# Patient Record
Sex: FEMALE | Race: WHITE | NOT HISPANIC OR LATINO | Employment: OTHER | URBAN - METROPOLITAN AREA
[De-identification: names, ages, dates, MRNs, and addresses within clinical notes are randomized per-mention and may not be internally consistent; named-entity substitution may affect disease eponyms.]

---

## 2017-01-05 ENCOUNTER — ALLSCRIPTS OFFICE VISIT (OUTPATIENT)
Dept: OTHER | Facility: OTHER | Age: 82
End: 2017-01-05

## 2017-01-05 DIAGNOSIS — M54.6 PAIN IN THORACIC SPINE: ICD-10-CM

## 2017-01-23 ENCOUNTER — APPOINTMENT (OUTPATIENT)
Dept: PHYSICAL THERAPY | Facility: CLINIC | Age: 82
End: 2017-01-23
Payer: COMMERCIAL

## 2017-01-23 ENCOUNTER — GENERIC CONVERSION - ENCOUNTER (OUTPATIENT)
Dept: OTHER | Facility: OTHER | Age: 82
End: 2017-01-23

## 2017-01-23 PROCEDURE — G0283 ELEC STIM OTHER THAN WOUND: HCPCS

## 2017-01-23 PROCEDURE — 97162 PT EVAL MOD COMPLEX 30 MIN: CPT

## 2017-01-23 PROCEDURE — 97014 ELECTRIC STIMULATION THERAPY: CPT

## 2017-01-26 ENCOUNTER — APPOINTMENT (OUTPATIENT)
Dept: PHYSICAL THERAPY | Facility: CLINIC | Age: 82
End: 2017-01-26
Payer: COMMERCIAL

## 2017-02-03 ENCOUNTER — ALLSCRIPTS OFFICE VISIT (OUTPATIENT)
Dept: OTHER | Facility: OTHER | Age: 82
End: 2017-02-03

## 2017-02-16 ENCOUNTER — HOSPITAL ENCOUNTER (OUTPATIENT)
Facility: HOSPITAL | Age: 82
Setting detail: OBSERVATION
Discharge: HOME | End: 2017-02-17
Attending: EMERGENCY MEDICINE | Admitting: INTERNAL MEDICINE
Payer: COMMERCIAL

## 2017-02-16 ENCOUNTER — APPOINTMENT (EMERGENCY)
Dept: RADIOLOGY | Facility: HOSPITAL | Age: 82
End: 2017-02-16
Payer: COMMERCIAL

## 2017-02-16 DIAGNOSIS — R07.9 CHEST PAIN IN ADULT: Primary | ICD-10-CM

## 2017-02-16 DIAGNOSIS — R07.89 ATYPICAL CHEST PAIN: ICD-10-CM

## 2017-02-16 LAB
ALBUMIN SERPL BCP-MCNC: 3.7 G/DL (ref 3.5–5)
ALP SERPL-CCNC: 93 U/L (ref 46–116)
ALT SERPL W P-5'-P-CCNC: 28 U/L (ref 12–78)
ANION GAP SERPL CALCULATED.3IONS-SCNC: 7 MMOL/L (ref 4–13)
AST SERPL W P-5'-P-CCNC: 26 U/L (ref 5–45)
BASOPHILS # BLD AUTO: 0.03 THOUSANDS/ΜL (ref 0–0.1)
BASOPHILS NFR BLD AUTO: 0 % (ref 0–1)
BILIRUB SERPL-MCNC: 0.5 MG/DL (ref 0.2–1)
BUN SERPL-MCNC: 14 MG/DL (ref 5–25)
CALCIUM SERPL-MCNC: 9.6 MG/DL (ref 8.3–10.1)
CHLORIDE SERPL-SCNC: 101 MMOL/L (ref 100–108)
CO2 SERPL-SCNC: 32 MMOL/L (ref 21–32)
CREAT SERPL-MCNC: 0.99 MG/DL (ref 0.6–1.3)
DEPRECATED D DIMER PPP: 613 NG/ML (FEU) (ref 0–424)
EOSINOPHIL # BLD AUTO: 0.11 THOUSAND/ΜL (ref 0–0.61)
EOSINOPHIL NFR BLD AUTO: 2 % (ref 0–6)
ERYTHROCYTE [DISTWIDTH] IN BLOOD BY AUTOMATED COUNT: 13.5 % (ref 11.6–15.1)
GFR SERPL CREATININE-BSD FRML MDRD: 53.3 ML/MIN/1.73SQ M
GLUCOSE SERPL-MCNC: 102 MG/DL (ref 65–140)
HCT VFR BLD AUTO: 40.8 % (ref 34.8–46.1)
HGB BLD-MCNC: 13.9 G/DL (ref 11.5–15.4)
LIPASE SERPL-CCNC: 101 U/L (ref 73–393)
LYMPHOCYTES # BLD AUTO: 2.68 THOUSANDS/ΜL (ref 0.6–4.47)
LYMPHOCYTES NFR BLD AUTO: 38 % (ref 14–44)
MAGNESIUM SERPL-MCNC: 2 MG/DL (ref 1.6–2.6)
MCH RBC QN AUTO: 33.4 PG (ref 26.8–34.3)
MCHC RBC AUTO-ENTMCNC: 34.1 G/DL (ref 31.4–37.4)
MCV RBC AUTO: 98 FL (ref 82–98)
MONOCYTES # BLD AUTO: 0.52 THOUSAND/ΜL (ref 0.17–1.22)
MONOCYTES NFR BLD AUTO: 7 % (ref 4–12)
NEUTROPHILS # BLD AUTO: 3.78 THOUSANDS/ΜL (ref 1.85–7.62)
NEUTS SEG NFR BLD AUTO: 53 % (ref 43–75)
PLATELET # BLD AUTO: 250 THOUSANDS/UL (ref 149–390)
PMV BLD AUTO: 10.8 FL (ref 8.9–12.7)
POTASSIUM SERPL-SCNC: 3.5 MMOL/L (ref 3.5–5.3)
PROT SERPL-MCNC: 7.6 G/DL (ref 6.4–8.2)
RBC # BLD AUTO: 4.16 MILLION/UL (ref 3.81–5.12)
SODIUM SERPL-SCNC: 140 MMOL/L (ref 136–145)
TROPONIN I SERPL-MCNC: <0.02 NG/ML
TROPONIN I SERPL-MCNC: <0.02 NG/ML
WBC # BLD AUTO: 7.12 THOUSAND/UL (ref 4.31–10.16)

## 2017-02-16 PROCEDURE — A9270 NON-COVERED ITEM OR SERVICE: HCPCS | Performed by: EMERGENCY MEDICINE

## 2017-02-16 PROCEDURE — 80053 COMPREHEN METABOLIC PANEL: CPT | Performed by: EMERGENCY MEDICINE

## 2017-02-16 PROCEDURE — 84484 ASSAY OF TROPONIN QUANT: CPT | Performed by: EMERGENCY MEDICINE

## 2017-02-16 PROCEDURE — 72070 X-RAY EXAM THORAC SPINE 2VWS: CPT

## 2017-02-16 PROCEDURE — 83690 ASSAY OF LIPASE: CPT | Performed by: EMERGENCY MEDICINE

## 2017-02-16 PROCEDURE — 93005 ELECTROCARDIOGRAM TRACING: CPT | Performed by: EMERGENCY MEDICINE

## 2017-02-16 PROCEDURE — 71020 HB CHEST X-RAY 2VW FRONTAL&LATL: CPT

## 2017-02-16 PROCEDURE — 85379 FIBRIN DEGRADATION QUANT: CPT | Performed by: EMERGENCY MEDICINE

## 2017-02-16 PROCEDURE — 96374 THER/PROPH/DIAG INJ IV PUSH: CPT

## 2017-02-16 PROCEDURE — 85025 COMPLETE CBC W/AUTO DIFF WBC: CPT | Performed by: EMERGENCY MEDICINE

## 2017-02-16 PROCEDURE — 83735 ASSAY OF MAGNESIUM: CPT | Performed by: EMERGENCY MEDICINE

## 2017-02-16 PROCEDURE — 36415 COLL VENOUS BLD VENIPUNCTURE: CPT | Performed by: EMERGENCY MEDICINE

## 2017-02-16 RX ORDER — ASPIRIN 81 MG/1
324 TABLET, CHEWABLE ORAL ONCE
Status: COMPLETED | OUTPATIENT
Start: 2017-02-16 | End: 2017-02-16

## 2017-02-16 RX ADMIN — NITROGLYCERIN 1 INCH: 20 OINTMENT TOPICAL at 21:48

## 2017-02-16 RX ADMIN — ASPIRIN 81 MG 324 MG: 81 TABLET ORAL at 21:47

## 2017-02-16 RX ADMIN — LIDOCAINE HYDROCHLORIDE 15 ML: 20 SOLUTION ORAL; TOPICAL at 19:40

## 2017-02-16 RX ADMIN — FAMOTIDINE 20 MG: 10 INJECTION, SOLUTION INTRAVENOUS at 19:41

## 2017-02-17 ENCOUNTER — APPOINTMENT (OUTPATIENT)
Dept: NON INVASIVE DIAGNOSTICS | Facility: HOSPITAL | Age: 82
End: 2017-02-17
Payer: COMMERCIAL

## 2017-02-17 VITALS
DIASTOLIC BLOOD PRESSURE: 63 MMHG | RESPIRATION RATE: 19 BRPM | HEART RATE: 96 BPM | OXYGEN SATURATION: 96 % | HEIGHT: 60 IN | SYSTOLIC BLOOD PRESSURE: 123 MMHG | TEMPERATURE: 99.5 F | WEIGHT: 143 LBS | BODY MASS INDEX: 28.07 KG/M2

## 2017-02-17 PROBLEM — K21.9 GERD (GASTROESOPHAGEAL REFLUX DISEASE): Chronic | Status: ACTIVE | Noted: 2017-02-17

## 2017-02-17 PROBLEM — R07.89 ATYPICAL CHEST PAIN: Status: ACTIVE | Noted: 2017-02-17

## 2017-02-17 PROBLEM — F41.9 ANXIETY: Chronic | Status: ACTIVE | Noted: 2017-02-17

## 2017-02-17 LAB
ANION GAP SERPL CALCULATED.3IONS-SCNC: 7 MMOL/L (ref 4–13)
ATRIAL RATE: 70 BPM
BUN SERPL-MCNC: 12 MG/DL (ref 5–25)
CALCIUM SERPL-MCNC: 9 MG/DL (ref 8.3–10.1)
CHLORIDE SERPL-SCNC: 104 MMOL/L (ref 100–108)
CO2 SERPL-SCNC: 30 MMOL/L (ref 21–32)
CREAT SERPL-MCNC: 0.84 MG/DL (ref 0.6–1.3)
ERYTHROCYTE [DISTWIDTH] IN BLOOD BY AUTOMATED COUNT: 13.4 % (ref 11.6–15.1)
GFR SERPL CREATININE-BSD FRML MDRD: >60 ML/MIN/1.73SQ M
GLUCOSE SERPL-MCNC: 104 MG/DL (ref 65–140)
HCT VFR BLD AUTO: 37.7 % (ref 34.8–46.1)
HGB BLD-MCNC: 12.4 G/DL (ref 11.5–15.4)
MCH RBC QN AUTO: 32.8 PG (ref 26.8–34.3)
MCHC RBC AUTO-ENTMCNC: 32.9 G/DL (ref 31.4–37.4)
MCV RBC AUTO: 100 FL (ref 82–98)
P AXIS: 37 DEGREES
PLATELET # BLD AUTO: 221 THOUSANDS/UL (ref 149–390)
PMV BLD AUTO: 10.2 FL (ref 8.9–12.7)
POTASSIUM SERPL-SCNC: 4 MMOL/L (ref 3.5–5.3)
PR INTERVAL: 170 MS
QRS AXIS: -35 DEGREES
QRSD INTERVAL: 90 MS
QT INTERVAL: 364 MS
QTC INTERVAL: 393 MS
RBC # BLD AUTO: 3.78 MILLION/UL (ref 3.81–5.12)
SODIUM SERPL-SCNC: 141 MMOL/L (ref 136–145)
T WAVE AXIS: 22 DEGREES
TROPONIN I SERPL-MCNC: <0.02 NG/ML
VENTRICULAR RATE: 70 BPM
WBC # BLD AUTO: 8.17 THOUSAND/UL (ref 4.31–10.16)

## 2017-02-17 PROCEDURE — 93306 TTE W/DOPPLER COMPLETE: CPT

## 2017-02-17 PROCEDURE — 80048 BASIC METABOLIC PNL TOTAL CA: CPT | Performed by: PHYSICIAN ASSISTANT

## 2017-02-17 PROCEDURE — 85027 COMPLETE CBC AUTOMATED: CPT | Performed by: PHYSICIAN ASSISTANT

## 2017-02-17 PROCEDURE — 99285 EMERGENCY DEPT VISIT HI MDM: CPT

## 2017-02-17 PROCEDURE — 84484 ASSAY OF TROPONIN QUANT: CPT | Performed by: PHYSICIAN ASSISTANT

## 2017-02-17 PROCEDURE — 36415 COLL VENOUS BLD VENIPUNCTURE: CPT | Performed by: PHYSICIAN ASSISTANT

## 2017-02-17 RX ORDER — PANTOPRAZOLE SODIUM 40 MG/1
40 TABLET, DELAYED RELEASE ORAL DAILY
Status: DISCONTINUED | OUTPATIENT
Start: 2017-02-17 | End: 2017-02-17 | Stop reason: HOSPADM

## 2017-02-17 RX ORDER — ACETAMINOPHEN 325 MG/1
650 TABLET ORAL EVERY 6 HOURS PRN
Status: DISCONTINUED | OUTPATIENT
Start: 2017-02-17 | End: 2017-02-17 | Stop reason: HOSPADM

## 2017-02-17 RX ORDER — PANTOPRAZOLE SODIUM 40 MG/1
40 TABLET, DELAYED RELEASE ORAL 2 TIMES DAILY
Qty: 60 TABLET | Refills: 0 | Status: SHIPPED | OUTPATIENT
Start: 2017-02-17 | End: 2017-02-18

## 2017-02-17 RX ORDER — MELATONIN
1000 DAILY
Status: DISCONTINUED | OUTPATIENT
Start: 2017-02-17 | End: 2017-02-17 | Stop reason: HOSPADM

## 2017-02-17 RX ORDER — ALPRAZOLAM 0.25 MG/1
0.25 TABLET ORAL 3 TIMES DAILY PRN
Status: DISCONTINUED | OUTPATIENT
Start: 2017-02-17 | End: 2017-02-17 | Stop reason: HOSPADM

## 2017-02-17 RX ORDER — ASPIRIN 81 MG/1
81 TABLET, CHEWABLE ORAL DAILY
Status: DISCONTINUED | OUTPATIENT
Start: 2017-02-17 | End: 2017-02-17 | Stop reason: HOSPADM

## 2017-02-18 ENCOUNTER — HOSPITAL ENCOUNTER (EMERGENCY)
Facility: HOSPITAL | Age: 82
Discharge: HOME | End: 2017-02-18
Attending: EMERGENCY MEDICINE | Admitting: EMERGENCY MEDICINE
Payer: COMMERCIAL

## 2017-02-18 VITALS
RESPIRATION RATE: 20 BRPM | SYSTOLIC BLOOD PRESSURE: 137 MMHG | BODY MASS INDEX: 28.54 KG/M2 | TEMPERATURE: 97.6 F | HEIGHT: 60 IN | OXYGEN SATURATION: 97 % | HEART RATE: 90 BPM | WEIGHT: 145.4 LBS | DIASTOLIC BLOOD PRESSURE: 70 MMHG

## 2017-02-18 DIAGNOSIS — R22.0 LIP SWELLING: ICD-10-CM

## 2017-02-18 DIAGNOSIS — L50.9 URTICARIA: Primary | ICD-10-CM

## 2017-02-18 PROCEDURE — 96372 THER/PROPH/DIAG INJ SC/IM: CPT

## 2017-02-18 PROCEDURE — 99283 EMERGENCY DEPT VISIT LOW MDM: CPT

## 2017-02-18 RX ORDER — DIPHENHYDRAMINE HCL 25 MG
25 TABLET ORAL ONCE
Status: COMPLETED | OUTPATIENT
Start: 2017-02-18 | End: 2017-02-18

## 2017-02-18 RX ADMIN — DIPHENHYDRAMINE HCL 25 MG: 25 TABLET ORAL at 15:27

## 2017-02-18 RX ADMIN — DEXAMETHASONE SODIUM PHOSPHATE 4.5 MG: 10 INJECTION INTRAMUSCULAR; INTRAVENOUS at 15:28

## 2017-02-23 ENCOUNTER — GENERIC CONVERSION - ENCOUNTER (OUTPATIENT)
Dept: OTHER | Facility: OTHER | Age: 82
End: 2017-02-23

## 2017-03-02 ENCOUNTER — ALLSCRIPTS OFFICE VISIT (OUTPATIENT)
Dept: OTHER | Facility: OTHER | Age: 82
End: 2017-03-02

## 2017-03-08 ENCOUNTER — LAB CONVERSION - ENCOUNTER (OUTPATIENT)
Dept: OTHER | Facility: OTHER | Age: 82
End: 2017-03-08

## 2017-03-08 ENCOUNTER — TRANSCRIBE ORDERS (OUTPATIENT)
Dept: LAB | Facility: MEDICAL CENTER | Age: 82
End: 2017-03-08

## 2017-03-08 ENCOUNTER — APPOINTMENT (OUTPATIENT)
Dept: LAB | Facility: MEDICAL CENTER | Age: 82
End: 2017-03-08
Payer: COMMERCIAL

## 2017-03-08 DIAGNOSIS — L50.9 URTICARIA, UNSPECIFIED: Primary | ICD-10-CM

## 2017-03-08 DIAGNOSIS — L50.9 URTICARIA, UNSPECIFIED: ICD-10-CM

## 2017-03-08 LAB
BACTERIA UR QL AUTO: ABNORMAL /HPF
BASOPHILS # BLD AUTO: 0.01 THOUSANDS/ΜL (ref 0–0.1)
BASOPHILS NFR BLD AUTO: 0 % (ref 0–1)
BILIRUB UR QL STRIP: NEGATIVE
CLARITY UR: CLEAR
COLOR UR: YELLOW
EOSINOPHIL # BLD AUTO: 0.07 THOUSAND/ΜL (ref 0–0.61)
EOSINOPHIL NFR BLD AUTO: 1 % (ref 0–6)
ERYTHROCYTE [DISTWIDTH] IN BLOOD BY AUTOMATED COUNT: 13.8 % (ref 11.6–15.1)
ERYTHROCYTE [SEDIMENTATION RATE] IN BLOOD: 33 MM/HOUR (ref 0–20)
GLUCOSE UR STRIP-MCNC: NEGATIVE MG/DL
HCT VFR BLD AUTO: 39.1 % (ref 34.8–46.1)
HGB BLD-MCNC: 12.7 G/DL (ref 11.5–15.4)
HGB UR QL STRIP.AUTO: ABNORMAL
HYALINE CASTS #/AREA URNS LPF: ABNORMAL /LPF
KETONES UR STRIP-MCNC: NEGATIVE MG/DL
LEUKOCYTE ESTERASE UR QL STRIP: NEGATIVE
LYMPHOCYTES # BLD AUTO: 2.43 THOUSANDS/ΜL (ref 0.6–4.47)
LYMPHOCYTES NFR BLD AUTO: 27 % (ref 14–44)
MCH RBC QN AUTO: 32.4 PG (ref 26.8–34.3)
MCHC RBC AUTO-ENTMCNC: 32.5 G/DL (ref 31.4–37.4)
MCV RBC AUTO: 100 FL (ref 82–98)
MONOCYTES # BLD AUTO: 0.53 THOUSAND/ΜL (ref 0.17–1.22)
MONOCYTES NFR BLD AUTO: 6 % (ref 4–12)
NEUTROPHILS # BLD AUTO: 5.94 THOUSANDS/ΜL (ref 1.85–7.62)
NEUTS SEG NFR BLD AUTO: 66 % (ref 43–75)
NITRITE UR QL STRIP: NEGATIVE
NON-SQ EPI CELLS URNS QL MICRO: ABNORMAL /HPF
NRBC BLD AUTO-RTO: 0 /100 WBCS
PH UR STRIP.AUTO: 6.5 [PH] (ref 4.5–8)
PLATELET # BLD AUTO: 287 THOUSANDS/UL (ref 149–390)
PMV BLD AUTO: 11.3 FL (ref 8.9–12.7)
PROT UR STRIP-MCNC: NEGATIVE MG/DL
RBC # BLD AUTO: 3.92 MILLION/UL (ref 3.81–5.12)
RBC #/AREA URNS AUTO: ABNORMAL /HPF
SP GR UR STRIP.AUTO: 1.01 (ref 1–1.03)
UROBILINOGEN UR QL STRIP.AUTO: 0.2 E.U./DL
WBC # BLD AUTO: 9 THOUSAND/UL (ref 4.31–10.16)
WBC #/AREA URNS AUTO: ABNORMAL /HPF

## 2017-03-08 PROCEDURE — 86592 SYPHILIS TEST NON-TREP QUAL: CPT

## 2017-03-08 PROCEDURE — 85652 RBC SED RATE AUTOMATED: CPT

## 2017-03-08 PROCEDURE — 86038 ANTINUCLEAR ANTIBODIES: CPT

## 2017-03-08 PROCEDURE — 81001 URINALYSIS AUTO W/SCOPE: CPT

## 2017-03-08 PROCEDURE — 85025 COMPLETE CBC W/AUTO DIFF WBC: CPT

## 2017-03-08 PROCEDURE — 36415 COLL VENOUS BLD VENIPUNCTURE: CPT

## 2017-03-09 LAB — RPR SER QL: NORMAL

## 2017-03-10 LAB — RYE IGE QN: NEGATIVE

## 2017-03-22 ENCOUNTER — GENERIC CONVERSION - ENCOUNTER (OUTPATIENT)
Dept: OTHER | Facility: OTHER | Age: 82
End: 2017-03-22

## 2017-03-23 ENCOUNTER — GENERIC CONVERSION - ENCOUNTER (OUTPATIENT)
Dept: OTHER | Facility: OTHER | Age: 82
End: 2017-03-23

## 2017-03-27 ENCOUNTER — GENERIC CONVERSION - ENCOUNTER (OUTPATIENT)
Dept: OTHER | Facility: OTHER | Age: 82
End: 2017-03-27

## 2017-04-05 ENCOUNTER — GENERIC CONVERSION - ENCOUNTER (OUTPATIENT)
Dept: OTHER | Facility: OTHER | Age: 82
End: 2017-04-05

## 2017-04-10 ENCOUNTER — GENERIC CONVERSION - ENCOUNTER (OUTPATIENT)
Dept: OTHER | Facility: OTHER | Age: 82
End: 2017-04-10

## 2017-05-03 ENCOUNTER — GENERIC CONVERSION - ENCOUNTER (OUTPATIENT)
Dept: OTHER | Facility: OTHER | Age: 82
End: 2017-05-03

## 2017-05-10 ENCOUNTER — GENERIC CONVERSION - ENCOUNTER (OUTPATIENT)
Dept: OTHER | Facility: OTHER | Age: 82
End: 2017-05-10

## 2017-06-07 ENCOUNTER — GENERIC CONVERSION - ENCOUNTER (OUTPATIENT)
Dept: OTHER | Facility: OTHER | Age: 82
End: 2017-06-07

## 2017-07-12 ENCOUNTER — GENERIC CONVERSION - ENCOUNTER (OUTPATIENT)
Dept: OTHER | Facility: OTHER | Age: 82
End: 2017-07-12

## 2017-07-25 ENCOUNTER — GENERIC CONVERSION - ENCOUNTER (OUTPATIENT)
Dept: OTHER | Facility: OTHER | Age: 82
End: 2017-07-25

## 2017-07-26 ENCOUNTER — GENERIC CONVERSION - ENCOUNTER (OUTPATIENT)
Dept: OTHER | Facility: OTHER | Age: 82
End: 2017-07-26

## 2017-08-01 ENCOUNTER — TRANSCRIBE ORDERS (OUTPATIENT)
Dept: LAB | Facility: MEDICAL CENTER | Age: 82
End: 2017-08-01

## 2017-08-01 ENCOUNTER — APPOINTMENT (OUTPATIENT)
Dept: LAB | Facility: MEDICAL CENTER | Age: 82
End: 2017-08-01
Payer: COMMERCIAL

## 2017-08-01 DIAGNOSIS — L50.1 IDIOPATHIC URTICARIA: Primary | ICD-10-CM

## 2017-08-01 LAB
ALBUMIN SERPL BCP-MCNC: 3.6 G/DL (ref 3.5–5)
ALP SERPL-CCNC: 86 U/L (ref 46–116)
ALT SERPL W P-5'-P-CCNC: 16 U/L (ref 12–78)
ANION GAP SERPL CALCULATED.3IONS-SCNC: 7 MMOL/L (ref 4–13)
AST SERPL W P-5'-P-CCNC: 17 U/L (ref 5–45)
BASOPHILS # BLD AUTO: 0.03 THOUSANDS/ΜL (ref 0–0.1)
BASOPHILS NFR BLD AUTO: 0 % (ref 0–1)
BILIRUB SERPL-MCNC: 0.6 MG/DL (ref 0.2–1)
BUN SERPL-MCNC: 13 MG/DL (ref 5–25)
CALCIUM SERPL-MCNC: 9.5 MG/DL (ref 8.3–10.1)
CHLORIDE SERPL-SCNC: 105 MMOL/L (ref 100–108)
CO2 SERPL-SCNC: 26 MMOL/L (ref 21–32)
CREAT SERPL-MCNC: 0.78 MG/DL (ref 0.6–1.3)
EOSINOPHIL # BLD AUTO: 0.06 THOUSAND/ΜL (ref 0–0.61)
EOSINOPHIL NFR BLD AUTO: 1 % (ref 0–6)
ERYTHROCYTE [DISTWIDTH] IN BLOOD BY AUTOMATED COUNT: 14.5 % (ref 11.6–15.1)
GFR SERPL CREATININE-BSD FRML MDRD: 69 ML/MIN/1.73SQ M
GLUCOSE SERPL-MCNC: 109 MG/DL (ref 65–140)
HCT VFR BLD AUTO: 39.5 % (ref 34.8–46.1)
HGB BLD-MCNC: 13 G/DL (ref 11.5–15.4)
LYMPHOCYTES # BLD AUTO: 2.12 THOUSANDS/ΜL (ref 0.6–4.47)
LYMPHOCYTES NFR BLD AUTO: 30 % (ref 14–44)
MCH RBC QN AUTO: 32.3 PG (ref 26.8–34.3)
MCHC RBC AUTO-ENTMCNC: 32.9 G/DL (ref 31.4–37.4)
MCV RBC AUTO: 98 FL (ref 82–98)
MONOCYTES # BLD AUTO: 0.7 THOUSAND/ΜL (ref 0.17–1.22)
MONOCYTES NFR BLD AUTO: 10 % (ref 4–12)
NEUTROPHILS # BLD AUTO: 4.04 THOUSANDS/ΜL (ref 1.85–7.62)
NEUTS SEG NFR BLD AUTO: 59 % (ref 43–75)
NRBC BLD AUTO-RTO: 0 /100 WBCS
PLATELET # BLD AUTO: 231 THOUSANDS/UL (ref 149–390)
PMV BLD AUTO: 11.9 FL (ref 8.9–12.7)
POTASSIUM SERPL-SCNC: 3.8 MMOL/L (ref 3.5–5.3)
PROT SERPL-MCNC: 7.2 G/DL (ref 6.4–8.2)
RBC # BLD AUTO: 4.02 MILLION/UL (ref 3.81–5.12)
SODIUM SERPL-SCNC: 138 MMOL/L (ref 136–145)
T4 SERPL-MCNC: 7.5 UG/DL (ref 4.7–13.3)
TSH SERPL DL<=0.05 MIU/L-ACNC: 1.52 UIU/ML (ref 0.36–3.74)
WBC # BLD AUTO: 6.97 THOUSAND/UL (ref 4.31–10.16)

## 2017-08-01 PROCEDURE — 84436 ASSAY OF TOTAL THYROXINE: CPT

## 2017-08-01 PROCEDURE — 36415 COLL VENOUS BLD VENIPUNCTURE: CPT

## 2017-08-01 PROCEDURE — 85025 COMPLETE CBC W/AUTO DIFF WBC: CPT

## 2017-08-01 PROCEDURE — 84443 ASSAY THYROID STIM HORMONE: CPT

## 2017-08-01 PROCEDURE — 86618 LYME DISEASE ANTIBODY: CPT

## 2017-08-01 PROCEDURE — 80053 COMPREHEN METABOLIC PANEL: CPT

## 2017-08-01 PROCEDURE — 86677 HELICOBACTER PYLORI ANTIBODY: CPT

## 2017-08-02 LAB
B BURGDOR IGG SER IA-ACNC: 0.13
B BURGDOR IGM SER IA-ACNC: 0.23
H PYLORI IGG SER IA-ACNC: 1.1 U/ML (ref 0–0.8)

## 2017-08-10 ENCOUNTER — GENERIC CONVERSION - ENCOUNTER (OUTPATIENT)
Dept: OTHER | Facility: OTHER | Age: 82
End: 2017-08-10

## 2017-08-15 ENCOUNTER — ALLSCRIPTS OFFICE VISIT (OUTPATIENT)
Dept: OTHER | Facility: OTHER | Age: 82
End: 2017-08-15

## 2017-09-06 ENCOUNTER — GENERIC CONVERSION - ENCOUNTER (OUTPATIENT)
Dept: OTHER | Facility: OTHER | Age: 82
End: 2017-09-06

## 2017-09-08 ENCOUNTER — APPOINTMENT (OUTPATIENT)
Dept: LAB | Facility: MEDICAL CENTER | Age: 82
End: 2017-09-08
Payer: COMMERCIAL

## 2017-09-08 ENCOUNTER — TRANSCRIBE ORDERS (OUTPATIENT)
Dept: LAB | Facility: MEDICAL CENTER | Age: 82
End: 2017-09-08

## 2017-09-08 DIAGNOSIS — R10.816 EPIGASTRIC ABDOMINAL TENDERNESS, REBOUND TENDERNESS PRESENCE NOT SPECIFIED: Primary | ICD-10-CM

## 2017-09-08 DIAGNOSIS — R76.8 POSITIVE HELICOBACTER PYLORI SEROLOGY: ICD-10-CM

## 2017-09-08 DIAGNOSIS — R10.816 EPIGASTRIC ABDOMINAL TENDERNESS, REBOUND TENDERNESS PRESENCE NOT SPECIFIED: ICD-10-CM

## 2017-09-08 DIAGNOSIS — E73.9 LACTOSE INTOLERANCE: ICD-10-CM

## 2017-09-08 DIAGNOSIS — K21.9 GASTRO-ESOPHAGEAL REFLUX DISEASE WITHOUT ESOPHAGITIS: ICD-10-CM

## 2017-09-08 PROCEDURE — 87338 HPYLORI STOOL AG IA: CPT

## 2017-09-09 LAB — H PYLORI AG STL QL IA: NEGATIVE

## 2017-09-13 ENCOUNTER — GENERIC CONVERSION - ENCOUNTER (OUTPATIENT)
Dept: OTHER | Facility: OTHER | Age: 82
End: 2017-09-13

## 2017-10-27 ENCOUNTER — ALLSCRIPTS OFFICE VISIT (OUTPATIENT)
Dept: OTHER | Facility: OTHER | Age: 82
End: 2017-10-27

## 2017-10-28 NOTE — PROGRESS NOTES
Assessment  1  No advance directives (V49 89) (Z78 9)   2  Former smoker (V15 82) (Q36 233)   3  Preoperative clearance (V72 84) (Z01 818)   4  Macular cyst, hole, or pseudohole, left eye (362 54) (H35 342)   5  Benign essential hypertension (401 1) (I10)   6  Irritable bowel syndrome (564 1) (K58 9)    Plan  Benign essential hypertension    · Losartan Potassium 25 MG Oral Tablet; TAKE 1 TABLET DAILY AS DIRECTED  Need for influenza vaccination    · Stop: Fluzone High-Dose 0 5 ML Intramuscular Suspension Prefilled  Syringe  Preoperative clearance    · EKG/ECG- POC; Status:Complete - Retrospective By Protocol Authorization;   Done:  [id number] 11:20AM    Discussion/Summary  Surgical Clearance: She is at a LOW risk from a cardiovascular standpoint at this time without any additional cardiac testing  Reevaluation needed, if she should present with symptoms prior to surgery/procedure  History of Present Illness  Pre-Op Visit (Brief): The patient is being seen for a preoperative visit  The procedure is a(n) vitreotomy left eye scheduled for November 2, 2017 with [facility]  The indication for surgery is  vitreous with the retinal buccal left eye  Surgical Risk Assessment:   Prior Anesthesia: She had prior anesthesia,-- no prior adverse reaction to edidural anesthesia,-- no prior adverse reaction to spinal anesthesia-- and-- no prior adverse reaction to general anesthesia  Pertinent Past Medical History: no pertinent past medical history  Exercise Capacity: able to walk four blocks without symptoms-- and-- able to walk two flights of stairs without symptoms  Lifestyle Factors: denies alcohol use, denies tobacco use and denies illegal drug use  Symptoms: no symptoms  STOP questionnaire score is 1  Other QUIQUE risk factors include age over 48, but-- normal BMI,-- female gender-- and-- normal neck circumference  Predicted risk of QUIQUE: None  Pertinent Family History: no pertinent family history     Living Situation: home is secure and supportive and no post-op concerns with her living situation  HPI: Patient presents for medical clearance for a left atriotomy      Review of Systems    Constitutional: No fever, no chills, feels well, no tiredness, no recent weight gain or weight loss  Eyes: as noted in HPI    ENT: no complaints of earache, no loss of hearing, no nose bleeds, no nasal discharge, no sore throat, no hoarseness  Cardiovascular: No complaints of slow heart rate, no fast heart rate, no chest pain, no palpitations, no leg claudication, no lower extremity edema  Respiratory: No complaints of shortness of breath, no wheezing, no cough, no SOB on exertion, no orthopnea, no PND  Gastrointestinal: No complaints of abdominal pain, no constipation, no nausea or vomiting, no diarrhea, no bloody stools  Genitourinary: No complaints of dysuria, no incontinence, no pelvic pain, no dysmenorrhea, no vaginal discharge or bleeding  Musculoskeletal: No complaints of arthralgias, no myalgias, no joint swelling or stiffness, no limb pain or swelling  Integumentary: No complaints of skin rash or lesions, no itching, no skin wounds, no breast pain or lump  Neurological: No complaints of headache, no confusion, no convulsions, no numbness, no dizziness or fainting, no tingling, no limb weakness, no difficulty walking  Psychiatric: Not suicidal, no sleep disturbance, no anxiety or depression, no change in personality, no emotional problems  Endocrine: No complaints of proptosis, no hot flashes, no muscle weakness, no deepening of the voice, no feelings of weakness  Hematologic/Lymphatic: No complaints of swollen glands, no swollen glands in the neck, does not bleed easily, does not bruise easily  ROS reviewed  Active Problems  1  Advanced directives, counseling/discussion (Q57 49) (Z71 89)   2  Allergic contact dermatitis due to cosmetics (482 81) (L23 2)   3  Anxiety (300 00) (F41 9)   4   Arthritis (716 90) (M19 90)   5  Carpal tunnel syndrome, unspecified laterality (354 0) (G56 00)   6  Chronic bilateral thoracic back pain (724 1,338 29) (M54 6,G89 29)   7  Chronic idiopathic urticaria (708 1) (L50 1)   8  Chronic urticaria (708 8) (L50 8)   9  Diverticulosis (562 10) (K57 90)   10  Encounter for screening mammogram for breast cancer (V76 12) (Z12 31)   11  Encounter for special screening examination for nervous system disorder (V80 09)    (Z13 858)   12  Esophageal reflux (530 81) (K21 9)   13  Exercise counseling (V65 41) (Z71 82)   14  Exertional dyspnea (786 09) (R06 09)   15  Heart palpitations (785 1) (R00 2)   16  History of food allergy (V15 05) (Z91 018)   17  Irritable bowel syndrome (564 1) (K58 9)   18  Lactose intolerance (271 3) (E73 9)   19  Need for influenza vaccination (V04 81) (Z23)   20  Osteoporosis (733 00) (M81 0)   21  Preoperative clearance (V72 84) (Z01 818)   22  Screening for neurological condition (V80 09) (Z13 89)   23  Venous insufficiency (459 81) (I87 2)    Past Medical History   · History of Abdominal bloating (787 3) (R14 0)   · History of Abdominal pain (789 00) (R10 9)   · History of Abdominal pain of multiple sites (789 09) (R10 9)   · History of Acute recurrent maxillary sinusitis (461 0) (J01 01)   · History of Cellulitis (682 9) (L03 90)   · History of Colon cancer screening (V76 51) (Z12 11)   · History of Dermatitis (692 9) (L30 9)   · History of Screening for depression (V79 0) (Z13 89)   · History of Screening for genitourinary condition (V81 6) (Z13 89)   · History of Screening for glaucoma (V80 1) (Z13 5)   · History of Significant closed head trauma within past 3 months (V15 52) (E45 806)   · History of Special screening for other neurological conditions (V80 09) (Z13 89)    The active problems and past medical history were reviewed and updated today        Surgical History   · History of Cholecystectomy   · History of Hemorrhoidectomy   · History of Hysterectomy   · History of Tonsillectomy With Adenoidectomy    The surgical history was reviewed and updated today  Family History  Mother    · Family history of colon cancer (V16 0) (Z80 0)  Maternal Uncle    · Family history of Gastric Cancer (151 9)    The family history was reviewed and updated today  Social History   · Former smoker (D39 65) (W02 156)   · Never Drank Alcohol   · No advance directives (V49 89) (Z78 9)   · No living will  The social history was reviewed and updated today  The social history was reviewed and is unchanged  Current Meds   1  ALPRAZolam 0 25 MG Oral Tablet Disintegrating; One tablet at 9 AM, 2 PM and 8 PM;   Therapy: 78FKR3316 to (Last Rx:43Kmk3321) Ordered   2  Citracal Maximum TABS; 1 TABLET Bid Recorded   3  Claritin 10 MG Oral Capsule; Therapy: (Recorded:06Vvm3979) to Recorded   4  [employer] Vitamin E 400 UNIT Oral Capsule; 1 PO QD Recorded   5  Multivitamins Oral Capsule; TAKE 1 CAPSULE Daily Recorded   6  Tylenol Extra Strength 500 MG Oral Tablet; takes one daily Recorded   7  Vitamin D3 1000 UNIT Oral Capsule; 1 PO BID Recorded   8  Zantac 150 MG CAPS; TAKE 1 CAPSULE TWICE DAILY; Therapy: (NPVIQBVO:58FIE0491) to Recorded   9  ZyrTEC Allergy 10 MG Oral Capsule; Therapy: (Recorded:33Geb2852) to Recorded    The medication list was reviewed and updated today  Allergies  1  Cantil TABS   2  Escitalopram Oxalate TABS   3  Pantoprazole Sodium TBEC    Vitals   Recorded: 58YEC0469 11:02AM   Temperature 97 3 F   Heart Rate 86   Systolic 857   Diastolic 92   Height 5 ft    Weight 147 lb 3 2 oz   BMI Calculated 28 75   BSA Calculated 1 64   O2 Saturation 97     Physical Exam    Constitutional   General appearance: No acute distress, well appearing and well nourished  Eyes   Conjunctiva and lids: No swelling, erythema or discharge  Pupils and irises: Equal, round and reactive to light      Ears, Nose, Mouth, and Throat   External inspection of ears and nose: Normal     Otoscopic examination: Tympanic membranes translucent with normal light reflex  Canals patent without erythema  Oropharynx: Normal with no erythema, edema, exudate or lesions  Pulmonary   Respiratory effort: No increased work of breathing or signs of respiratory distress  Auscultation of lungs: Clear to auscultation  Cardiovascular   Palpation of heart: Normal PMI, no thrills  Auscultation of heart: Normal rate and rhythm, normal S1 and S2, without murmurs  Examination of extremities for edema and/or varicosities: Normal     Abdomen   Abdomen: Non-tender, no masses  Liver and spleen: No hepatomegaly or splenomegaly  Lymphatic   Palpation of lymph nodes in neck: No lymphadenopathy  Musculoskeletal   Gait and station: Normal     Digits and nails: Normal without clubbing or cyanosis  Inspection/palpation of joints, bones, and muscles: Normal     Skin   Skin and subcutaneous tissue: Normal without rashes or lesions  Neurologic   Cranial nerves: Cranial nerves 2-12 intact  Reflexes: 2+ and symmetric  Sensation: No sensory loss  Psychiatric   Orientation to person, place, and time: Normal     Mood and affect: Normal        Results/Data  EKG/ECG- POC 27Oct2017 11:20AM Ty Yo     Test Name Result Flag Reference   EKG/ECG      See Tracing for results       End of Encounter Meds  1  ALPRAZolam 0 25 MG Oral Tablet Disintegrating; One tablet at 9 AM, 2 PM and 8 PM;   Therapy: 87ULC3519 to (Last Rx:14Rku5220) Ordered  2  Losartan Potassium 25 MG Oral Tablet; TAKE 1 TABLET DAILY AS DIRECTED; Therapy: 35NAR2161 to (Last Rx:27Oct2017) Ordered  3  Citracal Maximum TABS; 1 TABLET Bid Recorded   4  Claritin 10 MG Oral Capsule; Therapy: (Recorded:84Rpa8267) to Recorded   5  [employer] Vitamin E 400 UNIT Oral Capsule; 1 PO QD Recorded   6  Multivitamins Oral Capsule; TAKE 1 CAPSULE Daily Recorded   7  Tylenol Extra Strength 500 MG Oral Tablet; takes one daily Recorded   8   Vitamin D3 1000 UNIT Oral Capsule; 1 PO BID Recorded   9  Zantac 150 MG CAPS (RaNITidine HCl); TAKE 1 CAPSULE TWICE DAILY; Therapy: (XQQONZUT:97FZG4959) to Recorded   10  ZyrTEC Allergy 10 MG Oral Capsule;     Therapy: (Recorded:69Tvc6572) to Recorded    Future Appointments    Date/Time Provider Specialty Site   11/15/2017 02:00 PM Ty Ram DO Family Medicine [address] [place] [address]   Electronically signed by : Bhargav Ortez DO; Oct 27 2017 11:54AM EST                       (Author)

## 2017-10-30 ENCOUNTER — GENERIC CONVERSION - ENCOUNTER (OUTPATIENT)
Dept: OTHER | Facility: OTHER | Age: 82
End: 2017-10-30

## 2017-12-06 ENCOUNTER — GENERIC CONVERSION - ENCOUNTER (OUTPATIENT)
Dept: FAMILY MEDICINE CLINIC | Facility: CLINIC | Age: 82
End: 2017-12-06

## 2017-12-13 ENCOUNTER — GENERIC CONVERSION - ENCOUNTER (OUTPATIENT)
Dept: FAMILY MEDICINE CLINIC | Facility: CLINIC | Age: 82
End: 2017-12-13

## 2018-01-10 NOTE — RESULT NOTES
Verified Results  * XR CHEST PA & LATERAL [id number] 08:50AM Casimiro Landa Order Number: [id number]     Test Name Result Flag Reference   XR CHEST PA & LATERAL (Report)     CHEST     INDICATION: Palpitations, chest pain, anxiety   COMPARISON: 7/2/2013 chest x-ray     VIEWS: Frontal and lateral projections; 2 images     FINDINGS:     The lungs are clear  No pleural effusions  The cardiomediastinal silhouette is unremarkable     S shaped scoliosis     Findings are stable       IMPRESSION:     No acute cardiopulmonary disease, stable        Workstation performed: [id number]     Signed by:   Elza Ferreira MD   3/11/16

## 2018-01-11 NOTE — RESULT NOTES
Verified Results  (1) T3 TOTAL [id number] 09:02AM Valentino Frannie    Order Number: [id number]     Order Number: [id number]     Test Name Result Flag Reference   T3 1 0 ng/mL  0 6-1 8     (1) T4, FREE [id number] 09:02AM [email or web address] Order Number: [id number]     Test Name Result Flag Reference   T4,FREE 0 99 ng/dL  0 76-1 46     (1) TSH 49QRM6559 09:02AM [email or web address] Order Number: [id number]    Patients undergoing fluorescein dye angiography may retain small amounts of fluorescein in the body for 48-72 hours post procedure  Samples containing fluorescein can produce falsely depressed TSH values  If the patient had this procedure,a specimen should be resubmitted post fluorescein clearance  The recommended reference ranges for TSH during pregnancy are as follows:  First trimester 0 1 to 2 5 uIU/mL  Second trimester  0 2 to 3 0 uIU/mL  Third trimester 0 3 to 3 0 uIU/m     Test Name Result Flag Reference   TSH 1 672 uIU/mL  0 358-3 740     (1) CBC/PLT/DIFF [id number] 09:02AM Valentino Frannie    Order Number: [id number]     Order Number: [id number]     Test Name Result Flag Reference   WBC COUNT 4 83 Thousand/uL  4 31-10 16   RBC COUNT 4 46 Million/uL  3 81-5 12   HEMOGLOBIN 14 4 g/dL  11 5-15 4   HEMATOCRIT 44 0 %  34 8-46  1   MCV 99 fL H 82-98   MCH 32 3 pg  26 8-34 3   MCHC 32 7 g/dL  31 4-37 4   RDW 14 0 %  11 6-15 1   MPV 11 4 fL  8 9-12 7   PLATELET COUNT 028 Thousands/uL  149-390   NEUTROPHILS RELATIVE PERCENT 55 %  43-75   LYMPHOCYTES RELATIVE PERCENT 35 %  14-44   MONOCYTES RELATIVE PERCENT 8 %  4-12   EOSINOPHILS RELATIVE PERCENT 1 %  0-6   BASOPHILS RELATIVE PERCENT 1 %  0-1   NEUTROPHILS ABSOLUTE COUNT 2 68 Thousands/µL  1 85-7 62   LYMPHOCYTES ABSOLUTE COUNT 1 70 Thousands/µL  0 60-4 47   MONOCYTES ABSOLUTE COUNT 0 37 Thousand/µL  0 17-1 22   EOSINOPHILS ABSOLUTE COUNT 0 04 Thousand/µL  0 00-0 61   BASOPHILS ABSOLUTE COUNT 0 04 Thousands/µL  0 00-0 10     (1) COMPREHENSIVE METABOLIC PANEL [id number] 09:02AM Lamonte Prince   TW Order Number: [id number]      National Kidney Disease Education Program recommendations are as follows:  GFR calculation is accurate only with a steady state creatinine  Chronic Kidney disease less than 60 ml/min/1 73 sq  meters  Kidney failure less than 15 ml/min/1 73 sq  meters  Test Name Result Flag Reference   GLUCOSE,RANDM 93 mg/dL     If the patient is fasting, the ADA then defines impaired fasting glucose as > 100 mg/dL and diabetes as > or equal to 123 mg/dL     SODIUM 144 mmol/L  136-145   POTASSIUM 4 2 mmol/L  3 5-5 3   CHLORIDE 106 mmol/L  100-108   CARBON DIOXIDE 27 mmol/L  21-32   ANION GAP (CALC) 11 mmol/L  4-13   BLOOD UREA NITROGEN 15 mg/dL  5-25   CREATININE 0 97 mg/dL  0 60-1 30   Standardized to IDMS reference method   CALCIUM 9 6 mg/dL  8 3-10 1   BILI, TOTAL 0 69 mg/dL  0 20-1 00   ALK PHOSPHATAS 80 U/L     ALT (SGPT) 27 U/L  12-78   AST(SGOT) 28 U/L  5-45   ALBUMIN 4 1 g/dL  3 5-5 0   TOTAL PROTEIN 7 3 g/dL  6 4-8 2   eGFR Non-African American 54 7 ml/min/1 73sq m       (1) VITAMIN D 25-HYDROXY [id number] 09:02AM Lamonte Chew   TW Order Number: [id number]    TW Order Number: [id number]     Test Name Result Flag Reference   VIT D 25-HYDROX 50 3 ng/mL  30 0-100 0

## 2018-01-12 VITALS — SYSTOLIC BLOOD PRESSURE: 138 MMHG | DIASTOLIC BLOOD PRESSURE: 74 MMHG

## 2018-01-12 NOTE — RESULT NOTES
Verified Results  (1) CBC/PLT/DIFF 21Oct2016 08:31AM [employer]    Order Number: [id number]     Test Name Result Flag Reference   WBC COUNT 5 29 Thousand/uL  4 31-10 16   RBC COUNT 4 45 Million/uL  3 81-5 12   HEMOGLOBIN 14 7 g/dL  11 5-15 4   HEMATOCRIT 43 5 %  34 8-46  1   MCV 98 fL  82-98   MCH 33 0 pg  26 8-34 3   MCHC 33 8 g/dL  31 4-37 4   RDW 14 1 %  11 6-15 1   MPV 12 1 fL  8 9-12 7   PLATELET COUNT 773 Thousands/uL  149-390   nRBC AUTOMATED 0 /100 WBCs     NEUTROPHILS RELATIVE PERCENT 48 %  43-75   LYMPHOCYTES RELATIVE PERCENT 42 %  14-44   MONOCYTES RELATIVE PERCENT 8 %  4-12   EOSINOPHILS RELATIVE PERCENT 1 %  0-6   BASOPHILS RELATIVE PERCENT 1 %  0-1   NEUTROPHILS ABSOLUTE COUNT 2 54 Thousands/?L  1 85-7 62   LYMPHOCYTES ABSOLUTE COUNT 2 24 Thousands/?L  0 60-4 47   MONOCYTES ABSOLUTE COUNT 0 42 Thousand/?L  0 17-1 22   EOSINOPHILS ABSOLUTE COUNT 0 05 Thousand/?L  0 00-0 61   BASOPHILS ABSOLUTE COUNT 0 03 Thousands/?L  0 00-0 10   - Patient Instructions: This bloodwork is non-fasting  Please drink two glasses of water morning of bloodwork  - Patient Instructions: This bloodwork is non-fasting  Please drink two glasses of water morning of bloodwork  (1) COMPREHENSIVE METABOLIC PANEL [id number] 06:95SI [employer]    Order Number: [id number]     Test Name Result Flag Reference   GLUCOSE,RANDM 102 mg/dL     If the patient is fasting, the ADA then defines impaired fasting glucose as > 100 mg/dL and diabetes as > or equal to 123 mg/dL     SODIUM 140 mmol/L  136-145   POTASSIUM 3 7 mmol/L  3 5-5 3   CHLORIDE 107 mmol/L  100-108   CARBON DIOXIDE 25 mmol/L  21-32   ANION GAP (CALC) 8 mmol/L  4-13   BLOOD UREA NITROGEN 11 mg/dL  5-25   CREATININE 0 96 mg/dL  0 60-1 30   Standardized to IDMS reference method   CALCIUM 9 5 mg/dL  8 3-10 1   BILI, TOTAL 0 88 mg/dL  0 20-1 00   ALK PHOSPHATAS 71 U/L     ALT (SGPT) 24 U/L  12-78   AST(SGOT) 15 U/L  5-45   ALBUMIN 3 9 g/dL 3 5-5 0   TOTAL PROTEIN 7 6 g/dL  6 4-8 2   eGFR Non-African American 55 2 ml/min/1 73sq m     - Patient Instructions: This bloodwork is non-fasting  Please drink two glasses of water morning of bloodwork  National Kidney Disease Education Program recommendations are as follows:  GFR calculation is accurate only with a steady state creatinine  Chronic Kidney disease less than 60 ml/min/1 73 sq  meters  Kidney failure less than 15 ml/min/1 73 sq  meters  (1) TSH [id number] 08:31AM Ty Yo    Order Number: [id number]     Test Name Result Flag Reference   TSH 1 620 uIU/mL  0 358-3 740   - Patient Instructions: This bloodwork is non-fasting  Please drink two glasses of water morning of bloodwork  - Patient Instructions: This bloodwork is non-fasting  Please drink two glasses of water morning of bloodwork  Patients undergoing fluorescein dye angiography may retain small amounts of fluorescein in the body for 48-72 hours post procedure  Samples containing fluorescein can produce falsely depressed TSH values  If the patient had this procedure,a specimen should be resubmitted post fluorescein clearance            The recommended reference ranges for TSH during pregnancy are as follows:  First trimester 0 1 to 2 5 uIU/mL  Second trimester  0 2 to 3 0 uIU/mL  Third trimester 0 3 to 3 0 uIU/m

## 2018-01-13 VITALS
DIASTOLIC BLOOD PRESSURE: 84 MMHG | BODY MASS INDEX: 28.27 KG/M2 | HEIGHT: 60 IN | WEIGHT: 144 LBS | SYSTOLIC BLOOD PRESSURE: 144 MMHG

## 2018-01-13 VITALS
WEIGHT: 147.2 LBS | HEART RATE: 86 BPM | DIASTOLIC BLOOD PRESSURE: 92 MMHG | TEMPERATURE: 97.3 F | SYSTOLIC BLOOD PRESSURE: 158 MMHG | BODY MASS INDEX: 28.9 KG/M2 | HEIGHT: 60 IN | OXYGEN SATURATION: 97 %

## 2018-01-13 VITALS — DIASTOLIC BLOOD PRESSURE: 68 MMHG | SYSTOLIC BLOOD PRESSURE: 130 MMHG

## 2018-01-14 VITALS
BODY MASS INDEX: 28.57 KG/M2 | WEIGHT: 145.5 LBS | SYSTOLIC BLOOD PRESSURE: 138 MMHG | HEIGHT: 60 IN | DIASTOLIC BLOOD PRESSURE: 68 MMHG

## 2018-01-14 NOTE — CONSULTS
History of Present Illness  Pre-Op Visit (Brief): The patient is being seen for a preoperative visit  The procedure is a(n) vitreotomy left eye scheduled for November 2, 2017 with [facility]  The indication for surgery is  vitreous with the retinal buccal left eye  Surgical Risk Assessment:   Prior Anesthesia: She had prior anesthesia, no prior adverse reaction to edidural anesthesia, no prior adverse reaction to spinal anesthesia and no prior adverse reaction to general anesthesia  Pertinent Past Medical History: no pertinent past medical history  Exercise Capacity: able to walk four blocks without symptoms and able to walk two flights of stairs without symptoms  Lifestyle Factors: denies alcohol use, denies tobacco use and denies illegal drug use  Symptoms: no symptoms  STOP questionnaire score is 1  Other QUIQUE risk factors include age over 48, but normal BMI, female gender and normal neck circumference  Predicted risk of QUIQUE: None  Pertinent Family History: no pertinent family history  Living Situation: home is secure and supportive and no post-op concerns with her living situation  HPI: Patient presents for medical clearance for a left atriotomy      Review of Systems    Constitutional: No fever, no chills, feels well, no tiredness, no recent weight gain or weight loss  Eyes: as noted in HPI    ENT: no complaints of earache, no loss of hearing, no nose bleeds, no nasal discharge, no sore throat, no hoarseness  Cardiovascular: No complaints of slow heart rate, no fast heart rate, no chest pain, no palpitations, no leg claudication, no lower extremity edema  Respiratory: No complaints of shortness of breath, no wheezing, no cough, no SOB on exertion, no orthopnea, no PND  Gastrointestinal: No complaints of abdominal pain, no constipation, no nausea or vomiting, no diarrhea, no bloody stools     Genitourinary: No complaints of dysuria, no incontinence, no pelvic pain, no dysmenorrhea, no vaginal discharge or bleeding  Musculoskeletal: No complaints of arthralgias, no myalgias, no joint swelling or stiffness, no limb pain or swelling  Integumentary: No complaints of skin rash or lesions, no itching, no skin wounds, no breast pain or lump  Neurological: No complaints of headache, no confusion, no convulsions, no numbness, no dizziness or fainting, no tingling, no limb weakness, no difficulty walking  Psychiatric: Not suicidal, no sleep disturbance, no anxiety or depression, no change in personality, no emotional problems  Endocrine: No complaints of proptosis, no hot flashes, no muscle weakness, no deepening of the voice, no feelings of weakness  Hematologic/Lymphatic: No complaints of swollen glands, no swollen glands in the neck, does not bleed easily, does not bruise easily  ROS reviewed  Active Problems    1  Advanced directives, counseling/discussion (V65 49) (Z71 89)   2  Allergic contact dermatitis due to cosmetics (692 81) (L23 2)   3  Anxiety (300 00) (F41 9)   4  Arthritis (716 90) (M19 90)   5  Carpal tunnel syndrome, unspecified laterality (354 0) (G56 00)   6  Chronic bilateral thoracic back pain (724 1,338 29) (M54 6,G89 29)   7  Chronic idiopathic urticaria (708 1) (L50 1)   8  Chronic urticaria (708 8) (L50 8)   9  Diverticulosis (562 10) (K57 90)   10  Encounter for screening mammogram for breast cancer (V76 12) (Z12 31)   11  Encounter for special screening examination for nervous system disorder (V80 09)    (Z13 858)   12  Esophageal reflux (530 81) (K21 9)   13  Exercise counseling (V65 41) (Z71 82)   14  Exertional dyspnea (786 09) (R06 09)   15  Heart palpitations (785 1) (R00 2)   16  History of food allergy (V15 05) (Z91 018)   17  Irritable bowel syndrome (564 1) (K58 9)   18  Lactose intolerance (271 3) (E73 9)   19  Need for influenza vaccination (V04 81) (Z23)   20  Osteoporosis (733 00) (M81 0)   21  Preoperative clearance (V72 84) (Z01 818)   22  Screening for neurological condition (V80 09) (Z13 89)   23  Venous insufficiency (459 81) (I87 2)    Past Medical History    · History of Abdominal bloating (787 3) (R14 0)   · History of Abdominal pain (789 00) (R10 9)   · History of Abdominal pain of multiple sites (789 09) (R10 9)   · History of Acute recurrent maxillary sinusitis (461 0) (J01 01)   · History of Cellulitis (682 9) (L03 90)   · History of Colon cancer screening (V76 51) (Z12 11)   · History of Dermatitis (692 9) (L30 9)   · History of Screening for depression (V79 0) (Z13 89)   · History of Screening for genitourinary condition (V81 6) (Z13 89)   · History of Screening for glaucoma (V80 1) (Z13 5)   · History of Significant closed head trauma within past 3 months (V15 52) (S37 716)   · History of Special screening for other neurological conditions (V80 09) (Z13 89)    The active problems and past medical history were reviewed and updated today  Surgical History    · History of Cholecystectomy   · History of Hemorrhoidectomy   · History of Hysterectomy   · History of Tonsillectomy With Adenoidectomy    The surgical history was reviewed and updated today  Family History    · Family history of colon cancer (V16 0) (Z80 0)    · Family history of Gastric Cancer (151 9)    The family history was reviewed and updated today  Social History    · Former smoker (G95 22) (J06 437)   · Never Drank Alcohol   · No advance directives (V49 89) (Z78 9)   · No living will  The social history was reviewed and updated today  The social history was reviewed and is unchanged  Current Meds   1  ALPRAZolam 0 25 MG Oral Tablet Disintegrating; One tablet at 9 AM, 2 PM and 8 PM;   Therapy: [id number] to (Last Rx:[id number]) Ordered   2  Citracal Maximum TABS; 1 TABLET Bid Recorded   3  Claritin 10 MG Oral Capsule; Therapy: (Recorded:[id number]) to Recorded   4  [employer] Vitamin E 400 UNIT Oral Capsule; 1 PO QD Recorded   5   Multivitamins Oral Capsule; TAKE 1 CAPSULE Daily Recorded   6  Tylenol Extra Strength 500 MG Oral Tablet; takes one daily Recorded   7  Vitamin D3 1000 UNIT Oral Capsule; 1 PO BID Recorded   8  Zantac 150 MG CAPS; TAKE 1 CAPSULE TWICE DAILY; Therapy: (RGZA:[id number]) to Recorded   9  ZyrTEC Allergy 10 MG Oral Capsule; Therapy: (Recorded:[id number]) to Recorded    The medication list was reviewed and updated today  Allergies    1  Cantil TABS   2  Escitalopram Oxalate TABS   3  Pantoprazole Sodium TBEC    Vitals  Signs    Temperature: 97 3 F  Heart Rate: 86  Systolic: 733  Diastolic: 92  Height: 5 ft   Weight: 147 lb 3 2 oz  BMI Calculated: 28 75  BSA Calculated: 1 64  O2 Saturation: 97    Physical Exam    Constitutional   General appearance: No acute distress, well appearing and well nourished  Eyes   Conjunctiva and lids: No swelling, erythema or discharge  Pupils and irises: Equal, round and reactive to light  Ears, Nose, Mouth, and Throat   External inspection of ears and nose: Normal     Otoscopic examination: Tympanic membranes translucent with normal light reflex  Canals patent without erythema  Oropharynx: Normal with no erythema, edema, exudate or lesions  Pulmonary   Respiratory effort: No increased work of breathing or signs of respiratory distress  Auscultation of lungs: Clear to auscultation  Cardiovascular   Palpation of heart: Normal PMI, no thrills  Auscultation of heart: Normal rate and rhythm, normal S1 and S2, without murmurs  Examination of extremities for edema and/or varicosities: Normal     Abdomen   Abdomen: Non-tender, no masses  Liver and spleen: No hepatomegaly or splenomegaly  Lymphatic   Palpation of lymph nodes in neck: No lymphadenopathy  Musculoskeletal   Gait and station: Normal     Digits and nails: Normal without clubbing or cyanosis  Inspection/palpation of joints, bones, and muscles: Normal     Skin   Skin and subcutaneous tissue: Normal without rashes or lesions  Neurologic   Cranial nerves: Cranial nerves 2-12 intact  Reflexes: 2+ and symmetric  Sensation: No sensory loss  Psychiatric   Orientation to person, place, and time: Normal     Mood and affect: Normal        Results/Data  EKG/ECG- POC 27Oct2017 11:20AM Servando Hooper     Test Name Result Flag Reference   EKG/ECG      See Tracing for results       Assessment    1  No advance directives (V49 89) (Z78 9)   2  Former smoker (V15 82) (V87 190)   3  Preoperative clearance (V72 84) (Z01 818)   4  Macular cyst, hole, or pseudohole, left eye (362 54) (H35 342)   5  Benign essential hypertension (401 1) (I10)   6  Irritable bowel syndrome (564 1) (K58 9)    Plan  Benign essential hypertension    · Losartan Potassium 25 MG Oral Tablet; TAKE 1 TABLET DAILY AS DIRECTED  Need for influenza vaccination    · Stop: Fluzone High-Dose 0 5 ML Intramuscular Suspension Prefilled  Syringe  Preoperative clearance    · EKG/ECG- POC; Status:Complete - Retrospective By Protocol Authorization;   Done:  [id number] 11:20AM    Discussion/Summary  Surgical Clearance: She is at a LOW risk from a cardiovascular standpoint at this time without any additional cardiac testing  Reevaluation needed, if she should present with symptoms prior to surgery/procedure  End of Encounter Meds    1  ALPRAZolam 0 25 MG Oral Tablet Disintegrating; One tablet at 9 AM, 2 PM and 8 PM;   Therapy: [id number] to (Last Rx:[id number]) Ordered    2  Losartan Potassium 25 MG Oral Tablet; TAKE 1 TABLET DAILY AS DIRECTED; Therapy: [id number] to (Last Rx:27Oct2017) Ordered    3  Citracal Maximum TABS; 1 TABLET Bid Recorded   4  Claritin 10 MG Oral Capsule; Therapy: (Recorded:[id number]) to Recorded   5  [employer] Vitamin E 400 UNIT Oral Capsule; 1 PO QD Recorded   6  Multivitamins Oral Capsule; TAKE 1 CAPSULE Daily Recorded   7  Tylenol Extra Strength 500 MG Oral Tablet; takes one daily Recorded   8  Vitamin D3 1000 UNIT Oral Capsule; 1 PO BID Recorded   9   Zantac 150 MG CAPS (RaNITidine HCl); TAKE 1 CAPSULE TWICE DAILY; Therapy: (LATQYMEV:[id number]) to Recorded   10  ZyrTEC Allergy 10 MG Oral Capsule;     Therapy: (Recorded:[id number]) to Recorded    Signatures   Electronically signed by : Inocencio Coelho DO; Oct 27 2017 11:54AM EST                       (Author)

## 2018-01-15 VITALS
DIASTOLIC BLOOD PRESSURE: 72 MMHG | HEIGHT: 60 IN | WEIGHT: 143 LBS | BODY MASS INDEX: 28.07 KG/M2 | TEMPERATURE: 97.3 F | SYSTOLIC BLOOD PRESSURE: 132 MMHG

## 2018-02-16 ENCOUNTER — OFFICE VISIT (OUTPATIENT)
Dept: FAMILY MEDICINE CLINIC | Facility: CLINIC | Age: 83
End: 2018-02-16
Payer: COMMERCIAL

## 2018-02-16 VITALS
BODY MASS INDEX: 29.45 KG/M2 | HEIGHT: 60 IN | DIASTOLIC BLOOD PRESSURE: 82 MMHG | SYSTOLIC BLOOD PRESSURE: 160 MMHG | WEIGHT: 150 LBS

## 2018-02-16 DIAGNOSIS — L50.1 CHRONIC IDIOPATHIC URTICARIA: ICD-10-CM

## 2018-02-16 DIAGNOSIS — F41.1 GENERALIZED ANXIETY DISORDER: Primary | ICD-10-CM

## 2018-02-16 DIAGNOSIS — I10 ESSENTIAL HYPERTENSION: ICD-10-CM

## 2018-02-16 PROBLEM — L50.8 CHRONIC URTICARIA: Status: ACTIVE | Noted: 2017-01-05

## 2018-02-16 PROBLEM — H35.342 MACULAR CYST, HOLE, OR PSEUDOHOLE, LEFT EYE: Status: ACTIVE | Noted: 2017-10-27

## 2018-02-16 PROCEDURE — 1101F PT FALLS ASSESS-DOCD LE1/YR: CPT | Performed by: FAMILY MEDICINE

## 2018-02-16 PROCEDURE — 99213 OFFICE O/P EST LOW 20 MIN: CPT | Performed by: FAMILY MEDICINE

## 2018-02-16 PROCEDURE — 3725F SCREEN DEPRESSION PERFORMED: CPT | Performed by: FAMILY MEDICINE

## 2018-02-16 RX ORDER — CETIRIZINE HYDROCHLORIDE 10 MG/1
10 TABLET ORAL DAILY
COMMUNITY
End: 2020-08-28 | Stop reason: ALTCHOICE

## 2018-02-16 RX ORDER — ALPRAZOLAM 0.25 MG/1
0.25 TABLET ORAL 3 TIMES DAILY PRN
Qty: 90 TABLET | Refills: 0 | Status: SHIPPED | OUTPATIENT
Start: 2018-02-16 | End: 2018-03-14 | Stop reason: SDUPTHER

## 2018-02-16 RX ORDER — RANITIDINE 150 MG/1
150 CAPSULE ORAL 2 TIMES DAILY
Qty: 60 CAPSULE | Refills: 3 | Status: SHIPPED | OUTPATIENT
Start: 2018-02-16 | End: 2018-03-14

## 2018-02-16 RX ORDER — LOSARTAN POTASSIUM 25 MG/1
1 TABLET ORAL DAILY
COMMUNITY
Start: 2017-10-27 | End: 2018-02-16

## 2018-02-16 RX ORDER — LOSARTAN POTASSIUM 50 MG/1
50 TABLET ORAL DAILY
Qty: 30 TABLET | Refills: 3 | Status: SHIPPED | OUTPATIENT
Start: 2018-02-16 | End: 2018-03-14

## 2018-02-16 RX ORDER — RANITIDINE 150 MG/1
1 CAPSULE ORAL 2 TIMES DAILY
COMMUNITY
End: 2018-02-16 | Stop reason: SDUPTHER

## 2018-02-16 NOTE — PROGRESS NOTES
Assessment/Plan:    No problem-specific Assessment & Plan notes found for this encounter  Diagnoses and all orders for this visit:    Essential hypertension    Other orders  -     ranitidine (ZANTAC) 150 MG capsule; Take 1 capsule by mouth 2 (two) times a day  -     losartan (COZAAR) 25 mg tablet; Take 1 tablet by mouth daily  -     cetirizine (ZyrTEC) 10 mg tablet; Take 10 mg by mouth daily          Subjective:      Patient ID: Vaishnavi Nobles is a 80 y o  female  Patient here for follow-up visit regarding her blood pressure which still is not meeting guidelines she takes a low dose of Cozaar 25 mg once a day        The following portions of the patient's history were reviewed and updated as appropriate:   She  has a past medical history of Diverticulosis; GERD (gastroesophageal reflux disease); Hiatal hernia; Irritable bowel disease; Lactose intolerance; and Osteoporosis  She  does not have any pertinent problems on file  She  has a past surgical history that includes Carpal tunnel release; Hysterectomy; Tonsillectomy; Cholecystectomy; Ear tube removal; and Skin cancer excision  Her family history is not on file  She  reports that she has quit smoking  She has never used smokeless tobacco  She reports that she does not drink alcohol or use drugs  Current Outpatient Prescriptions   Medication Sig Dispense Refill    ALPRAZolam (XANAX) 0 25 mg tablet Take 0 25 mg by mouth 3 (three) times a day as needed for anxiety   Calcium Citrate (CITRACAL PO) Take 1 tablet by mouth daily   cetirizine (ZyrTEC) 10 mg tablet Take 10 mg by mouth daily      Cholecalciferol (VITAMIN D PO) Take 1,000 Units by mouth 2 (two) times a day   losartan (COZAAR) 25 mg tablet Take 1 tablet by mouth daily      Multiple Vitamin (MULTIVITAMIN) capsule Take 1 capsule by mouth daily   ranitidine (ZANTAC) 150 MG capsule Take 1 capsule by mouth 2 (two) times a day      VITAMIN E PO Take 1 tablet by mouth daily  No current facility-administered medications for this visit  Current Outpatient Prescriptions on File Prior to Visit   Medication Sig    ALPRAZolam (XANAX) 0 25 mg tablet Take 0 25 mg by mouth 3 (three) times a day as needed for anxiety   Calcium Citrate (CITRACAL PO) Take 1 tablet by mouth daily   Cholecalciferol (VITAMIN D PO) Take 1,000 Units by mouth 2 (two) times a day   Multiple Vitamin (MULTIVITAMIN) capsule Take 1 capsule by mouth daily   VITAMIN E PO Take 1 tablet by mouth daily  No current facility-administered medications on file prior to visit  She is allergic to escitalopram; mepenzolate; maalox anti-gas [simethicone]; other; protonix [pantoprazole]; and reglan [metoclopramide]       Review of Systems   Constitutional: Negative for activity change, appetite change, diaphoresis, fatigue and fever  HENT: Negative  Eyes: Negative  Respiratory: Negative for apnea, cough, chest tightness, shortness of breath and wheezing  Cardiovascular: Negative for chest pain, palpitations and leg swelling  Gastrointestinal: Negative for abdominal distention, abdominal pain, anal bleeding, constipation, diarrhea, nausea and vomiting  Endocrine: Negative for cold intolerance, heat intolerance, polydipsia, polyphagia and polyuria  Genitourinary: Negative for difficulty urinating, dysuria, flank pain, hematuria and urgency  Musculoskeletal: Negative for arthralgias, back pain, gait problem, joint swelling and myalgias  Skin: Negative for color change, rash and wound  Chronic urticaria currently well controlled with Zantac   Allergic/Immunologic: Negative for environmental allergies, food allergies and immunocompromised state  Neurological: Negative for dizziness, seizures, syncope, speech difficulty, numbness and headaches  Hematological: Negative for adenopathy  Does not bruise/bleed easily     Psychiatric/Behavioral: Negative for agitation, behavioral problems, hallucinations, sleep disturbance and suicidal ideas  The patient is nervous/anxious  Objective:      /82 (BP Location: Right arm, Patient Position: Sitting, Cuff Size: Standard)   Ht 5' (1 524 m)   Wt 68 kg (150 lb)   BMI 29 29 kg/m²          Physical Exam   Constitutional: She is oriented to person, place, and time  She appears well-developed and well-nourished  No distress  HENT:   Head: Normocephalic  Right Ear: External ear normal    Left Ear: External ear normal    Nose: Nose normal    Mouth/Throat: Oropharynx is clear and moist    Eyes: Conjunctivae and EOM are normal  Pupils are equal, round, and reactive to light  Right eye exhibits no discharge  Left eye exhibits no discharge  No scleral icterus  Neck: Normal range of motion  No tracheal deviation present  No thyromegaly present  Cardiovascular: Normal rate, regular rhythm and normal heart sounds  Exam reveals no gallop and no friction rub  No murmur heard  Pulmonary/Chest: Effort normal and breath sounds normal  No respiratory distress  She has no wheezes  Abdominal: Soft  Bowel sounds are normal  She exhibits no mass  There is no tenderness  There is no guarding  Musculoskeletal: She exhibits no edema or deformity  Lymphadenopathy:     She has no cervical adenopathy  Neurological: She is alert and oriented to person, place, and time  No cranial nerve deficit  Skin: Skin is warm and dry  No rash noted  She is not diaphoretic  No erythema  Psychiatric: She has a normal mood and affect   Thought content normal

## 2018-03-05 ENCOUNTER — TELEPHONE (OUTPATIENT)
Dept: FAMILY MEDICINE CLINIC | Facility: CLINIC | Age: 83
End: 2018-03-05

## 2018-03-05 NOTE — TELEPHONE ENCOUNTER
She has been on the cozaar 50 mg for 9 days and has headache, and dizziness, she went back to the 25 mg, is that ok?

## 2018-03-05 NOTE — TELEPHONE ENCOUNTER
Doesn't know any Bp, daughter nurse said to do this until she could talk to you today   Please advise

## 2018-03-05 NOTE — TELEPHONE ENCOUNTER
Called Pt with 's message to take a full tablet - She was taking this Rx x 9day & when taking it she got a bad headache & very dizzy  Now while taking 1/2 tab she no longer has the headache      She said she does not feel comfortable taking a full tablet & would like to continue on the 1/2 tab, recheck B/P daily, then call back with results

## 2018-03-05 NOTE — TELEPHONE ENCOUNTER
Pt called back stating that B/P today 151/77 taken @ home this morning - Cazaar 50mg 1/2 tablet - Do you want to make any changes?

## 2018-03-05 NOTE — TELEPHONE ENCOUNTER
I do not know if that is okay blood pressure readings would be nice to have because she has either headache and dizzy from high blood pressure or could be low blood pressure no way of knowing without checking pressure

## 2018-03-08 ENCOUNTER — TELEPHONE (OUTPATIENT)
Dept: FAMILY MEDICINE CLINIC | Facility: CLINIC | Age: 83
End: 2018-03-08

## 2018-03-14 DIAGNOSIS — K21.9 GASTROESOPHAGEAL REFLUX DISEASE WITHOUT ESOPHAGITIS: ICD-10-CM

## 2018-03-14 DIAGNOSIS — I10 ESSENTIAL HYPERTENSION: Primary | ICD-10-CM

## 2018-03-14 DIAGNOSIS — F41.1 GENERALIZED ANXIETY DISORDER: ICD-10-CM

## 2018-03-14 RX ORDER — LOSARTAN POTASSIUM 25 MG/1
25 TABLET ORAL DAILY
Qty: 30 TABLET | Refills: 5 | Status: SHIPPED | OUTPATIENT
Start: 2018-03-14 | End: 2018-09-05 | Stop reason: SDUPTHER

## 2018-03-14 RX ORDER — ALPRAZOLAM 0.25 MG/1
0.25 TABLET ORAL 3 TIMES DAILY PRN
Qty: 90 TABLET | Refills: 0 | OUTPATIENT
Start: 2018-03-14 | End: 2018-04-20 | Stop reason: SDUPTHER

## 2018-03-14 RX ORDER — RANITIDINE 150 MG/1
150 TABLET ORAL 2 TIMES DAILY
Qty: 60 TABLET | Refills: 5 | Status: SHIPPED | OUTPATIENT
Start: 2018-03-14 | End: 2018-09-05 | Stop reason: SDUPTHER

## 2018-04-20 DIAGNOSIS — F41.1 GENERALIZED ANXIETY DISORDER: ICD-10-CM

## 2018-04-20 RX ORDER — ALPRAZOLAM 0.25 MG/1
0.25 TABLET ORAL 3 TIMES DAILY PRN
Qty: 90 TABLET | Refills: 0 | Status: SHIPPED | OUTPATIENT
Start: 2018-04-20 | End: 2018-05-24 | Stop reason: ALTCHOICE

## 2018-05-24 ENCOUNTER — OFFICE VISIT (OUTPATIENT)
Dept: FAMILY MEDICINE CLINIC | Facility: CLINIC | Age: 83
End: 2018-05-24
Payer: COMMERCIAL

## 2018-05-24 VITALS
WEIGHT: 149.8 LBS | BODY MASS INDEX: 30.2 KG/M2 | HEIGHT: 59 IN | DIASTOLIC BLOOD PRESSURE: 82 MMHG | SYSTOLIC BLOOD PRESSURE: 148 MMHG

## 2018-05-24 DIAGNOSIS — I10 BENIGN ESSENTIAL HYPERTENSION: Primary | ICD-10-CM

## 2018-05-24 DIAGNOSIS — F41.1 GENERALIZED ANXIETY DISORDER: ICD-10-CM

## 2018-05-24 DIAGNOSIS — M17.0 PRIMARY OSTEOARTHRITIS OF BOTH KNEES: ICD-10-CM

## 2018-05-24 PROCEDURE — 99213 OFFICE O/P EST LOW 20 MIN: CPT | Performed by: FAMILY MEDICINE

## 2018-05-24 RX ORDER — ALPRAZOLAM 0.25 MG/1
0.25 TABLET ORAL 2 TIMES DAILY PRN
Qty: 60 TABLET | Refills: 0 | Status: SHIPPED | OUTPATIENT
Start: 2018-05-24 | End: 2018-06-25 | Stop reason: SDUPTHER

## 2018-05-24 NOTE — PROGRESS NOTES
Assessment/Plan:    Benign essential hypertension   Continue current dose of antihypertensives       Diagnoses and all orders for this visit:    Benign essential hypertension    Primary osteoarthritis of both knees          Subjective:      Patient ID: Hanny William is a 80 y o  female  Patient here for follow-up visit regarding blood pressure blood pressure is mildly elevated in blood in office but much better than it was at the previous visit and at home her blood pressure is less when she is not excited about being in a doctor's office a 2nd issue she complains about today is osteoarthritis of both knees stiffness and pain with walking        The following portions of the patient's history were reviewed and updated as appropriate:   She  has a past medical history of Diverticulosis; GERD (gastroesophageal reflux disease); Hiatal hernia; Irritable bowel disease; Lactose intolerance; and Osteoporosis  She   Patient Active Problem List    Diagnosis Date Noted    Benign essential hypertension 10/27/2017    Macular cyst, hole, or pseudohole, left eye 10/27/2017    Atypical chest pain 02/17/2017    GERD (gastroesophageal reflux disease) 02/17/2017    Anxiety 02/17/2017    Chronic urticaria 01/05/2017     She  has a past surgical history that includes Carpal tunnel release; Hysterectomy; Tonsillectomy; Cholecystectomy; Ear tube removal; and Skin cancer excision  Her family history is not on file  She  reports that she has quit smoking  She has never used smokeless tobacco  She reports that she does not drink alcohol or use drugs  Current Outpatient Prescriptions   Medication Sig Dispense Refill    ALPRAZolam (XANAX) 0 25 mg tablet Take 1 tablet (0 25 mg total) by mouth 3 (three) times a day as needed for anxiety for up to 30 days (Patient taking differently: Take 0 25 mg by mouth 2 (two) times a day as needed for anxiety  ) 90 tablet 0    Calcium Citrate (CITRACAL PO) Take 1 tablet by mouth daily        cetirizine (ZyrTEC) 10 mg tablet Take 10 mg by mouth daily      Cholecalciferol (VITAMIN D PO) Take 1,000 Units by mouth 2 (two) times a day   losartan (COZAAR) 25 mg tablet Take 1 tablet (25 mg total) by mouth daily 30 tablet 5    Multiple Vitamin (MULTIVITAMIN) capsule Take 1 capsule by mouth daily   ranitidine (ZANTAC) 150 mg tablet Take 1 tablet (150 mg total) by mouth 2 (two) times a day 60 tablet 5    VITAMIN E PO Take 1 tablet by mouth daily  No current facility-administered medications for this visit  Current Outpatient Prescriptions on File Prior to Visit   Medication Sig    ALPRAZolam (XANAX) 0 25 mg tablet Take 1 tablet (0 25 mg total) by mouth 3 (three) times a day as needed for anxiety for up to 30 days (Patient taking differently: Take 0 25 mg by mouth 2 (two) times a day as needed for anxiety  )    Calcium Citrate (CITRACAL PO) Take 1 tablet by mouth daily   cetirizine (ZyrTEC) 10 mg tablet Take 10 mg by mouth daily    Cholecalciferol (VITAMIN D PO) Take 1,000 Units by mouth 2 (two) times a day   losartan (COZAAR) 25 mg tablet Take 1 tablet (25 mg total) by mouth daily    Multiple Vitamin (MULTIVITAMIN) capsule Take 1 capsule by mouth daily   ranitidine (ZANTAC) 150 mg tablet Take 1 tablet (150 mg total) by mouth 2 (two) times a day    VITAMIN E PO Take 1 tablet by mouth daily  No current facility-administered medications on file prior to visit  She is allergic to escitalopram; mepenzolate; maalox anti-gas [simethicone]; other; protonix [pantoprazole]; and reglan [metoclopramide]       Review of Systems   Constitutional: Negative for activity change, appetite change, diaphoresis, fatigue and fever  HENT: Negative  Eyes: Negative  Respiratory: Negative for apnea, cough, chest tightness, shortness of breath and wheezing  Cardiovascular: Negative for chest pain, palpitations and leg swelling     Gastrointestinal: Negative for abdominal distention, abdominal pain, anal bleeding, constipation, diarrhea, nausea and vomiting  Endocrine: Negative for cold intolerance, heat intolerance, polydipsia, polyphagia and polyuria  Genitourinary: Negative for difficulty urinating, dysuria, flank pain, hematuria and urgency  Musculoskeletal: Positive for arthralgias, gait problem and joint swelling  Negative for back pain and myalgias  Skin: Negative for color change, rash and wound  Allergic/Immunologic: Negative for environmental allergies, food allergies and immunocompromised state  Neurological: Negative for dizziness, seizures, syncope, speech difficulty, numbness and headaches  Hematological: Negative for adenopathy  Does not bruise/bleed easily  Psychiatric/Behavioral: Negative for agitation, behavioral problems, hallucinations, sleep disturbance and suicidal ideas  Objective:      /82 (BP Location: Left arm, Patient Position: Sitting, Cuff Size: Standard)   Ht 4' 11" (1 499 m)   Wt 67 9 kg (149 lb 12 8 oz)   BMI 30 26 kg/m²          Physical Exam   Constitutional: She is oriented to person, place, and time  She appears well-developed and well-nourished  No distress  HENT:   Head: Normocephalic  Right Ear: External ear normal    Left Ear: External ear normal    Nose: Nose normal    Mouth/Throat: Oropharynx is clear and moist    Eyes: Conjunctivae and EOM are normal  Pupils are equal, round, and reactive to light  Right eye exhibits no discharge  Left eye exhibits no discharge  No scleral icterus  Neck: Normal range of motion  No tracheal deviation present  No thyromegaly present  Cardiovascular: Normal rate, regular rhythm and normal heart sounds  Exam reveals no gallop and no friction rub  No murmur heard  Pulmonary/Chest: Effort normal and breath sounds normal  No respiratory distress  She has no wheezes  Abdominal: Soft  Bowel sounds are normal  She exhibits no mass  There is no tenderness  There is no guarding  Musculoskeletal: She exhibits tenderness and deformity  She exhibits no edema  Arthritic morphology of both knees with crepitance and pain with movement   Lymphadenopathy:     She has no cervical adenopathy  Neurological: She is alert and oriented to person, place, and time  No cranial nerve deficit  Skin: Skin is warm and dry  No rash noted  She is not diaphoretic  No erythema  Psychiatric: She has a normal mood and affect   Thought content normal

## 2018-06-25 DIAGNOSIS — F41.1 GENERALIZED ANXIETY DISORDER: ICD-10-CM

## 2018-06-25 RX ORDER — ALPRAZOLAM 0.25 MG/1
0.25 TABLET ORAL 2 TIMES DAILY PRN
Qty: 60 TABLET | Refills: 0 | Status: SHIPPED | OUTPATIENT
Start: 2018-06-25 | End: 2018-07-25 | Stop reason: SDUPTHER

## 2018-07-25 DIAGNOSIS — F41.1 GENERALIZED ANXIETY DISORDER: ICD-10-CM

## 2018-07-25 RX ORDER — ALPRAZOLAM 0.25 MG/1
0.25 TABLET ORAL 2 TIMES DAILY PRN
Qty: 60 TABLET | Refills: 0 | Status: SHIPPED | OUTPATIENT
Start: 2018-07-25 | End: 2018-09-12 | Stop reason: SDUPTHER

## 2018-09-05 DIAGNOSIS — K21.9 GASTROESOPHAGEAL REFLUX DISEASE WITHOUT ESOPHAGITIS: ICD-10-CM

## 2018-09-05 DIAGNOSIS — I10 ESSENTIAL HYPERTENSION: ICD-10-CM

## 2018-09-05 RX ORDER — RANITIDINE 150 MG/1
150 TABLET ORAL 2 TIMES DAILY
Qty: 60 TABLET | Refills: 2 | Status: SHIPPED | OUTPATIENT
Start: 2018-09-05 | End: 2018-09-12 | Stop reason: SDUPTHER

## 2018-09-05 RX ORDER — LOSARTAN POTASSIUM 25 MG/1
25 TABLET ORAL DAILY
Qty: 30 TABLET | Refills: 5 | Status: SHIPPED | OUTPATIENT
Start: 2018-09-05 | End: 2019-03-06 | Stop reason: SDUPTHER

## 2018-09-12 ENCOUNTER — OFFICE VISIT (OUTPATIENT)
Dept: FAMILY MEDICINE CLINIC | Facility: CLINIC | Age: 83
End: 2018-09-12
Payer: COMMERCIAL

## 2018-09-12 VITALS
DIASTOLIC BLOOD PRESSURE: 82 MMHG | WEIGHT: 149.8 LBS | HEIGHT: 60 IN | SYSTOLIC BLOOD PRESSURE: 146 MMHG | BODY MASS INDEX: 29.41 KG/M2

## 2018-09-12 DIAGNOSIS — I10 BENIGN ESSENTIAL HYPERTENSION: Primary | ICD-10-CM

## 2018-09-12 DIAGNOSIS — F41.1 GENERALIZED ANXIETY DISORDER: ICD-10-CM

## 2018-09-12 DIAGNOSIS — K21.9 GASTROESOPHAGEAL REFLUX DISEASE WITHOUT ESOPHAGITIS: ICD-10-CM

## 2018-09-12 DIAGNOSIS — F41.9 ANXIETY: Chronic | ICD-10-CM

## 2018-09-12 PROCEDURE — 99213 OFFICE O/P EST LOW 20 MIN: CPT | Performed by: FAMILY MEDICINE

## 2018-09-12 RX ORDER — RANITIDINE 150 MG/1
150 TABLET ORAL 2 TIMES DAILY
Qty: 60 TABLET | Refills: 0 | Status: SHIPPED | OUTPATIENT
Start: 2018-09-12 | End: 2018-11-06 | Stop reason: SDUPTHER

## 2018-09-12 RX ORDER — ALPRAZOLAM 0.25 MG/1
0.25 TABLET ORAL 2 TIMES DAILY PRN
Qty: 60 TABLET | Refills: 0 | Status: SHIPPED | OUTPATIENT
Start: 2018-09-12 | End: 2018-10-23 | Stop reason: SDUPTHER

## 2018-09-12 NOTE — PROGRESS NOTES
Assessment/Plan: patient will continue her current dose of alprazolam she is not able to discontinue this patient's occurred will continue to be treated with ranitidine    No problem-specific Assessment & Plan notes found for this encounter  There are no diagnoses linked to this encounter  Subjective:      Patient ID: Melanie Bhatt is a 80 y o  female  follw up visit for  Anxiety and  gerd          The following portions of the patient's history were reviewed and updated as appropriate:   She  has a past medical history of Diverticulosis; GERD (gastroesophageal reflux disease); Hiatal hernia; Irritable bowel disease; Lactose intolerance; and Osteoporosis  She   Patient Active Problem List    Diagnosis Date Noted    Benign essential hypertension 10/27/2017    Macular cyst, hole, or pseudohole, left eye 10/27/2017    Atypical chest pain 02/17/2017    GERD (gastroesophageal reflux disease) 02/17/2017    Anxiety 02/17/2017    Chronic urticaria 01/05/2017     She  has a past surgical history that includes Carpal tunnel release; Hysterectomy; Tonsillectomy; Cholecystectomy; Ear tube removal; Skin cancer excision; and Hemorroidectomy  Her family history includes Colon cancer in her mother; Stomach cancer in her father  She  reports that she has quit smoking  She has never used smokeless tobacco  She reports that she does not drink alcohol or use drugs  Current Outpatient Prescriptions   Medication Sig Dispense Refill    ALPRAZolam (XANAX) 0 25 mg tablet Take 1 tablet (0 25 mg total) by mouth 2 (two) times a day as needed for anxiety 60 tablet 0    Calcium Citrate (CITRACAL PO) Take 1 tablet by mouth daily   cetirizine (ZyrTEC) 10 mg tablet Take 10 mg by mouth daily      Cholecalciferol (VITAMIN D PO) Take 1,000 Units by mouth 2 (two) times a day        losartan (COZAAR) 25 mg tablet Take 1 tablet (25 mg total) by mouth daily 30 tablet 5    Multiple Vitamin (MULTIVITAMIN) capsule Take 1 capsule by mouth daily   ranitidine (ZANTAC) 150 mg tablet Take 1 tablet (150 mg total) by mouth 2 (two) times a day 60 tablet 2    VITAMIN E PO Take 1 tablet by mouth daily  No current facility-administered medications for this visit  Current Outpatient Prescriptions on File Prior to Visit   Medication Sig    ALPRAZolam (XANAX) 0 25 mg tablet Take 1 tablet (0 25 mg total) by mouth 2 (two) times a day as needed for anxiety    Calcium Citrate (CITRACAL PO) Take 1 tablet by mouth daily   cetirizine (ZyrTEC) 10 mg tablet Take 10 mg by mouth daily    Cholecalciferol (VITAMIN D PO) Take 1,000 Units by mouth 2 (two) times a day   losartan (COZAAR) 25 mg tablet Take 1 tablet (25 mg total) by mouth daily    Multiple Vitamin (MULTIVITAMIN) capsule Take 1 capsule by mouth daily   ranitidine (ZANTAC) 150 mg tablet Take 1 tablet (150 mg total) by mouth 2 (two) times a day    VITAMIN E PO Take 1 tablet by mouth daily  No current facility-administered medications on file prior to visit  She is allergic to escitalopram; mepenzolate; maalox anti-gas [simethicone]; other; protonix [pantoprazole]; and reglan [metoclopramide]       Review of Systems   Constitutional: Negative for activity change, appetite change, diaphoresis, fatigue and fever  HENT: Negative  Eyes: Negative  Respiratory: Negative for apnea, cough, chest tightness, shortness of breath and wheezing  Cardiovascular: Negative for chest pain, palpitations and leg swelling  Gastrointestinal: Negative for abdominal distention, abdominal pain, anal bleeding, constipation, diarrhea, nausea and vomiting  Endocrine: Negative for cold intolerance, heat intolerance, polydipsia, polyphagia and polyuria  Genitourinary: Negative for difficulty urinating, dysuria, flank pain, hematuria and urgency  Musculoskeletal: Negative for arthralgias, back pain, gait problem, joint swelling and myalgias     Skin: Negative for color change, rash and wound  Allergic/Immunologic: Negative for environmental allergies, food allergies and immunocompromised state  Neurological: Negative for dizziness, seizures, syncope, speech difficulty, numbness and headaches  Hematological: Negative for adenopathy  Does not bruise/bleed easily  Psychiatric/Behavioral: Negative for agitation, behavioral problems, hallucinations, sleep disturbance and suicidal ideas  Objective:      /82 (BP Location: Left arm, Patient Position: Sitting, Cuff Size: Standard)   Ht 4' 11 5" (1 511 m)   Wt 67 9 kg (149 lb 12 8 oz)   BMI 29 75 kg/m²          Physical Exam   Constitutional: She is oriented to person, place, and time  She appears well-developed and well-nourished  No distress  HENT:   Head: Normocephalic  Right Ear: External ear normal    Left Ear: External ear normal    Nose: Nose normal    Mouth/Throat: Oropharynx is clear and moist    Eyes: Conjunctivae and EOM are normal  Pupils are equal, round, and reactive to light  Right eye exhibits no discharge  Left eye exhibits no discharge  No scleral icterus  Neck: Normal range of motion  No tracheal deviation present  No thyromegaly present  Cardiovascular: Normal rate, regular rhythm and normal heart sounds  Exam reveals no gallop and no friction rub  No murmur heard  Pulmonary/Chest: Effort normal and breath sounds normal  No respiratory distress  She has no wheezes  Abdominal: Soft  Bowel sounds are normal  She exhibits no mass  There is no tenderness  There is no guarding  Musculoskeletal: She exhibits no edema or deformity  Lymphadenopathy:     She has no cervical adenopathy  Neurological: She is alert and oriented to person, place, and time  No cranial nerve deficit  Skin: Skin is warm and dry  No rash noted  She is not diaphoretic  No erythema  Psychiatric: She has a normal mood and affect   Thought content normal

## 2018-10-23 DIAGNOSIS — F41.1 GENERALIZED ANXIETY DISORDER: ICD-10-CM

## 2018-10-23 RX ORDER — ALPRAZOLAM 0.25 MG/1
0.25 TABLET ORAL 2 TIMES DAILY PRN
Qty: 60 TABLET | Refills: 0 | Status: SHIPPED | OUTPATIENT
Start: 2018-10-23 | End: 2018-11-19 | Stop reason: SDUPTHER

## 2018-11-06 DIAGNOSIS — K21.9 GASTROESOPHAGEAL REFLUX DISEASE WITHOUT ESOPHAGITIS: ICD-10-CM

## 2018-11-06 RX ORDER — RANITIDINE 150 MG/1
TABLET ORAL
Qty: 60 TABLET | Refills: 1 | Status: SHIPPED | OUTPATIENT
Start: 2018-11-06 | End: 2019-02-04 | Stop reason: SDUPTHER

## 2018-11-19 DIAGNOSIS — F41.1 GENERALIZED ANXIETY DISORDER: ICD-10-CM

## 2018-11-19 RX ORDER — ALPRAZOLAM 0.25 MG/1
0.25 TABLET ORAL 2 TIMES DAILY PRN
Qty: 60 TABLET | Refills: 0 | Status: SHIPPED | OUTPATIENT
Start: 2018-11-19 | End: 2018-12-28 | Stop reason: SDUPTHER

## 2018-11-23 ENCOUNTER — OFFICE VISIT (OUTPATIENT)
Dept: FAMILY MEDICINE CLINIC | Facility: CLINIC | Age: 83
End: 2018-11-23
Payer: COMMERCIAL

## 2018-11-23 VITALS
HEIGHT: 60 IN | WEIGHT: 149 LBS | SYSTOLIC BLOOD PRESSURE: 132 MMHG | TEMPERATURE: 97.6 F | DIASTOLIC BLOOD PRESSURE: 74 MMHG | BODY MASS INDEX: 29.25 KG/M2

## 2018-11-23 DIAGNOSIS — J01.01 ACUTE RECURRENT MAXILLARY SINUSITIS: Primary | ICD-10-CM

## 2018-11-23 PROCEDURE — 99213 OFFICE O/P EST LOW 20 MIN: CPT | Performed by: FAMILY MEDICINE

## 2018-11-23 PROCEDURE — 1036F TOBACCO NON-USER: CPT | Performed by: FAMILY MEDICINE

## 2018-11-23 PROCEDURE — 1160F RVW MEDS BY RX/DR IN RCRD: CPT | Performed by: FAMILY MEDICINE

## 2018-11-23 RX ORDER — CEFUROXIME AXETIL 500 MG/1
500 TABLET ORAL EVERY 12 HOURS SCHEDULED
Qty: 14 TABLET | Refills: 0 | Status: SHIPPED | OUTPATIENT
Start: 2018-11-23 | End: 2018-11-30

## 2018-11-23 NOTE — PROGRESS NOTES
Assessment/Plan:  Patient will be treated with Ceftin 500 mg b i d  For 7 days    No problem-specific Assessment & Plan notes found for this encounter  Diagnoses and all orders for this visit:    Acute recurrent maxillary sinusitis  -     cefuroxime (CEFTIN) 500 mg tablet; Take 1 tablet (500 mg total) by mouth every 12 (twelve) hours for 7 days          Subjective:      Patient ID: Nevaeh Jo is a 80 y o  female  Patient is here with acute maxillary sinusitis and lower respiratory tract infection patient has cough again congestion as is expectorating purulent mucus she does from time to time feel chilled not febrile no signs of sepsis        The following portions of the patient's history were reviewed and updated as appropriate:   She  has a past medical history of Diverticulosis; GERD (gastroesophageal reflux disease); Hiatal hernia; Irritable bowel disease; Lactose intolerance; and Osteoporosis  She   Patient Active Problem List    Diagnosis Date Noted    Benign essential hypertension 10/27/2017    Macular cyst, hole, or pseudohole, left eye 10/27/2017    Atypical chest pain 02/17/2017    GERD (gastroesophageal reflux disease) 02/17/2017    Anxiety 02/17/2017    Chronic urticaria 01/05/2017     She  has a past surgical history that includes Carpal tunnel release; Hysterectomy; Tonsillectomy; Cholecystectomy; Ear tube removal; Skin cancer excision; and Hemorroidectomy  Her family history includes Colon cancer in her mother; Stomach cancer in her father  She  reports that she has quit smoking  She has never used smokeless tobacco  She reports that she does not drink alcohol or use drugs  Current Outpatient Prescriptions   Medication Sig Dispense Refill    ALPRAZolam (XANAX) 0 25 mg tablet Take 1 tablet (0 25 mg total) by mouth 2 (two) times a day as needed for anxiety 60 tablet 0    Calcium Citrate (CITRACAL PO) Take 1 tablet by mouth daily        cetirizine (ZyrTEC) 10 mg tablet Take 10 mg by mouth daily      Cholecalciferol (VITAMIN D PO) Take 1,000 Units by mouth 2 (two) times a day   losartan (COZAAR) 25 mg tablet Take 1 tablet (25 mg total) by mouth daily 30 tablet 5    Multiple Vitamin (MULTIVITAMIN) capsule Take 1 capsule by mouth daily   ranitidine (ZANTAC) 150 mg tablet take 1 tablet by mouth twice a day 60 tablet 1    VITAMIN E PO Take 1 tablet by mouth daily   cefuroxime (CEFTIN) 500 mg tablet Take 1 tablet (500 mg total) by mouth every 12 (twelve) hours for 7 days 14 tablet 0     No current facility-administered medications for this visit  Current Outpatient Prescriptions on File Prior to Visit   Medication Sig    ALPRAZolam (XANAX) 0 25 mg tablet Take 1 tablet (0 25 mg total) by mouth 2 (two) times a day as needed for anxiety    Calcium Citrate (CITRACAL PO) Take 1 tablet by mouth daily   cetirizine (ZyrTEC) 10 mg tablet Take 10 mg by mouth daily    Cholecalciferol (VITAMIN D PO) Take 1,000 Units by mouth 2 (two) times a day   losartan (COZAAR) 25 mg tablet Take 1 tablet (25 mg total) by mouth daily    Multiple Vitamin (MULTIVITAMIN) capsule Take 1 capsule by mouth daily   ranitidine (ZANTAC) 150 mg tablet take 1 tablet by mouth twice a day    VITAMIN E PO Take 1 tablet by mouth daily  No current facility-administered medications on file prior to visit  She is allergic to escitalopram; mepenzolate; maalox anti-gas [simethicone]; other; protonix [pantoprazole]; and reglan [metoclopramide]       Review of Systems   Constitutional: Negative for activity change, appetite change, diaphoresis, fatigue and fever  HENT: Positive for sinus pressure and sore throat  Eyes: Negative  Respiratory: Positive for cough  Negative for apnea, chest tightness, shortness of breath and wheezing  Cardiovascular: Negative for chest pain, palpitations and leg swelling     Gastrointestinal: Negative for abdominal distention, abdominal pain, anal bleeding, constipation, diarrhea, nausea and vomiting  Endocrine: Negative for cold intolerance, heat intolerance, polydipsia, polyphagia and polyuria  Genitourinary: Negative for difficulty urinating, dysuria, flank pain, hematuria and urgency  Musculoskeletal: Negative for arthralgias, back pain, gait problem, joint swelling and myalgias  Skin: Negative for color change, rash and wound  Allergic/Immunologic: Negative for environmental allergies, food allergies and immunocompromised state  Neurological: Negative for dizziness, seizures, syncope, speech difficulty, numbness and headaches  Hematological: Negative for adenopathy  Does not bruise/bleed easily  Psychiatric/Behavioral: Negative for agitation, behavioral problems, hallucinations, sleep disturbance and suicidal ideas  Objective:      /74 (BP Location: Left arm, Patient Position: Sitting, Cuff Size: Standard)   Temp 97 6 °F (36 4 °C) (Tympanic)   Ht 4' 11 5" (1 511 m)   Wt 67 6 kg (149 lb)   BMI 29 59 kg/m²          Physical Exam   Constitutional: She is oriented to person, place, and time  She appears well-developed and well-nourished  No distress  HENT:   Head: Normocephalic  Right Ear: External ear normal    Left Ear: External ear normal    Mouth/Throat: Oropharyngeal exudate present  Sinuses are hyperemic posterior pharynx is call will stone with posterior nasal drip   Eyes: Pupils are equal, round, and reactive to light  Conjunctivae and EOM are normal  Right eye exhibits no discharge  Left eye exhibits no discharge  No scleral icterus  Neck: Normal range of motion  No tracheal deviation present  No thyromegaly present  Cardiovascular: Normal rate, regular rhythm and normal heart sounds  Exam reveals no gallop and no friction rub  No murmur heard  Pulmonary/Chest: Effort normal  No respiratory distress  She has no wheezes  Rhonchi in both lung fields   Abdominal: Soft   Bowel sounds are normal  She exhibits no mass  There is no tenderness  There is no guarding  Musculoskeletal: She exhibits no edema or deformity  Lymphadenopathy:     She has no cervical adenopathy  Neurological: She is alert and oriented to person, place, and time  No cranial nerve deficit  Skin: Skin is warm and dry  No rash noted  She is not diaphoretic  No erythema  Psychiatric: She has a normal mood and affect   Thought content normal

## 2018-12-28 DIAGNOSIS — F41.1 GENERALIZED ANXIETY DISORDER: ICD-10-CM

## 2018-12-28 RX ORDER — ALPRAZOLAM 0.25 MG/1
0.25 TABLET ORAL 2 TIMES DAILY PRN
Qty: 60 TABLET | Refills: 0 | Status: SHIPPED | OUTPATIENT
Start: 2018-12-28 | End: 2019-02-04 | Stop reason: SDUPTHER

## 2019-02-04 DIAGNOSIS — K21.9 GASTROESOPHAGEAL REFLUX DISEASE WITHOUT ESOPHAGITIS: ICD-10-CM

## 2019-02-04 DIAGNOSIS — F41.1 GENERALIZED ANXIETY DISORDER: ICD-10-CM

## 2019-02-04 RX ORDER — ALPRAZOLAM 0.25 MG/1
0.25 TABLET ORAL 2 TIMES DAILY PRN
Qty: 60 TABLET | Refills: 0 | Status: SHIPPED | OUTPATIENT
Start: 2019-02-04 | End: 2019-03-06 | Stop reason: SDUPTHER

## 2019-02-04 RX ORDER — RANITIDINE 150 MG/1
150 TABLET ORAL 2 TIMES DAILY
Qty: 60 TABLET | Refills: 5 | Status: SHIPPED | OUTPATIENT
Start: 2019-02-04 | End: 2019-10-02 | Stop reason: SDUPTHER

## 2019-03-06 ENCOUNTER — OFFICE VISIT (OUTPATIENT)
Dept: FAMILY MEDICINE CLINIC | Facility: CLINIC | Age: 84
End: 2019-03-06
Payer: COMMERCIAL

## 2019-03-06 VITALS
HEIGHT: 60 IN | BODY MASS INDEX: 29.06 KG/M2 | WEIGHT: 148 LBS | DIASTOLIC BLOOD PRESSURE: 74 MMHG | SYSTOLIC BLOOD PRESSURE: 138 MMHG

## 2019-03-06 DIAGNOSIS — Z00.00 MEDICARE ANNUAL WELLNESS VISIT, SUBSEQUENT: Primary | ICD-10-CM

## 2019-03-06 DIAGNOSIS — I10 ESSENTIAL HYPERTENSION: ICD-10-CM

## 2019-03-06 DIAGNOSIS — F41.1 GENERALIZED ANXIETY DISORDER: ICD-10-CM

## 2019-03-06 PROCEDURE — G0439 PPPS, SUBSEQ VISIT: HCPCS | Performed by: FAMILY MEDICINE

## 2019-03-06 PROCEDURE — 1170F FXNL STATUS ASSESSED: CPT | Performed by: FAMILY MEDICINE

## 2019-03-06 PROCEDURE — 1160F RVW MEDS BY RX/DR IN RCRD: CPT | Performed by: FAMILY MEDICINE

## 2019-03-06 PROCEDURE — 1125F AMNT PAIN NOTED PAIN PRSNT: CPT | Performed by: FAMILY MEDICINE

## 2019-03-06 PROCEDURE — 1036F TOBACCO NON-USER: CPT | Performed by: FAMILY MEDICINE

## 2019-03-06 RX ORDER — ALPRAZOLAM 0.25 MG/1
0.25 TABLET ORAL 2 TIMES DAILY PRN
Qty: 60 TABLET | Refills: 0 | Status: SHIPPED | OUTPATIENT
Start: 2019-03-06 | End: 2019-04-05 | Stop reason: SDUPTHER

## 2019-03-06 RX ORDER — LOSARTAN POTASSIUM 25 MG/1
25 TABLET ORAL DAILY
Qty: 30 TABLET | Refills: 5 | Status: SHIPPED | OUTPATIENT
Start: 2019-03-06 | End: 2019-05-15 | Stop reason: SDUPTHER

## 2019-03-06 NOTE — PROGRESS NOTES
Assessment and Plan:  Problem List Items Addressed This Visit     None        Health Maintenance Due   Topic Date Due    Medicare Annual Wellness Visit (AWV)  06/30/1931    SLP PLAN OF CARE  06/30/1931    BMI: Followup Plan  06/30/1949    Pneumococcal PPSV23/PCV13 65+ Years / Low and Medium Risk (2 of 2 - PCV13) 10/22/2008    INFLUENZA VACCINE  07/01/2018    Fall Risk  02/16/2019    Depression Screening PHQ  02/16/2019    Urinary Incontinence Screening  02/16/2019         HPI:  Patient Active Problem List   Diagnosis    Atypical chest pain    GERD (gastroesophageal reflux disease)    Anxiety    Benign essential hypertension    Chronic urticaria    Macular cyst, hole, or pseudohole, left eye     Past Medical History:   Diagnosis Date    Diverticulosis     GERD (gastroesophageal reflux disease)     Hiatal hernia     Irritable bowel disease     Lactose intolerance     Osteoporosis      Past Surgical History:   Procedure Laterality Date    CARPAL TUNNEL RELEASE      CHOLECYSTECTOMY      EAR TUBE REMOVAL      HEMORROIDECTOMY      HYSTERECTOMY      SKIN CANCER EXCISION      TONSILLECTOMY       Family History   Problem Relation Age of Onset    Colon cancer Mother     Stomach cancer Father      Social History     Tobacco Use   Smoking Status Former Smoker   Smokeless Tobacco Never Used     Social History     Substance and Sexual Activity   Alcohol Use No      Social History     Substance and Sexual Activity   Drug Use No         Current Outpatient Medications   Medication Sig Dispense Refill    ALPRAZolam (XANAX) 0 25 mg tablet Take 1 tablet (0 25 mg total) by mouth 2 (two) times a day as needed for anxiety 60 tablet 0    Calcium Citrate (CITRACAL PO) Take 1 tablet by mouth daily   cetirizine (ZyrTEC) 10 mg tablet Take 10 mg by mouth daily      Cholecalciferol (VITAMIN D PO) Take 1,000 Units by mouth 2 (two) times a day        losartan (COZAAR) 25 mg tablet Take 1 tablet (25 mg total) by mouth daily 30 tablet 5    Multiple Vitamin (MULTIVITAMIN) capsule Take 1 capsule by mouth daily   ranitidine (ZANTAC) 150 mg tablet Take 1 tablet (150 mg total) by mouth 2 (two) times a day 60 tablet 5    VITAMIN E PO Take 1 tablet by mouth daily  No current facility-administered medications for this visit  Allergies   Allergen Reactions    Escitalopram Hives    Mepenzolate      Other reaction(s): Urinary Retention    Maalox Anti-Gas [Simethicone] Hives    Other      Seasonal     Protonix [Pantoprazole] Hives    Reglan [Metoclopramide]      Immunization History   Administered Date(s) Administered    Influenza Split High Dose Preservative Free IM 06/30/1931       Patient Care Team:  Monica Hodges DO as PCP - General  Rocael Winter MD    Medicare Screening Tests and Risk Assessments: Beatrice Vang is here for her Subsequent Wellness visit  Health Risk Assessment:  Patient rates overall health as good  Patient feels that their physical health rating is Same  Eyesight was rated as Slightly worse  Hearing was rated as Same  Patient feels that their emotional and mental health rating is Same  Pain experienced by patient in the last 7 days has been None  Patient's pain rating has been 5/10  Patient states that she has experienced no weight loss or gain in last 6 months  Emotional/Mental Health:  Patient has been feeling nervous/anxious  PHQ-9 Depression Screening:    Frequency of the following problems over the past two weeks:      1  Little interest or pleasure in doing things: 0 - not at all      2  Feeling down, depressed, or hopeless: 0 - not at all  PHQ-2 Score: 0          Broken Bones/Falls: Fall Risk Assessment:    In the past year, patient has experienced: No history of falling in past year  Chronic conditions that may contribute to falls visual disturbance  Bladder/Bowel:  Patient has leaked urine accidently in the last six months    Patient reports no loss of bowel control  Immunizations:  Patient has not had a flu vaccination within the last year  Patient has not received a pneumonia shot  Patient has not received a shingles shot  Patient has not received tetanus/diphtheria shot  Home Safety:  Patient does not have trouble with stairs inside or outside of their home  Patient currently reports that there are no safety hazards present in home, working smoke alarms, working carbon monoxide detectors  Preventative Screenings:   No breast cancer screening performed, no colon cancer screen completed, cholesterol screen completed, glaucoma eye exam completed,     Nutrition:  Current diet: Regular and Limited junk food with servings of the following:    Medications:  Patient is currently taking over-the-counter supplements  List of OTC medications includes: vitamins  Patient is able to manage medications  Lifestyle Choices:  Patient reports no tobacco use  Patient has smoked or used tobacco in the past   Patient has stopped her tobacco use  Tobacco use quit date: 1989  Patient reports no alcohol use  Patient drives a vehicle  Patient wears seat belt  Current level of exercise of physical activity described by patient as: limited due to knee pain, does housework  Activities of Daily Living:  Can get out of bed by his or her self, able to dress self, able to make own meals, able to do own shopping, able to bathe self, can do own laundry/housekeeping, can manage own money, pay bills and track expenses    Previous Hospitalizations:  No hospitalization or ED visit in past 12 months        Advanced Directives:  Patient has decided on a power of   Patient has spoken to designated power of   Patient has completed advanced directive          Preventative Screening/Counseling:      Cardiovascular:      General: Screening Not Indicated          Diabetes:      General: Risks and Benefits Discussed and Screening Current Colorectal Cancer:      General: Screening Not Indicated          Breast Cancer:      General: Screening Not Indicated          Cervical Cancer:      General: Screening Not Indicated          Osteoporosis:      General: Screening Not Indicated          AAA:      General: Screening Not Indicated          Glaucoma:      General: Risks and Benefits Discussed and Screening Current          HIV:      General: Screening Not Indicated          Hepatitis C:      General: Screening Not Indicated        Advanced Directives:   Patient has living will for healthcare, has durable POA for healthcare, patient has an advanced directive  Information on ACP and/or AD not provided  No 5 wishes given  End of life assessment reviewed with patient  Provider agrees with end of life decisions   Immunizations:      Influenza: Risks & Benefits Discussed and Influenza UTD This Year      Pneumococcal: Risks & Benefits Discussed and Vaccine Status Unknown      Shingrix: Risks & Benefits Discussed and Patient Declines      Hepatitis B (Low risk patients): Prevention Counseling and Series Not Indicated      Zostavax: Risks & Benefits Discussed and Patient Declines            No exam data present    Physical Exam:  Review of Systems   Constitutional: Negative for activity change, appetite change, diaphoresis, fatigue and fever  HENT: Positive for postnasal drip and sinus pressure  Eyes: Positive for visual disturbance  Respiratory: Negative for apnea, cough, chest tightness, shortness of breath and wheezing  Cardiovascular: Negative for chest pain, palpitations and leg swelling  Gastrointestinal: Negative for abdominal distention, abdominal pain, anal bleeding, bowel incontinence, constipation, diarrhea, nausea and vomiting  Endocrine: Negative for cold intolerance, heat intolerance, polydipsia, polyphagia and polyuria  Genitourinary: Positive for urgency  Negative for difficulty urinating, dysuria, flank pain and hematuria  Stess incontinence   Musculoskeletal: Negative for arthralgias, back pain, gait problem, joint swelling and myalgias  Skin: Negative for color change, rash and wound  Allergic/Immunologic: Negative for environmental allergies, food allergies and immunocompromised state  Neurological: Negative for dizziness, seizures, syncope, speech difficulty, numbness and headaches  Hematological: Negative for adenopathy  Does not bruise/bleed easily  Psychiatric/Behavioral: Negative for agitation, behavioral problems, hallucinations, sleep disturbance and suicidal ideas  The patient is nervous/anxious  Vitals:    03/06/19 1223   BP: 138/74   BP Location: Left arm   Patient Position: Sitting   Cuff Size: Standard   Weight: 67 1 kg (148 lb)   Height: 4' 11 5" (1 511 m)   Body mass index is 29 39 kg/m²  Physical Exam   Constitutional: She is oriented to person, place, and time  She appears well-developed and well-nourished  HENT:   Head: Normocephalic and atraumatic  Right Ear: External ear normal    Left Ear: External ear normal    Nose: Nose normal    Mouth/Throat: Oropharynx is clear and moist    Eyes: Pupils are equal, round, and reactive to light  Conjunctivae and EOM are normal    Neck: Normal range of motion  Neck supple  Cardiovascular: Normal rate, regular rhythm, normal heart sounds and intact distal pulses  Exam reveals no friction rub  No murmur heard  Pulmonary/Chest: Effort normal and breath sounds normal  No respiratory distress  She has no wheezes  She has no rales  She exhibits no tenderness  Abdominal: Soft  Bowel sounds are normal    Musculoskeletal: Normal range of motion  Neurological: She is alert and oriented to person, place, and time  Skin: Skin is warm and dry  Capillary refill takes 2 to 3 seconds  Psychiatric: She has a normal mood and affect   Her behavior is normal  Judgment and thought content normal

## 2019-03-29 ENCOUNTER — OFFICE VISIT (OUTPATIENT)
Dept: FAMILY MEDICINE CLINIC | Facility: CLINIC | Age: 84
End: 2019-03-29
Payer: COMMERCIAL

## 2019-03-29 VITALS
BODY MASS INDEX: 28.66 KG/M2 | WEIGHT: 146 LBS | DIASTOLIC BLOOD PRESSURE: 82 MMHG | SYSTOLIC BLOOD PRESSURE: 144 MMHG | HEIGHT: 60 IN | TEMPERATURE: 97.8 F

## 2019-03-29 DIAGNOSIS — J01.01 ACUTE RECURRENT MAXILLARY SINUSITIS: Primary | ICD-10-CM

## 2019-03-29 PROCEDURE — 1036F TOBACCO NON-USER: CPT | Performed by: FAMILY MEDICINE

## 2019-03-29 PROCEDURE — 1160F RVW MEDS BY RX/DR IN RCRD: CPT | Performed by: FAMILY MEDICINE

## 2019-03-29 PROCEDURE — 99213 OFFICE O/P EST LOW 20 MIN: CPT | Performed by: FAMILY MEDICINE

## 2019-03-29 RX ORDER — CEPHALEXIN 500 MG/1
CAPSULE ORAL
Qty: 30 CAPSULE | Refills: 0 | Status: SHIPPED | OUTPATIENT
Start: 2019-03-29 | End: 2019-04-08

## 2019-03-29 NOTE — PROGRESS NOTES
Assessment/Plan:    No problem-specific Assessment & Plan notes found for this encounter  Diagnoses and all orders for this visit:    Acute recurrent maxillary sinusitis          Subjective:      Patient ID: Nusrat Menendez is a 80 y o  female  Comes of sinusitis with pressure in the sinuses sore throat postnasal drip a feeling of a head swelling and a clearing of the throat not currently taking any antibiotic therapy      The following portions of the patient's history were reviewed and updated as appropriate:   She  has a past medical history of Diverticulosis, GERD (gastroesophageal reflux disease), Hiatal hernia, Irritable bowel disease, Lactose intolerance, and Osteoporosis  She   Patient Active Problem List    Diagnosis Date Noted    Benign essential hypertension 10/27/2017    Macular cyst, hole, or pseudohole, left eye 10/27/2017    Atypical chest pain 02/17/2017    GERD (gastroesophageal reflux disease) 02/17/2017    Anxiety 02/17/2017    Chronic urticaria 01/05/2017     She  has a past surgical history that includes Carpal tunnel release; Hysterectomy; Tonsillectomy; Cholecystectomy; Ear tube removal; Skin cancer excision; and Hemorroidectomy  Her family history includes Colon cancer in her mother; Stomach cancer in her father  She  reports that she quit smoking about 30 years ago  She has never used smokeless tobacco  She reports that she does not drink alcohol or use drugs  Current Outpatient Medications   Medication Sig Dispense Refill    ALPRAZolam (XANAX) 0 25 mg tablet Take 1 tablet (0 25 mg total) by mouth 2 (two) times a day as needed for anxiety 60 tablet 0    Calcium Citrate (CITRACAL PO) Take 1 tablet by mouth daily   cetirizine (ZyrTEC) 10 mg tablet Take 10 mg by mouth daily      Cholecalciferol (VITAMIN D PO) Take 1,000 Units by mouth 2 (two) times a day        losartan (COZAAR) 25 mg tablet Take 1 tablet (25 mg total) by mouth daily 30 tablet 5    Multiple Vitamin (MULTIVITAMIN) capsule Take 1 capsule by mouth daily   ranitidine (ZANTAC) 150 mg tablet Take 1 tablet (150 mg total) by mouth 2 (two) times a day 60 tablet 5    VITAMIN E PO Take 1 tablet by mouth daily  No current facility-administered medications for this visit  Current Outpatient Medications on File Prior to Visit   Medication Sig    ALPRAZolam (XANAX) 0 25 mg tablet Take 1 tablet (0 25 mg total) by mouth 2 (two) times a day as needed for anxiety    Calcium Citrate (CITRACAL PO) Take 1 tablet by mouth daily   cetirizine (ZyrTEC) 10 mg tablet Take 10 mg by mouth daily    Cholecalciferol (VITAMIN D PO) Take 1,000 Units by mouth 2 (two) times a day   losartan (COZAAR) 25 mg tablet Take 1 tablet (25 mg total) by mouth daily    Multiple Vitamin (MULTIVITAMIN) capsule Take 1 capsule by mouth daily   ranitidine (ZANTAC) 150 mg tablet Take 1 tablet (150 mg total) by mouth 2 (two) times a day    VITAMIN E PO Take 1 tablet by mouth daily  No current facility-administered medications on file prior to visit  She is allergic to escitalopram; mepenzolate; maalox anti-gas [simethicone]; other; protonix [pantoprazole]; and reglan [metoclopramide]       Review of Systems   Constitutional: Negative for activity change, appetite change, diaphoresis, fatigue and fever  HENT: Positive for sinus pressure, sinus pain and sore throat  Eyes: Negative  Respiratory: Negative for apnea, cough, chest tightness, shortness of breath and wheezing  Cardiovascular: Negative for chest pain, palpitations and leg swelling  Gastrointestinal: Negative for abdominal distention, abdominal pain, anal bleeding, constipation, diarrhea, nausea and vomiting  Endocrine: Negative for cold intolerance, heat intolerance, polydipsia, polyphagia and polyuria  Genitourinary: Negative for difficulty urinating, dysuria, flank pain, hematuria and urgency     Musculoskeletal: Negative for arthralgias, back pain, gait problem, joint swelling and myalgias  Skin: Negative for color change, rash and wound  Allergic/Immunologic: Negative for environmental allergies, food allergies and immunocompromised state  Neurological: Negative for dizziness, seizures, syncope, speech difficulty, numbness and headaches  Hematological: Negative for adenopathy  Does not bruise/bleed easily  Psychiatric/Behavioral: Negative for agitation, behavioral problems, hallucinations, sleep disturbance and suicidal ideas  Objective:      /82 (BP Location: Left arm, Patient Position: Sitting, Cuff Size: Standard)   Temp 97 8 °F (36 6 °C) (Tympanic) Comment (Src): Left Ear  Ht 4' 11 5" (1 511 m)   Wt 66 2 kg (146 lb)   BMI 28 99 kg/m²          Physical Exam   Constitutional: She is oriented to person, place, and time  She appears well-developed and well-nourished  No distress  HENT:   Head: Normocephalic  Right Ear: External ear normal    Left Ear: External ear normal    Mouth/Throat: Oropharyngeal exudate present  Red and irritated nasal mucosa   Eyes: Pupils are equal, round, and reactive to light  Conjunctivae and EOM are normal  Right eye exhibits no discharge  Left eye exhibits no discharge  No scleral icterus  Neck: Normal range of motion  No tracheal deviation present  No thyromegaly present  Cardiovascular: Normal rate, regular rhythm and normal heart sounds  Exam reveals no gallop and no friction rub  No murmur heard  Pulmonary/Chest: Effort normal and breath sounds normal  No respiratory distress  She has no wheezes  Abdominal: Soft  Bowel sounds are normal  She exhibits no mass  There is no tenderness  There is no guarding  Musculoskeletal: She exhibits no edema or deformity  Lymphadenopathy:     She has no cervical adenopathy  Neurological: She is alert and oriented to person, place, and time  No cranial nerve deficit  Skin: Skin is warm and dry  No rash noted  She is not diaphoretic   No erythema  Psychiatric: She has a normal mood and affect   Thought content normal

## 2019-04-04 ENCOUNTER — TELEPHONE (OUTPATIENT)
Dept: FAMILY MEDICINE CLINIC | Facility: CLINIC | Age: 84
End: 2019-04-04

## 2019-04-05 DIAGNOSIS — F41.1 GENERALIZED ANXIETY DISORDER: ICD-10-CM

## 2019-04-08 RX ORDER — ALPRAZOLAM 0.25 MG/1
TABLET ORAL
Qty: 60 TABLET | Refills: 0 | Status: SHIPPED | OUTPATIENT
Start: 2019-04-08 | End: 2019-05-07 | Stop reason: SDUPTHER

## 2019-05-07 DIAGNOSIS — F41.1 GENERALIZED ANXIETY DISORDER: ICD-10-CM

## 2019-05-07 RX ORDER — ALPRAZOLAM 0.25 MG/1
0.25 TABLET ORAL 2 TIMES DAILY PRN
Qty: 60 TABLET | Refills: 0 | Status: SHIPPED | OUTPATIENT
Start: 2019-05-07 | End: 2019-06-10 | Stop reason: SDUPTHER

## 2019-05-15 DIAGNOSIS — I10 ESSENTIAL HYPERTENSION: ICD-10-CM

## 2019-05-15 RX ORDER — LOSARTAN POTASSIUM 25 MG/1
25 TABLET ORAL DAILY
Qty: 90 TABLET | Refills: 1 | Status: SHIPPED | OUTPATIENT
Start: 2019-05-15 | End: 2019-10-02 | Stop reason: SDUPTHER

## 2019-06-10 DIAGNOSIS — F41.1 GENERALIZED ANXIETY DISORDER: ICD-10-CM

## 2019-06-10 RX ORDER — ALPRAZOLAM 0.25 MG/1
0.25 TABLET ORAL 2 TIMES DAILY PRN
Qty: 60 TABLET | Refills: 0 | Status: SHIPPED | OUTPATIENT
Start: 2019-06-10 | End: 2019-07-10 | Stop reason: SDUPTHER

## 2019-07-10 DIAGNOSIS — F41.1 GENERALIZED ANXIETY DISORDER: ICD-10-CM

## 2019-07-10 RX ORDER — ALPRAZOLAM 0.25 MG/1
0.25 TABLET ORAL 2 TIMES DAILY PRN
Qty: 60 TABLET | Refills: 0 | Status: SHIPPED | OUTPATIENT
Start: 2019-07-10 | End: 2019-08-14 | Stop reason: SDUPTHER

## 2019-08-12 DIAGNOSIS — F41.1 GENERALIZED ANXIETY DISORDER: ICD-10-CM

## 2019-08-13 RX ORDER — ALPRAZOLAM 0.25 MG/1
0.25 TABLET ORAL 2 TIMES DAILY PRN
Qty: 60 TABLET | Refills: 0 | OUTPATIENT
Start: 2019-08-13

## 2019-08-14 ENCOUNTER — OFFICE VISIT (OUTPATIENT)
Dept: FAMILY MEDICINE CLINIC | Facility: CLINIC | Age: 84
End: 2019-08-14
Payer: COMMERCIAL

## 2019-08-14 VITALS
HEIGHT: 60 IN | SYSTOLIC BLOOD PRESSURE: 140 MMHG | BODY MASS INDEX: 29.25 KG/M2 | DIASTOLIC BLOOD PRESSURE: 80 MMHG | WEIGHT: 149 LBS

## 2019-08-14 DIAGNOSIS — F41.1 GENERALIZED ANXIETY DISORDER: ICD-10-CM

## 2019-08-14 DIAGNOSIS — I10 BENIGN ESSENTIAL HYPERTENSION: Primary | ICD-10-CM

## 2019-08-14 DIAGNOSIS — K21.9 GASTROESOPHAGEAL REFLUX DISEASE WITHOUT ESOPHAGITIS: Chronic | ICD-10-CM

## 2019-08-14 DIAGNOSIS — F41.9 ANXIETY: Chronic | ICD-10-CM

## 2019-08-14 PROCEDURE — 1036F TOBACCO NON-USER: CPT | Performed by: FAMILY MEDICINE

## 2019-08-14 PROCEDURE — 1160F RVW MEDS BY RX/DR IN RCRD: CPT | Performed by: FAMILY MEDICINE

## 2019-08-14 PROCEDURE — 99213 OFFICE O/P EST LOW 20 MIN: CPT | Performed by: FAMILY MEDICINE

## 2019-08-14 RX ORDER — ALPRAZOLAM 0.25 MG/1
0.25 TABLET ORAL 2 TIMES DAILY PRN
Qty: 60 TABLET | Refills: 0 | Status: SHIPPED | OUTPATIENT
Start: 2019-08-14 | End: 2019-09-17 | Stop reason: SDUPTHER

## 2019-08-14 NOTE — PROGRESS NOTES
Assessment/Plan:    Problem List Items Addressed This Visit        Digestive    GERD (gastroesophageal reflux disease) (Chronic)       Cardiovascular and Mediastinum    Benign essential hypertension - Primary       Other    Anxiety (Chronic)           Diagnoses and all orders for this visit:    Benign essential hypertension    Gastroesophageal reflux disease without esophagitis    Anxiety        No problem-specific Assessment & Plan notes found for this encounter  Subjective:      Patient ID: Silverio Solomon is a 80 y o  female  Patient is here today for a follow-up of visit she has not recently had lipid profile or a chemistry panel she is scheduled for pre-admission testing as part of her surgical workup for her up on bladder resuspension on but I am going to give her slips for what we need and she can just do it whenever she gets her pre-admission testing      The following portions of the patient's history were reviewed and updated as appropriate:   She has a past medical history of Diverticulosis, GERD (gastroesophageal reflux disease), Hiatal hernia, Irritable bowel disease, Lactose intolerance, and Osteoporosis  ,  does not have any pertinent problems on file  ,   has a past surgical history that includes Carpal tunnel release; Hysterectomy; Tonsillectomy; Cholecystectomy; Ear tube removal; Skin cancer excision; and Hemorroidectomy  ,  family history includes Colon cancer in her mother; Stomach cancer in her father  ,   reports that she quit smoking about 30 years ago  She has never used smokeless tobacco  She reports that she does not drink alcohol or use drugs  ,  is allergic to escitalopram; mepenzolate; maalox anti-gas [simethicone]; other; protonix [pantoprazole]; and reglan [metoclopramide]     Current Outpatient Medications   Medication Sig Dispense Refill    ALPRAZolam (XANAX) 0 25 mg tablet Take 1 tablet (0 25 mg total) by mouth 2 (two) times a day as needed for anxiety 60 tablet 0    Calcium Citrate (CITRACAL PO) Take 1 tablet by mouth daily   cetirizine (ZyrTEC) 10 mg tablet Take 10 mg by mouth daily      Cholecalciferol (VITAMIN D PO) Take 1,000 Units by mouth 2 (two) times a day   losartan (COZAAR) 25 mg tablet Take 1 tablet (25 mg total) by mouth daily 90 tablet 1    Multiple Vitamin (MULTIVITAMIN) capsule Take 1 capsule by mouth daily   ranitidine (ZANTAC) 150 mg tablet Take 1 tablet (150 mg total) by mouth 2 (two) times a day 60 tablet 5    VITAMIN E PO Take 1 tablet by mouth daily  No current facility-administered medications for this visit  Review of Systems   Constitutional: Negative for activity change, appetite change, diaphoresis, fatigue and fever  HENT: Negative  Eyes: Negative  Respiratory: Negative for apnea, cough, chest tightness, shortness of breath and wheezing  Cardiovascular: Negative for chest pain, palpitations and leg swelling  Gastrointestinal: Negative for abdominal distention, abdominal pain, anal bleeding, constipation, diarrhea, nausea and vomiting  Endocrine: Negative for cold intolerance, heat intolerance, polydipsia, polyphagia and polyuria  Genitourinary: Positive for enuresis and frequency  Negative for difficulty urinating, dysuria, flank pain, hematuria and urgency  Musculoskeletal: Negative for arthralgias, back pain, gait problem, joint swelling and myalgias  Skin: Negative for color change, rash and wound  Allergic/Immunologic: Negative for environmental allergies, food allergies and immunocompromised state  Neurological: Negative for dizziness, seizures, syncope, speech difficulty, numbness and headaches  Hematological: Negative for adenopathy  Does not bruise/bleed easily  Psychiatric/Behavioral: Negative for agitation, behavioral problems, hallucinations, sleep disturbance and suicidal ideas  The patient is nervous/anxious            Objective:  Vitals:    08/14/19 1201   BP: 140/80   BP Location: Left arm   Patient Position: Sitting   Cuff Size: Standard   Weight: 67 6 kg (149 lb)   Height: 4' 11 5" (1 511 m)     Body mass index is 29 59 kg/m²  Physical Exam   Constitutional: She is oriented to person, place, and time  She appears well-developed and well-nourished  No distress  HENT:   Head: Normocephalic  Right Ear: External ear normal    Left Ear: External ear normal    Nose: Nose normal    Mouth/Throat: Oropharynx is clear and moist    Eyes: Pupils are equal, round, and reactive to light  Conjunctivae and EOM are normal  Right eye exhibits no discharge  Left eye exhibits no discharge  No scleral icterus  Neck: Normal range of motion  No tracheal deviation present  No thyromegaly present  Cardiovascular: Normal rate, regular rhythm and normal heart sounds  Exam reveals no gallop and no friction rub  No murmur heard  Pulmonary/Chest: Effort normal and breath sounds normal  No respiratory distress  She has no wheezes  Abdominal: Soft  Bowel sounds are normal  She exhibits no mass  There is no tenderness  There is no guarding  Musculoskeletal: She exhibits no edema or deformity  Lymphadenopathy:     She has no cervical adenopathy  Neurological: She is alert and oriented to person, place, and time  No cranial nerve deficit  Skin: Skin is warm and dry  No rash noted  She is not diaphoretic  No erythema  Psychiatric: She has a normal mood and affect   Thought content normal

## 2019-08-20 ENCOUNTER — LAB (OUTPATIENT)
Dept: LAB | Facility: MEDICAL CENTER | Age: 84
End: 2019-08-20
Payer: COMMERCIAL

## 2019-08-20 DIAGNOSIS — F41.9 ANXIETY: Chronic | ICD-10-CM

## 2019-08-20 LAB
ALBUMIN SERPL BCP-MCNC: 4.2 G/DL (ref 3.5–5)
ALP SERPL-CCNC: 93 U/L (ref 46–116)
ALT SERPL W P-5'-P-CCNC: 20 U/L (ref 12–78)
ANION GAP SERPL CALCULATED.3IONS-SCNC: 10 MMOL/L (ref 4–13)
AST SERPL W P-5'-P-CCNC: 17 U/L (ref 5–45)
BILIRUB SERPL-MCNC: 0.88 MG/DL (ref 0.2–1)
BUN SERPL-MCNC: 12 MG/DL (ref 5–25)
CALCIUM SERPL-MCNC: 9.4 MG/DL (ref 8.3–10.1)
CHLORIDE SERPL-SCNC: 108 MMOL/L (ref 100–108)
CHOLEST SERPL-MCNC: 175 MG/DL (ref 50–200)
CO2 SERPL-SCNC: 26 MMOL/L (ref 21–32)
CREAT SERPL-MCNC: 0.77 MG/DL (ref 0.6–1.3)
GFR SERPL CREATININE-BSD FRML MDRD: 69 ML/MIN/1.73SQ M
GLUCOSE P FAST SERPL-MCNC: 100 MG/DL (ref 65–99)
HDLC SERPL-MCNC: 39 MG/DL (ref 40–60)
LDLC SERPL CALC-MCNC: 99 MG/DL (ref 0–100)
NONHDLC SERPL-MCNC: 136 MG/DL
POTASSIUM SERPL-SCNC: 4 MMOL/L (ref 3.5–5.3)
PROT SERPL-MCNC: 7.4 G/DL (ref 6.4–8.2)
SODIUM SERPL-SCNC: 144 MMOL/L (ref 136–145)
TRIGL SERPL-MCNC: 187 MG/DL
TSH SERPL DL<=0.05 MIU/L-ACNC: 1.99 UIU/ML (ref 0.36–3.74)

## 2019-08-20 PROCEDURE — 80053 COMPREHEN METABOLIC PANEL: CPT | Performed by: FAMILY MEDICINE

## 2019-08-20 PROCEDURE — 80061 LIPID PANEL: CPT | Performed by: FAMILY MEDICINE

## 2019-08-20 PROCEDURE — 84443 ASSAY THYROID STIM HORMONE: CPT

## 2019-08-20 PROCEDURE — 36415 COLL VENOUS BLD VENIPUNCTURE: CPT | Performed by: FAMILY MEDICINE

## 2019-09-03 ENCOUNTER — CONSULT (OUTPATIENT)
Dept: FAMILY MEDICINE CLINIC | Facility: CLINIC | Age: 84
End: 2019-09-03
Payer: COMMERCIAL

## 2019-09-03 VITALS
HEIGHT: 60 IN | SYSTOLIC BLOOD PRESSURE: 128 MMHG | DIASTOLIC BLOOD PRESSURE: 80 MMHG | WEIGHT: 148 LBS | TEMPERATURE: 97.8 F | BODY MASS INDEX: 29.06 KG/M2

## 2019-09-03 DIAGNOSIS — N81.6 RECTOCELE: ICD-10-CM

## 2019-09-03 DIAGNOSIS — I10 BENIGN ESSENTIAL HYPERTENSION: ICD-10-CM

## 2019-09-03 DIAGNOSIS — Z01.818 PREOPERATIVE CLEARANCE: Primary | ICD-10-CM

## 2019-09-03 DIAGNOSIS — N81.10 BLADDER PROLAPSE, FEMALE, ACQUIRED: ICD-10-CM

## 2019-09-03 PROCEDURE — 99214 OFFICE O/P EST MOD 30 MIN: CPT | Performed by: FAMILY MEDICINE

## 2019-09-03 NOTE — PROGRESS NOTES
Subjective: Kingsley Daniel is a 80 y o  female who presents to the office today for a preoperative consultation at the request of surgeon Serenity Mcbride MD who plans on performing anterior colporrhaphy, rectocele repair, sling and cysto on September 13  This consultation is requested for the specific conditions prompting preoperative evaluation (i e  because of potential affect on operative risk):  Essential hypertension  Planned anesthesia: general  The patient has the following known anesthesia issues:  no prior problems with endotracheal intubation or anesthesia  Patients bleeding risk: no recent abnormal bleeding  The following portions of the patient's history were reviewed and updated as appropriate:   She  has a past medical history of Diverticulosis, GERD (gastroesophageal reflux disease), Hiatal hernia, Irritable bowel disease, Lactose intolerance, and Osteoporosis  She   Patient Active Problem List    Diagnosis Date Noted    Benign essential hypertension 10/27/2017    Macular cyst, hole, or pseudohole, left eye 10/27/2017    Atypical chest pain 02/17/2017    GERD (gastroesophageal reflux disease) 02/17/2017    Anxiety 02/17/2017    Chronic urticaria 01/05/2017     She  has a past surgical history that includes Carpal tunnel release; Hysterectomy; Tonsillectomy; Cholecystectomy; Ear tube removal; Skin cancer excision; and Hemorroidectomy  Her family history includes Colon cancer in her mother; Stomach cancer in her father  She  reports that she quit smoking about 30 years ago  She has never used smokeless tobacco  She reports that she does not drink alcohol or use drugs  Current Outpatient Medications   Medication Sig Dispense Refill    Calcium Citrate (CITRACAL PO) Take 1 tablet by mouth daily   cetirizine (ZyrTEC) 10 mg tablet Take 10 mg by mouth daily      Cholecalciferol (VITAMIN D PO) Take 1,000 Units by mouth 2 (two) times a day        losartan (COZAAR) 25 mg tablet Take 1 tablet (25 mg total) by mouth daily 90 tablet 1    Multiple Vitamin (MULTIVITAMIN) capsule Take 1 capsule by mouth daily   ranitidine (ZANTAC) 150 mg tablet Take 1 tablet (150 mg total) by mouth 2 (two) times a day 60 tablet 5    VITAMIN E PO Take 1 tablet by mouth daily   ALPRAZolam (XANAX) 0 25 mg tablet Take 1 tablet (0 25 mg total) by mouth 2 (two) times a day as needed for anxiety 60 tablet 0     No current facility-administered medications for this visit  Current Outpatient Medications on File Prior to Visit   Medication Sig    Calcium Citrate (CITRACAL PO) Take 1 tablet by mouth daily   cetirizine (ZyrTEC) 10 mg tablet Take 10 mg by mouth daily    Cholecalciferol (VITAMIN D PO) Take 1,000 Units by mouth 2 (two) times a day   losartan (COZAAR) 25 mg tablet Take 1 tablet (25 mg total) by mouth daily    Multiple Vitamin (MULTIVITAMIN) capsule Take 1 capsule by mouth daily   ranitidine (ZANTAC) 150 mg tablet Take 1 tablet (150 mg total) by mouth 2 (two) times a day    VITAMIN E PO Take 1 tablet by mouth daily   ALPRAZolam (XANAX) 0 25 mg tablet Take 1 tablet (0 25 mg total) by mouth 2 (two) times a day as needed for anxiety     No current facility-administered medications on file prior to visit  She is allergic to escitalopram; mepenzolate; maalox anti-gas [simethicone]; other; protonix [pantoprazole]; and reglan [metoclopramide]     Past Medical History:   Diagnosis Date    Diverticulosis     GERD (gastroesophageal reflux disease)     Hiatal hernia     Irritable bowel disease     Lactose intolerance     Osteoporosis        Past Surgical History:   Procedure Laterality Date    CARPAL TUNNEL RELEASE      CHOLECYSTECTOMY      EAR TUBE REMOVAL      HEMORROIDECTOMY      HYSTERECTOMY      SKIN CANCER EXCISION      TONSILLECTOMY         Review of Systems  A comprehensive review of systems was negative except for: Genitourinary: positive for Rectocele and also a bladder prolapse     Objective:  Physical Exam    Cardiographics  ECG: normal sinus rhythm, no blocks or conduction defects, no ischemic changes  Echocardiogram: normal and reviewed by myself    Imaging  Chest x-ray:  no recent chest x-ray     Lab Review   Lab on 08/20/2019   Component Date Value    TSH 3RD GENERATON 08/20/2019 1 990    Office Visit on 08/14/2019   Component Date Value    Sodium 08/20/2019 144     Potassium 08/20/2019 4 0     Chloride 08/20/2019 108     CO2 08/20/2019 26     ANION GAP 08/20/2019 10     BUN 08/20/2019 12     Creatinine 08/20/2019 0 77     Glucose, Fasting 08/20/2019 100*    Calcium 08/20/2019 9 4     AST 08/20/2019 17     ALT 08/20/2019 20     Alkaline Phosphatase 08/20/2019 93     Total Protein 08/20/2019 7 4     Albumin 08/20/2019 4 2     Total Bilirubin 08/20/2019 0 88     eGFR 08/20/2019 69     Cholesterol 08/20/2019 175     Triglycerides 08/20/2019 187*    HDL, Direct 08/20/2019 39*    LDL Calculated 08/20/2019 99     Non-HDL-Chol (CHOL-HDL) 08/20/2019 136         Assessment:     80 y o  female with planned surgery as above  Known risk factors for perioperative complications: None    Difficulty with intubation is not anticipated  Cardiac Risk Estimation:  Minimal    Current medications which may produce withdrawal symptoms if withheld perioperatively:  None      Plan:  Patient is CLEARED for surgery without any additional cardiac testing  1  Preoperative workup as follows none  2  Change in medication regimen before surgery: none, continue medication regimen including morning of surgery, with sip of water  3  Prophylaxis for cardiac events with perioperative beta-blockers: not indicated  4  Invasive hemodynamic monitoring perioperatively: not indicated    5  Deep vein thrombosis prophylaxis postoperatively:regimen to be chosen by surgical team   6  Other measures: Not applicable

## 2019-09-16 DIAGNOSIS — F41.1 GENERALIZED ANXIETY DISORDER: ICD-10-CM

## 2019-09-16 RX ORDER — ALPRAZOLAM 0.25 MG/1
0.25 TABLET ORAL 2 TIMES DAILY PRN
Qty: 60 TABLET | Refills: 0 | OUTPATIENT
Start: 2019-09-16

## 2019-09-17 DIAGNOSIS — F41.1 GENERALIZED ANXIETY DISORDER: ICD-10-CM

## 2019-09-17 RX ORDER — ALPRAZOLAM 0.25 MG/1
0.25 TABLET ORAL 2 TIMES DAILY PRN
Qty: 60 TABLET | Refills: 0 | Status: SHIPPED | OUTPATIENT
Start: 2019-09-17 | End: 2019-10-17 | Stop reason: SDUPTHER

## 2019-10-02 DIAGNOSIS — I10 ESSENTIAL HYPERTENSION: ICD-10-CM

## 2019-10-02 DIAGNOSIS — K21.9 GASTROESOPHAGEAL REFLUX DISEASE WITHOUT ESOPHAGITIS: ICD-10-CM

## 2019-10-02 RX ORDER — RANITIDINE 150 MG/1
150 TABLET ORAL 2 TIMES DAILY
Qty: 60 TABLET | Refills: 5 | Status: SHIPPED | OUTPATIENT
Start: 2019-10-02 | End: 2019-11-19 | Stop reason: ALTCHOICE

## 2019-10-02 RX ORDER — LOSARTAN POTASSIUM 25 MG/1
25 TABLET ORAL DAILY
Qty: 90 TABLET | Refills: 1 | Status: SHIPPED | OUTPATIENT
Start: 2019-10-02 | End: 2020-03-11 | Stop reason: SDUPTHER

## 2019-10-17 DIAGNOSIS — F41.1 GENERALIZED ANXIETY DISORDER: ICD-10-CM

## 2019-10-17 RX ORDER — ALPRAZOLAM 0.25 MG/1
0.25 TABLET ORAL 2 TIMES DAILY PRN
Qty: 60 TABLET | Refills: 0 | Status: SHIPPED | OUTPATIENT
Start: 2019-10-17 | End: 2019-11-19 | Stop reason: SDUPTHER

## 2019-11-19 ENCOUNTER — OFFICE VISIT (OUTPATIENT)
Dept: FAMILY MEDICINE CLINIC | Facility: CLINIC | Age: 84
End: 2019-11-19
Payer: COMMERCIAL

## 2019-11-19 VITALS
SYSTOLIC BLOOD PRESSURE: 148 MMHG | HEIGHT: 60 IN | BODY MASS INDEX: 29.45 KG/M2 | WEIGHT: 150 LBS | DIASTOLIC BLOOD PRESSURE: 80 MMHG

## 2019-11-19 DIAGNOSIS — I10 BENIGN ESSENTIAL HYPERTENSION: Primary | ICD-10-CM

## 2019-11-19 DIAGNOSIS — K21.9 GASTROESOPHAGEAL REFLUX DISEASE WITHOUT ESOPHAGITIS: Chronic | ICD-10-CM

## 2019-11-19 DIAGNOSIS — F41.1 GENERALIZED ANXIETY DISORDER: ICD-10-CM

## 2019-11-19 PROCEDURE — 1036F TOBACCO NON-USER: CPT | Performed by: FAMILY MEDICINE

## 2019-11-19 PROCEDURE — 1160F RVW MEDS BY RX/DR IN RCRD: CPT | Performed by: FAMILY MEDICINE

## 2019-11-19 PROCEDURE — 99213 OFFICE O/P EST LOW 20 MIN: CPT | Performed by: FAMILY MEDICINE

## 2019-11-19 RX ORDER — IBUPROFEN 600 MG/1
TABLET ORAL
COMMUNITY
Start: 2019-09-13 | End: 2019-11-19 | Stop reason: ALTCHOICE

## 2019-11-19 RX ORDER — ALPRAZOLAM 0.25 MG/1
0.25 TABLET ORAL 2 TIMES DAILY PRN
Qty: 60 TABLET | Refills: 0 | Status: SHIPPED | OUTPATIENT
Start: 2019-11-19 | End: 2019-12-19 | Stop reason: SDUPTHER

## 2019-11-19 RX ORDER — FAMOTIDINE 20 MG/1
20 TABLET, FILM COATED ORAL 2 TIMES DAILY
Qty: 60 TABLET | Refills: 5 | Status: SHIPPED | OUTPATIENT
Start: 2019-11-19 | End: 2020-03-11 | Stop reason: ALTCHOICE

## 2019-11-19 NOTE — PROGRESS NOTES
BMI Counseling: Body mass index is 29 79 kg/m²  The BMI is above normal  Nutrition recommendations include decreasing portion sizes, encouraging healthy choices of fruits and vegetables, decreasing fast food intake, moderation in carbohydrate intake and increasing intake of lean protein  Exercise recommendations include exercising 3-5 times per week  No pharmacotherapy was ordered  Patient referred to PCP due to patient being overweight  Assessment/Plan:  Patient will continue her current medications    Problem List Items Addressed This Visit        Digestive    GERD (gastroesophageal reflux disease) (Chronic)       Cardiovascular and Mediastinum    Benign essential hypertension - Primary     Continue current meds                Diagnoses and all orders for this visit:    Benign essential hypertension    Gastroesophageal reflux disease without esophagitis    Other orders  -     Discontinue: ibuprofen (MOTRIN) 600 mg tablet        Benign essential hypertension  Continue current meds        Subjective:      Patient ID: Alee Felix is a 80 y o  female  Mrs Burnett here for a follow-up of visit she had good results from her pelvic reconstructive surgery blood pressure is borderline today but she always get always gets very anxious in the office and when checked at home her blood pressures are much better she would like to continue taking the Pepcid 20 mg b i d  We took her off of her proton pump inhibitors because of her osteoporosis      The following portions of the patient's history were reviewed and updated as appropriate:   She has a past medical history of Diverticulosis, GERD (gastroesophageal reflux disease), Hiatal hernia, Irritable bowel disease, Lactose intolerance, and Osteoporosis  ,  does not have any pertinent problems on file  ,   has a past surgical history that includes Carpal tunnel release; Hysterectomy; Tonsillectomy; Cholecystectomy; Ear tube removal; Skin cancer excision;  Hemorroidectomy; and Vagina reconstruction surgery (09/13/2019)  ,  family history includes Colon cancer in her mother; Stomach cancer in her father  ,   reports that she quit smoking about 30 years ago  She has never used smokeless tobacco  She reports that she does not drink alcohol or use drugs  ,  is allergic to escitalopram; mepenzolate; maalox anti-gas [simethicone]; other; protonix [pantoprazole]; and reglan [metoclopramide]     Current Outpatient Medications   Medication Sig Dispense Refill    ALPRAZolam (XANAX) 0 25 mg tablet Take 1 tablet (0 25 mg total) by mouth 2 (two) times a day as needed for anxiety 60 tablet 0    Calcium Citrate (CITRACAL PO) Take 1 tablet by mouth daily   cetirizine (ZyrTEC) 10 mg tablet Take 10 mg by mouth daily      Cholecalciferol (VITAMIN D PO) Take 1,000 Units by mouth 2 (two) times a day   losartan (COZAAR) 25 mg tablet Take 1 tablet (25 mg total) by mouth daily 90 tablet 1    Multiple Vitamin (MULTIVITAMIN) capsule Take 1 capsule by mouth daily   VITAMIN E PO Take 1 tablet by mouth daily  No current facility-administered medications for this visit  Review of Systems   Constitutional: Negative for activity change, appetite change, diaphoresis, fatigue and fever  HENT: Negative  Eyes: Negative  Respiratory: Negative for apnea, cough, chest tightness, shortness of breath and wheezing  Cardiovascular: Negative for chest pain, palpitations and leg swelling  Gastrointestinal: Negative for abdominal distention, abdominal pain, anal bleeding, constipation, diarrhea, nausea and vomiting  Endocrine: Negative for cold intolerance, heat intolerance, polydipsia, polyphagia and polyuria  Genitourinary: Negative for difficulty urinating, dysuria, flank pain, hematuria and urgency  Musculoskeletal: Negative for arthralgias, back pain, gait problem, joint swelling and myalgias  Skin: Negative for color change, rash and wound     Allergic/Immunologic: Negative for environmental allergies, food allergies and immunocompromised state  Neurological: Negative for dizziness, seizures, syncope, speech difficulty, numbness and headaches  Hematological: Negative for adenopathy  Does not bruise/bleed easily  Psychiatric/Behavioral: Negative for agitation, behavioral problems, hallucinations, sleep disturbance and suicidal ideas  Objective:  Vitals:    11/19/19 1202   BP: 148/80   BP Location: Left arm   Patient Position: Sitting   Cuff Size: Standard   Weight: 68 kg (150 lb)   Height: 4' 11 5" (1 511 m)     Body mass index is 29 79 kg/m²  Physical Exam   Constitutional: She is oriented to person, place, and time  She appears well-developed and well-nourished  No distress  HENT:   Head: Normocephalic  Right Ear: External ear normal    Left Ear: External ear normal    Nose: Nose normal    Mouth/Throat: Oropharynx is clear and moist    Eyes: Pupils are equal, round, and reactive to light  Conjunctivae and EOM are normal  Right eye exhibits no discharge  Left eye exhibits no discharge  No scleral icterus  Neck: Normal range of motion  No tracheal deviation present  No thyromegaly present  Cardiovascular: Normal rate, regular rhythm and normal heart sounds  Exam reveals no gallop and no friction rub  No murmur heard  Pulmonary/Chest: Effort normal and breath sounds normal  No respiratory distress  She has no wheezes  Abdominal: Soft  Bowel sounds are normal  She exhibits no mass  There is no tenderness  There is no guarding  Musculoskeletal: She exhibits no edema or deformity  Lymphadenopathy:     She has no cervical adenopathy  Neurological: She is alert and oriented to person, place, and time  No cranial nerve deficit  Skin: Skin is warm and dry  No rash noted  She is not diaphoretic  No erythema  Psychiatric: She has a normal mood and affect   Thought content normal

## 2019-12-19 DIAGNOSIS — F41.1 GENERALIZED ANXIETY DISORDER: ICD-10-CM

## 2019-12-19 RX ORDER — ALPRAZOLAM 0.25 MG/1
0.25 TABLET ORAL 2 TIMES DAILY PRN
Qty: 60 TABLET | Refills: 0 | Status: SHIPPED | OUTPATIENT
Start: 2019-12-19 | End: 2020-01-20 | Stop reason: SDUPTHER

## 2020-01-20 DIAGNOSIS — F41.1 GENERALIZED ANXIETY DISORDER: ICD-10-CM

## 2020-01-21 RX ORDER — ALPRAZOLAM 0.25 MG/1
0.25 TABLET ORAL 2 TIMES DAILY PRN
Qty: 60 TABLET | Refills: 0 | Status: ON HOLD | OUTPATIENT
Start: 2020-01-21 | End: 2020-02-05 | Stop reason: SDUPTHER

## 2020-01-31 ENCOUNTER — APPOINTMENT (EMERGENCY)
Dept: CT IMAGING | Facility: HOSPITAL | Age: 85
End: 2020-01-31
Payer: COMMERCIAL

## 2020-01-31 ENCOUNTER — HOSPITAL ENCOUNTER (EMERGENCY)
Facility: HOSPITAL | Age: 85
End: 2020-01-31
Attending: FAMILY MEDICINE | Admitting: FAMILY MEDICINE
Payer: COMMERCIAL

## 2020-01-31 ENCOUNTER — HOSPITAL ENCOUNTER (INPATIENT)
Facility: HOSPITAL | Age: 85
LOS: 5 days | Discharge: NURSING, REHAB OR LONG-TERM CARE | DRG: 481 | End: 2020-02-05
Attending: SURGERY | Admitting: SURGERY
Payer: COMMERCIAL

## 2020-01-31 ENCOUNTER — APPOINTMENT (INPATIENT)
Dept: RADIOLOGY | Facility: HOSPITAL | Age: 85
DRG: 481 | End: 2020-01-31
Payer: COMMERCIAL

## 2020-01-31 ENCOUNTER — APPOINTMENT (EMERGENCY)
Dept: RADIOLOGY | Facility: HOSPITAL | Age: 85
End: 2020-01-31
Payer: COMMERCIAL

## 2020-01-31 VITALS
RESPIRATION RATE: 18 BRPM | WEIGHT: 173.28 LBS | SYSTOLIC BLOOD PRESSURE: 142 MMHG | OXYGEN SATURATION: 98 % | TEMPERATURE: 97.2 F | DIASTOLIC BLOOD PRESSURE: 62 MMHG | HEART RATE: 106 BPM | BODY MASS INDEX: 34.41 KG/M2

## 2020-01-31 DIAGNOSIS — S72.142A CLOSED DISPLACED INTERTROCHANTERIC FRACTURE OF LEFT FEMUR, INITIAL ENCOUNTER (HCC): Primary | ICD-10-CM

## 2020-01-31 DIAGNOSIS — F41.1 GENERALIZED ANXIETY DISORDER: ICD-10-CM
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
## 2020-01-31 DIAGNOSIS — S52.502A CLOSED FRACTURE OF LEFT DISTAL RADIUS: ICD-10-CM

## 2020-01-31 DIAGNOSIS — S72.142A: Primary | ICD-10-CM

## 2020-01-31 DIAGNOSIS — S52.502A NONDISPLACED FRACTURE OF DISTAL END OF LEFT RADIUS: ICD-10-CM

## 2020-01-31 LAB
ABO GROUP BLD: NORMAL
ALBUMIN SERPL BCP-MCNC: 3.7 G/DL (ref 3.5–5)
ALP SERPL-CCNC: 95 U/L (ref 46–116)
ALT SERPL W P-5'-P-CCNC: 23 U/L (ref 12–78)
ANION GAP SERPL CALCULATED.3IONS-SCNC: 10 MMOL/L (ref 4–13)
APTT PPP: 28 SECONDS (ref 23–37)
AST SERPL W P-5'-P-CCNC: 19 U/L (ref 5–45)
BASOPHILS # BLD AUTO: 0.08 THOUSANDS/ΜL (ref 0–0.1)
BASOPHILS NFR BLD AUTO: 1 % (ref 0–1)
BILIRUB SERPL-MCNC: 0.7 MG/DL (ref 0.2–1)
BLD GP AB SCN SERPL QL: NEGATIVE
BUN SERPL-MCNC: 16 MG/DL (ref 5–25)
CALCIUM SERPL-MCNC: 9.7 MG/DL (ref 8.3–10.1)
CHLORIDE SERPL-SCNC: 100 MMOL/L (ref 100–108)
CK SERPL-CCNC: 39 U/L (ref 26–192)
CO2 SERPL-SCNC: 27 MMOL/L (ref 21–32)
CREAT SERPL-MCNC: 1.02 MG/DL (ref 0.6–1.3)
EOSINOPHIL # BLD AUTO: 0.06 THOUSAND/ΜL (ref 0–0.61)
EOSINOPHIL NFR BLD AUTO: 1 % (ref 0–6)
ERYTHROCYTE [DISTWIDTH] IN BLOOD BY AUTOMATED COUNT: 13.1 % (ref 11.6–15.1)
GFR SERPL CREATININE-BSD FRML MDRD: 49 ML/MIN/1.73SQ M
GLUCOSE SERPL-MCNC: 133 MG/DL (ref 65–140)
HCT VFR BLD AUTO: 40.6 % (ref 34.8–46.1)
HGB BLD-MCNC: 13.3 G/DL (ref 11.5–15.4)
IMM GRANULOCYTES # BLD AUTO: 0.06 THOUSAND/UL (ref 0–0.2)
IMM GRANULOCYTES NFR BLD AUTO: 1 % (ref 0–2)
INR PPP: 1.01 (ref 0.84–1.19)
LYMPHOCYTES # BLD AUTO: 3.4 THOUSANDS/ΜL (ref 0.6–4.47)
LYMPHOCYTES NFR BLD AUTO: 38 % (ref 14–44)
MCH RBC QN AUTO: 33.7 PG (ref 26.8–34.3)
MCHC RBC AUTO-ENTMCNC: 32.8 G/DL (ref 31.4–37.4)
MCV RBC AUTO: 103 FL (ref 82–98)
MONOCYTES # BLD AUTO: 0.96 THOUSAND/ΜL (ref 0.17–1.22)
MONOCYTES NFR BLD AUTO: 11 % (ref 4–12)
NEUTROPHILS # BLD AUTO: 4.29 THOUSANDS/ΜL (ref 1.85–7.62)
NEUTS SEG NFR BLD AUTO: 48 % (ref 43–75)
NRBC BLD AUTO-RTO: 0 /100 WBCS
PLATELET # BLD AUTO: 296 THOUSANDS/UL (ref 149–390)
PMV BLD AUTO: 10.6 FL (ref 8.9–12.7)
POTASSIUM SERPL-SCNC: 3.4 MMOL/L (ref 3.5–5.3)
PROT SERPL-MCNC: 7.4 G/DL (ref 6.4–8.2)
PROTHROMBIN TIME: 13.3 SECONDS (ref 11.6–14.5)
RBC # BLD AUTO: 3.95 MILLION/UL (ref 3.81–5.12)
RH BLD: POSITIVE
SODIUM SERPL-SCNC: 137 MMOL/L (ref 136–145)
SPECIMEN EXPIRATION DATE: NORMAL
TROPONIN I SERPL-MCNC: <0.02 NG/ML
WBC # BLD AUTO: 8.85 THOUSAND/UL (ref 4.31–10.16)

## 2020-01-31 PROCEDURE — 93005 ELECTROCARDIOGRAM TRACING: CPT

## 2020-01-31 PROCEDURE — 29125 APPL SHORT ARM SPLINT STATIC: CPT | Performed by: PHYSICIAN ASSISTANT

## 2020-01-31 PROCEDURE — 96376 TX/PRO/DX INJ SAME DRUG ADON: CPT

## 2020-01-31 PROCEDURE — 96374 THER/PROPH/DIAG INJ IV PUSH: CPT

## 2020-01-31 PROCEDURE — 84484 ASSAY OF TROPONIN QUANT: CPT | Performed by: PHYSICIAN ASSISTANT

## 2020-01-31 PROCEDURE — 82550 ASSAY OF CK (CPK): CPT | Performed by: PHYSICIAN ASSISTANT

## 2020-01-31 PROCEDURE — 73552 X-RAY EXAM OF FEMUR 2/>: CPT

## 2020-01-31 PROCEDURE — 99285 EMERGENCY DEPT VISIT HI MDM: CPT | Performed by: PHYSICIAN ASSISTANT

## 2020-01-31 PROCEDURE — 85730 THROMBOPLASTIN TIME PARTIAL: CPT | Performed by: PHYSICIAN ASSISTANT

## 2020-01-31 PROCEDURE — 80053 COMPREHEN METABOLIC PANEL: CPT | Performed by: PHYSICIAN ASSISTANT

## 2020-01-31 PROCEDURE — 72125 CT NECK SPINE W/O DYE: CPT

## 2020-01-31 PROCEDURE — 85610 PROTHROMBIN TIME: CPT | Performed by: PHYSICIAN ASSISTANT

## 2020-01-31 PROCEDURE — 85025 COMPLETE CBC W/AUTO DIFF WBC: CPT | Performed by: PHYSICIAN ASSISTANT

## 2020-01-31 PROCEDURE — 71260 CT THORAX DX C+: CPT

## 2020-01-31 PROCEDURE — 99222 1ST HOSP IP/OBS MODERATE 55: CPT | Performed by: SURGERY

## 2020-01-31 PROCEDURE — 74177 CT ABD & PELVIS W/CONTRAST: CPT

## 2020-01-31 PROCEDURE — 86850 RBC ANTIBODY SCREEN: CPT | Performed by: EMERGENCY MEDICINE

## 2020-01-31 PROCEDURE — 36415 COLL VENOUS BLD VENIPUNCTURE: CPT | Performed by: PHYSICIAN ASSISTANT

## 2020-01-31 PROCEDURE — 86901 BLOOD TYPING SEROLOGIC RH(D): CPT | Performed by: EMERGENCY MEDICINE

## 2020-01-31 PROCEDURE — 70450 CT HEAD/BRAIN W/O DYE: CPT

## 2020-01-31 PROCEDURE — 99285 EMERGENCY DEPT VISIT HI MDM: CPT

## 2020-01-31 PROCEDURE — 86900 BLOOD TYPING SEROLOGIC ABO: CPT | Performed by: EMERGENCY MEDICINE

## 2020-01-31 PROCEDURE — 73110 X-RAY EXAM OF WRIST: CPT

## 2020-01-31 PROCEDURE — 73502 X-RAY EXAM HIP UNI 2-3 VIEWS: CPT

## 2020-01-31 RX ORDER — LORAZEPAM 0.5 MG/1
0.5 TABLET ORAL ONCE
Status: COMPLETED | OUTPATIENT
Start: 2020-01-31 | End: 2020-01-31

## 2020-01-31 RX ORDER — HYDROMORPHONE HCL/PF 1 MG/ML
0.2 SYRINGE (ML) INJECTION
Status: DISCONTINUED | OUTPATIENT
Start: 2020-01-31 | End: 2020-02-05 | Stop reason: HOSPADM

## 2020-01-31 RX ORDER — OXYCODONE HYDROCHLORIDE 5 MG/1
2.5 TABLET ORAL EVERY 4 HOURS PRN
Status: DISCONTINUED | OUTPATIENT
Start: 2020-01-31 | End: 2020-02-05 | Stop reason: HOSPADM

## 2020-01-31 RX ORDER — LORAZEPAM 2 MG/ML
1 INJECTION INTRAMUSCULAR ONCE
Status: COMPLETED | OUTPATIENT
Start: 2020-01-31 | End: 2020-01-31

## 2020-01-31 RX ORDER — SODIUM CHLORIDE 9 MG/ML
75 INJECTION, SOLUTION INTRAVENOUS CONTINUOUS
Status: DISCONTINUED | OUTPATIENT
Start: 2020-02-01 | End: 2020-02-01

## 2020-01-31 RX ORDER — FENTANYL CITRATE 50 UG/ML
1 INJECTION, SOLUTION INTRAMUSCULAR; INTRAVENOUS ONCE
Status: COMPLETED | OUTPATIENT
Start: 2020-01-31 | End: 2020-01-31

## 2020-01-31 RX ORDER — FENTANYL CITRATE 50 UG/ML
25 INJECTION, SOLUTION INTRAMUSCULAR; INTRAVENOUS ONCE
Status: COMPLETED | OUTPATIENT
Start: 2020-01-31 | End: 2020-01-31

## 2020-01-31 RX ORDER — OXYCODONE HYDROCHLORIDE 5 MG/1
5 TABLET ORAL EVERY 4 HOURS PRN
Status: DISCONTINUED | OUTPATIENT
Start: 2020-01-31 | End: 2020-02-05 | Stop reason: HOSPADM

## 2020-01-31 RX ORDER — ALPRAZOLAM 0.25 MG/1
0.25 TABLET ORAL 2 TIMES DAILY PRN
Status: DISCONTINUED | OUTPATIENT
Start: 2020-01-31 | End: 2020-02-05 | Stop reason: HOSPADM

## 2020-01-31 RX ORDER — ONDANSETRON 2 MG/ML
INJECTION INTRAMUSCULAR; INTRAVENOUS
Status: DISPENSED
Start: 2020-01-31 | End: 2020-02-01

## 2020-01-31 RX ORDER — FAMOTIDINE 20 MG/1
20 TABLET, FILM COATED ORAL DAILY
Status: DISCONTINUED | OUTPATIENT
Start: 2020-02-01 | End: 2020-02-01

## 2020-01-31 RX ORDER — ACETAMINOPHEN 325 MG/1
650 TABLET ORAL EVERY 6 HOURS SCHEDULED
Status: DISCONTINUED | OUTPATIENT
Start: 2020-01-31 | End: 2020-02-03

## 2020-01-31 RX ADMIN — OXYCODONE HYDROCHLORIDE 5 MG: 5 TABLET ORAL at 20:32

## 2020-01-31 RX ADMIN — LORAZEPAM 0.5 MG: 0.5 TABLET ORAL at 14:42

## 2020-01-31 RX ADMIN — FENTANYL CITRATE 25 MCG: 50 INJECTION, SOLUTION INTRAMUSCULAR; INTRAVENOUS at 16:43

## 2020-01-31 RX ADMIN — ACETAMINOPHEN 650 MG: 325 TABLET ORAL at 19:41

## 2020-01-31 RX ADMIN — FENTANYL CITRATE 25 MCG: 50 INJECTION, SOLUTION INTRAMUSCULAR; INTRAVENOUS at 13:46

## 2020-01-31 RX ADMIN — FENTANYL CITRATE 25 MCG: 50 INJECTION, SOLUTION INTRAMUSCULAR; INTRAVENOUS at 14:42

## 2020-01-31 RX ADMIN — IOHEXOL 100 ML: 350 INJECTION, SOLUTION INTRAVENOUS at 14:07

## 2020-01-31 NOTE — EMTALA/ACUTE CARE TRANSFER
[facility] EMERGENCY DEPARTMENT  [address] [place] [id number]  Dept: [phone]      EMTALA TRANSFER CONSENT    NAME Darleen GALINDO 1931                              MRN [id number]    I have been informed of my rights regarding examination, treatment, and transfer   by Dr Dhara Dia MD    Benefits: Specialized equipment and/or services available at the receiving facility (Include comment)________________________(trauma/orthopedic surgery)    Risks: Potential for delay in receiving treatment, Potential deterioration of medical condition, Loss of IV, Increased discomfort during transfer, Possible worsening of condition or death during transfer      Consent for Transfer:  I acknowledge that my medical condition has been evaluated and explained to me by the emergency department physician or other qualified medical person and/or my attending physician, who has recommended that I be transferred to the service of  Accepting Physician: Dr Aye Francisco at [address] Name, [address] : [facility]  The above potential benefits of such transfer, the potential risks associated with such transfer, and the probable risks of not being transferred have been explained to me, and I fully understand them  The doctor has explained that, in my case, the benefits of transfer outweigh the risks  I agree to be transferred  I authorize the performance of emergency medical procedures and treatments upon me in both transit and upon arrival at the receiving facility  Additionally, I authorize the release of any and all medical records to the receiving facility and request they be transported with me, if possible  I understand that the safest mode of transportation during a medical emergency is an ambulance and that the Hospital advocates the use of this mode of transport   Risks of traveling to the receiving facility by car, including absence of medical control, life sustaining equipment, such as oxygen, and medical personnel has been explained to me and I fully understand them  (JACOB CORRECT BOX BELOW)  [  ]  I consent to the stated transfer and to be transported by ambulance/helicopter  [  ]  I consent to the stated transfer, but refuse transportation by ambulance and accept full responsibility for my transportation by car  I understand the risks of non-ambulance transfers and I exonerate the Hospital and its staff from any deterioration in my condition that results from this refusal     X___________________________________________    DATE  20  TIME________  Signature of patient or legally responsible individual signing on patient behalf           RELATIONSHIP TO PATIENT_________________________          Provider Certification    NAME Chelsy Thurston                                        [facility] 1931                              MRN [id number]    A medical screening exam was performed on the above named patient  Based on the examination:    Condition Necessitating Transfer The primary encounter diagnosis was Closed displaced intertrochanteric fracture of left femur, initial encounter ([facility] [address] )  A diagnosis of Fall, initial encounter was also pertinent to this visit      Patient Condition: The patient has been stabilized such that within reasonable medical probability, no material deterioration of the patient condition or the condition of the unborn child(rogerio) is likely to result from the transfer    Reason for Transfer: Level of Care needed not available at this facility, Patient/Family request    Transfer Requirements: [facility]   · Space available and qualified personnel available for treatment as acknowledged by PACS  · Agreed to accept transfer and to provide appropriate medical treatment as acknowledged by       Dr Lesia Schneider  · Appropriate medical records of the examination and treatment of the patient are provided at the time of transfer   STAFF INITIAL WHEN COMPLETED _______  · Transfer will be performed by qualified personnel from    and appropriate transfer equipment as required, including the use of necessary and appropriate life support measures  Provider Certification: I have examined the patient and explained the following risks and benefits of being transferred/refusing transfer to the patient/family:  General risk, such as traffic hazards, adverse weather conditions, rough terrain or turbulence, possible failure of equipment (including vehicle or aircraft), or consequences of actions of persons outside the control of the transport personnel, Unanticipated needs of medical equipment and personnel during transport, Risk of worsening condition, The possibility of a transport vehicle being unavailable      Based on these reasonable risks and benefits to the patient and/or the unborn child(rogerio), and based upon the information available at the time of the patients examination, I certify that the medical benefits reasonably to be expected from the provision of appropriate medical treatments at another medical facility outweigh the increasing risks, if any, to the individuals medical condition, and in the case of labor to the unborn child, from effecting the transfer      X____________________________________________ DATE 01/31/20        TIME_______      ORIGINAL - SEND TO MEDICAL RECORDS   COPY - SEND WITH PATIENT DURING TRANSFER

## 2020-01-31 NOTE — H&P
H&P Exam - Trauma   Chepe Holcomb 80 y o  female MRN: [id number]  Unit/Bed#: [facility] 608-01 Encounter: [id number]          Assessment/Plan   Trauma Alert: Other Transfer from [address] [place] [address] [place]: Ambulance  Trauma Team: Attending Nano Boykin and Residents Quynh Simpson  Consultants: Orthopaedics: Resident Edie  Time Called 6:40pm, Returned call: Yes right away    Trauma Active Problems:   Fall  Left intertrochanteric fracture  Left distal radial fracture    Trauma Plan:   Admit to trauma  CTs/xrays reviewed  Ortho consult-NWB LUE and LLE, OR tomorrow   NPO at midnight   Neurovascular checks  Analgesia  PT/OT  Case mgmt     Chief Complaint: "My hip is sore "     History of Present Illness   HPI:  Chepe Holcomb is a 80 y o  female who presents as a transfer from [facility] after a fall at home  Patient says she was going down her stairs when she slipped on the last step and landed on her left hip  Patient denies hitting her head or LOC  Patient says she was unable to stand back up or move her leg due to left hip pain  At [facility], patient found to have a left radial fracture which was splinted and a left intertrochanteric fracture  Patient complains of left hip pain at this time  Denies AC/AP  Patient does not have other complaints at this time  Mechanism:Fall    Review of Systems   Constitutional: Negative for chills, diaphoresis, fatigue and fever  HENT: Negative for congestion, rhinorrhea and sore throat  Eyes: Negative for photophobia and visual disturbance  Respiratory: Negative for cough, chest tightness and shortness of breath  Cardiovascular: Negative for chest pain and palpitations  Gastrointestinal: Negative for abdominal pain, blood in stool, constipation, diarrhea, nausea and vomiting  Genitourinary: Negative for dysuria, frequency and hematuria  Musculoskeletal: Positive for arthralgias and gait problem  Negative for myalgias, neck pain and neck stiffness  Skin: Negative for pallor and rash  Neurological: Negative for weakness, light-headedness, numbness and headaches  Hematological: Negative for adenopathy  Does not bruise/bleed easily  All other systems reviewed and are negative  12-point, complete review of systems was reviewed and negative except as stated above         Historical Information        Past Medical History:   Diagnosis Date    Diverticulosis     GERD (gastroesophageal reflux disease)     Hiatal hernia     Irritable bowel disease     Lactose intolerance     Osteoporosis      Past Surgical History:   Procedure Laterality Date    CARPAL TUNNEL RELEASE      CHOLECYSTECTOMY      EAR TUBE REMOVAL      HEMORROIDECTOMY      HYSTERECTOMY      PERINEAL  PELVIC RECONSTRUCTION SURGERY  2019    Dr Lucy Esteban       Social History   Social History     Substance and Sexual Activity   Alcohol Use Never    Frequency: Never     Social History     Substance and Sexual Activity   Drug Use No     Social History     Tobacco Use   Smoking Status Former Smoker    Last attempt to quit: Florene Odor Years since quittin 1   Smokeless Tobacco Never Used     Immunization History   Administered Date(s) Administered    Influenza Split High Dose Preservative Free IM 1931    Pneumococcal Polysaccharide PPV23 10/22/2007     Last Tetanus: Unknown     Meds/Allergies   all current active meds have been reviewed    Allergies   Allergen Reactions    Escitalopram Hives    Mepenzolate      Other reaction(s): Urinary Retention    Maalox Anti-Gas [Simethicone] Hives    Other      Seasonal     Protonix [Pantoprazole] Hives    Reglan [Metoclopramide]          PHYSICAL EXAM    Objective   Vitals:   First set: Temperature: 97 8 °F (36 6 °C) (20)  Pulse: 99 (20)  Respirations: 16 (20)  Blood Pressure: 136/62 (20)    Primary Survey:   (A) Airway: Intact  (B) Breathing: CTAB  (C) Circulation: Pulses: normal, carotid  2/4, pedal  2/4, radial  2/4 and femoral  2/4  (D) Disabliity:  GCS Total:  15  (E) Expose:  Completed    Secondary Survey: (Click on Physical Exam tab above)  Physical Exam   Constitutional: She is oriented to person, place, and time  No distress  Alert and oriented, no acute distress   HENT:   No obvious signs of trauma    Eyes: Pupils are equal, round, and reactive to light  EOM are normal    Neck: Normal range of motion  Neck supple  No Cspine TTP   Cardiovascular: Normal rate, regular rhythm, normal heart sounds and intact distal pulses  Pulmonary/Chest: Effort normal and breath sounds normal  No respiratory distress  She exhibits no tenderness  Abdominal: Soft  Bowel sounds are normal  She exhibits no distension  There is no tenderness  Musculoskeletal: She exhibits tenderness and deformity  LUE: splinted, soft compartments, cap refill <2, able to range fingers, sensation intact  LLE: shorted and externally rotated, soft compartments throughout leg, TTP at proximal femur, sensation intact, able to range toes, DP/PT 2+   Neurological: She is alert and oriented to person, place, and time  No facial asymmetry noted, CN 2-12 intact, full ROM of upper and lower extremities, muscle strength 5/5 throughout, sensation intact throughout   Skin: Skin is warm and dry  Capillary refill takes less than 2 seconds  No rash noted  She is not diaphoretic  No erythema  No pallor  Psychiatric: She has a normal mood and affect  Her behavior is normal  Judgment and thought content normal    Nursing note and vitals reviewed  Invasive Devices     Peripheral Intravenous Line            Peripheral IV 01/31/20 Right Antecubital less than 1 day          Drain            Urethral Catheter Latex 16 Fr  less than 1 day                Lab Results:   Results: I have personally reviewed pertinent reports      Lab Results   Component Value Date    SODIUM 137 01/31/2020    K 3 4 (L) 01/31/2020     01/31/2020    CO2 27 01/31/2020    BUN 16 01/31/2020    CREATININE 1 02 01/31/2020    CALCIUM 9 7 01/31/2020    AST 19 01/31/2020    ALT 23 01/31/2020    ALKPHOS 95 01/31/2020    EGFR 49 01/31/2020     Lab Results   Component Value Date    WBC 8 72 02/01/2020    HGB 10 9 (L) 02/01/2020    HCT 33 6 (L) 02/01/2020     (H) 02/01/2020     02/01/2020    MCH 33 3 02/01/2020    MCHC 32 4 02/01/2020    RDW 13 2 02/01/2020    MPV 10 5 02/01/2020    NRBC 0 02/01/2020     Lab Results   Component Value Date    TROPONINI <0 02 01/31/2020     Imaging/EKG Studies: Results: I have personally reviewed pertinent reports  1/31 CTH/Cspine: Negative  1/31 CT CAP: Comminuted left intertrochanteric hip fracture  Otherwise no traumatic injury to the chest, abdomen, or pelvis  1/31 Left hip XR: Acute left femoral intertrochanteric fracture with mild varus angulation  1/31 Left wrist XR: Nondisplaced fracture of the distal radius        Code Status: Level 1 - Full Code  Advance Directive and Living Will:      Power of :    POLST:

## 2020-01-31 NOTE — ED PROVIDER NOTES
H&P Exam - Trauma   Alexx Hitchcock 80 y o  female MRN: [id number]  Unit/Bed#: TR15/TR15 Encounter: [id number]    Assessment/Plan   Trauma Alert: Trauma Acuity: Trauma Evaluation  Model of Arrival:   via   BLS  Trauma Team: Current Providers  Attending Provider: Baljit Amaya MD  Physician Assistant: Paulett Duane, PA-C  Registered Nurse: Fiona Manzanares RN  Registered Nurse: Janis Seals RN  ED Technician: Kevon Loera  Consultants: Orthopaedics: Dr Anil Marte  Returned call: Yes    Trauma Active Problems: 1  Fall down single step, left hip pain    Trauma Plan: 1  Check labs, imaging  Dispo pending findings  Chief Complaint:   Chief Complaint   Patient presents with    Fall     Patient fell down one step, c/o left hip pain, no LOC, no thinners       History of Present Illness   HPI:  Alexx Hitchcock is a 80 y o  female who presents with left hip pain following a fall down a single step today at home  Mechanism:        tripped and fell on last step     80year old female presents via EMS for evaluation after a fall  Pt notes she was walking down her steps to go outside  She tripped and fell down the last step and landed on her left hip  She was unable to get back up after the fall  She normally ambulates without assistance  C/o left hip pain and left wrist pain since injury  Pt has been unable to ambulate since injury  Denies any symptoms prior to the fall and states she tripped secondary to her "bad knees"  She has been feeling well and in her usual state of health  Denies hitting head  No LOC reported  Denies aspirin or blood thinners  Denies neck or back pain  She states, "I feel shakey and need something for my anxiety"  She does admit taking her home xanax earlier today  EMS reports she was laying outside for approximately 30-40 minutes until EMS arrived          History provided by:  Patient and EMS personnel   used: No    Fall   Mechanism of injury: fall    Incident location:  Home  Time since incident:  1 hour  Arrived directly from scene: yes    Fall:     Fall occurred:  Down stairs    Impact surface:  [place]    Point of impact:  Buttocks (left hip/left side)  Suspicion of alcohol use: no    Suspicion of drug use: no    Prior to arrival data:     Patient ambulatory at scene: no      Blood loss:  None    Responsiveness at scene:  Alert    Orientation at scene:  Person, place, situation and time    Loss of consciousness: no      Airway interventions:  None    Breathing interventions:  None    IV access status:  Established    Fluids administered:  None    Medications administered:  None    Immobilization:  C-collar  Associated symptoms: no abdominal pain, no back pain, no chest pain, no difficulty breathing, no headaches, no loss of consciousness, no nausea, no neck pain, no seizures and no vomiting    Risk factors: no anticoagulation therapy      Review of Systems   Constitutional: Positive for chills  Negative for fatigue and fever  HENT: Negative  Negative for congestion, rhinorrhea and sore throat  Eyes: Negative  Negative for visual disturbance  Respiratory: Negative  Negative for cough, shortness of breath and wheezing  Cardiovascular: Negative  Negative for chest pain, palpitations and leg swelling  Gastrointestinal: Negative  Negative for abdominal pain, constipation, diarrhea, nausea and vomiting  Genitourinary: Negative  Negative for dysuria, flank pain, frequency and hematuria  Musculoskeletal: Positive for arthralgias and gait problem  Negative for back pain, myalgias and neck pain  Skin: Negative  Negative for rash  Neurological: Negative for dizziness, seizures, loss of consciousness, syncope, light-headedness and headaches  Psychiatric/Behavioral: Negative for confusion  The patient is nervous/anxious  All other systems reviewed and are negative        Historical Information     Immunizations:   Immunization History   Administered Date(s) Administered    Influenza Split High Dose Preservative Free IM 1931    Pneumococcal Polysaccharide PPV23 10/22/2007       Past Medical History:   Diagnosis Date    Diverticulosis     GERD (gastroesophageal reflux disease)     Hiatal hernia     Irritable bowel disease     Lactose intolerance     Osteoporosis        Family History   Problem Relation Age of Onset    Colon cancer Mother     Stomach cancer Father      Past Surgical History:   Procedure Laterality Date    CARPAL TUNNEL RELEASE      CHOLECYSTECTOMY      EAR TUBE REMOVAL      HEMORROIDECTOMY      HYSTERECTOMY      PERINEAL  PELVIC RECONSTRUCTION SURGERY  2019    Dr Asaf White - [facility]    SKIN CANCER EXCISION      TONSILLECTOMY         Social History     Socioeconomic History    Marital status:       Spouse name: None    Number of children: None    Years of education: None    Highest education level: None   Occupational History    None   Social Needs    Financial resource strain: None    Food insecurity:     Worry: None     Inability: None    Transportation needs:     Medical: None     Non-medical: None   Tobacco Use    Smoking status: Former Smoker     Last attempt to quit:      Years since quittin     Smokeless tobacco: Never Used   Substance and Sexual Activity    Alcohol use: No    Drug use: No    Sexual activity: None   Lifestyle    Physical activity:     Days per week: None     Minutes per session: None    Stress: None   Relationships    Social connections:     Talks on phone: None     Gets together: None     Attends Druze service: None     Active member of club or organization: None     Attends meetings of clubs or organizations: None     Relationship status: None    Intimate partner violence:     Fear of current or ex partner: None     Emotionally abused: None     Physically abused: None     Forced sexual activity: None   Other Topics Concern    None   Social History Narrative No advanced directives        Family History: non-contributory    Meds/Allergies   Prior to Admission Medications   Prescriptions Last Dose Informant Patient Reported? Taking? ALPRAZolam (XANAX) 0 25 mg tablet   No No   Sig: Take 1 tablet (0 25 mg total) by mouth 2 (two) times a day as needed for anxiety   Calcium Citrate (CITRACAL PO)   Yes No   Sig: Take 1 tablet by mouth daily  Cholecalciferol (VITAMIN D PO)   Yes No   Sig: Take 1,000 Units by mouth 2 (two) times a day  Multiple Vitamin (MULTIVITAMIN) capsule   Yes No   Sig: Take 1 capsule by mouth daily  VITAMIN E PO   Yes No   Sig: Take 1 tablet by mouth daily  cetirizine (ZyrTEC) 10 mg tablet  Self Yes No   Sig: Take 10 mg by mouth daily   famotidine (PEPCID) 20 mg tablet   No No   Sig: Take 1 tablet (20 mg total) by mouth 2 (two) times a day   losartan (COZAAR) 25 mg tablet   No No   Sig: Take 1 tablet (25 mg total) by mouth daily      Facility-Administered Medications: None       Allergies   Allergen Reactions    Escitalopram Hives    Mepenzolate      Other reaction(s): Urinary Retention    Maalox Anti-Gas [Simethicone] Hives    Other      Seasonal     Protonix [Pantoprazole] Hives    Reglan [Metoclopramide]        PHYSICAL EXAM  Objective   Vitals:   First set: Temperature: (!) 97 2 °F (36 2 °C) (01/31/20 1330)  Pulse: 96 (01/31/20 1330)  Respirations: 20 (01/31/20 1330)  Blood Pressure: 130/81 (01/31/20 1330)  SpO2: 99 % (01/31/20 1330)    Primary Survey:   (A) Airway: airway patent; pt phonating normally  C spine immobilized  (B) Breathing: lung sounds present bilaterally  (C) Circulation: Pulses:   normal, carotid  2/4, pedal  2/4, radial  2/4 and femoral  2/4; there is no visible external hemorrhage    (D) Disabliity:  GCS Total:  15, Eye Opening:   Spontaneous = 4, Motor Response: Obeys commands = 6 and Verbal Response:  Oriented = 5  (E) Expose:  Completed    Secondary Survey: (Click on Physical Exam tab above)    Physical Exam Constitutional: She is oriented to person, place, and time  She appears well-developed and well-nourished  Non-toxic appearance  She appears distressed  Cervical collar in place  Elderly female   HENT:   Head: Normocephalic and atraumatic  Head is without raccoon's eyes, without Esteban's sign, without abrasion and without contusion  Right Ear: Hearing, tympanic membrane, external ear and ear canal normal  No hemotympanum  Left Ear: Hearing, tympanic membrane, external ear and ear canal normal  No hemotympanum  Nose: Nose normal    Mouth/Throat: Uvula is midline, oropharynx is clear and moist and mucous membranes are normal  No oropharyngeal exudate  Eyes: Pupils are equal, round, and reactive to light  Conjunctivae, EOM and lids are normal  No scleral icterus  Neck: Trachea normal and phonation normal  Neck supple  No spinous process tenderness present  No tracheal deviation present  Cardiovascular: Regular rhythm, normal heart sounds, intact distal pulses and normal pulses  Tachycardia present  No murmur heard  Pulmonary/Chest: Effort normal and breath sounds normal  No tachypnea  No respiratory distress  She has no wheezes  She has no rhonchi  She has no rales  Abdominal: Soft  Bowel sounds are normal  She exhibits no distension  There is no tenderness  There is no rigidity, no rebound, no guarding and no CVA tenderness  Musculoskeletal: She exhibits no edema  Left elbow: Normal  She exhibits normal range of motion  No tenderness found  Left wrist: She exhibits tenderness  She exhibits normal range of motion, no swelling, no deformity and no laceration  Left hip: She exhibits decreased range of motion, bony tenderness and deformity  She exhibits no swelling and no laceration  Cervical back: Normal  She exhibits normal range of motion, no bony tenderness and no pain  Thoracic back: Normal  She exhibits normal range of motion, no bony tenderness and no pain  Lumbar back: Normal  She exhibits normal range of motion, no bony tenderness and no pain  Left hand: Normal  She exhibits normal range of motion, no tenderness, normal capillary refill, no deformity, no laceration and no swelling  Normal sensation noted  Normal strength noted  + shortening and external rotation of LLE   Neurological: She is alert and oriented to person, place, and time  She has normal strength  No cranial nerve deficit or sensory deficit  She exhibits normal muscle tone  GCS eye subscore is 4  GCS verbal subscore is 5  GCS motor subscore is 6  Gait not assessed   Skin: Skin is warm and dry  Capillary refill takes less than 2 seconds  No abrasion, no bruising, no ecchymosis, no laceration and no rash noted  No cyanosis  Psychiatric: Her speech is normal and behavior is normal  Her mood appears anxious  Nursing note and vitals reviewed        Invasive Devices     Peripheral Intravenous Line            Peripheral IV 01/31/20 Left Antecubital less than 1 day          Drain            Urethral Catheter Latex 16 Fr  less than 1 day                Lab Results:   Results Reviewed     Procedure Component Value Units Date/Time    Troponin I [[id number]]  (Normal) Collected:  01/31/20 1342    Lab Status:  Final result Specimen:  Blood from Arm, Left Updated:  01/31/20 1407     Troponin I <0 02 ng/mL     Comprehensive metabolic panel [[id number]]  (Abnormal) Collected:  01/31/20 1342    Lab Status:  Final result Specimen:  Blood from Arm, Left Updated:  01/31/20 1404     Sodium 137 mmol/L      Potassium 3 4 mmol/L      Chloride 100 mmol/L      CO2 27 mmol/L      ANION GAP 10 mmol/L      BUN 16 mg/dL      Creatinine 1 02 mg/dL      Glucose 133 mg/dL      Calcium 9 7 mg/dL      AST 19 U/L      ALT 23 U/L      Alkaline Phosphatase 95 U/L      Total Protein 7 4 g/dL      Albumin 3 7 g/dL      Total Bilirubin 0 70 mg/dL      eGFR 49 ml/min/1 73sq m     Narrative:       National Kidney Disease Foundation guidelines for Chronic Kidney Disease (CKD):     Stage 1 with normal or high GFR (GFR > 90 mL/min/1 73 square meters)    Stage 2 Mild CKD (GFR = 60-89 mL/min/1 73 square meters)    Stage 3A Moderate CKD (GFR = 45-59 mL/min/1 73 square meters)    Stage 3B Moderate CKD (GFR = 30-44 mL/min/1 73 square meters)    Stage 4 Severe CKD (GFR = 15-29 mL/min/1 73 square meters)    Stage 5 End Stage CKD (GFR <15 mL/min/1 73 square meters)  Note: GFR calculation is accurate only with a steady state creatinine    CK Total with Reflex CKMB [[id number]]  (Normal) Collected:  01/31/20 1342    Lab Status:  Final result Specimen:  Blood from Arm, Left Updated:  01/31/20 1403     Total CK 39 U/L     Protime-INR [[id number]]  (Normal) Collected:  01/31/20 1342    Lab Status:  Final result Specimen:  Blood from Arm, Left Updated:  01/31/20 1358     Protime 13 3 seconds      INR 1 01    APTT [[id number]]  (Normal) Collected:  01/31/20 1342    Lab Status:  Final result Specimen:  Blood from Arm, Left Updated:  01/31/20 1358     PTT 28 seconds     CBC and differential [[id number]]  (Abnormal) Collected:  01/31/20 1342    Lab Status:  Final result Specimen:  Blood from Arm, Left Updated:  01/31/20 1349     WBC 8 85 Thousand/uL      RBC 3 95 Million/uL      Hemoglobin 13 3 g/dL      Hematocrit 40 6 %       fL      MCH 33 7 pg      MCHC 32 8 g/dL      RDW 13 1 %      MPV 10 6 fL      Platelets 968 Thousands/uL      nRBC 0 /100 WBCs      Neutrophils Relative 48 %      Immat GRANS % 1 %      Lymphocytes Relative 38 %      Monocytes Relative 11 %      Eosinophils Relative 1 %      Basophils Relative 1 %      Neutrophils Absolute 4 29 Thousands/µL      Immature Grans Absolute 0 06 Thousand/uL      Lymphocytes Absolute 3 40 Thousands/µL      Monocytes Absolute 0 96 Thousand/µL      Eosinophils Absolute 0 06 Thousand/µL      Basophils Absolute 0 08 Thousands/µL                  Imaging Studies:   Direct to CT:  Yes  XR wrist 3+ views LEFT   Final Result by Elida Dickens MD (01/31 1642)      Nondisplaced fracture of the distal radius  The study was marked in [facility] for immediate notification  Workstation performed: [id number]         XR hip/pelv 2-3 vws left if performed   ED Interpretation by nAjali Meza PA-C (01/31 1415)   Abnormal   Hip fracture      Final Result by Gita Hooper MD (01/31 1626)      Acute left femoral intertrochanteric fracture with mild varus angulation  Workstation performed: [id number]         TRAUMA - CT head wo contrast   Final Result by Katherine Naylor MD (01/31 1422)      No acute intracranial abnormality  Workstation performed: [id number]         TRAUMA - CT spine cervical wo contrast   Final Result by Katherine Naylor MD (01/31 1430)      No cervical spine fracture or traumatic malalignment  Workstation performed: [id number]         TRAUMA - CT chest abdomen pelvis w contrast   Final Result by Katherine Naylor MD (01/31 1452)      Degraded by respiratory motion  1   Comminuted left intertrochanteric hip fracture  2   Otherwise no traumatic injury to the chest, abdomen, or pelvis  The study was marked in [facility] for immediate notification        Workstation performed: [id number]             Other Studies: xray pelvis with left hip, xray left wrist    Code Status: Prior  Advance Directive and Living Will:      Power of :    POLST:      Procedures  ECG 12 Lead Documentation Only  Date/Time: 1/31/2020 1:51 PM  Performed by: Anjali Meza PA-C  Authorized by: Anjali Meza PA-C     Indications / Diagnosis:  Trauma, HTN  ECG reviewed by me, the ED Provider: yes    Patient location:  ED  Previous ECG:     Previous ECG:  Unavailable  Interpretation:     Interpretation: abnormal    Rate:     ECG rate:  94    ECG rate assessment: normal    Rhythm:     Rhythm: sinus rhythm    Ectopy:     Ectopy: PVCs    QRS:     QRS axis:  Left QRS intervals:  Normal  Conduction:     Conduction: normal    ST segments:     ST segments:  Normal  T waves:     T waves: non-specific    Comments:      , , QT/QTc 374/467; no acute ischemic changes  Splint application  Date/Time: 1/31/2020 4:50 PM  Performed by: Nathaniel Edwards PA-C  Authorized by: Nathaniel Edwards PA-C     Patient location:  ED  Performing Provider:  PA  Other Assisting Provider: Yes (comment) (ER tech)    Consent:     Consent obtained:  Verbal    Consent given by:  Patient    Risks discussed:  Discoloration, numbness, pain and swelling    Alternatives discussed:  Referral  [place] protocol:     Procedure explained and questions answered to patient or proxy's satisfaction: yes      Radiology Images displayed and confirmed  If images not available, report reviewed: yes      Site/side marked: yes      Patient identity confirmed:  Verbally with patient and arm band  Indication:     Indications: fracture    Pre-procedure details:     Sensation:  Normal    Skin color:  Pink  Procedure details:     Laterality:  Left    Location:  Wrist    Wrist:  L wrist    Strapping: no      Splint type:  Short arm splint, static (forearm to hand)    Supplies:  Elastic bandage, cotton padding and Ortho-Glass  Post-procedure details:     Pain:  Improved    Sensation:  Normal    Neurovascular Exam: skin pink, capillary refill <2 sec, normal pulses and skin intact, warm, and dry      Patient tolerance of procedure:   Tolerated well, no immediate complications             ED Course  ED Course as of Jan 31 1705 Fri Jan 31, 2020   1349 WBC: 8 85   1349 Hemoglobin: 13 3   1349 Platelet Count: 475   1401 INR: 1 01   1401 Protime: 13 3   1401 PTT: 28   1404 Glucose, Random: 133   1404 Creatinine: 1 02   1404 BUN: 16   1404 Sodium: 137   1404 Potassium(!): 3 4   1404 Chloride: 100   1404 CO2: 27   1404 Anion Gap: 10   1404 Calcium: 9 7   1404 AST: 19   1404 ALT: 23   1404 Alkaline Phosphatase: 95 1404 Total Protein: 7 4   1404 Albumin: 3 7   1404 TOTAL BILIRUBIN: 0 70   1404 eGFR: 49   1404 Total CK: 39   1408 Troponin I: <0 02   1414 Independently viewed and interpreted by me - intertrochanteric fracture left hip; pending official read  XR hip/pelv 2-3 vws left if performed   1416 Tiger Text sent to on call ortho Dr Sindy Saucedo  0 Pt and daughter Lu Parks updated on xray results and hip fracture  Other studies still pending  Pt's daughter would prefer transfer to [place] as she feels they have "better resources"  However, willing to discuss further once other studies result  1426 IMPRESSION:     No acute intracranial abnormality  TRAUMA - CT head wo contrast   1427 Per Dr Sindy Saucedo, if work up negative for other traumatic injuries, can admit here for surgery tomorrow and NPO after midnight  1432 IMPRESSION:     No cervical spine fracture or traumatic malalignment  TRAUMA - CT spine cervical wo contrast   1435 Per Dr Sindy Saucedo, she spoke to OR, will not have staff to recover pt tomorrow so won't be able to do surgery here tomorrow  1513 IMPRESSION:     Degraded by respiratory motion      1  Comminuted left intertrochanteric hip fracture      2   Otherwise no traumatic injury to the chest, abdomen, or pelvis      The study was marked in [facility] for immediate notification       TRAUMA - CT chest abdomen pelvis w contrast   1520 Pt updated regarding results  1525 Per nursing supervisor, no staff here to recover pt  Will require transfer for surgery  [id number] PACS request for transfer placed  Ulices Rivas 103 to Dr Whitney Maldonado, trauma, accepts for transfer to ED to be evaluated  26 Pt and daughter updated on transfer plan  No further questions at this time  Pending EMS   SIRISHA paperwork completed and placed on the chart        1648 XR wrist 3+ views LEFT   1649 IMPRESSION:     Nondisplaced fracture of the distal radius      The study was marked in EPIC for immediate notification  1653 Pt updated on left wrist results  Short arm splint applied  Pt neurovascularly intact post splint application  EMS at bedside to pick pt up for transport  Pt and daughter in agreeance with transfer/admission plan and had no further questions  Pt left in stable condition  MDM  Number of Diagnoses or Management Options  Closed displaced intertrochanteric fracture of left femur, initial encounter [facility]): new and requires workup  Fall, initial encounter: new and requires workup  Nondisplaced fracture of distal end of left radius: new and requires workup     Amount and/or Complexity of Data Reviewed  Clinical lab tests: reviewed and ordered  Tests in the radiology section of CPT®: reviewed and ordered  Decide to obtain previous medical records or to obtain history from someone other than the patient: yes  Obtain history from someone other than the patient: yes  Review and summarize past medical records: yes  Discuss the patient with other providers: yes (Attending, orthopedics, trauma)  Independent visualization of images, tracings, or specimens: yes    Risk of Complications, Morbidity, and/or Mortality  Presenting problems: high  Diagnostic procedures: high  Management options: high    Patient Progress  Patient progress: stable        Disposition  [facility] Transfer: No  Final diagnoses:   Closed displaced intertrochanteric fracture of left femur, initial encounter ([facility] )   Fall, initial encounter   Nondisplaced fracture of distal end of left radius     Time reflects when diagnosis was documented in both MDM as applicable and the Disposition within this note     Time User Action Codes Description Comment    1/31/2020  3:35 PM Shara Morocho [S72 142A] Closed displaced intertrochanteric fracture of left femur, initial encounter ([facility] )     1/31/2020  3:35 PM Shara Morocho [W19  UGQC] Fall, initial encounter     1/31/2020  4:49 PM Shara Le Nondisplaced fracture of distal end of left radius       ED Disposition     ED Disposition Condition Date/Time Comment    Transfer to Another Facility-In Network  Fri Jan 31, 2020  3:34 PM Malik Garcia should be transferred out to [facility]          MD Documentation      Most Recent Value   Patient Condition  The patient has been stabilized such that within reasonable medical probability, no material deterioration of the patient condition or the condition of the unborn child(rogerio) is likely to result from the transfer   Reason for Transfer  Level of Care needed not available at this facility, Patient/Family request   Benefits of Transfer  Specialized equipment and/or services available at the receiving facility (Include comment)________________________ [trauma/orthopedic surgery]   Risks of Transfer  Potential for delay in receiving treatment, Potential deterioration of medical condition, Loss of IV, Increased discomfort during transfer, Possible worsening of condition or death during transfer   Accepting Physician  Dr Gurdeep Lancaster Name, [address]    (Name & Tel number)  PACS   Sending MD Dr Jerry Gamez   Provider Certification  General risk, such as traffic hazards, adverse weather conditions, rough terrain or turbulence, possible failure of equipment (including vehicle or aircraft), or consequences of actions of persons outside the control of the transport personnel, Unanticipated needs of medical equipment and personnel during transport, Risk of worsening condition, The possibility of a transport vehicle being unavailable      RN Documentation      Most Recent Value   Accepting Facility Name, [id number] [facility]  ER    (Name & Tel number)  PACS   Report Given to  [place]   Medications Reviewed with Next Provider of Service  Yes   Transport Mode  Ambulance   Level of Care  Advanced life support Copies of Medical Records Sent  History and Physical, Orders, Transfer form, Progress note, Nursing note, Radiology, EKG, Labs, Med Rec form   Transfer Date  01/31/20   Transfer Time  1656      Follow-up Information    None       Discharge Medication List as of 1/31/2020  4:56 PM      CONTINUE these medications which have NOT CHANGED    Details   ALPRAZolam (XANAX) 0 25 mg tablet Take 1 tablet (0 25 mg total) by mouth 2 (two) times a day as needed for anxiety, Starting Tue 1/21/2020, Normal      Calcium Citrate (CITRACAL PO) Take 1 tablet by mouth daily  , Until Discontinued, Historical Med      cetirizine (ZyrTEC) 10 mg tablet Take 10 mg by mouth daily, Historical Med      Cholecalciferol (VITAMIN D PO) Take 1,000 Units by mouth 2 (two) times a day , Until Discontinued, Historical Med      famotidine (PEPCID) 20 mg tablet Take 1 tablet (20 mg total) by mouth 2 (two) times a day, Starting Tue 11/19/2019, Until Thu 12/19/2019, Normal      losartan (COZAAR) 25 mg tablet Take 1 tablet (25 mg total) by mouth daily, Starting Wed 10/2/2019, Normal      Multiple Vitamin (MULTIVITAMIN) capsule Take 1 capsule by mouth daily  , Until Discontinued, Historical Med      VITAMIN E PO Take 1 tablet by mouth daily  , Until Discontinued, Historical Med           No discharge procedures on file        ED Provider  Electronically Signed by         Dominique Covington PA-C  01/31/20 5481

## 2020-02-01 ENCOUNTER — APPOINTMENT (INPATIENT)
Dept: RADIOLOGY | Facility: HOSPITAL | Age: 85
DRG: 481 | End: 2020-02-01
Payer: COMMERCIAL

## 2020-02-01 ENCOUNTER — ANESTHESIA EVENT (INPATIENT)
Dept: PERIOP | Facility: HOSPITAL | Age: 85
DRG: 481 | End: 2020-02-01
Payer: COMMERCIAL

## 2020-02-01 ENCOUNTER — ANESTHESIA (INPATIENT)
Dept: PERIOP | Facility: HOSPITAL | Age: 85
DRG: 481 | End: 2020-02-01
Payer: COMMERCIAL
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
## 2020-02-01 LAB
ANION GAP SERPL CALCULATED.3IONS-SCNC: 4 MMOL/L (ref 4–13)
ATRIAL RATE: 91 BPM
BASOPHILS # BLD AUTO: 0.04 THOUSANDS/ΜL (ref 0–0.1)
BASOPHILS NFR BLD AUTO: 1 % (ref 0–1)
BUN SERPL-MCNC: 12 MG/DL (ref 5–25)
CALCIUM SERPL-MCNC: 8.4 MG/DL (ref 8.3–10.1)
CHLORIDE SERPL-SCNC: 110 MMOL/L (ref 100–108)
CO2 SERPL-SCNC: 25 MMOL/L (ref 21–32)
CREAT SERPL-MCNC: 0.72 MG/DL (ref 0.6–1.3)
EOSINOPHIL # BLD AUTO: 0.02 THOUSAND/ΜL (ref 0–0.61)
EOSINOPHIL NFR BLD AUTO: 0 % (ref 0–6)
ERYTHROCYTE [DISTWIDTH] IN BLOOD BY AUTOMATED COUNT: 13.2 % (ref 11.6–15.1)
GFR SERPL CREATININE-BSD FRML MDRD: 75 ML/MIN/1.73SQ M
GLUCOSE SERPL-MCNC: 140 MG/DL (ref 65–140)
HCT VFR BLD AUTO: 33.6 % (ref 34.8–46.1)
HGB BLD-MCNC: 10.9 G/DL (ref 11.5–15.4)
IMM GRANULOCYTES # BLD AUTO: 0.04 THOUSAND/UL (ref 0–0.2)
IMM GRANULOCYTES NFR BLD AUTO: 1 % (ref 0–2)
LYMPHOCYTES # BLD AUTO: 1.46 THOUSANDS/ΜL (ref 0.6–4.47)
LYMPHOCYTES NFR BLD AUTO: 17 % (ref 14–44)
MCH RBC QN AUTO: 33.3 PG (ref 26.8–34.3)
MCHC RBC AUTO-ENTMCNC: 32.4 G/DL (ref 31.4–37.4)
MCV RBC AUTO: 103 FL (ref 82–98)
MONOCYTES # BLD AUTO: 0.85 THOUSAND/ΜL (ref 0.17–1.22)
MONOCYTES NFR BLD AUTO: 10 % (ref 4–12)
NEUTROPHILS # BLD AUTO: 6.31 THOUSANDS/ΜL (ref 1.85–7.62)
NEUTS SEG NFR BLD AUTO: 71 % (ref 43–75)
NRBC BLD AUTO-RTO: 0 /100 WBCS
P AXIS: 59 DEGREES
PLATELET # BLD AUTO: 202 THOUSANDS/UL (ref 149–390)
PMV BLD AUTO: 10.5 FL (ref 8.9–12.7)
POTASSIUM SERPL-SCNC: 3.7 MMOL/L (ref 3.5–5.3)
PR INTERVAL: 162 MS
QRS AXIS: -29 DEGREES
QRSD INTERVAL: 86 MS
QT INTERVAL: 338 MS
QTC INTERVAL: 415 MS
RBC # BLD AUTO: 3.27 MILLION/UL (ref 3.81–5.12)
SODIUM SERPL-SCNC: 139 MMOL/L (ref 136–145)
T WAVE AXIS: 47 DEGREES
VENTRICULAR RATE: 91 BPM
WBC # BLD AUTO: 8.72 THOUSAND/UL (ref 4.31–10.16)

## 2020-02-01 PROCEDURE — 80048 BASIC METABOLIC PNL TOTAL CA: CPT | Performed by: ORTHOPAEDIC SURGERY

## 2020-02-01 PROCEDURE — 73502 X-RAY EXAM HIP UNI 2-3 VIEWS: CPT | Performed by: ORTHOPAEDIC SURGERY

## 2020-02-01 PROCEDURE — 2W3DX2Z IMMOBILIZATION OF LEFT LOWER ARM USING CAST: ICD-10-PCS | Performed by: ORTHOPAEDIC SURGERY

## 2020-02-01 PROCEDURE — C1713 ANCHOR/SCREW BN/BN,TIS/BN: HCPCS | Performed by: ORTHOPAEDIC SURGERY

## 2020-02-01 PROCEDURE — 93010 ELECTROCARDIOGRAM REPORT: CPT | Performed by: INTERNAL MEDICINE

## 2020-02-01 PROCEDURE — 0QS736Z REPOSITION LEFT UPPER FEMUR WITH INTRAMEDULLARY INTERNAL FIXATION DEVICE, PERCUTANEOUS APPROACH: ICD-10-PCS | Performed by: ORTHOPAEDIC SURGERY

## 2020-02-01 PROCEDURE — 73502 X-RAY EXAM HIP UNI 2-3 VIEWS: CPT

## 2020-02-01 PROCEDURE — NC001 PR NO CHARGE: Performed by: ORTHOPAEDIC SURGERY

## 2020-02-01 PROCEDURE — 27245 TREAT THIGH FRACTURE: CPT | Performed by: ORTHOPAEDIC SURGERY

## 2020-02-01 PROCEDURE — 25600 CLTX DST RDL FX/EPHYS SEP WO: CPT | Performed by: ORTHOPAEDIC SURGERY

## 2020-02-01 PROCEDURE — 99223 1ST HOSP IP/OBS HIGH 75: CPT | Performed by: ORTHOPAEDIC SURGERY

## 2020-02-01 PROCEDURE — 99232 SBSQ HOSP IP/OBS MODERATE 35: CPT | Performed by: PHYSICIAN ASSISTANT

## 2020-02-01 PROCEDURE — C1769 GUIDE WIRE: HCPCS | Performed by: ORTHOPAEDIC SURGERY

## 2020-02-01 PROCEDURE — 85025 COMPLETE CBC W/AUTO DIFF WBC: CPT | Performed by: ORTHOPAEDIC SURGERY

## 2020-02-01 DEVICE — 11.0MM TI HELICAL BLADE 90MM-STERILE: Type: IMPLANTABLE DEVICE | Site: LEG | Status: FUNCTIONAL

## 2020-02-01 DEVICE — 10MM/130 DEG TI CANN TROCH FIXATION NAIL 170MM-STERILE: Type: IMPLANTABLE DEVICE | Site: LEG | Status: FUNCTIONAL

## 2020-02-01 DEVICE — 5.0MM TI LOCKING SCREW W/T25 STARDRIVE 34MM F/IM NAIL-STER: Type: IMPLANTABLE DEVICE | Site: LEG | Status: FUNCTIONAL

## 2020-02-01 RX ORDER — HYDROMORPHONE HCL/PF 1 MG/ML
0.5 SYRINGE (ML) INJECTION
Status: DISCONTINUED | OUTPATIENT
Start: 2020-02-01 | End: 2020-02-01 | Stop reason: HOSPADM

## 2020-02-01 RX ORDER — HYDROMORPHONE HCL/PF 1 MG/ML
0.2 SYRINGE (ML) INJECTION
Status: DISCONTINUED | OUTPATIENT
Start: 2020-02-01 | End: 2020-02-01 | Stop reason: HOSPADM

## 2020-02-01 RX ORDER — DIPHENHYDRAMINE HYDROCHLORIDE 50 MG/ML
12.5 INJECTION INTRAMUSCULAR; INTRAVENOUS ONCE AS NEEDED
Status: DISCONTINUED | OUTPATIENT
Start: 2020-02-01 | End: 2020-02-01 | Stop reason: HOSPADM

## 2020-02-01 RX ORDER — MAGNESIUM HYDROXIDE 1200 MG/15ML
LIQUID ORAL AS NEEDED
Status: DISCONTINUED | OUTPATIENT
Start: 2020-02-01 | End: 2020-02-01 | Stop reason: HOSPADM

## 2020-02-01 RX ORDER — METOCLOPRAMIDE HYDROCHLORIDE 5 MG/ML
10 INJECTION INTRAMUSCULAR; INTRAVENOUS ONCE AS NEEDED
Status: DISCONTINUED | OUTPATIENT
Start: 2020-02-01 | End: 2020-02-01 | Stop reason: HOSPADM

## 2020-02-01 RX ORDER — PROPOFOL 10 MG/ML
INJECTION, EMULSION INTRAVENOUS AS NEEDED
Status: DISCONTINUED | OUTPATIENT
Start: 2020-02-01 | End: 2020-02-01 | Stop reason: SURG

## 2020-02-01 RX ORDER — EPHEDRINE SULFATE 50 MG/ML
INJECTION INTRAVENOUS AS NEEDED
Status: DISCONTINUED | OUTPATIENT
Start: 2020-02-01 | End: 2020-02-01 | Stop reason: SURG

## 2020-02-01 RX ORDER — SODIUM CHLORIDE 9 MG/ML
INJECTION, SOLUTION INTRAVENOUS CONTINUOUS PRN
Status: DISCONTINUED | OUTPATIENT
Start: 2020-02-01 | End: 2020-02-01 | Stop reason: SURG

## 2020-02-01 RX ORDER — BUPIVACAINE HYDROCHLORIDE 7.5 MG/ML
INJECTION, SOLUTION INTRASPINAL AS NEEDED
Status: DISCONTINUED | OUTPATIENT
Start: 2020-02-01 | End: 2020-02-01 | Stop reason: SURG

## 2020-02-01 RX ORDER — CHLORHEXIDINE GLUCONATE 0.12 MG/ML
15 RINSE ORAL ONCE
Status: COMPLETED | OUTPATIENT
Start: 2020-02-01 | End: 2020-02-01

## 2020-02-01 RX ORDER — ONDANSETRON 2 MG/ML
4 INJECTION INTRAMUSCULAR; INTRAVENOUS ONCE AS NEEDED
Status: DISCONTINUED | OUTPATIENT
Start: 2020-02-01 | End: 2020-02-01 | Stop reason: HOSPADM

## 2020-02-01 RX ORDER — LIDOCAINE HYDROCHLORIDE 10 MG/ML
0.5 INJECTION, SOLUTION EPIDURAL; INFILTRATION; INTRACAUDAL; PERINEURAL ONCE AS NEEDED
Status: CANCELLED | OUTPATIENT
Start: 2020-02-01

## 2020-02-01 RX ORDER — FAMOTIDINE 20 MG/1
20 TABLET, FILM COATED ORAL 2 TIMES DAILY
Status: DISCONTINUED | OUTPATIENT
Start: 2020-02-01 | End: 2020-02-05 | Stop reason: HOSPADM

## 2020-02-01 RX ORDER — FENTANYL CITRATE/PF 50 MCG/ML
50 SYRINGE (ML) INJECTION
Status: DISCONTINUED | OUTPATIENT
Start: 2020-02-01 | End: 2020-02-01 | Stop reason: HOSPADM

## 2020-02-01 RX ORDER — FENTANYL CITRATE 50 UG/ML
INJECTION, SOLUTION INTRAMUSCULAR; INTRAVENOUS AS NEEDED
Status: DISCONTINUED | OUTPATIENT
Start: 2020-02-01 | End: 2020-02-01 | Stop reason: SURG

## 2020-02-01 RX ORDER — ONDANSETRON 2 MG/ML
INJECTION INTRAMUSCULAR; INTRAVENOUS AS NEEDED
Status: DISCONTINUED | OUTPATIENT
Start: 2020-02-01 | End: 2020-02-01 | Stop reason: SURG

## 2020-02-01 RX ORDER — PROPOFOL 10 MG/ML
INJECTION, EMULSION INTRAVENOUS CONTINUOUS PRN
Status: DISCONTINUED | OUTPATIENT
Start: 2020-02-01 | End: 2020-02-01 | Stop reason: SURG

## 2020-02-01 RX ADMIN — FENTANYL CITRATE 50 MCG: 50 INJECTION, SOLUTION INTRAMUSCULAR; INTRAVENOUS at 10:17

## 2020-02-01 RX ADMIN — EPHEDRINE SULFATE 10 MG: 50 INJECTION, SOLUTION INTRAVENOUS at 09:41

## 2020-02-01 RX ADMIN — PROPOFOL 20 MG: 10 INJECTION, EMULSION INTRAVENOUS at 08:14

## 2020-02-01 RX ADMIN — FENTANYL CITRATE 50 MCG: 50 INJECTION, SOLUTION INTRAMUSCULAR; INTRAVENOUS at 10:33

## 2020-02-01 RX ADMIN — ONDANSETRON 4 MG: 2 INJECTION INTRAMUSCULAR; INTRAVENOUS at 09:08

## 2020-02-01 RX ADMIN — PHENYLEPHRINE HYDROCHLORIDE 200 MCG: 10 INJECTION INTRAVENOUS at 08:32

## 2020-02-01 RX ADMIN — SODIUM CHLORIDE 75 ML/HR: 0.9 INJECTION, SOLUTION INTRAVENOUS at 00:43

## 2020-02-01 RX ADMIN — OXYCODONE HYDROCHLORIDE 5 MG: 5 TABLET ORAL at 00:43

## 2020-02-01 RX ADMIN — ACETAMINOPHEN 650 MG: 325 TABLET ORAL at 17:35

## 2020-02-01 RX ADMIN — BUPIVACAINE HYDROCHLORIDE IN DEXTROSE 1.4 ML: 7.5 INJECTION, SOLUTION SUBARACHNOID at 08:18

## 2020-02-01 RX ADMIN — PHENYLEPHRINE HYDROCHLORIDE 20 MCG/MIN: 10 INJECTION INTRAVENOUS at 08:40

## 2020-02-01 RX ADMIN — CEFAZOLIN SODIUM 2000 MG: 10 INJECTION, POWDER, FOR SOLUTION INTRAVENOUS at 17:34

## 2020-02-01 RX ADMIN — PHENYLEPHRINE HYDROCHLORIDE 200 MCG: 10 INJECTION INTRAVENOUS at 08:28

## 2020-02-01 RX ADMIN — FENTANYL CITRATE 25 MCG: 50 INJECTION, SOLUTION INTRAMUSCULAR; INTRAVENOUS at 08:29

## 2020-02-01 RX ADMIN — PROPOFOL 30 MCG/KG/MIN: 10 INJECTION, EMULSION INTRAVENOUS at 08:25

## 2020-02-01 RX ADMIN — CHLORHEXIDINE GLUCONATE 0.12% ORAL RINSE 15 ML: 1.2 LIQUID ORAL at 07:47

## 2020-02-01 RX ADMIN — Medication 2000 MG: at 08:12

## 2020-02-01 RX ADMIN — PHENYLEPHRINE HYDROCHLORIDE 200 MCG: 10 INJECTION INTRAVENOUS at 08:24

## 2020-02-01 RX ADMIN — PHENYLEPHRINE HYDROCHLORIDE 200 MCG: 10 INJECTION INTRAVENOUS at 08:20

## 2020-02-01 RX ADMIN — FENTANYL CITRATE 25 MCG: 50 INJECTION, SOLUTION INTRAMUSCULAR; INTRAVENOUS at 07:59

## 2020-02-01 RX ADMIN — FAMOTIDINE 20 MG: 20 TABLET ORAL at 12:44

## 2020-02-01 RX ADMIN — EPHEDRINE SULFATE 5 MG: 50 INJECTION, SOLUTION INTRAVENOUS at 08:55

## 2020-02-01 RX ADMIN — FENTANYL CITRATE 25 MCG: 50 INJECTION, SOLUTION INTRAMUSCULAR; INTRAVENOUS at 08:05

## 2020-02-01 RX ADMIN — EPHEDRINE SULFATE 10 MG: 50 INJECTION, SOLUTION INTRAVENOUS at 09:02

## 2020-02-01 RX ADMIN — ACETAMINOPHEN 650 MG: 325 TABLET ORAL at 00:43

## 2020-02-01 RX ADMIN — FAMOTIDINE 20 MG: 20 TABLET ORAL at 17:35

## 2020-02-01 RX ADMIN — PROPOFOL 30 MG: 10 INJECTION, EMULSION INTRAVENOUS at 08:06

## 2020-02-01 RX ADMIN — FENTANYL CITRATE 25 MCG: 50 INJECTION, SOLUTION INTRAMUSCULAR; INTRAVENOUS at 08:21

## 2020-02-01 RX ADMIN — PHENYLEPHRINE HYDROCHLORIDE 200 MCG: 10 INJECTION INTRAVENOUS at 08:53

## 2020-02-01 RX ADMIN — SODIUM CHLORIDE: 0.9 INJECTION, SOLUTION INTRAVENOUS at 08:25

## 2020-02-01 RX ADMIN — EPHEDRINE SULFATE 10 MG: 50 INJECTION, SOLUTION INTRAVENOUS at 09:27

## 2020-02-01 RX ADMIN — ACETAMINOPHEN 650 MG: 325 TABLET ORAL at 12:44

## 2020-02-01 NOTE — PLAN OF CARE
Problem: Potential for Falls  Goal: Patient will remain free of falls  Description  INTERVENTIONS:  - Assess patient frequently for physical needs  -  Identify cognitive and physical deficits and behaviors that affect risk of falls    -  [place] fall precautions as indicated by assessment   - Educate patient/family on patient safety including physical limitations  - Instruct patient to call for assistance with activity based on assessment  - Modify environment to reduce risk of injury  - Consider OT/PT consult to assist with strengthening/mobility  Outcome: Progressing     Problem: Prexisting or High Potential for Compromised Skin Integrity  Goal: Skin integrity is maintained or improved  Description  INTERVENTIONS:  - Identify patients at risk for skin breakdown  - Assess and monitor skin integrity  - Assess and monitor nutrition and hydration status  - Monitor labs   - Assess for incontinence   - Turn and reposition patient  - Assist with mobility/ambulation  - Relieve pressure over bony prominences  - Avoid friction and shearing  - Provide appropriate hygiene as needed including keeping skin clean and dry  - Evaluate need for skin moisturizer/barrier cream  - Collaborate with interdisciplinary team   - Patient/family teaching  - Consider wound care consult   Outcome: Progressing

## 2020-02-01 NOTE — PROGRESS NOTES
Subjective: No acute events overnight  No acute distress  Resting comfortably in bed    Objective:  A 10 point ROS was performed; negative except as noted above  Lab Results   Component Value Date/Time    WBC 8 72 02/01/2020 04:53 AM    WBC 4 79 03/11/2015 08:58 AM    HGB 10 9 (L) 02/01/2020 04:53 AM    HGB 14 5 03/11/2015 08:58 AM       Vitals:    02/01/20 0022   BP: 133/68   Pulse: 86   Resp: 16   Temp: 98 °F (36 7 °C)   SpO2: 96%     Left lower extremity  Thigh soft and compressible  TTP over hip  Motor and sensory exam is grossly intact distally  Toes WWP    Left upper extremity  Splint CDI  Motor and sensory exam grossly intact to median, radial, and ulnar nerve distributions distally  Fingers WWP    Assessment: 80 y o  female with left distal radius and intertrochanteric fracture    Plan:  NWB LUE and LLE  To OR for L TFN and possible closed reduction and casting of left upper extremity  Pain control  DVT ppx:  On hold for OR  PT/OT  Will continue to assess for acute blood loss anemia  Dispo: Ortho will follow

## 2020-02-01 NOTE — H&P
[address] [facility] 80 y o  female MRN: [id number]  Unit/Bed#: ED 3      Chief Complaint:   Left wrist and hip pain    HPI:   80 y o  RHD female who is a community ambulator s/p fall complains of left wrist and hip pain  She does have a history of left wrist fracture in the 1980s but no history of injury to the left hip  She notes being at the bottom of the steps when her from risk grading her losing her balance, falling feeling immediate pain  No numbness or tingling noted  Review Of Systems:   · Skin: Normal  · Neuro: See HPI  · Musculoskeletal: See HPI  · 14 point review of systems negative except as stated above     Past Medical History:   Past Medical History:   Diagnosis Date    Diverticulosis     GERD (gastroesophageal reflux disease)     Hiatal hernia     Irritable bowel disease     Lactose intolerance     Osteoporosis        Past Surgical History:   Past Surgical History:   Procedure Laterality Date    CARPAL TUNNEL RELEASE      CHOLECYSTECTOMY      EAR TUBE REMOVAL      HEMORROIDECTOMY      HYSTERECTOMY      PERINEAL  PELVIC RECONSTRUCTION SURGERY  2019    Dr Ho Barrett - [facility]    SKIN CANCER EXCISION      TONSILLECTOMY         Family History:  Family history reviewed and non-contributory  Family History   Problem Relation Age of Onset    Colon cancer Mother     Stomach cancer Father        Social History:  Social History     Socioeconomic History    Marital status:       Spouse name: Not on file    Number of children: Not on file    Years of education: Not on file    Highest education level: Not on file   Occupational History    Not on file   Social Needs    Financial resource strain: Not on file    Food insecurity:     Worry: Not on file     Inability: Not on file    Transportation needs:     Medical: Not on file     Non-medical: Not on file   Tobacco Use    Smoking status: Former Smoker     Last attempt to quit:      Years since quittin 1    Smokeless tobacco: Never Used   Substance and Sexual Activity    Alcohol use: No    Drug use: No    Sexual activity: Not on file   Lifestyle    Physical activity:     Days per week: Not on file     Minutes per session: Not on file    Stress: Not on file   Relationships    Social connections:     Talks on phone: Not on file     Gets together: Not on file     Attends Mu-ism service: Not on file     Active member of club or organization: Not on file     Attends meetings of clubs or organizations: Not on file     Relationship status: Not on file    Intimate partner violence:     Fear of current or ex partner: Not on file     Emotionally abused: Not on file     Physically abused: Not on file     Forced sexual activity: Not on file   Other Topics Concern    Not on file   Social History Narrative    No advanced directives        Allergies:    Allergies   Allergen Reactions    Escitalopram Hives    Mepenzolate      Other reaction(s): Urinary Retention    Maalox Anti-Gas [Simethicone] Hives    Other      Seasonal     Protonix [Pantoprazole] Hives    Reglan [Metoclopramide]            Labs:  0   Lab Value Date/Time    HCT 40 6 01/31/2020 1342    HCT 39 5 08/01/2017 1343    HCT 39 1 03/08/2017 1451    HCT 44 2 03/11/2015 0858    HGB 13 3 01/31/2020 1342    HGB 13 0 08/01/2017 1343    HGB 12 7 03/08/2017 1451    HGB 14 5 03/11/2015 0858    INR 1 01 01/31/2020 1342    WBC 8 85 01/31/2020 1342    WBC 6 97 08/01/2017 1343    WBC 9 00 03/08/2017 1451    WBC 4 79 03/11/2015 0858    ESR 33 (H) 03/08/2017 1451       Meds:    Current Facility-Administered Medications:     acetaminophen (TYLENOL) tablet 650 mg, 650 mg, Oral, Q6H EILEEN, Gregory Doll, DO    HYDROmorphone (DILAUDID) injection 0 2 mg, 0 2 mg, Intravenous, Q3H PRN, Gregory Doll, DO    oxyCODONE (ROXICODONE) IR tablet 2 5 mg, 2 5 mg, Oral, Q4H PRN, Gregory Doll, DO    oxyCODONE (ROXICODONE) IR tablet 5 mg, 5 mg, Oral, Q4H PRN, Gregory Doll, DO    Current Outpatient Medications:     ALPRAZolam (XANAX) 0 25 mg tablet, Take 1 tablet (0 25 mg total) by mouth 2 (two) times a day as needed for anxiety, Disp: 60 tablet, Rfl: 0    Calcium Citrate (CITRACAL PO), Take 1 tablet by mouth daily  , Disp: , Rfl:     cetirizine (ZyrTEC) 10 mg tablet, Take 10 mg by mouth daily, Disp: , Rfl:     Cholecalciferol (VITAMIN D PO), Take 1,000 Units by mouth 2 (two) times a day , Disp: , Rfl:     famotidine (PEPCID) 20 mg tablet, Take 1 tablet (20 mg total) by mouth 2 (two) times a day, Disp: 60 tablet, Rfl: 5    losartan (COZAAR) 25 mg tablet, Take 1 tablet (25 mg total) by mouth daily, Disp: 90 tablet, Rfl: 1    Multiple Vitamin (MULTIVITAMIN) capsule, Take 1 capsule by mouth daily  , Disp: , Rfl:     VITAMIN E PO, Take 1 tablet by mouth daily  , Disp: , Rfl:     Blood Culture:   No results found for: BLOODCX    Wound Culture:   No results found for: WOUNDCULT    Ins and Outs:  No intake/output data recorded  Physical Exam:   /70   Pulse 99   Temp 97 8 °F (36 6 °C) (Oral)   Resp 20   SpO2 96%   Gen: Alert and oriented to person, place, time  HEENT: EOMI, eyes clear, moist mucus membranes, hearing intact  Respiratory: Bilateral chest rise  No audible wheezing found  Cardiovascular: Regular Rate and Rhythm  Abdomen: soft nontender/nondistended  Musculoskeletal: left lower extremity  · Skin intact, limb shortened and externally rotated  · Tender to palpation over hip  · Pain with internal/external rotation of the hip  · Sensation intact L3-S1  · Intact ankle dorsi/plantar flexion, EHL/FHL  · 2+ DP pulse    Musculoskeletal: left upper extremity  · Skin intact, tender to palpation wrist  · Motor and sensory exams grossly intact median, radial, and ulnar nerve distributions distally  · Fingers warm and well perfused, 2+ radial pulse    Radiology:   I personally reviewed the films    X-rays left wrist shows distal radius fracture  X-rays left hip show IT fracture    Assessment:  80 y  o female status post fall with leftIntertrochanteric femur fracture and distal radius fracture    Plan:   · Non weight bearing LLE and LUE  · Analgesics for pain  · NPO at midnight  · Angel Catheter insertion  · Medicine consult for all medical management and preoperative risk stratification  · To OR for IM nail femur fracture of Intertrochanteric femur fracture  · There is no height or weight on file to calculate BMI  moderately obese  Recommend behavior modifications    · Dispo: Ortho will follow    Liza Yoder MD

## 2020-02-01 NOTE — OCCUPATIONAL THERAPY NOTE
OCCUPATIONAL THERAPY CANCEL NOTE:    ORDERS RECEIVED  CHART REVIEW COMPLETED  PT IN OR FOR IMN  WILL FOLLOW POST-OP      Brendon Carranza, MOT, OTR/L

## 2020-02-01 NOTE — OP NOTE
OPERATIVE REPORT  PATIENT NAME: Malik Garcia    :  1931  MRN: [id number]  Pt Location:  OR ROOM 09    SURGERY DATE: 2020     SURGEON: Cristina Chang MD     RESIDENT ASSITANT: Gena Bright MD PGY-3 Assisted in the procedure  Manuel Resendiz MD, was present for the entire procedure and was scrubbed for and performed all the key and essential components of the procedure  PREOPERATIVE DIAGNOSIS:  Left Distal RadiusFracture    POSTOPERATIVE DIAGNOSIS: Same    PROCEDURES: Short Arm Cast Application Left Distal Radius Fracture    ANESTHESIA STAFF: Andi SMITH     COMPLICATIONS: None    FINDINGS: Stable Reduction    SPECIMEN(S): None    ESTIMATED BLOOD LOSS: Minimal    INDICATIONS FOR PROCEDURE:  The patient is a 80 y o  female presenting with pain and lack of function secondary to a fracture of the left distal radius  This was amenable to nonoperative care and following her intertrochanteric hip fracture fixation for her comfort while she was still under sedation for her anesthesia she consented to having a short-arm cast placed  OPERATIVE TECHNIQUE:  The left wrist was identified and after time-out for safety a stocking that and cast padding was placed around the forearm and distal radius and a fiberglass short-arm cast was then placed in appropriate anatomic position of the wrist taking care to avoid any irritation of skin or soft tissue  After the cast was placed the patient was transported to the recovery room in good condition  Fluoroscopy was not utilized for this portion of the procedure as the fracture was nondisplaced        SIGNATURE: Karol Gavin MD  DATE: 2020  TIME: 9:42 AM

## 2020-02-01 NOTE — ANESTHESIA PROCEDURE NOTES
Spinal Block    Patient location during procedure: OR  Reason for block: primary anesthetic  Staffing  Anesthesiologist: Lavern Celestin MD  Resident/CRNA: Nunu Beltran CRNA  Performed: anesthesiologist   Preanesthetic Checklist  Completed: patient identified, site marked, surgical consent, pre-op evaluation, timeout performed, IV checked, risks and benefits discussed and monitors and equipment checked  Spinal Block  Patient position: right lateral decubitus  Prep: ChloraPrep and site prepped and draped  Patient monitoring: cardiac monitor, frequent blood pressure checks, heart rate and continuous pulse ox  Approach: midline  Location: L3-4  Injection technique: single-shot  Needle  Needle type: pencil-tip   Needle gauge: 25 G  Needle length: 10 cm  Assessment  Sensory level: T4  Injection Assessment:  negative aspiration for heme, no paresthesia on injection and positive aspiration for clear CSF    Post-procedure:  adhesive bandage applied, pressure dressing applied, secured with tape, site cleaned and sterile dressing applied

## 2020-02-01 NOTE — ANESTHESIA POSTPROCEDURE EVALUATION
Post-Op Assessment Note    CV Status:  Stable  Pain Score: 6    Pain management: adequate     Mental Status:  Alert and awake   Hydration Status:  Euvolemic and stable   PONV Controlled:  None   Airway Patency:  Patent   Post Op Vitals Reviewed: Yes      Staff: CRNA           /55 (02/01/20 1009)    Temp 98 °F (36 7 °C) (02/01/20 1009)    Pulse 96 (02/01/20 1009)   Resp 19 (02/01/20 1009)    SpO2 93 % (02/01/20 1009)

## 2020-02-01 NOTE — PHYSICAL THERAPY NOTE
Physical Therapy Cancellation Note    Pt orders received  Chart reviewed  Will hold PT due to pending OR for IM nail femur   Will continue to follow  Mounika Combs, Pt, DPT

## 2020-02-01 NOTE — DISCHARGE INSTRUCTIONS
Discharge Instructions - Eran Alonso 80 y o  female MRN: [id number]  Unit/Bed#: PACU 13    Weight Bearing Status:                                           Weight bearing as tolerated left lower extremity  Non weight bearing left upper extremity in short arm cast    DVT prophylaxis:  Complete course of Lovenox as directed    Pain:  Continue analgesics as directed    Dressing Instructions:   Keep dressing clean, dry and intact until follow up appointment  Do not shower until follow up    PT/OT:  Continue PT/OT on outpatient basis as directed    Appt Instructions: If you do not have your appointment, please call the clinic at [phone] to f/u with Dr Rhett Dukes in 2 weeks  Otherwise followup as scheduled     Contact the office sooner if you experience any increased numbness/tingling in the extremities        Miscellaneous:  None

## 2020-02-01 NOTE — OP NOTE
OPERATIVE REPORT  PATIENT NAME: Assunta Sandifer    :  1931  MRN: [id number]  Pt Location:  OR ROOM 09    SURGERY DATE: 2020     SURGEON: Estefani Esquivel MD     ASSISTANT: Mary Hamilton PA-C     NOTE: Mary Hamilton PA-C was present throughout the entire procedure and performed essential assistance with patient prepping, draping, positioning, fracture reduction, implant insertion, wound closure, sterile dressing application, all under my direct supervision  RESIDENT ASSITANT: Morales Saxena MD PGY-3 Assisted in the procedure  Jesus Barker MD, was present for the entire procedure and directly supervised/performed all the key and essential components of the procedure  PREOPERATIVE DIAGNOSIS:  Left Intertrochanteric Proximal Femur Fracture    POSTOPERATIVE DIAGNOSIS: Same    PROCEDURES: Cephalomedullary Nail Left Proximal Femur for Intertrochanteric Femur Fracture    ANESTHESIA STAFF: Luis Cazares MD     ANESTHESIA TYPE: Spinal with Sedation    COMPLICATIONS: None    FINDINGS: Stable Reduction    SPECIMEN(S):None    ESTIMATED BLOOD LOSS: Minimal    INDICATION:  Briefly, the patient is a 80 y o   female who presented to the ED with a left intertrochanteric proximal femur fracture  The patient was admitted for medical optimization and consent was obtained to perform cephalo-medullary fixation of the fracture  A thorough discussion of the risks and benefits of procedure as well as alternatives the procedure was performed prior to obtaining consent  PROCEDURE AND TECHNIQUE:  The day of surgery I identified the patient's left proximal femur and marked it with my initials  The patient was taken back to the operating room where spinal anesthesia was induced with sedation and 2 g of IV Ancef were provided  The patient was positioned on the fracture table with all bony prominence padded    After a timeout for safety the leg was placed in gentle adduction and distal traction with slight internal rotation to reduce the fracture anatomically under fluoroscopic guidance  The leg was then prepped and draped in normal sterile fashion and a starting point for a Synthes trochanteric fixation nail was identified  An incision was made and a guidewire was placed keeping with the surgical technique for Synthes trochanteric nail  The entry reamer was then utilized  A Synthes 130° 10 mm diameter TFN was placed into the intramedullary canal confirmed under fluoroscopy  A guidewire for a helical blade was then placed into the femoral head keeping the tip apex distance as close to zero as possible, a step reamer was then utilized and a 90 mm helical blade was placed confirming reduction of the fracture and placement of the implant on the AP and lateral projection  Distally a 34 mm screw was placed through the jig into the nail and this was confirmed under fluoroscopy as well  The targeting jig was removed and final fluoroscopy was obtained  The 2 incisions were irrigated and closed with a deep layer using an 0 Vicryl, subdermal layer with 2-0 Vicryl and staples for skin  Sterile dressings were then placed and the patient was awoken  The patient will be weightbearing as tolerated with an assistive device, will require physical therapy assessment and treatment, 24 hours of IV Ancef prophylaxis, as well as 28 days of DVT prophylaxis  Please note the patient has a minimally displaced left distal radius fracture and this was treated for her comfort in the operating room with a short-arm cast as documented in the procedure note to follow  The patient was transported to the recovery room in good condition and will be followed closely by the orthopedic and medical team   At the end the procedure all the counts were correct        PATIENT DISPOSITION: Stable to PACU       SIGNATURE: Philippe Talbot MD  DATE: February 1, 2020  TIME: 9:38 AM

## 2020-02-01 NOTE — ANESTHESIA PREPROCEDURE EVALUATION
Review of Systems/Medical History  Patient summary reviewed  Chart reviewed  No history of anesthetic complications     Cardiovascular  EKG reviewed, Hypertension ,   Comment: SUMMARY tte 2017     LEFT VENTRICLE:  Size was normal   Systolic function was normal  Ejection fraction was estimated to be 65 %  There were no regional wall motion abnormalities  Wall thickness was increased  There was mild concentric hypertrophy      MITRAL VALVE:  There was trace regurgitation      TRICUSPID VALVE:  There was trace regurgitation  ,  Pulmonary  Negative pulmonary ROS        GI/Hepatic    GERD poorly controlled,  Hiatal hernia,   Comment: IBS     Negative  ROS        Endo/Other    Obesity    GYN  Negative gynecology ROS          Hematology  Negative hematology ROS      Musculoskeletal    Comment: Left femur fracture 2/2 fall      Neurology  Negative neurology ROS      Psychology   Negative psychology ROS Anxiety,              Physical Exam    Airway    Mallampati score: II  TM Distance: >3 FB  Neck ROM: full     Dental   No notable dental hx     Cardiovascular  Rhythm: regular, Rate: normal, Cardiovascular exam normal    Pulmonary  Pulmonary exam normal Breath sounds clear to auscultation,     Other Findings        Anesthesia Plan  ASA Score- 2     Anesthesia Type- spinal and IV sedation with anesthesia with ASA Monitors  Additional Monitors:   Airway Plan:         Plan Factors-    Induction- intravenous  Postoperative Plan- Plan for postoperative opioid use  Informed Consent- Anesthetic plan and risks discussed with patient  I personally reviewed this patient with the CRNA  Discussed and agreed on the Anesthesia Plan with the CRNA  Leti Crawford           Recent labs personally reviewed:  Lab Results   Component Value Date    WBC 8 85 01/31/2020    HGB 13 3 01/31/2020     01/31/2020     Lab Results   Component Value Date     03/11/2015    K 3 4 (L) 01/31/2020    BUN 16 01/31/2020    CREATININE 1 02 01/31/2020    GLUCOSE 84 03/11/2015     Lab Results   Component Value Date    PTT 28 01/31/2020      Lab Results   Component Value Date    INR 1 01 01/31/2020       Blood type A    No results found for: Ada Dietz MD, have personally seen and evaluated the patient prior to anesthetic care  I have reviewed the pre-anesthetic record, and other medical records if appropriate to the anesthetic care  If a CRNA is involved in the case, I have reviewed the CRNA assessment, if present, and agree  Risks/benefits and alternatives discussed with patient including possible PONV, sore throat, and possibility of rare anesthetic and surgical emergencies

## 2020-02-01 NOTE — PROGRESS NOTES
Progress Note - Julián Varma 6/30/1931, 80 y o  female MRN: [id number]    Unit/Bed#: [facility] 608-01 Encounter: [id number]    Primary Care Provider: Hector Camarillo DO   Date and time admitted to hospital: 1/31/2020  6:18 PM        Fall  Assessment & Plan  - PT/OT    Closed fracture of left distal radius  Assessment & Plan  - Ortho consulted, cast applied    * Fracture of femur, intertrochanteric, left, closed ([address] )  Assessment & Plan  - Cephalomedullary Nail Left Proximal Femur 2/1/2020  - follow up Hgb in AM  - PT/OT            Bedside nurse rounds completed with nurse Joyce Bentley  Prophylaxis: Enoxaparin (Lovenox)    Disposition: PT/OT evals    Code status:  Level 1 - Full Code    Consultants: Ortho    Is the patient 72 years or older?: YES:    1  Before the illness or injury that brought you to the Emergency, did you need someone to help you on a regular basis? 0=No   2  Since the illness or injury that brought you to the Emergency, have you needed more help than usual to take care of yourself? 0=No   3  Have you been hospitalized for one or more nights during the past 6 months (excluding a stay in the Emergency Department)? 0=No   4  In general, do you see well? 0=Yes   5  In general, do you have serious problems with your memory? 0=No   6  Do you take more than three different medications everyday? 1=Yes   TOTAL   1     Did you order a geriatric consult if the score was 2 or greater?: yes    Identification of Seniors at Risk      Most Recent Value   (ISAR) Identification of Seniors at Risk   Before the illness or injury that brought you to the Emergency, did you need someone to help you on a regular basis? 0 Filed at: 01/31/2020 1826   In the last 24 hours, have you needed more help than usual?  0 Filed at: 01/31/2020 1826   Have you been hospitalized for one or more nights during the past 6 months?   0 Filed at: 01/31/2020 1826   In general, do you see well?  0 Filed at: 01/31/2020 1826   In general, do you have serious problems with your memory? 0 Filed at: 01/31/2020 1826   Do you take more than three different medications every day? 1 Filed at: 01/31/2020 1826   ISAR Score  1 Filed at: 01/31/2020 1826            SUBJECTIVE:     Transfer from: [place]  Outside Films Received: not applicable  Tertiary Exam Due on: today    Mechanism of Injury:Fall    Details related to Injury: +LOC:  no    Chief Complaint: cold    HPI/Last 24 hour events: C/O feeling cold  Otherwise, her left leg pain is well managed  Her cast on her left wrist is in place  Denies it feeling too snug  No CP, SOB, abdominal pain, nausea      Active medications:           Current Facility-Administered Medications:     acetaminophen (TYLENOL) tablet 650 mg, 650 mg, Oral, Q6H EILEEN, 650 mg at 02/01/20 1244    ALPRAZolam (XANAX) tablet 0 25 mg, 0 25 mg, Oral, BID PRN    ceFAZolin (ANCEF) 2 G in sodium chloride 0 9% 50 ml IVPB (CMPD ORDER), 2,000 mg, Intravenous, Q8H    enoxaparin (LOVENOX) subcutaneous injection 40 mg, 40 mg, Subcutaneous, Q24H EILEEN    famotidine (PEPCID) tablet 20 mg, 20 mg, Oral, Daily, 20 mg at 02/01/20 1244    HYDROmorphone (DILAUDID) injection 0 2 mg, 0 2 mg, Intravenous, Q3H PRN    oxyCODONE (ROXICODONE) IR tablet 2 5 mg, 2 5 mg, Oral, Q4H PRN    oxyCODONE (ROXICODONE) IR tablet 5 mg, 5 mg, Oral, Q4H PRN, 5 mg at 02/01/20 0043    sodium chloride 0 9 % infusion, 75 mL/hr, Intravenous, Continuous      OBJECTIVE:     Vitals:   Vitals:    02/01/20 1238   BP: 121/68   Pulse: 101   Resp:    Temp: 97 8 °F (36 6 °C)   SpO2: 96%       Physical Exam:     Constitution: patient lying in bed, appears comfortable  HEENT: ROX, EOMI  CV: regular rate and rhythm, +2 DP pulses  Pulm: CTA, no wheezes, rhonchi or crackles, unlabored, equal bilaterally  Abd: soft, nontender, nondistended, active bowel sounds  Extremities: moves all extremities, equal strength, no edema, no skin breakdown  Neuro: A&O, no focal deficits  Skin: no rash or breakdown, warm            I/O:   I/O       01/30 0701 - 01/31 0700 01/31 0701 - 02/01 0700 02/01 0701 - 02/02 0700    I V  (mL/kg)  397 5 (5 1) 1500 (19 1)    Total Intake(mL/kg)  397 5 (5 1) 1500 (19 1)    Urine (mL/kg/hr)  875 1150 (2 3)    Blood   300    Total Output  875 1450    Net  -477 5 +50                 Invasive Devices: Invasive Devices     Peripheral Intravenous Line            Peripheral IV 01/31/20 Right Antecubital less than 1 day    Peripheral IV 02/01/20 Right Hand less than 1 day          Drain            Urethral Catheter Latex 16 Fr  less than 1 day                  Imaging:   Xr Wrist 3+ Views Left    Result Date: 1/31/2020  Impression: Nondisplaced fracture of the distal radius  The study was marked in [facility] for immediate notification  Workstation performed: [id number]     Xr Hip/pelv 2-3 Vws Left If Performed    Result Date: 1/31/2020  Impression: Acute left femoral intertrochanteric fracture with mild varus angulation  Workstation performed: [id number]     Xr Femur 2 Vw Left    Result Date: 2/1/2020  Impression: Intertrochanteric fracture with posttraumatic coxa vara but no significant displacement  Workstation performed: [id number]     Trauma - Ct Head Wo Contrast    Result Date: 1/31/2020  Impression: No acute intracranial abnormality  Workstation performed: [id number]     Trauma - Ct Spine Cervical Wo Contrast    Result Date: 1/31/2020  Impression: No cervical spine fracture or traumatic malalignment  Workstation performed: [id number]     Trauma - Ct Chest Abdomen Pelvis W Contrast    Result Date: 1/31/2020  Impression: Degraded by respiratory motion  1   Comminuted left intertrochanteric hip fracture  2   Otherwise no traumatic injury to the chest, abdomen, or pelvis  The study was marked in [facility] for immediate notification   Workstation performed: [id number]       Labs:   CBC:   Lab Results   Component Value Date    WBC 8 72 02/01/2020    HGB 10 9 (L) 02/01/2020    HCT 33 6 (L) 02/01/2020  (H) 02/01/2020     02/01/2020    MCH 33 3 02/01/2020    MCHC 32 4 02/01/2020    RDW 13 2 02/01/2020    MPV 10 5 02/01/2020    NRBC 0 02/01/2020     CMP:   Lab Results   Component Value Date     (H) 02/01/2020    CO2 25 02/01/2020    BUN 12 02/01/2020    CREATININE 0 72 02/01/2020    CALCIUM 8 4 02/01/2020    AST 19 01/31/2020    ALT 23 01/31/2020    ALKPHOS 95 01/31/2020    EGFR 75 02/01/2020

## 2020-02-02 LAB
ANION GAP SERPL CALCULATED.3IONS-SCNC: 4 MMOL/L (ref 4–13)
BASOPHILS # BLD AUTO: 0.04 THOUSANDS/ΜL (ref 0–0.1)
BASOPHILS NFR BLD AUTO: 1 % (ref 0–1)
BUN SERPL-MCNC: 9 MG/DL (ref 5–25)
CALCIUM SERPL-MCNC: 8.5 MG/DL (ref 8.3–10.1)
CHLORIDE SERPL-SCNC: 113 MMOL/L (ref 100–108)
CO2 SERPL-SCNC: 27 MMOL/L (ref 21–32)
CREAT SERPL-MCNC: 0.72 MG/DL (ref 0.6–1.3)
EOSINOPHIL # BLD AUTO: 0.12 THOUSAND/ΜL (ref 0–0.61)
EOSINOPHIL NFR BLD AUTO: 2 % (ref 0–6)
ERYTHROCYTE [DISTWIDTH] IN BLOOD BY AUTOMATED COUNT: 13.4 % (ref 11.6–15.1)
GFR SERPL CREATININE-BSD FRML MDRD: 75 ML/MIN/1.73SQ M
GLUCOSE SERPL-MCNC: 99 MG/DL (ref 65–140)
HCT VFR BLD AUTO: 33.5 % (ref 34.8–46.1)
HGB BLD-MCNC: 10.5 G/DL (ref 11.5–15.4)
IMM GRANULOCYTES # BLD AUTO: 0.02 THOUSAND/UL (ref 0–0.2)
IMM GRANULOCYTES NFR BLD AUTO: 0 % (ref 0–2)
LYMPHOCYTES # BLD AUTO: 1.14 THOUSANDS/ΜL (ref 0.6–4.47)
LYMPHOCYTES NFR BLD AUTO: 15 % (ref 14–44)
MCH RBC QN AUTO: 33 PG (ref 26.8–34.3)
MCHC RBC AUTO-ENTMCNC: 31.3 G/DL (ref 31.4–37.4)
MCV RBC AUTO: 105 FL (ref 82–98)
MONOCYTES # BLD AUTO: 0.77 THOUSAND/ΜL (ref 0.17–1.22)
MONOCYTES NFR BLD AUTO: 10 % (ref 4–12)
NEUTROPHILS # BLD AUTO: 5.34 THOUSANDS/ΜL (ref 1.85–7.62)
NEUTS SEG NFR BLD AUTO: 72 % (ref 43–75)
NRBC BLD AUTO-RTO: 0 /100 WBCS
PLATELET # BLD AUTO: 183 THOUSANDS/UL (ref 149–390)
PMV BLD AUTO: 11.1 FL (ref 8.9–12.7)
POTASSIUM SERPL-SCNC: 3.9 MMOL/L (ref 3.5–5.3)
RBC # BLD AUTO: 3.18 MILLION/UL (ref 3.81–5.12)
SODIUM SERPL-SCNC: 144 MMOL/L (ref 136–145)
WBC # BLD AUTO: 7.43 THOUSAND/UL (ref 4.31–10.16)

## 2020-02-02 PROCEDURE — 80048 BASIC METABOLIC PNL TOTAL CA: CPT | Performed by: PHYSICIAN ASSISTANT

## 2020-02-02 PROCEDURE — NS001 PR NO SIGNATURE OR ATTESTATION: Performed by: ORTHOPAEDIC SURGERY

## 2020-02-02 PROCEDURE — 99232 SBSQ HOSP IP/OBS MODERATE 35: CPT | Performed by: SURGERY

## 2020-02-02 PROCEDURE — 85025 COMPLETE CBC W/AUTO DIFF WBC: CPT | Performed by: PHYSICIAN ASSISTANT

## 2020-02-02 PROCEDURE — 97163 PT EVAL HIGH COMPLEX 45 MIN: CPT

## 2020-02-02 PROCEDURE — 97167 OT EVAL HIGH COMPLEX 60 MIN: CPT

## 2020-02-02 RX ORDER — DOCUSATE SODIUM 100 MG/1
100 CAPSULE, LIQUID FILLED ORAL DAILY
Status: DISCONTINUED | OUTPATIENT
Start: 2020-02-02 | End: 2020-02-05 | Stop reason: HOSPADM

## 2020-02-02 RX ORDER — AMOXICILLIN 250 MG
1 CAPSULE ORAL
Status: DISCONTINUED | OUTPATIENT
Start: 2020-02-02 | End: 2020-02-05 | Stop reason: HOSPADM

## 2020-02-02 RX ADMIN — FAMOTIDINE 20 MG: 20 TABLET ORAL at 08:21

## 2020-02-02 RX ADMIN — OXYCODONE HYDROCHLORIDE 5 MG: 5 TABLET ORAL at 04:12

## 2020-02-02 RX ADMIN — OXYCODONE HYDROCHLORIDE 5 MG: 5 TABLET ORAL at 08:21

## 2020-02-02 RX ADMIN — ENOXAPARIN SODIUM 40 MG: 40 INJECTION SUBCUTANEOUS at 08:21

## 2020-02-02 RX ADMIN — CEFAZOLIN SODIUM 2000 MG: 10 INJECTION, POWDER, FOR SOLUTION INTRAVENOUS at 00:37

## 2020-02-02 RX ADMIN — ACETAMINOPHEN 650 MG: 325 TABLET ORAL at 08:20

## 2020-02-02 RX ADMIN — ACETAMINOPHEN 650 MG: 325 TABLET ORAL at 19:14

## 2020-02-02 RX ADMIN — SENNOSIDES AND DOCUSATE SODIUM 1 TABLET: 8.6; 5 TABLET ORAL at 21:51

## 2020-02-02 RX ADMIN — ACETAMINOPHEN 650 MG: 325 TABLET ORAL at 13:59

## 2020-02-02 RX ADMIN — FAMOTIDINE 20 MG: 20 TABLET ORAL at 18:32

## 2020-02-02 NOTE — PLAN OF CARE
Problem: Potential for Falls  Goal: Patient will remain free of falls  Description  INTERVENTIONS:  - Assess patient frequently for physical needs  -  Identify cognitive and physical deficits and behaviors that affect risk of falls    -  [place] fall precautions as indicated by assessment   - Educate patient/family on patient safety including physical limitations  - Instruct patient to call for assistance with activity based on assessment  - Modify environment to reduce risk of injury  - Consider OT/PT consult to assist with strengthening/mobility  Outcome: Progressing     Problem: Prexisting or High Potential for Compromised Skin Integrity  Goal: Skin integrity is maintained or improved  Description  INTERVENTIONS:  - Identify patients at risk for skin breakdown  - Assess and monitor skin integrity  - Assess and monitor nutrition and hydration status  - Monitor labs   - Assess for incontinence   - Turn and reposition patient  - Assist with mobility/ambulation  - Relieve pressure over bony prominences  - Avoid friction and shearing  - Provide appropriate hygiene as needed including keeping skin clean and dry  - Evaluate need for skin moisturizer/barrier cream  - Collaborate with interdisciplinary team   - Patient/family teaching  - Consider wound care consult   Outcome: Progressing     Problem: PAIN - ADULT  Goal: Verbalizes/displays adequate comfort level or baseline comfort level  Description  Interventions:  - Encourage patient to monitor pain and request assistance  - Assess pain using appropriate pain scale  - Administer analgesics based on type and severity of pain and evaluate response  - Implement non-pharmacological measures as appropriate and evaluate response  - Consider cultural and social influences on pain and pain management  - Notify physician/advanced practitioner if interventions unsuccessful or patient reports new pain  Outcome: Progressing     Problem: DISCHARGE PLANNING  Goal: Discharge to home or other facility with appropriate resources  Description  INTERVENTIONS:  - Identify barriers to discharge w/patient and caregiver  - Arrange for needed discharge resources and transportation as appropriate  - Identify discharge learning needs (meds, wound care, etc )  - Arrange for interpretive services to assist at discharge as needed  - Refer to Case Management Department for coordinating discharge planning if the patient needs post-hospital services based on physician/advanced practitioner order or complex needs related to functional status, cognitive ability, or social support system  Outcome: Progressing

## 2020-02-02 NOTE — ASSESSMENT & PLAN NOTE
- Cephalomedullary Nail Left Proximal Femur 2/1/2020  - POD # 1 - Hemoglobin stable  - PT/OT   - WBAT  - Venodynes and Lovenox

## 2020-02-02 NOTE — OCCUPATIONAL THERAPY NOTE
OccupationalTherapy Evaluation     Patient Name: Malik Garcia  JRDUA'F Date: 2/2/2020  Problem List  Principal Problem:    Fracture of femur, intertrochanteric, left, closed ([address] )  Active Problems:    Closed fracture of left distal radius    Fall    Past Medical History  Past Medical History:   Diagnosis Date    Diverticulosis     GERD (gastroesophageal reflux disease)     Hiatal hernia     Irritable bowel disease     Lactose intolerance     Osteoporosis      Past Surgical History  Past Surgical History:   Procedure Laterality Date    CARPAL TUNNEL RELEASE      CHOLECYSTECTOMY      EAR TUBE REMOVAL      HEMORROIDECTOMY      HYSTERECTOMY      PERINEAL  PELVIC RECONSTRUCTION SURGERY  09/13/2019    Dr Rosie Hummel  Santhosh Boyd           02/02/20 0835   Note Type   Note type Eval/Treat   Restrictions/Precautions   Weight Bearing Precautions Per Order Yes   LUE Weight Bearing Per Order NWB  (OK TO USE PLATFORM WALKER)   LLE Weight Bearing Per Order WBAT   Other Precautions WBS; Chair Alarm;Cognitive; Bed Alarm;Multiple lines; Fall Risk;Pain   Pain Assessment   Pain Assessment 0-10   Pain Score 7   Pain Type Acute pain   Pain Location Hip   Pain Orientation Left   Hospital Pain Intervention(s) Repositioned; Ambulation/increased activity; Distraction; Emotional support   Response to Interventions TOLERATED    Home Living   Type of [address] [place] [address] Two level;Bed/bath upstairs;Stairs to enter with rails  (3 TAMEKA)   Bathroom Shower/Tub Tub/shower unit   Bathroom Toilet Standard   Home Equipment Walker;Cane   Additional Comments pt is from the above home set up at baseline alone  however plans to d/c to daughter's 1sh with 3 tameka      Prior Function   Level of [place] Independent with ADLs and functional mobility   Lives With Alone   Receives Help From Family   ADL Assistance Independent   IADLs Independent   Falls in the last 6 months 1 to 4   Vocational Retired   Lifestyle Autonomy PT REPORTS BEING I WITH ADLS/IADLS PTA  Reciprocal Relationships LIVES ALONE AT BASELINE  PT PLANS TO D/C TO HER DAUGHTER'S HOME  PT REPORTS SHE WILL BE ALONE DURING THE DAY    Service to Others RETIRED   Intrinsic Gratification ENJOYS DOING THINGS ON HER OWN    ADL   Eating Assistance 4  Minimal Assistance   Grooming Assistance 4  Minimal Assistance   UB Bathing Assistance 3  Moderate Assistance   LB Bathing Assistance 2  Maximal Assistance   UB Dressing Assistance 3  Moderate Assistance   LB Dressing Assistance 2  Maximal Assistance   Toileting Assistance  2  Maximal Assistance   Functional Assistance 2  Maximal Assistance   Bed Mobility   Supine to Sit 3  Moderate assistance   Additional items Assist x 2; Increased time required;Verbal cues;LE management   Sit to Supine Unable to assess  (PT LEFT OOB WITH ALARM ON )   Transfers   Sit to Stand 3  Moderate assistance   Additional items Assist x 2; Increased time required;Verbal cues   Stand to Sit 3  Moderate assistance   Additional items Assist x 2; Increased time required;Verbal cues   Functional Mobility   Functional Mobility 3  Moderate assistance   Additional Comments AX2 W/ PLATFORM WALKER    Additional items Rolling walker   Balance   Static Sitting Fair   Static Standing Poor   Ambulatory Poor -   Activity Tolerance   Activity Tolerance Patient limited by pain; Other (Comment)  (ANXIETY )   Medical Staff Made Aware SPOKE TO CM REGARDING D/C PLAN    Nurse Made Aware APPROPRIATE TO SEE PER RN    RUE Assessment   RUE Assessment WFL  (R HAND DOMINANT)   LUE Assessment   LUE Assessment X  (NWB IN CAST )   Hand Function   Gross Motor Coordination Functional   Fine Motor Coordination Functional   Sensation   Light Touch No apparent deficits   Cognition   Overall Cognitive Status Impaired   Arousal/Participation Alert; Cooperative   Attention Attends with cues to redirect   Orientation Level Oriented X4   Memory Decreased recall of precautions;Decreased recall of recent events   Following Commands Follows one step commands with increased time or repetition   Comments PT IS PLEASANT AND COOPERATIVE HOWEVER REQUIRED ENCOURAGEMENT TO PARTICIPATE  CUES FOR CARRY OVER OF LEARNED PRECAUTIONS  PT IS ANXIOUS AND EASILY DISTRACTED REQUIRING CUES TO ATTEND TO TASK AND SIMPLE, DIRECT CUES T/O  ALARM ON FOR SAFETY    Assessment   Limitation Decreased ADL status; Decreased Safe judgement during ADL;Decreased cognition;Decreased endurance;Decreased self-care trans;Decreased high-level ADLs;Mood limitation   Prognosis Good   Assessment 87 YO Female SEEN FOR INITIAL OCCUPATIONAL THERAPY EVALUATION FOLLOWING TXF FROM [facility] S/P FALL RESULTING IN CLOSED FX OF L DISTAL RADIUS AND L INTERTROCHANTERIC FX  PT IS S/P L TFN  PT IS NWB ON LUE, OK FOR USE OF PLATFORM WALKER AND WBAT ON LLE  PROBLEMS LIST INCLUDES ANXIETY, HTN, MACULAR CYST, IBS AND OP  PT IS FROM HOME ALONE WHERE SHE REPORTS BEING INDEPENDENT WITH ADLS/IADLS/DRIVING PTA  PT REPORTS HER PLAN IS TO D/C TO HER DAUGHTER'S HOME WHERE SHE WILL ALSO BE ALONE DURING THE DAY  PT CURRENTLY REQUIRES OVERALL MOD-MAX A WITH ADLS, AND MOD A X2 WITH TRANSFERS /LIMITED FUNCTIONAL MOBILITY WITH USE OF PLATFORM RW  PT IS LIMITED 2' PAIN, FATIGUE, IMPAIRED BALANCE, FALL RISK , WB RESTRICTIONS, ORTHOPEDIC RESTRICTIONS, DIZZINESS WITH CHANGE OF POSITIONING , ANXIETY, EASILY DISTRACTED, LIMITED FAMILY/FRIEND SUPPORT , INACCESSIBLE HOME ENVIRONMENT and OVERALL LIMITED ACTIVITY TOLERANCE  PT EDUCATED ON CARRY OVER OF WB STATUS, DEEP BREATHING TECHNIQUES T/O ACTIVITY and CONTINUE PARTICIPATION IN SELF-CARE/MOBILITY WITH STAFF WHILE IN THE HOSPITAL   FROM AN OCCUPATIONAL THERAPY PERSPECTIVE, PT WOULD BENEFIT FROM ADDITIONAL OT SERVICES IN AN INPT REHAB SETTING UPON D/C  WILL CONT TO FOLLOW TO ADDRESS THE BELOW DESCRIBED GOALS      Goals   Patient Goals TO "GO SLOW"    LTG Time Frame 10-14   Long Term Goal #1 SEE BELOW    Plan   Treatment Interventions ADL retraining;Functional transfer training; Endurance training;Cognitive reorientation;Patient/family training;Equipment evaluation/education; Compensatory technique education; Energy conservation; Activityengagement   Goal Expiration Date 02/16/20   OT Frequency 3-5x/wk   Recommendation   OT Discharge Recommendation Short Term Rehab   OT - OK to Discharge Yes   Modified Reginald Scale   Modified Clare Scale 4       OCCUPATIONAL THERAPY GOALS TO BE MET WITHIN 10-14 DAYS:    -Pt will increase bed mobility to MOD I to participate in functional activities with G tolerance and balance  -Pt will improve functional mobility and transfers to MOD I on/off all surfaces w/ G balance/safety including toileting   -Pt will increase independence in all ADLS to MOD I with G balance sitting upright in chair   -Pt will improve activity tolerance to G for 30 min txment sessions w/ G carry over of learned energy conservation techniques   -Pt will improve independence in lt homemaking activities to MOD I without requiring cues for safety   -Pt will demonstrate G carryover of learned WBS, safety techniques and proper body mechanics in functional and leisure activities with use of DME   -Pt will identify 2-3 coping strategies that promote G mental health that can be completed within the hospital and carry over to d/c environment    -Pt will complete additional cognitive assessment with 100% attention to task in order to assist with safe d/c plan         Documentation completed by TAL Delgado, OTR/L

## 2020-02-02 NOTE — PLAN OF CARE
Problem: PHYSICAL THERAPY ADULT  Goal: Performs mobility at highest level of function for planned discharge setting  See evaluation for individualized goals  Description  Treatment/Interventions: Functional transfer training, LE strengthening/ROM, Therapeutic exercise, Endurance training, Cognitive reorientation, Patient/family training, Equipment eval/education, Bed mobility, Gait training, Spoke to nursing, Spoke to case management, OT  Equipment Recommended: Walker(with platform attachment)       See flowsheet documentation for full assessment, interventions and recommendations  Outcome: Progressing  Note:   Prognosis: Good  Problem List: Decreased strength, Decreased range of motion, Decreased endurance, Impaired balance, Decreased mobility, Decreased coordination, Decreased safety awareness, Pain, Orthopedic restrictions  Assessment: Pt  is an 81 yo F who presents s/p fall with subsequent Fx of L femur  POD 1 TFN, NWB LUE however can use platform walker and WBAT LLE  order placed for PT eval and tx, w/ activity order of WBAT L LE and NWB L UE  pt presents w/ comorbidities of Closed Fx of Radius, Fall, atypical chest pain, GERD, Anxiety, htn, and Macular cyst L eye and personal factors of advanced age, inaccessible home environment, living in 2 story house, mobilizing w/ assistive device, stair(s) to enter home, inability to ambulate household distances, inability to navigate community distances, inability to navigate level surfaces w/o external assistance, unable to perform dynamic tasks in community, limited home support, positive fall history, anxiety, unable to perform physical activity, inability to perform IADLs, inability to perform ADLs and inability to live alone  pt presents w/ pain, weakness, decreased ROM, decreased endurance, impaired balance, gait deviations, impaired coordination, decreased safety awareness and fall risk   these impairments are evident in findings from physical examination (weakness and impaired coordination), mobility assessment (need for MOD Ax2 assist w/ all phases of mobility when usually mobilizing independently, tolerance to only 5 feet of ambulation and need for cueing for mobility technique), and Barthel Index: 40/100  pt needed input for task focus and mobility technique  pt is at risk for falls due to physical and safety awareness deficits  pt's clinical presentation is unstable/unpredictable (evident in need for assist w/ all phases of mobility when usually mobilizing independently, tolerance to only 5 feet of ambulation, pain impacting overall mobility status, need for input for mobility technique, need for input for task focus and mobility technique and need for input for mobility technique/safety)  pt needs inpatient PT tx to improve mobility deficits  discharge recommendation is for inpatient rehab to reduce fall risk and maximize level of functional independence  Recommendation: Post acute IP rehab     PT - OK to Discharge: Yes    See flowsheet documentation for full assessment

## 2020-02-02 NOTE — SOCIAL WORK
CM met with Pt with an introduction and explanation of role  Pt reported her granddaughter and great grandchild resides with her in a 2 story home with no use of DME but has access to a cane and roller walker if needed and 3 steps to enter the home  Pt reported being independent with ADLs with no hx of VNA, SNF, mental health or drug/alcohol placements  Pt denied having a living will, reported the use of [facility] in [place] and has Susie Marshall as a PCP  CM discuss the post acute care recommendation was made by your care team for STR  Discussed Freedom of Choice with patient  List of facilities given to patient via in person  patient aware the list is custom filtered for them by insurance and that [facility] post acute providers are designated  Pt reported being agreeable to the recommendation of SNF, and requested [facility] to [facility] and [facility]  CM made the requested referrals  CM reviewed d/c planning process including the following: identifying help at home, patient preference for d/c planning needs, Discharge Lounge, [facility] Meds to Bed program, availability of treatment team to discuss questions or concerns patient and/or family may have regarding understanding medications and recognizing signs and symptoms once discharged  CM also encouraged patient to follow up with all recommended appointments after discharge  Patient advised of importance for patient and family to participate in managing patients medical well being

## 2020-02-02 NOTE — UTILIZATION REVIEW
Initial Clinical Review    Admission: Date/Time/Statement: Inpatient Admission Orders (From admission, onward)     Ordered        01/31/20 1907  Inpatient Admission  Once                   Orders Placed This Encounter   Procedures    Inpatient Admission     Standing Status:   Standing     Number of Occurrences:   1     Order Specific Question:   Admitting Physician     Answer:   Seun Sanchez     Order Specific Question:   Level of Care     Answer:   Med Surg [16]     Order Specific Question:   Bed Type     Answer:   Trauma [7]     Order Specific Question:   Estimated length of stay     Answer:   More than 2 Midnights     Order Specific Question:   Certification     Answer:   I certify that inpatient services are medically necessary for this patient for a duration of greater than two midnights  See H&P and MD Progress Notes for additional information about the patient's course of treatment  ED Arrival Information     Expected Arrival Acuity Means of Arrival Escorted By Service Admission Type    - 1/31/2020 18:18 Urgent Ambulance [facility] EMS Trauma Emergency    Arrival Complaint    L  Fm 1960 [address]        Chief Complaint   Patient presents with    Fall     trauma transfer from [facility]  left hip fracture left radius fracture     Assessment/Plan: 79 y/o female who initially presented to [address] [facility] ED s/p fall at home  Pt states she was going down her stairs, slipped on the last step and landed on her L hip  Denies hitting her head or LOC  States she was unable to stand back up or move her leg d/t L hip pain  XR's at [facility] revealed a L radial fx which was splinted and L intertrochanteric fx  No blood thinners  Transferred to [facility] for trauma and eval & tx  Admit inpatient to M/S unit under trauma service -- ortho consulted, plan to OR in AM   Npo, IVF, analgesics prn  NWB ABI and VAHID      OPERATIVE REPORT  SURGERY DATE: 2/1/2020    PROCEDURES: Cephalomedullary Nail Left Proximal Femur for Intertrochanteric Femur Fracture  Short Arm Cast Application Left Distal Radius Fracture    ANESTHESIA TYPE: Spinal with Sedation   FINDINGS: Stable Reduction    2/2 -- POD #1 --  Pain controlled with current pain regimen  Continue analgesics prn  WBAT LLE, lovenox sq for DVT ppx  Cast on L wrist in place  Monitor Hgb for acute blood loss anemia  Reg diet   PT/OT evals -- recommending IP STR on d/c      ED Triage Vitals   Temperature Pulse Respirations Blood Pressure SpO2   01/31/20 1836 01/31/20 1836 01/31/20 1836 01/31/20 1836 01/31/20 1836   97 8 °F (36 6 °C) 99 16 136/62 98 %      Temp Source Heart Rate Source Patient Position - Orthostatic VS BP Location FiO2 (%)   01/31/20 1836 01/31/20 1851 02/01/20 0022 02/01/20 1130 --   Oral Monitor Lying Right arm       Pain Score       01/31/20 1836       3        Wt Readings from Last 1 Encounters:   01/31/20 78 5 kg (173 lb)     Additional Vital Signs:   Date/Time  Temp  Pulse  Resp  BP  MAP (mmHg)  SpO2  O2 Device   02/02/20 11:24:50  98 5 °F (36 9 °C)  90    140/71  94  95 %     02/02/20 08:18:04  99 5 °F (37 5 °C)  100    142/74  97  90 %     02/02/20 03:07:48  99 1 °F (37 3 °C)  111Abnormal   18  143/75  98  94 %     02/01/20 2251  98 4 °F (36 9 °C)  101  18  134/65    93 %  None (Room air)   02/01/20 18:38:40  98 5 °F (36 9 °C)  111Abnormal   18  142/78  99  92 %  None (Room air)   02/01/20 1600              None (Room air)   02/01/20 1430  98 7 °F (37 1 °C)  105  18  142/78  99  90 %  None (Room air)   02/01/20 1330  98 9 °F (37 2 °C)  111Abnormal     143/79  100  95 %     02/01/20 1230  97 8 °F (36 6 °C)  104  18  121/68  86  95 %     02/01/20 1130  97 3 °F (36 3 °C)Abnormal   85  18  121/67      None (Room air)   02/01/20 1045    88  21  110/56    95 %  Nasal cannula   02/01/20 1034  97 5 °F (36 4 °C)  93        98 %  Nasal cannula   02/01/20 1030    90  18  113/56    92 %  Nasal cannula   02/01/20 1015    94  22  124/65    92 %  Nasal cannula   02/01/20 1009 97 5 °F (36 4 °C)   97   20   118/55     93 %  Nasal cannula   02/01/20 0747  97 °F (36 1 °C)Abnormal                02/01/20 0022  98 °F (36 7 °C)  86  16  133/68    96 %  None (Room air)   01/31/20 2200              None (Room air)   01/31/20 1851    99  20  124/70    96 %     01/31/20 18:36:48  97 8 °F (36 6 °C)  99  16  136/62    98 %         Pertinent Labs/Diagnostic Test Results:   XR L hip/pelvis 1/31 -- Acute left femoral intertrochanteric fracture with mild varus angulation  XR L wrist 1/31 -- Nondisplaced fracture of the distal radius  XT L femur 2/1 --Intertrochanteric fracture with posttraumatic coxa vara but no significant displacement      Results from last 7 days   Lab Units 02/02/20  0435 02/01/20  0453 01/31/20  1342   WBC Thousand/uL 7 43 8 72 8 85   HEMOGLOBIN g/dL 10 5* 10 9* 13 3   HEMATOCRIT % 33 5* 33 6* 40 6   PLATELETS Thousands/uL 183 202 296   NEUTROS ABS Thousands/µL 5 34 6 31 4 29     Results from last 7 days   Lab Units 02/02/20  0435 02/01/20  0453 01/31/20  1342   SODIUM mmol/L 144 139 137   POTASSIUM mmol/L 3 9 3 7 3 4*   CHLORIDE mmol/L 113* 110* 100   CO2 mmol/L 27 25 27   ANION GAP mmol/L 4 4 10   BUN mg/dL 9 12 16   CREATININE mg/dL 0 72 0 72 1 02   EGFR ml/min/1 73sq m 75 75 49   CALCIUM mg/dL 8 5 8 4 9 7     Results from last 7 days   Lab Units 01/31/20  1342   AST U/L 19   ALT U/L 23   ALK PHOS U/L 95   TOTAL PROTEIN g/dL 7 4   ALBUMIN g/dL 3 7   TOTAL BILIRUBIN mg/dL 0 70     Results from last 7 days   Lab Units 02/02/20  0435 02/01/20  0453 01/31/20  1342   GLUCOSE RANDOM mg/dL 99 140 133     Results from last 7 days   Lab Units 01/31/20  1342   CK TOTAL U/L 39     Results from last 7 days   Lab Units 01/31/20  1342   TROPONIN I ng/mL <0 02     Results from last 7 days   Lab Units 01/31/20  1342   PROTIME seconds 13 3   INR  1 01   PTT seconds 28     ED Treatment:   Medication Administration from 01/31/2020 1700 to 01/31/2020 2056       Date/Time Order Dose Route Action     01/31/2020 1941 acetaminophen (TYLENOL) tablet 650 mg 650 mg Oral Given     01/31/2020 2032 oxyCODONE (ROXICODONE) IR tablet 5 mg 5 mg Oral Given     Past Medical History:   Diagnosis Date    Diverticulosis     GERD (gastroesophageal reflux disease)     Hiatal hernia     Irritable bowel disease     Lactose intolerance     Osteoporosis      Present on Admission:   Fracture of femur, intertrochanteric, left, closed ([facility] [address] )      Admitting Diagnosis: Closed fracture of left distal radius [S52 502A]  Fracture of femur, intertrochanteric, left, closed ([facility] [address] ) [S72 142A]  Unspecified multiple injuries, initial encounter [T07  XXXA]  Age/Sex: 80 y o  female  Admission Orders:  Scheduled Medications:  Medications:  acetaminophen 650 mg Oral Q6H [address]   enoxaparin 40 mg Subcutaneous Q24H [address]   famotidine 20 mg Oral BID     Continuous IV Infusions:  sodium chloride 0 9 % infusion   Rate: 75 mL/hr Dose: 75 mL/hr  Freq: Continuous Route: IV  Indications of Use: IV Hydration  Start: 02/01/20 0000 End: 02/01/20 1328     PRN Meds:  ALPRAZolam 0 25 mg Oral BID PRN   HYDROmorphone 0 2 mg Intravenous Q3H PRN   oxyCODONE 2 5 mg Oral Q4H PRN   oxyCODONE 5 mg Oral Q4H PRN 1/31 x1, 2/1 x1, 2/2 x2       IP CONSULT TO CASE MANAGEMENT  IP CONSULT TO GERONTOLOGY  IP CONSULT TO INTERNAL MEDICINE    Network Utilization Review Department  [email or web address]  ATTENTION: Please call with any questions or concerns to [phone] and carefully listen to the prompts so that you are directed to the right person  All voicemails are confidential   Pam Ingmy all requests for admission clinical reviews, approved or denied determinations and any other requests to dedicated fax number below belonging to the campus where the patient is receiving treatment   List of dedicated fax numbers for the Facilities:  FACILITY NAME UR FAX NUMBER   ADMISSION DENIALS (Administrative/Medical Necessity) [phone]   PARENT CHILD [facility] (Maternity/NICU/Pediatrics) [phone]   Jonathan Mohamud [phone]   [facility] Almanigel Caller [phone]   Jessica Bhagat [phone]   Jessicasandra Barrett [facility] [address] [facility] [phone]   Cherelle Arguellesman [phone] [address] [facility]  [address] [facility] [address] [facility] [phone]

## 2020-02-02 NOTE — PROGRESS NOTES
Progress Note - Kacie Cárdenas 6/30/1931, 80 y o  female MRN: [id number]    Unit/Bed#: [facility] 608-01 Encounter: [id number]    Primary Care Provider: Tony Blount DO   Date and time admitted to hospital: 1/31/2020  6:18 PM        Fall  Assessment & Plan  - PT/OT  - will also consult Geriatrics    Closed fracture of left distal radius  Assessment & Plan  - Ortho consulted, cast applied  Fingers warm and mobile  PAin controlled    * Fracture of femur, intertrochanteric, left, closed (HCC)  Assessment & Plan  - Cephalomedullary Nail Left Proximal Femur 2/1/2020  - POD # 1 - Hemoglobin stable  - PT/OT   - WBAT  - Venodynes and Lovenox            Bedside nurse rounds completed with nurse Pollie Meigs  Prophylaxis: Sequential compression device (Venodyne)  and Enoxaparin (Lovenox)    Disposition: rehab    Code status:  Level 1 - Full Code    Consultants: Ortho, Geriatrics    Is the patient 72 years or older?: YES:    1  Before the illness or injury that brought you to the Emergency, did you need someone to help you on a regular basis? 0=No   2  Since the illness or injury that brought you to the Emergency, have you needed more help than usual to take care of yourself? 1=Yes   3  Have you been hospitalized for one or more nights during the past 6 months (excluding a stay in the Emergency Department)? 0=No   4  In general, do you see well? 0=Yes   5  In general, do you have serious problems with your memory? 0=No   6  Do you take more than three different medications everyday? 1=Yes   TOTAL   2     Did you order a geriatric consult if the score was 2 or greater?: yes    Identification of Seniors at Risk      Most Recent Value   (ISAR) Identification of Seniors at Risk   Before the illness or injury that brought you to the Emergency, did you need someone to help you on a regular basis?   0 Filed at: 01/31/2020 1826   In the last 24 hours, have you needed more help than usual?  0 Filed at: 01/31/2020 1826   Have you been hospitalized for one or more nights during the past 6 months? 0 Filed at: 01/31/2020 1826   In general, do you see well?  0 Filed at: 01/31/2020 1826   In general, do you have serious problems with your memory? 0 Filed at: 01/31/2020 1826   Do you take more than three different medications every day? 1 Filed at: 01/31/2020 1826   ISAR Score  1 Filed at: 01/31/2020 1826            SUBJECTIVE:     Transfer from: Home  Outside Films Received: no  Tertiary Exam Due on: 2/2/20    Mechanism of Injury:Fall    Details related to Injury: +LOC:  no    Chief Complaint: Wrist pain and leg pain    HPI/Last 24 hour events: Admitted and went to OR with ORtho for fixation of the leg, wrist remains splinted  Active medications:           Current Facility-Administered Medications:     acetaminophen (TYLENOL) tablet 650 mg, 650 mg, Oral, Q6H [address], 650 mg at 02/02/20 0820    ALPRAZolam (XANAX) tablet 0 25 mg, 0 25 mg, Oral, BID PRN    enoxaparin (LOVENOX) subcutaneous injection 40 mg, 40 mg, Subcutaneous, Q24H EILEEN, 40 mg at 02/02/20 0821    famotidine (PEPCID) tablet 20 mg, 20 mg, Oral, BID, 20 mg at 02/02/20 7740    HYDROmorphone (DILAUDID) injection 0 2 mg, 0 2 mg, Intravenous, Q3H PRN    oxyCODONE (ROXICODONE) IR tablet 2 5 mg, 2 5 mg, Oral, Q4H PRN    oxyCODONE (ROXICODONE) IR tablet 5 mg, 5 mg, Oral, Q4H PRN, 5 mg at 02/02/20 0821      OBJECTIVE:     Vitals:   Vitals:    02/02/20 0818   BP: 142/74   Pulse: 100   Resp:    Temp: 99 5 °F (37 5 °C)   SpO2: 90%       Physical Exam:           I/O:   I/O       01/31 0701 - 02/01 0700 02/01 0701 - 02/02 0700 02/02 0701 - 02/03 0700    P  O   900     I V  (mL/kg) 397 5 (5 1) 1500 (19 1)     Total Intake(mL/kg) 397 5 (5 1) 2400 (30 6)     Urine (mL/kg/hr) 875 2500 (1 3)     Blood  300     Total Output 875 2800     Net -477 5 -400                  Invasive Devices:    Invasive Devices     Peripheral Intravenous Line            Peripheral IV 01/31/20 Right Antecubital 1 day Drain            Urethral Catheter Latex 16 Fr  1 day                  Imaging:   Xr Wrist 3+ Views Left    Result Date: 1/31/2020  Impression: Nondisplaced fracture of the distal radius  The study was marked in [facility] for immediate notification  Workstation performed: [id number]     Xr Hip/pelv 2-3 Vws Left If Performed    Result Date: 2/1/2020  Impression: Fluoroscopic guidance provided for left hip ORIF  Please refer to the separate procedure notes for additional details  Workstation performed: [id number]     Xr Hip/pelv 2-3 Vws Left If Performed    Result Date: 1/31/2020  Impression: Acute left femoral intertrochanteric fracture with mild varus angulation  Workstation performed: [id number]     Xr Femur 2 Vw Left    Result Date: 2/1/2020  Impression: Intertrochanteric fracture with posttraumatic coxa vara but no significant displacement  Workstation performed: [id number]     Trauma - Ct Head Wo Contrast    Result Date: 1/31/2020  Impression: No acute intracranial abnormality  Workstation performed: [id number]     Trauma - Ct Spine Cervical Wo Contrast    Result Date: 1/31/2020  Impression: No cervical spine fracture or traumatic malalignment  Workstation performed: [id number]     Trauma - Ct Chest Abdomen Pelvis W Contrast    Result Date: 1/31/2020  Impression: Degraded by respiratory motion  1   Comminuted left intertrochanteric hip fracture  2   Otherwise no traumatic injury to the chest, abdomen, or pelvis  The study was marked in [facility] for immediate notification  Workstation performed: [id number]       Labs:Results for Susi Lamas (MRN [id number]) as of 2/2/2020 08:25   Ref   Range 2/2/2020 04:35   Sodium Latest Ref Range: 136 - 145 mmol/L 144   Potassium Latest Ref Range: 3 5 - 5 3 mmol/L 3 9   Chloride Latest Ref Range: 100 - 108 mmol/L 113 (H)   CO2 Latest Ref Range: 21 - 32 mmol/L 27   Anion Gap Latest Ref Range: 4 - 13 mmol/L 4   BUN Latest Ref Range: 5 - 25 mg/dL 9   Creatinine Latest Ref Range: 0 60 - 1 30 mg/dL 0 72   Glucose, Random Latest Ref Range: 65 - 140 mg/dL 99   Calcium Latest Ref Range: 8 3 - 10 1 mg/dL 8 5   eGFR Latest Units: ml/min/1 73sq m 75   WBC Latest Ref Range: 4 31 - 10 16 Thousand/uL 7 43   Red Blood Cell Count Latest Ref Range: 3 81 - 5 12 Million/uL 3 18 (L)   Hemoglobin Latest Ref Range: 11 5 - 15 4 g/dL 10 5 (L)   HCT Latest Ref Range: 34 8 - 46 1 % 33 5 (L)   MCV Latest Ref Range: 82 - 98 fL 105 (H)   MCH Latest Ref Range: 26 8 - 34 3 pg 33 0   MCHC Latest Ref Range: 31 4 - 37 4 g/dL 31 3 (L)   RDW Latest Ref Range: 11 6 - 15 1 % 13 4   Platelet Count Latest Ref Range: 149 - 390 Thousands/uL 183   MPV Latest Ref Range: 8 9 - 12 7 fL 11 1   nRBC Latest Units: /100 WBCs 0   Neutrophils % Latest Ref Range: 43 - 75 % 72   Immat GRANS % Latest Ref Range: 0 - 2 % 0   Lymphocytes Relative Latest Ref Range: 14 - 44 % 15   Monocytes Relative Latest Ref Range: 4 - 12 % 10   Eosinophils Latest Ref Range: 0 - 6 % 2   Basophils Relative Latest Ref Range: 0 - 1 % 1   Immature Grans Absolute Latest Ref Range: 0 00 - 0 20 Thousand/uL 0 02   Absolute Neutrophils Latest Ref Range: 1 85 - 7 62 Thousands/µL 5 34   Lymphocytes Absolute Latest Ref Range: 0 60 - 4 47 Thousands/µL 1 14   Absolute Monocytes Latest Ref Range: 0 17 - 1 22 Thousand/µL 0 77   Absolute Eosinophils Latest Ref Range: 0 00 - 0 61 Thousand/µL 0 12   Basophils Absolute Latest Ref Range: 0 00 - 0 10 Thousands/µL 0 04

## 2020-02-02 NOTE — PHYSICAL THERAPY NOTE
PHYSICAL THERAPY Evaluation NOTE    Patient Name: Jace Silva  BEQBG'C Date: 2/2/2020     AGE:   80 y o  Mrn:   [id number]  ADMIT DX:  Closed fracture of left distal radius [S52 502A]  Fracture of femur, intertrochanteric, left, closed ([facility] [address] ) [S72 142A]  Unspecified multiple injuries, initial encounter [T07  XXXA]    Past Medical History:   Diagnosis Date    Diverticulosis     GERD (gastroesophageal reflux disease)     Hiatal hernia     Irritable bowel disease     Lactose intolerance     Osteoporosis      Length Of Stay: 2  PHYSICAL THERAPY EVALUATION :    02/02/20 0834   Note Type   Note type Eval only   Pain Assessment   Pain Assessment 0-10   Pain Score 7   Pain Type Acute pain   Pain Location Hip   Pain Orientation Left   Home Living   Type of Home House  (2 SH, full flight to bed and bath, 3 JOSE LUIS)   Home Layout Two level;Bed/bath upstairs;Stairs to enter with rails   Bathroom Shower/Tub Tub/shower unit   Bathroom Toilet Standard   Bathroom Accessibility Accessible   Home Equipment Walker;Cane   Additional Comments Pt  lives in a 2 SH, 3 JOSE LUIS with full flight to primary bed and bath, Per Pt  plans to live with daughter after discharge in a 1 SH, 3STE however in both cases would be home alone throughout the day   Prior Function   Level of [place] Independent with ADLs and functional mobility   Lives With [employer] Help From Family   ADL Assistance Independent   IADLs Independent   Falls in the last 6 months 1 to 4   Vocational Retired   Comments PTA, pt reports INDEP with ADLs, IADLs and functional mobility with Cane or RW as needed   Restrictions/Precautions   Weight Bearing Precautions Per Order Yes   LUE Weight Bearing Per Order NWB  (Can use platform walker per Ortho)   LLE Weight Bearing Per Order WBAT   Other Precautions WBS; Fall Risk;Pain   General   Additional Pertinent History Pt  is an 79 yo F who presents s/p fall with subsequent Fx of L femur  POD 1 TFN, NWB LUE however can use platform walker and WBAT LLE  Family/Caregiver Present No   Cognition   Overall Cognitive Status WFL   Arousal/Participation Cooperative   Orientation Level Oriented X4   Memory Decreased short term memory   Following Commands Follows multistep commands with increased time or repetition   Comments Pt  was identified with full name and birthdate   RLE Assessment   RLE Assessment   (functionally > 3+/5)   LLE Assessment   LLE Assessment   (limited MMT due to Post op)   Coordination   Movements are Fluid and Coordinated 0   Coordination and Movement Description antalgic and bradykinetic functional mobility   Sensation WFL   Bed Mobility   Supine to Sit 3  Moderate assistance   Additional items Assist x 2   Sit to Supine Unable to assess   Additional Comments Pt  seated OOB In recliner prior to and following PT session   Transfers   Sit to Stand 3  Moderate assistance   Additional items Increased time required;Verbal cues; Assist x 2  (for hand placement and sequencing)   Stand to Sit 3  Moderate assistance   Additional items Assist x 2; Increased time required;Verbal cues  (to reach back to control descent)   Ambulation/Elevation   Gait pattern Improper Weight shift;Narrow NIALL; Forward Flexion;Decreased foot clearance;Shuffling; Inconsistent scott; Redundant gait at times; Short stride; Step to;Excessively slow   Gait Assistance 3  Moderate assist   Additional items Assist x 2;Verbal cues  (for sequencing and AD management)   Assistive Device Rolling walker  (with platform attachement)   Distance 5'x1 from bed to chair   Balance   Static Sitting Fair   Dynamic Sitting Fair -   Static Standing Poor   Dynamic Standing Poor   Ambulatory Zero  (Ax2)   Endurance Deficit   Endurance Deficit Yes   Endurance Deficit Description Postural and gait degradation noted with fatigue   Activity Tolerance   Activity Tolerance Patient limited by fatigue;Patient limited by pain   Medical Staff Made Aware Spoke to CM and Jessy, OT   Nurse Made Aware Spoke to RN   Assessment   Prognosis Good   Problem List Decreased strength;Decreased range of motion;Decreased endurance; Impaired balance;Decreased mobility; Decreased coordination;Decreased safety awareness;Pain;Orthopedic restrictions   Assessment Pt  is an 79 yo F who presents s/p fall with subsequent Fx of L femur  POD 1 TFN, NWB LUE however can use platform walker and WBAT LLE  order placed for PT eval and tx, w/ activity order of WBAT L LE and NWB L UE  pt presents w/ comorbidities of Closed Fx of Radius, Fall, atypical chest pain, GERD, Anxiety, htn, and Macular cyst L eye and personal factors of advanced age, inaccessible home environment, living in 2 story house, mobilizing w/ assistive device, stair(s) to enter home, inability to ambulate household distances, inability to navigate community distances, inability to navigate level surfaces w/o external assistance, unable to perform dynamic tasks in community, limited home support, positive fall history, anxiety, unable to perform physical activity, inability to perform IADLs, inability to perform ADLs and inability to live alone  pt presents w/ pain, weakness, decreased ROM, decreased endurance, impaired balance, gait deviations, impaired coordination, decreased safety awareness and fall risk  these impairments are evident in findings from physical examination (weakness and impaired coordination), mobility assessment (need for MOD Ax2 assist w/ all phases of mobility when usually mobilizing independently, tolerance to only 5 feet of ambulation and need for cueing for mobility technique), and Barthel Index: 40/100  pt needed input for task focus and mobility technique  pt is at risk for falls due to physical and safety awareness deficits   pt's clinical presentation is unstable/unpredictable (evident in need for assist w/ all phases of mobility when usually mobilizing independently, tolerance to only 5 feet of ambulation, pain impacting overall mobility status, need for input for mobility technique, need for input for task focus and mobility technique and need for input for mobility technique/safety)  pt needs inpatient PT tx to improve mobility deficits  discharge recommendation is for inpatient rehab to reduce fall risk and maximize level of functional independence  Goals   Patient Goals to walk    STG Expiration Date 02/12/20   Short Term Goal #1 pt will: Increase bilateral LE strength 1/2 grade to facilitate independent mobility, Perform all bed mobility tasks w/ supervision to decrease fall risk factors, Perform all transfers w/ supervision to improve independence, Ambulate 200 ft  with roller walker w/ supervision w/o LOB, Increase all balance 1/2 grade to decrease risk for falls and Improve Barthel Index score to 65 or greater to facilitate independence   PT Treatment Day 0   Plan   Treatment/Interventions Functional transfer training;LE strengthening/ROM; Therapeutic exercise; Endurance training;Cognitive reorientation;Patient/family training;Equipment eval/education; Bed mobility;Gait training;Spoke to nursing;Spoke to case management;OT   PT Frequency   (3-5x/wk)   Recommendation   Recommendation Post acute IP rehab   Equipment Recommended Walker  (with platform attachment)   PT - OK to Discharge Yes   Additional Comments When medically appropriate   Barthel Index   Feeding 10   Bathing 0   Grooming Score 0   Dressing Score 10   Bladder Score 0   Bowels Score 10   Toilet Use Score 5   Transfers (Bed/Chair) Score 5   Mobility (Level Surface) Score 0   Stairs Score 0   Barthel Index Score 40     Skilled PT recommended while in hospital and upon DC to progress pt toward treatment goals       Faviola Ha, PT, DPT 2/2/2020

## 2020-02-02 NOTE — PLAN OF CARE
Problem: OCCUPATIONAL THERAPY ADULT  Goal: Performs self-care activities at highest level of function for planned discharge setting  See evaluation for individualized goals  Description  Treatment Interventions: ADL retraining, Functional transfer training, Endurance training, Cognitive reorientation, Patient/family training, Equipment evaluation/education, Compensatory technique education, Energy conservation, Activityengagement          See flowsheet documentation for full assessment, interventions and recommendations  Note:   Limitation: Decreased ADL status, Decreased Safe judgement during ADL, Decreased cognition, Decreased endurance, Decreased self-care trans, Decreased high-level ADLs, Mood limitation  Prognosis: Good  Assessment: 81 YO Female SEEN FOR INITIAL OCCUPATIONAL THERAPY EVALUATION FOLLOWING TXF FROM [facility] S/P FALL RESULTING IN CLOSED FX OF L DISTAL RADIUS AND L INTERTROCHANTERIC FX  PT IS S/P L TFN  PT IS NWB ON LUE, OK FOR USE OF PLATFORM WALKER AND WBAT ON LLE  PROBLEMS LIST INCLUDES ANXIETY, HTN, MACULAR CYST, IBS AND OP  PT IS FROM HOME ALONE WHERE SHE REPORTS BEING INDEPENDENT WITH ADLS/IADLS/DRIVING PTA  PT REPORTS HER PLAN IS TO D/C TO HER DAUGHTER'S HOME WHERE SHE WILL ALSO BE ALONE DURING THE DAY  PT CURRENTLY REQUIRES OVERALL MOD-MAX A WITH ADLS, AND MOD A X2 WITH TRANSFERS /LIMITED FUNCTIONAL MOBILITY WITH USE OF PLATFORM RW  PT IS LIMITED 2' PAIN, FATIGUE, IMPAIRED BALANCE, FALL RISK , WB RESTRICTIONS, ORTHOPEDIC RESTRICTIONS, DIZZINESS WITH CHANGE OF POSITIONING , ANXIETY, EASILY DISTRACTED, LIMITED FAMILY/FRIEND SUPPORT , INACCESSIBLE HOME ENVIRONMENT and OVERALL LIMITED ACTIVITY TOLERANCE  PT EDUCATED ON CARRY OVER OF WB STATUS, DEEP BREATHING TECHNIQUES T/O ACTIVITY and CONTINUE PARTICIPATION IN SELF-CARE/MOBILITY WITH STAFF WHILE IN THE HOSPITAL   FROM AN OCCUPATIONAL THERAPY PERSPECTIVE, PT WOULD BENEFIT FROM ADDITIONAL OT SERVICES IN AN INPT REHAB SETTING UPON D/C  WILL CONT TO FOLLOW TO ADDRESS THE BELOW DESCRIBED GOALS  OT Discharge Recommendation: Short Term Rehab  OT - OK to Discharge:  Yes

## 2020-02-02 NOTE — PROGRESS NOTES
Subjective: No acute events overnight  No acute distress  Resting comfortably in bed    Objective:  A 10 point ROS was performed; negative except as noted above       Lab Results   Component Value Date/Time    WBC 7 43 02/02/2020 04:35 AM    WBC 4 79 03/11/2015 08:58 AM    HGB 10 5 (L) 02/02/2020 04:35 AM    HGB 14 5 03/11/2015 08:58 AM       Vitals:    02/02/20 0307   BP: 143/75   Pulse: (!) 111   Resp: 18   Temp: 99 1 °F (37 3 °C)   SpO2: 94%     Left lower extremity  Dressing C/D/I  Thigh soft and compressible  Motor intact to EHL/FHL/TA/GS  Sensation intact to light touch to dp/sp/tib/michael/saph distributions  Toes warm and well perfused with brisk capillary refill    Assessment: 80 y o  female POD 1 s/p L TFN    Plan:  WBAT  left lower extremity   Pain control  DVT ppx: Enoxaparin (Lovenox)  PT/OT  Will continue to assess for acute blood loss anemia  Dispo: Ortho will follow

## 2020-02-03 ENCOUNTER — APPOINTMENT (INPATIENT)
Dept: RADIOLOGY | Facility: HOSPITAL | Age: 85
DRG: 481 | End: 2020-02-03
Payer: COMMERCIAL

## 2020-02-03 PROBLEM — M25.562 LEFT KNEE PAIN: Status: ACTIVE | Noted: 2020-02-03

## 2020-02-03 LAB
25(OH)D3 SERPL-MCNC: 54.1 NG/ML (ref 30–100)
ANION GAP SERPL CALCULATED.3IONS-SCNC: 3 MMOL/L (ref 4–13)
ATRIAL RATE: 94 BPM
BASOPHILS # BLD AUTO: 0.05 THOUSANDS/ΜL (ref 0–0.1)
BASOPHILS NFR BLD AUTO: 1 % (ref 0–1)
BUN SERPL-MCNC: 13 MG/DL (ref 5–25)
CALCIUM SERPL-MCNC: 8.6 MG/DL (ref 8.3–10.1)
CHLORIDE SERPL-SCNC: 110 MMOL/L (ref 100–108)
CO2 SERPL-SCNC: 26 MMOL/L (ref 21–32)
CREAT SERPL-MCNC: 0.68 MG/DL (ref 0.6–1.3)
EOSINOPHIL # BLD AUTO: 0.1 THOUSAND/ΜL (ref 0–0.61)
EOSINOPHIL NFR BLD AUTO: 1 % (ref 0–6)
ERYTHROCYTE [DISTWIDTH] IN BLOOD BY AUTOMATED COUNT: 13.2 % (ref 11.6–15.1)
ERYTHROCYTE [SEDIMENTATION RATE] IN BLOOD: 79 MM/HOUR (ref 0–20)
GFR SERPL CREATININE-BSD FRML MDRD: 78 ML/MIN/1.73SQ M
GLUCOSE SERPL-MCNC: 107 MG/DL (ref 65–140)
HCT VFR BLD AUTO: 31.7 % (ref 34.8–46.1)
HGB BLD-MCNC: 10.2 G/DL (ref 11.5–15.4)
IMM GRANULOCYTES # BLD AUTO: 0.04 THOUSAND/UL (ref 0–0.2)
IMM GRANULOCYTES NFR BLD AUTO: 1 % (ref 0–2)
LYMPHOCYTES # BLD AUTO: 1.54 THOUSANDS/ΜL (ref 0.6–4.47)
LYMPHOCYTES NFR BLD AUTO: 22 % (ref 14–44)
MCH RBC QN AUTO: 33.1 PG (ref 26.8–34.3)
MCHC RBC AUTO-ENTMCNC: 32.2 G/DL (ref 31.4–37.4)
MCV RBC AUTO: 103 FL (ref 82–98)
MONOCYTES # BLD AUTO: 0.76 THOUSAND/ΜL (ref 0.17–1.22)
MONOCYTES NFR BLD AUTO: 11 % (ref 4–12)
NEUTROPHILS # BLD AUTO: 4.58 THOUSANDS/ΜL (ref 1.85–7.62)
NEUTS SEG NFR BLD AUTO: 64 % (ref 43–75)
NRBC BLD AUTO-RTO: 0 /100 WBCS
P AXIS: 59 DEGREES
PLATELET # BLD AUTO: 186 THOUSANDS/UL (ref 149–390)
PMV BLD AUTO: 11.2 FL (ref 8.9–12.7)
POTASSIUM SERPL-SCNC: 3.8 MMOL/L (ref 3.5–5.3)
PR INTERVAL: 154 MS
PTH-INTACT SERPL-MCNC: 25.4 PG/ML (ref 18.4–80.1)
QRS AXIS: -43 DEGREES
QRSD INTERVAL: 104 MS
QT INTERVAL: 374 MS
QTC INTERVAL: 467 MS
RBC # BLD AUTO: 3.08 MILLION/UL (ref 3.81–5.12)
SODIUM SERPL-SCNC: 139 MMOL/L (ref 136–145)
T WAVE AXIS: 70 DEGREES
TSH SERPL DL<=0.05 MIU/L-ACNC: 2.5 UIU/ML (ref 0.36–3.74)
VENTRICULAR RATE: 94 BPM
WBC # BLD AUTO: 7.07 THOUSAND/UL (ref 4.31–10.16)

## 2020-02-03 PROCEDURE — 73560 X-RAY EXAM OF KNEE 1 OR 2: CPT

## 2020-02-03 PROCEDURE — NS001 PR NO SIGNATURE OR ATTESTATION: Performed by: ORTHOPAEDIC SURGERY

## 2020-02-03 PROCEDURE — 80048 BASIC METABOLIC PNL TOTAL CA: CPT | Performed by: PHYSICIAN ASSISTANT

## 2020-02-03 PROCEDURE — 85652 RBC SED RATE AUTOMATED: CPT | Performed by: ORTHOPAEDIC SURGERY

## 2020-02-03 PROCEDURE — 83970 ASSAY OF PARATHORMONE: CPT | Performed by: ORTHOPAEDIC SURGERY

## 2020-02-03 PROCEDURE — 97116 GAIT TRAINING THERAPY: CPT

## 2020-02-03 PROCEDURE — 84443 ASSAY THYROID STIM HORMONE: CPT | Performed by: ORTHOPAEDIC SURGERY

## 2020-02-03 PROCEDURE — 99222 1ST HOSP IP/OBS MODERATE 55: CPT | Performed by: INTERNAL MEDICINE

## 2020-02-03 PROCEDURE — 97530 THERAPEUTIC ACTIVITIES: CPT

## 2020-02-03 PROCEDURE — 85025 COMPLETE CBC W/AUTO DIFF WBC: CPT | Performed by: PHYSICIAN ASSISTANT

## 2020-02-03 PROCEDURE — 99232 SBSQ HOSP IP/OBS MODERATE 35: CPT | Performed by: SURGERY

## 2020-02-03 PROCEDURE — 93010 ELECTROCARDIOGRAM REPORT: CPT | Performed by: INTERNAL MEDICINE

## 2020-02-03 PROCEDURE — 82306 VITAMIN D 25 HYDROXY: CPT | Performed by: ORTHOPAEDIC SURGERY

## 2020-02-03 RX ORDER — POTASSIUM CHLORIDE 20 MEQ/1
20 TABLET, EXTENDED RELEASE ORAL ONCE
Status: COMPLETED | OUTPATIENT
Start: 2020-02-03 | End: 2020-02-03

## 2020-02-03 RX ORDER — ACETAMINOPHEN 325 MG/1
975 TABLET ORAL EVERY 8 HOURS SCHEDULED
Status: DISCONTINUED | OUTPATIENT
Start: 2020-02-03 | End: 2020-02-05 | Stop reason: HOSPADM

## 2020-02-03 RX ORDER — LOSARTAN POTASSIUM 25 MG/1
25 TABLET ORAL DAILY
Status: DISCONTINUED | OUTPATIENT
Start: 2020-02-03 | End: 2020-02-05 | Stop reason: HOSPADM

## 2020-02-03 RX ORDER — B-COMPLEX WITH VITAMIN C
1 TABLET ORAL 2 TIMES DAILY WITH MEALS
Status: DISCONTINUED | OUTPATIENT
Start: 2020-02-03 | End: 2020-02-05 | Stop reason: HOSPADM

## 2020-02-03 RX ADMIN — ENOXAPARIN SODIUM 40 MG: 40 INJECTION SUBCUTANEOUS at 08:15

## 2020-02-03 RX ADMIN — CALCIUM CARBONATE 500 MG (1,250 MG)-VITAMIN D3 200 UNIT TABLET 1 TABLET: at 17:07

## 2020-02-03 RX ADMIN — DOCUSATE SODIUM 100 MG: 100 CAPSULE, LIQUID FILLED ORAL at 08:15

## 2020-02-03 RX ADMIN — FAMOTIDINE 20 MG: 20 TABLET ORAL at 08:15

## 2020-02-03 RX ADMIN — LOSARTAN POTASSIUM 25 MG: 25 TABLET, FILM COATED ORAL at 08:15

## 2020-02-03 RX ADMIN — ACETAMINOPHEN 975 MG: 325 TABLET ORAL at 13:19

## 2020-02-03 RX ADMIN — POTASSIUM CHLORIDE 20 MEQ: 1500 TABLET, EXTENDED RELEASE ORAL at 08:15

## 2020-02-03 RX ADMIN — ACETAMINOPHEN 975 MG: 325 TABLET ORAL at 22:13

## 2020-02-03 RX ADMIN — CALCIUM CARBONATE 500 MG (1,250 MG)-VITAMIN D3 200 UNIT TABLET 1 TABLET: at 08:15

## 2020-02-03 RX ADMIN — ACETAMINOPHEN 650 MG: 325 TABLET ORAL at 08:23

## 2020-02-03 RX ADMIN — Medication 1 TABLET: at 08:15

## 2020-02-03 RX ADMIN — FAMOTIDINE 20 MG: 20 TABLET ORAL at 17:07

## 2020-02-03 RX ADMIN — ALPRAZOLAM 0.25 MG: 0.25 TABLET ORAL at 22:22

## 2020-02-03 RX ADMIN — SENNOSIDES AND DOCUSATE SODIUM 1 TABLET: 8.6; 5 TABLET ORAL at 22:13

## 2020-02-03 RX ADMIN — ALPRAZOLAM 0.25 MG: 0.25 TABLET ORAL at 08:17

## 2020-02-03 NOTE — PROGRESS NOTES
Progress Note - Scott Pat 6/30/1931, 80 y o  female MRN: [id number]    Unit/Bed#: [facility] 659-18 Encounter: [id number]    Primary Care Provider: Ольга Recinos DO   Date and time admitted to hospital: 1/31/2020  6:18 PM        Left knee pain  Assessment & Plan  - acute on chronic, reports worse than baseline  Will Xray given s/p trauma     Fall  Assessment & Plan  - PT/OT  - will also consult Geriatrics    Closed fracture of left distal radius  Assessment & Plan  - Ortho consulted, cast applied  Fingers warm and mobile  PAin controlled  NWB LUE but ok to use platform walker, weight bear through elbow per ortho     * Fracture of femur, intertrochanteric, left, closed (HCC)  Assessment & Plan  - Cephalomedullary Nail Left Proximal Femur 2/1/2020  - POD # 2 - Hemoglobin stable  - PT/OT - recommending rehab placement    - WBAT  - Venodynes and Lovenox        Disposition: STR      SUBJECTIVE:  Chief Complaint: "My knees hurt  I mean, they always hurt, but in particular the left knee seems to hurt more than usual "    Subjective: reports overall pain is controlled, admits to bilateral knee pain, L worse than R  She describes that this may be chronic pain but admits that, in particular, her left knee hurts more than normal  She denies CP, SOB, palpitations, or pain anywhere else         OBJECTIVE:     Meds/Allergies     Current Facility-Administered Medications:     acetaminophen (TYLENOL) tablet 975 mg, 975 mg, Oral, Q8H [address], Mami R KIZZY Blanco, 650 mg at 02/03/20 5522    ALPRAZolam (XANAX) tablet 0 25 mg, 0 25 mg, Oral, BID PRN, Belem Dye MD, 0 25 mg at 02/03/20 0817    calcium carbonate-vitamin D (OSCAL-D) 500 mg-200 units per tablet 1 tablet, 1 tablet, Oral, BID With Meals, KIZZY Patton, 1 tablet at 02/03/20 0815    docusate sodium (COLACE) capsule 100 mg, 100 mg, Oral, Daily, Bharath Lunsford MD, 100 mg at 02/03/20 0815    enoxaparin (LOVENOX) subcutaneous injection 40 mg, 40 mg, Subcutaneous, Q24H [address], Cass Lee PA-C, 40 mg at 02/03/20 0815    famotidine (PEPCID) tablet 20 mg, 20 mg, Oral, BID, Zara Arriaga PA-C, 20 mg at 02/03/20 0815    HYDROmorphone (DILAUDID) injection 0 2 mg, 0 2 mg, Intravenous, Q3H PRN, Stephanie Camejo MD    losartan (COZAAR) tablet 25 mg, 25 mg, Oral, Daily, KIZZY Swain, 25 mg at 02/03/20 0815    multivitamin-minerals (CENTRUM) tablet 1 tablet, 1 tablet, Oral, Daily, KIZZY Swain, 1 tablet at 02/03/20 0815    oxyCODONE (ROXICODONE) IR tablet 2 5 mg, 2 5 mg, Oral, Q4H PRN, Stephanie Camejo MD    oxyCODONE (ROXICODONE) IR tablet 5 mg, 5 mg, Oral, Q4H PRN, Stephanie Camejo MD, 5 mg at 02/02/20 0821    senna-docusate sodium (SENOKOT S) 8 6-50 mg per tablet 1 tablet, 1 tablet, Oral, HS, Brandt Glez MD, 1 tablet at 02/02/20 2151     Vitals:   Vitals:    02/02/20 2301   BP: 138/65   Pulse: 97   Resp: 16   Temp: 98 3 °F (36 8 °C)   SpO2: 91%       Intake/Output:  I/O       02/01 0701 - 02/02 0700 02/02 0701 - 02/03 0700 02/03 0701 - 02/04 0700    P  O  900 800     I V  (mL/kg) 1500 (19 1)      Total Intake(mL/kg) 2400 (30 6) 800 (10 2)     Urine (mL/kg/hr) 2500 (1 3) 1725 (0 9)     Blood 300      Total Output 2800 1725     Net -400 -925                   Physical Exam:   Physical Exam   Constitutional: She is oriented to person, place, and time  No distress  HENT:   Head: Normocephalic and atraumatic  Eyes: Pupils are equal, round, and reactive to light  Conjunctivae are normal  Right eye exhibits no discharge  Left eye exhibits no discharge  Neck: No tracheal deviation present  Cardiovascular: Normal rate, regular rhythm, normal heart sounds and intact distal pulses  Pulmonary/Chest: Effort normal and breath sounds normal  No stridor  No respiratory distress  She has no wheezes  She has no rales  She exhibits no tenderness  Abdominal: Soft  Bowel sounds are normal  She exhibits no distension and no mass  There is no tenderness   There is no rebound and no guarding  Musculoskeletal: She exhibits no edema or deformity  Left thigh incision dressed, CDI, compartments soft  Left arm cast present, fingers + motor and sensory, < 2 sec cap refill  Bilateral knee - no bruising or swelling, able to do some ROM   Neurological: She is alert and oriented to person, place, and time  GCS 15  Moves all extremities, equal strength bilaterally  Speech clear  Facial symmetry    Skin: Skin is warm and dry  Capillary refill takes less than 2 seconds  She is not diaphoretic  Psychiatric: She has a normal mood and affect  Invasive Devices     Peripheral Intravenous Line            Peripheral IV 01/31/20 Right Antecubital 2 days                 Lab Results:   Results: I have personally reviewed pertinent reports  and I have personally reviewed pertinent films in PACS, BMP/CMP:   Lab Results   Component Value Date    SODIUM 139 02/03/2020    K 3 8 02/03/2020     (H) 02/03/2020    CO2 26 02/03/2020    BUN 13 02/03/2020    CREATININE 0 68 02/03/2020    CALCIUM 8 6 02/03/2020    EGFR 78 02/03/2020    and CBC:   Lab Results   Component Value Date    WBC 7 07 02/03/2020    HGB 10 2 (L) 02/03/2020    HCT 31 7 (L) 02/03/2020     (H) 02/03/2020     02/03/2020    MCH 33 1 02/03/2020    MCHC 32 2 02/03/2020    RDW 13 2 02/03/2020    MPV 11 2 02/03/2020    NRBC 0 02/03/2020     Imaging/EKG Studies: Results: I have personally reviewed pertinent reports     and I have personally reviewed pertinent films in PACS  Other Studies:   VTE Prophylaxis: Sequential compression device (Venodyne)  and Enoxaparin (Lovenox)

## 2020-02-03 NOTE — PROGRESS NOTES
Pastoral Care Progress Note    2/3/2020  Patient: Marcie Barajas : 1931  Admission Date & Time: 2020 1818  MRN: [id number] [facility]: [id number]                     Chaplaincy Interventions Utilized:   Empowerment: Clarified, confirmed, or reviewed information from treatment team , Encouraged self-care, Normalized experience of patient/family and Provided chaplaincy education    Exploration: Explored hope, Explored relational needs & resources, Explored spiritual needs & resources and Facilitated story telling    Collaboration: Facilitated respect for spiritual/cultural practice during hospitalization    Relationship Building: Cultivated a relationship of care and support and Listened empathically    Ritual: Provided prayer    Chaplaincy Outcomes Achieved:  Expressed gratitude, Expressed intermediate hope, Expressed peace, Identified priorities, Progressed toward acceptance, Spiritual resources utilized and Verbally processed emotions    Spiritual Coping Strategies Utilized:   Spiritual practices, Spiritual meaning and Spiritual community     found pt in room lying in bed  No family present  Pt expressed interest in  visitation because "it's always good to kill a little time with God " Pt states that she's [facility], prev being a member of the [facility] [employer] which focuses on poverty relief  She says she hasn't been able to attend Mass "due to my osteoporosis " She is open to a visit by [facility]  She says that she tries to put all of her affairs and worries "in God's hands " She lost one daughter in  at age 55; she lost her  in 2014; and a second daughter in 2014  She intends on moving in with her remaining living daughter  She expressed sadness at the thought of having to move out of her home of 40+ years  She says that she hopes to be able to do the things she once was able to do prior to fall/injury       Referral will be made to [facility]  for regular visitation for duration of stay  02/03/20 [address] Involvement Patient active with [employer]  (Pt not able to attend "due to [employer] ")   Spiritual Beliefs/Perceptions   Concept of God Accepting   Relationship with God Close  (She expressed how she puts everything in God's hands)   Conflicts/Concerns She is having to move out of her house of 40+ years; death of spouse and two daughters; not able to attend Mass due to [employer]    Spiritual Strengths Devoted to [place]; Support Systems Children   Psychosocial   Psychosocial (WDL) WDL   Patient Behaviors/Mood Cooperative;Pleasant;Verbal   Stress Factors   Patient Stress Factors Health changes; Other (Comment)  (Forced to move out of home due to health changes)   Coping Responses   Patient Coping Accepting; Anxiety;Open/discussion

## 2020-02-03 NOTE — PROGRESS NOTES
Subjective: No acute events overnight  No acute distress  Objective:  A 10 point ROS was performed; negative except as noted above  Lab Results   Component Value Date/Time    WBC 7 07 02/03/2020 04:47 AM    WBC 4 79 03/11/2015 08:58 AM    HGB 10 2 (L) 02/03/2020 04:47 AM    HGB 14 5 03/11/2015 08:58 AM       Vitals:    02/02/20 2301   BP: 138/65   Pulse: 97   Resp: 16   Temp: 98 3 °F (36 8 °C)   SpO2: 91%     Left lower extremity  Dressing C/D/I  Motor intact to EHL/FHL/TA/GS  Sensation intact to light touch to dp/sp/tib/michael/saph distributions  Toes warm and well perfused with brisk capillary refill     LUE  Cast clean and dry  Motor and sensory intact to the exposed digits  Brisk capillary refill to the exposed digits     Assessment: 80 y o  female post op day #2 from Left femoral IMN and conservative management of left distal radius fracture in [facility]       Plan:  WBAT  left lower extremity, NWB LUE in [facility]  Pain control  DVT ppx: Sequential compression device (Venodyne)  and Enoxaparin (Lovenox)  PT/OT  Will continue to assess for acute blood loss anemia  Dispo: Ortho will follow

## 2020-02-03 NOTE — PLAN OF CARE
Problem: Potential for Falls  Goal: Patient will remain free of falls  Description  INTERVENTIONS:  - Assess patient frequently for physical needs  -  Identify cognitive and physical deficits and behaviors that affect risk of falls    -  [place] fall precautions as indicated by assessment   - Educate patient/family on patient safety including physical limitations  - Instruct patient to call for assistance with activity based on assessment  - Modify environment to reduce risk of injury  - Consider OT/PT consult to assist with strengthening/mobility  Outcome: Progressing     Problem: Prexisting or High Potential for Compromised Skin Integrity  Goal: Skin integrity is maintained or improved  Description  INTERVENTIONS:  - Identify patients at risk for skin breakdown  - Assess and monitor skin integrity  - Assess and monitor nutrition and hydration status  - Monitor labs   - Assess for incontinence   - Turn and reposition patient  - Assist with mobility/ambulation  - Relieve pressure over bony prominences  - Avoid friction and shearing  - Provide appropriate hygiene as needed including keeping skin clean and dry  - Evaluate need for skin moisturizer/barrier cream  - Collaborate with interdisciplinary team   - Patient/family teaching  - Consider wound care consult   Outcome: Progressing     Problem: PAIN - ADULT  Goal: Verbalizes/displays adequate comfort level or baseline comfort level  Description  Interventions:  - Encourage patient to monitor pain and request assistance  - Assess pain using appropriate pain scale  - Administer analgesics based on type and severity of pain and evaluate response  - Implement non-pharmacological measures as appropriate and evaluate response  - Consider cultural and social influences on pain and pain management  - Notify physician/advanced practitioner if interventions unsuccessful or patient reports new pain  Outcome: Progressing     Problem: DISCHARGE PLANNING  Goal: Discharge to home or other facility with appropriate resources  Description  INTERVENTIONS:  - Identify barriers to discharge w/patient and caregiver  - Arrange for needed discharge resources and transportation as appropriate  - Identify discharge learning needs (meds, wound care, etc )  - Arrange for interpretive services to assist at discharge as needed  - Refer to Case Management Department for coordinating discharge planning if the patient needs post-hospital services based on physician/advanced practitioner order or complex needs related to functional status, cognitive ability, or social support system  Outcome: Progressing

## 2020-02-03 NOTE — NUTRITION
02/03/20 1246   Recommendations/Interventions   Nutrition Recommendations Other (specify)  (Recommend adjusting diet to Lactose Restricted, RD protocol not provided)

## 2020-02-03 NOTE — ASSESSMENT & PLAN NOTE
- Ortho consulted, cast applied  Fingers warm and mobile  PAin controlled  NWB LUE but ok to use platform walker, weight bear through elbow per ortho

## 2020-02-03 NOTE — ASSESSMENT & PLAN NOTE
- Cephalomedullary Nail Left Proximal Femur 2/1/2020  - POD # 2 - Hemoglobin stable  - PT/OT - recommending rehab placement    - WBAT  - Zenia and Lovenox

## 2020-02-03 NOTE — CONSULTS
Consultation - Konstantin Acosta 80 y o  female MRN: [id number]    Unit/Bed#: [facility] 608-01 Encounter: [id number]      Assessment/Plan     Assessment: This is a 80y o -year-old female with past medical history of osteoporosis, acid reflux, hypertension, anxiety was hospitalized for left intertrochanteric fracture and left distal radius fracture  Plan:    Osteoporosis - vitamin-D, TSH, PTH - pending  I will follow up on the results  Patient will need outpatient DEXA scan  She will need to be started on therapy for osteoporosis possibly Prolia or zolendronic acid as an outpatient  She is currently refusing any therapy for osteoporosis and wants to discuss possibilities with her primary care physician  She can continue taking 2000 units of vitamin-D daily and 650 mg of calcium twice a day  She may follow-up with telemedicine consultation at [facility]      Left intertrochanteric fracture and left distal radius fracture - managed per orthopedics  - rehab placement pending              Inpatient consult to Endocrinology     Performed by  Jorge Luis Mccormick MD     Authorized by Orlando Grace MD              CC:  Own the bone    History of Present Illness     HPI: Konstantin Acosta is a 80y o  year old female with past medical history of osteoporosis, acid reflux, hypertension, anxiety was hospitalized for left intertrochanteric fracture, left distal radius fracture  Patient was diagnosed with osteoporosis about 10 years ago  She was started on forteo, but subsequently stopped forteo after 1 week of use because of lactose intolerance  Patient is currently only on vitamin-D and calcium supplementation  She takes 2000 units of vitamin-D daily and 650 mg of calcium twice a day  She also has a past medical history of hysterectomy at age 55 and lactose intolerance  She admits to low diary intake due to her lactose intolerance   She denies any history of fractures, difficulties with ambulation or history of prolonged steroid use  She denies any family history of osteoporosis, calcium disorders or nephrolithiasis  She is a former smoker, smoked for 40 years, denies any excessive alcohol use  Review of Systems   Constitutional: Negative for chills, fatigue and fever  HENT: Negative for congestion, postnasal drip and sore throat  Eyes: Negative for pain  Respiratory: Negative for cough, choking and shortness of breath  Cardiovascular: Negative for chest pain, palpitations and leg swelling  Gastrointestinal: Negative for abdominal pain, blood in stool, constipation, diarrhea and nausea  Endocrine: Negative for polydipsia and polyuria  Genitourinary: Negative for dysuria  Musculoskeletal: Negative for arthralgias and back pain  Neurological: Negative for dizziness, light-headedness and headaches  Psychiatric/Behavioral: Negative for behavioral problems  The patient is not nervous/anxious  All other systems reviewed and are negative  Historical Information   Past Medical History:   Diagnosis Date    Diverticulosis     GERD (gastroesophageal reflux disease)     Hiatal hernia     Irritable bowel disease     Lactose intolerance     Osteoporosis      Past Surgical History:   Procedure Laterality Date    CARPAL TUNNEL RELEASE      CAST APPLICATION Left 9/3/1441    Procedure: APPLICATION CAST, LEFT WRIST;  Surgeon: Sammy Gage MD;  Location: [facility];  Service: Orthopedics    CHOLECYSTECTOMY      EAR TUBE REMOVAL      HEMORROIDECTOMY      HYSTERECTOMY      PERINEAL  PELVIC RECONSTRUCTION SURGERY  09/13/2019    Dr Kulwinder Barry Phy FX+INTRAMED PAUL Left 2/1/2020    Procedure: INSERTION NAIL IM FEMUR ANTEGRADE (TROCHANTERIC);   Surgeon: Sammy Gage MD;  Location: [facility];  Service: Orthopedics    SKIN CANCER EXCISION      TONSILLECTOMY       Social History   Social History     Substance and Sexual Activity   Alcohol Use Never    Frequency: Never     Social History     Substance and Sexual Activity   Drug Use No     Social History     Tobacco Use   Smoking Status Former Smoker    Last attempt to quit: Jessie Vann Years since quittin 1   Smokeless Tobacco Never Used     Family History:   Family History   Problem Relation Age of Onset    Colon cancer Mother     Stomach cancer Father        Meds/Allergies   Current Facility-Administered Medications   Medication Dose Route Frequency Provider Last Rate Last Dose    acetaminophen (TYLENOL) tablet 975 mg  975 mg Oral Q8H [facility] KIZZY Ortiz   650 mg at 20 7922    ALPRAZolam (XANAX) tablet 0 25 mg  0 25 mg Oral BID PRN Nicky Lutz MD   0 25 mg at 20 0817    calcium carbonate-vitamin D (OSCAL-D) 500 mg-200 units per tablet 1 tablet  1 tablet Oral BID With Meals Charlotte KIZZY Ortiz   1 tablet at 20 0815    docusate sodium (COLACE) capsule 100 mg  100 mg Oral Daily Abraham Muhammad MD   100 mg at 20 0815    enoxaparin (LOVENOX) subcutaneous injection 40 mg  40 mg Subcutaneous Q24H [facility] Cass Lee PA-C   40 mg at 20 0815    famotidine (PEPCID) tablet 20 mg  20 mg Oral BID Judith Hunt PA-C   20 mg at 20 0815    HYDROmorphone (DILAUDID) injection 0 2 mg  0 2 mg Intravenous Q3H PRN Nicky Lutz MD        losartan (COZAAR) tablet 25 mg  25 mg Oral Daily [place] KIZZY Ortiz   25 mg at 20 0815    multivitamin-minerals (CENTRUM) tablet 1 tablet  1 tablet Oral Daily Charlotte KIZZY Ortiz   1 tablet at 20 0815    oxyCODONE (ROXICODONE) IR tablet 2 5 mg  2 5 mg Oral Q4H PRN Nicky Lutz MD        oxyCODONE (ROXICODONE) IR tablet 5 mg  5 mg Oral Q4H PRN Nicky Lutz MD   5 mg at 20 8080    senna-docusate sodium (SENOKOT S) 8 6-50 mg per tablet 1 tablet  1 tablet Oral HS Abraham Muhammad MD   1 tablet at 20 2151     Allergies   Allergen Reactions    Escitalopram Hives    Mepenzolate      Other reaction(s): Urinary Retention    Maalox Anti-Gas [Simethicone] Hives    Other      Seasonal     Protonix [Pantoprazole] Hives    Reglan [Metoclopramide]        Objective   Vitals: Blood pressure 138/65, pulse 97, temperature 98 3 °F (36 8 °C), resp  rate 16, height 4' 11" (1 499 m), weight 78 5 kg (173 lb), SpO2 91 %  Intake/Output Summary (Last 24 hours) at 2/3/2020 1101  Last data filed at 2/2/2020 2000  Gross per 24 hour   Intake 600 ml   Output 1225 ml   Net -625 ml     Invasive Devices     Peripheral Intravenous Line            Peripheral IV 01/31/20 Right Antecubital 2 days                Physical Exam   Constitutional: She appears well-developed and well-nourished  HENT:   Head: Normocephalic and atraumatic  Eyes: Pupils are equal, round, and reactive to light  Conjunctivae are normal  Right eye exhibits no discharge  Left eye exhibits no discharge  No scleral icterus  Neck: Neck supple  No thyromegaly present  Cardiovascular: Normal rate and regular rhythm  No murmur heard  Pulmonary/Chest: Effort normal and breath sounds normal  No respiratory distress  Abdominal: Soft  She exhibits no distension  There is no tenderness  Musculoskeletal: She exhibits no edema  Neurological: She is alert  Skin: Skin is warm and dry  Psychiatric: She has a normal mood and affect  The history was obtained from the review of the chart, patient      Lab Results:       Lab Results   Component Value Date    WBC 7 07 02/03/2020    HGB 10 2 (L) 02/03/2020    HCT 31 7 (L) 02/03/2020     (H) 02/03/2020     02/03/2020     Lab Results   Component Value Date/Time    BUN 13 02/03/2020 04:50 AM    BUN 13 03/11/2015 08:58 AM     03/11/2015 08:58 AM    K 3 8 02/03/2020 04:50 AM    K 4 2 03/11/2015 08:58 AM     (H) 02/03/2020 04:50 AM     03/11/2015 08:58 AM    CO2 26 02/03/2020 04:50 AM    CO2 27 9 03/11/2015 08:58 AM    CREATININE 0 68 02/03/2020 04:50 AM    CREATININE 0 77 03/11/2015 08:58 AM    AST 19 01/31/2020 01:42 PM AST 16 03/11/2015 08:58 AM    ALT 23 01/31/2020 01:42 PM    ALT 19 03/11/2015 08:58 AM    ALB 3 7 01/31/2020 01:42 PM    ALB 3 6 03/11/2015 08:58 AM     No results for input(s): CHOL, HDL, LDL, TRIG, VLDL in the last 72 hours  No results found for: Parul Casey  No results found for: POCGLU    Imaging Studies: I have personally reviewed pertinent reports  Portions of the record may have been created with voice recognition software

## 2020-02-03 NOTE — CONSULTS
Consultation - Geriatric Medicine   Migue Lombardi 80 y o  female MRN: [id number]  Unit/Bed#: [facility] 608-01 Encounter: [id number]      Assessment/Plan     Left intertrochanteric fracture  -confirmed on XR 1/31/20  -s/p IM nail with Dr Bessie Rodney 2/1/20  -pain well controlled, continue current regimen  -continue treatment of underlying osteoporosis   -currently on Lovenox for dvt ppx  -continue PT and OT  -anticipate STR on dc    Acute pain due to trauma  -continue pain control per Geriatric pain protocol:  Tylenol 975mg Q8H scheduled  Roxicodone 2 5mg Q4H PRN moderate pain  Roxicodone 5mg Q4H PRN severe pain  Dilaudid 0 2mg Q3H PRN  -consider adjuncts such as Gabapentin 100mg HS recommend lidocaine patch topically  -encourage addition of non-pharmacologic pain treatment including ice and frequent repositioning  -continue bowel regimen including senna and Colace to prevent and treat constipation due to increased risk with acute pain and opiate pain medications    Left distal radius fracture  -confirmed on XR wrist 1/31/20  -soft arm cast applied 2/1/20 by Dr La December  -continue NWB to LUE per ortho and o/p f/u    Left knee pain  -chronic with acute exacerbation  -XR pending  -continue multimodal pain control  -recommend lidocaine patch topically for local pain control     Osteoporosis  -continue calcium and Vit D supplementation  -recommend checking TSH and Vit D  -will need close o/p f/u with PCP for DEXA and discussion of antiresorptive therapy treatment options    Ambulatory dysfunction with fall   -multifactorial including age, deconditioning, impaired vision and polypharmacy  -continue assistance with all transfers  -encourage use of assistive devices directed by PT  -will likely benefit from short-term rehab discharge  -recommend personal fall alert system and occupational therapy home safety eval if returning home following rehab, will place information in patient's paper chart to be provided on discharge GERD  -currently with exacerbation, likely multifactorial including anxiety, pain, and medications required to treat pain  -currently on Pepcid 20mg BID, continue current regimen, continue to readdress need for antacids given interaction with multiple other medications   -encourage remaining upright at least 60min following meals and avoidance of triggers such as coffee and carbonated beverages    Anxiety  -chronic acutely worsened by hospitalization and acute pain  -home alprazolam restarted on admission, encourage patient to notify nursing when needed as she has not been asking for it and is very anxious today  -although did not typically recommend benzodiazepines in geriatric population agree with restarted admission to prevent withdrawal, symptoms as well as patient's height anxiety due to traumatic injuries  -hold for sedation  -consider initiation of low-dose Zoloft with goal of tapering Alprazolam which could be considered as outpatient and would not recommend initiation during hospitalization for acute injury  -continue to encourage calm environment and encourage family at bedside to provide reassurance  -recommend counseling in addition to pharmacologic management as o/p    Impaired Vision  -recommend use of corrective lenses at all appropriate times  -encourage adequate lighting and encourage use of assistance with ambulation  -keep personal belongings close to person to avoid reaching  -encourage appropriate footwear at all times    Impaired mastication  -requires dentures for mastication, encourage use at all appropriate times  -insert consistencies appropriate for abilities    Deconditioning/debility/frailty  -at risk of frailty as patient is independent with ADLs and IADLs prior to admission  -multifactorial including age, impaired vision, GERD, hiatal hernia, osteoporosis, and multiple other chronic medical co-morbidities  -continue good control chronic medical conditions  -albumin 3 7  -continue nutritional support    Delirium precautions  -Patient is high risk of delirium due to age, traumatic injury, and acute pain  -Initiate delirium precautions  -maintain normal sleep/wake cycle  -minimize overnight interruptions, group overnight vitals/labs/nursing checks as possible  -dim lights, close blinds and turn off tv to minimize stimulation and encourage sleep environment in evenings  -ensure that pain is well controlled   -monitor for fecal and urinary retention which may precipitate delirium  -continue frequent mobilization and ambulation  -provide frequent reorientation and redirection  -encourage family and friends at the bedside to help help calm patient if anxious  -avoid medications which may precipitate or worsen delirium such as tramadol, anticholinergics, and benadryl, home Alprazolam restarted on admission, although not recommended in Geriatric population would not recommend abrupt dc due to withdrawal as well as following acute traumatic injury as symptoms of anxiety likely high and at that time     Home medication review  Personally confirmed with patient at bedside, PDMP, and PCP records  Alprazolam 0 25 mg TID (patient reports typically only requires BID)   Calcium citrate  Vitamin-D 1000 International Units  Losartan 25 mg daily  Famotidine 20mg (previously BID, now only PRN)  Multivitamin daily  Zyrtec 10mg daily     History of Present Illness   Physician Requesting Consult: Abraham Barrios MD  Reason for Consult / Principal Problem: Fall  Hx and PE limited by: N/A  Additional history obtained from: Extensive chart review and patient interview    HPI: Enriqueta Dye is a 80y o  year old female with HTN, GERD, osteoporosis, anxiety, carpal tunnel syndrome, impaired vision, impaired mastication, vitamin-D deficiency, and overactive bladder with pelvic organ prolapse s/p midurethral sling   She is admitted to trauma following a fall at home, on admission she was found to have left femur fracture as well as left distal radius fracture  She is being seen in consultation by Geriatrics for ambulatory dysfunction with fall and delirium prevention  At the time of my exam she sitting in bed resting comfortably awaiting delivery breakfast   She reports that on Friday morning when she was getting out of her car she felt excruciating pain in her left leg causing her to fall to the ground  She denies being in motion and tripping or falling but instead reports that the pain is what caused the fall  She was unable to get herself up and yelled for her neighbor who was able to alert emergency services  She underwent IM nail on 2/1/20 and has been working with PT since that time  She states that her pain is currently well controlled  Prior to her fall she was living in her own home, driving and was independent with all ADLs and iADLs  She states that she did not sleep well overnight due to different environment and being uncomfortable in the bed but denies additional acute complaints at this time  Inpatient consult to Gerontology  Consult performed by: Adrian Yousif DO  Consult ordered by: Juan Evans MD        Review of Systems   Constitutional: Negative for appetite change, chills and fever  HENT: Positive for dental problem (uses dentures)  Negative for hearing loss and trouble swallowing  Eyes: Positive for visual disturbance (wears glasses)  Respiratory: Negative for shortness of breath and wheezing  Cardiovascular: Negative for chest pain and palpitations  Gastrointestinal: Positive for constipation  Negative for nausea and vomiting  Endocrine: Negative  Negative for cold intolerance  Genitourinary: Negative for difficulty urinating and urgency  Musculoskeletal: Positive for gait problem (due to femur fracture)  Skin: Negative  Neurological: Positive for weakness  Negative for dizziness, light-headedness and headaches  Hematological: Bruises/bleeds easily     Psychiatric/Behavioral: Positive for decreased concentration, dysphoric mood and sleep disturbance (new this admission)  All other systems reviewed and are negative  Historical Information   Past Medical History:   Diagnosis Date    Diverticulosis     GERD (gastroesophageal reflux disease)     Hiatal hernia     Irritable bowel disease     Lactose intolerance     Osteoporosis      Past Surgical History:   Procedure Laterality Date    CARPAL TUNNEL RELEASE      CHOLECYSTECTOMY      EAR TUBE REMOVAL      HEMORROIDECTOMY      HYSTERECTOMY      PERINEAL  PELVIC RECONSTRUCTION SURGERY  2019    Dr Racquel Velasquez       Social History   Social History     Substance and Sexual Activity   Alcohol Use Never    Frequency: Never     Social History     Substance and Sexual Activity   Drug Use No     Social History     Tobacco Use   Smoking Status Former Smoker    Last attempt to quit:  [address] Years since quittin 1   Smokeless Tobacco Never Used     Family History:   Family History   Problem Relation Age of Onset    Colon cancer Mother     Stomach cancer Father      Meds/Allergies   all current active meds have been reviewed    Allergies   Allergen Reactions    Escitalopram Hives    Mepenzolate      Other reaction(s): Urinary Retention    Maalox Anti-Gas [Simethicone] Hives    Other      Seasonal     Protonix [Pantoprazole] Hives    Reglan [Metoclopramide]      Objective     Intake/Output Summary (Last 24 hours) at 2/3/2020 0714  Last data filed at 2020  Gross per 24 hour   Intake 800 ml   Output 1725 ml   Net -925 ml     Invasive Devices     Peripheral Intravenous Line            Peripheral IV 20 Right Antecubital 2 days              Physical Exam   Constitutional: She is oriented to person, place, and time  She appears well-developed and well-nourished  Appears stated age   HENT:   Head: Normocephalic and atraumatic     Mouth/Throat: Oropharynx is clear and moist  Edentulous   Eyes: Pupils are equal, round, and reactive to light  Conjunctivae and EOM are normal  No scleral icterus  Neck: Normal range of motion  Neck supple  No JVD present  No tracheal deviation present  Cardiovascular: Normal rate and intact distal pulses  No murmur heard  Pulmonary/Chest: Effort normal and breath sounds normal  No respiratory distress  She has no wheezes  Abdominal: Soft  Bowel sounds are normal  There is no tenderness  Musculoskeletal: She exhibits tenderness (Left thigh and knee with any movement)  Neurological: She is alert and oriented to person, place, and time  Skin: Skin is warm and dry  She is not diaphoretic  There is pallor  Psychiatric: Her mood appears anxious  Her speech is rapid and/or pressured  She expresses impulsivity  Patient is EXTREMELY anxious and perseverates on multiple different things through encounter including moving blanket and tray table despite having just been adjusted by PCA multiple times She is inattentive  Nursing note and vitals reviewed      Lab Results:   I have personally reviewed pertinent lab results including the following:  -sodium 139, potassium to make good 110, CO2 26, BUN 13, creatinine 0 68, GFR 78  -hemoglobin 10 2, hematocrit 31 7, WBC 7 7, platelets 903  -last Vit D on record 50 in 2016  -TSH 1 990    I have personally reviewed the following imaging study reports in PACS:  -CT head without contrast 01/31/2020 revealed no acute intracranial abnormality, on personal review bilateral temporal parenchyma loss is noted, R>L  -CT C-spine 1/31/20 revealed no cervical spine fx or malalignment   -CT chest/abd/pelvis 1/31/20 revealed left intertrochanteric hip fracture with no other traumatic injury to chest abdomen or pelvis  -XR hip/pelvis 1/31/20 revealed acute left intertrochanteric femur fx  -XR left wrist revealed nondisplaced fx of distal radius    Therapies:   PT: Recommend STR  OT: Recommend STR    VTE Prophylaxis: Enoxaparin (Lovenox)    Code Status: Level 1 - Full Code  Advance Directive and Living Will:      Power of : Yes  POLST:      Family and Social Support:   Freedom of Choice: Yes  CM Handoff Comments: 2/2 Referrals made to [facility]    Goals of Care: Better control of anxiety

## 2020-02-03 NOTE — SOCIAL WORK
Pt accepted to both  [facility] and [facility] for [facility]  Pt's preference is  [facility]  CM submitted for insurance and will follow up

## 2020-02-03 NOTE — PLAN OF CARE
Problem: PHYSICAL THERAPY ADULT  Goal: Performs mobility at highest level of function for planned discharge setting  See evaluation for individualized goals  Description  Treatment/Interventions: Functional transfer training, LE strengthening/ROM, Therapeutic exercise, Endurance training, Cognitive reorientation, Patient/family training, Equipment eval/education, Bed mobility, Gait training, Spoke to nursing, Spoke to case management, OT  Equipment Recommended: Walker(with platform attachment)       See flowsheet documentation for full assessment, interventions and recommendations  Outcome: Progressing  Note:   Prognosis: Good  Problem List: Decreased strength, Decreased endurance, Impaired balance, Decreased mobility, Decreased coordination, Pain, Orthopedic restrictions  Assessment: Pt presents with decreased mobility, strength, balance, and activity tolerance  Pt demonstrated ability to perform bed mobility at Mod A X1 and functional transfers at [address]  Pt performed SPT to commode at Mod A X1  Pt able to stand and perform all cassius care at [address] [place] [address] for steadying balance  Pt ambulated 10ft, 20ft with RW w/ platform attachment at [address] w/ 2nd person for chair follow only  Pt required one seated rest break 2' pain in L knee/hip  Pt presents w/ some self-limiting behavior requiring encouragement to ambulate further distances  Pt presents w/ improved mobility this session ambulating further distances and requiring less assistance for all functional transfers  Pt continues to benefit from skilled therapy to maximize functional independence  Recommendation at this time is rehab  Pt would benefit from continued ambulation, functional transfers, strength, balance, and activity tolerance        Recommendation: Post acute IP rehab     PT - OK to Discharge: Yes    See flowsheet documentation for full assessment

## 2020-02-03 NOTE — PHYSICAL THERAPY NOTE
PHYSICAL THERAPY NOTE      Patient Name: Alyssa Shah  VKSGB'O Date: 2/3/2020     02/03/20 1211   Pain Assessment   Pain Assessment 0-10   Pain Score 4   Pain Type Acute pain   Pain Location Hip;Knee   Pain Orientation Left   Hospital Pain Intervention(s) Repositioned; Ambulation/increased activity; Distraction; Emotional support   Response to Interventions tolerated   Restrictions/Precautions   Weight Bearing Precautions Per Order Yes   LUE Weight Bearing Per Order NWB  (okay to use platform walker)   LLE Weight Bearing Per Order WBAT   Other Precautions WBS; Fall Risk;Pain   General   Chart Reviewed Yes   Response to Previous Treatment Patient with no complaints from previous session  Family/Caregiver Present No   Cognition   Arousal/Participation Alert; Cooperative   Attention Attends with cues to redirect   Orientation Level Oriented X4   Memory Decreased recall of precautions   Following Commands Follows one step commands with increased time or repetition   Comments Pt is pleasant and requires encouragement to ambulate further distances   Subjective   Subjective Pt lying supine and agreeable to work w/ therapy   Bed Mobility   Supine to Sit 3  Moderate assistance   Additional items Assist x 1;HOB elevated; Increased time required;Verbal cues;LE management   Sit to Supine Unable to assess   Additional Comments Pt lying supine upon PT arrival  Pt returned seated OOB in chair at end of session w/ all needs within reach   Transfers   Sit to Stand 3  Moderate assistance   Additional items Assist x 1; Increased time required;Verbal cues   Stand to Sit 3  Moderate assistance   Additional items Assist x 1; Increased time required;Verbal cues   Stand pivot 3  Moderate assistance   Additional items Assist x 1; Increased time required;Verbal cues   Toilet transfer 3  Moderate assistance   Additional items Assist x 1;Commode   Additional Comments Pt performed STS from commode at [address] and was able to perform all cassius care w/ Min A for steadying balance while standing   Ambulation/Elevation   Gait pattern Excessively slow; Short stride; Improper Weight shift;Decreased foot clearance; Inconsistent scott; Foward flexed   Gait Assistance 4 Minimal assist   Additional items Assist x 1;Verbal cues  (2nd person for chair follow)   Assistive Device Rolling walker  (w/ platform attachment)   Distance 10ft, 20ft  (1 seated rest break)   Stair Management Assistance Not tested   Balance   Static Sitting Fair +   Dynamic Sitting Fair   Static Standing Poor +   Dynamic Standing Poor   Ambulatory Poor   Endurance Deficit   Endurance Deficit Yes   Endurance Deficit Description fatigue, weakness, pain   Activity Tolerance   Activity Tolerance Patient limited by fatigue;Patient limited by pain   Nurse Made Aware RN cleared pt for therapy   Assessment   Prognosis Good   Problem List Decreased strength;Decreased endurance; Impaired balance;Decreased mobility; Decreased coordination;Pain;Orthopedic restrictions   Assessment Pt presents with decreased mobility, strength, balance, and activity tolerance  Pt demonstrated ability to perform bed mobility at Mod A X1 and functional transfers at [address]  Pt performed SPT to commode at Mod A X1  Pt able to stand and perform all cassius care at [address] [place] [address] for steadying balance  Pt ambulated 10ft, 20ft with RW w/ platform attachment at [facility] w/ 2nd person for chair follow only  Pt required one seated rest break 2' pain in L knee/hip  Pt presents w/ some self-limiting behavior requiring encouragement to ambulate further distances  Pt presents w/ improved mobility this session ambulating further distances and requiring less assistance for all functional transfers  Pt continues to benefit from skilled therapy to maximize functional independence  Recommendation at this time is rehab   Pt would benefit from continued ambulation, functional transfers, strength, balance, and activity tolerance   Goals   Patient Goals to stretch knees   STG Expiration Date 02/12/20   PT Treatment Day 1   Plan   Treatment/Interventions Functional transfer training;LE strengthening/ROM; Endurance training;Patient/family training;Equipment eval/education; Bed mobility;Gait training;Spoke to nursing   Progress Progressing toward goals   PT Frequency   (3-5x/week)   Recommendation   Recommendation Post acute IP rehab   Equipment Recommended Walker   PT - OK to Discharge Yes   Additional Comments when medically cleared to rehab     August Martel, PT, DPT

## 2020-02-04 LAB
ANION GAP SERPL CALCULATED.3IONS-SCNC: 2 MMOL/L (ref 4–13)
BASOPHILS # BLD AUTO: 0.06 THOUSANDS/ΜL (ref 0–0.1)
BASOPHILS NFR BLD AUTO: 1 % (ref 0–1)
BUN SERPL-MCNC: 15 MG/DL (ref 5–25)
CALCIUM SERPL-MCNC: 8.8 MG/DL (ref 8.3–10.1)
CHLORIDE SERPL-SCNC: 110 MMOL/L (ref 100–108)
CO2 SERPL-SCNC: 28 MMOL/L (ref 21–32)
CREAT SERPL-MCNC: 0.65 MG/DL (ref 0.6–1.3)
EOSINOPHIL # BLD AUTO: 0.11 THOUSAND/ΜL (ref 0–0.61)
EOSINOPHIL NFR BLD AUTO: 2 % (ref 0–6)
ERYTHROCYTE [DISTWIDTH] IN BLOOD BY AUTOMATED COUNT: 13.3 % (ref 11.6–15.1)
GFR SERPL CREATININE-BSD FRML MDRD: 80 ML/MIN/1.73SQ M
GLUCOSE SERPL-MCNC: 92 MG/DL (ref 65–140)
HCT VFR BLD AUTO: 34 % (ref 34.8–46.1)
HGB BLD-MCNC: 11 G/DL (ref 11.5–15.4)
IMM GRANULOCYTES # BLD AUTO: 0.03 THOUSAND/UL (ref 0–0.2)
IMM GRANULOCYTES NFR BLD AUTO: 1 % (ref 0–2)
LYMPHOCYTES # BLD AUTO: 1.78 THOUSANDS/ΜL (ref 0.6–4.47)
LYMPHOCYTES NFR BLD AUTO: 30 % (ref 14–44)
MCH RBC QN AUTO: 34 PG (ref 26.8–34.3)
MCHC RBC AUTO-ENTMCNC: 32.4 G/DL (ref 31.4–37.4)
MCV RBC AUTO: 105 FL (ref 82–98)
MONOCYTES # BLD AUTO: 0.63 THOUSAND/ΜL (ref 0.17–1.22)
MONOCYTES NFR BLD AUTO: 10 % (ref 4–12)
NEUTROPHILS # BLD AUTO: 3.42 THOUSANDS/ΜL (ref 1.85–7.62)
NEUTS SEG NFR BLD AUTO: 56 % (ref 43–75)
NRBC BLD AUTO-RTO: 0 /100 WBCS
PLATELET # BLD AUTO: 215 THOUSANDS/UL (ref 149–390)
PMV BLD AUTO: 11.2 FL (ref 8.9–12.7)
POTASSIUM SERPL-SCNC: 4.4 MMOL/L (ref 3.5–5.3)
RBC # BLD AUTO: 3.24 MILLION/UL (ref 3.81–5.12)
SODIUM SERPL-SCNC: 140 MMOL/L (ref 136–145)
WBC # BLD AUTO: 6.03 THOUSAND/UL (ref 4.31–10.16)

## 2020-02-04 PROCEDURE — 80048 BASIC METABOLIC PNL TOTAL CA: CPT | Performed by: PHYSICIAN ASSISTANT

## 2020-02-04 PROCEDURE — 85025 COMPLETE CBC W/AUTO DIFF WBC: CPT | Performed by: PHYSICIAN ASSISTANT

## 2020-02-04 PROCEDURE — 97535 SELF CARE MNGMENT TRAINING: CPT

## 2020-02-04 PROCEDURE — 99232 SBSQ HOSP IP/OBS MODERATE 35: CPT | Performed by: INTERNAL MEDICINE

## 2020-02-04 PROCEDURE — 99232 SBSQ HOSP IP/OBS MODERATE 35: CPT | Performed by: NURSE PRACTITIONER

## 2020-02-04 RX ORDER — DOCUSATE SODIUM 100 MG/1
100 CAPSULE, LIQUID FILLED ORAL DAILY
Qty: 10 CAPSULE | Refills: 0 | Status: CANCELLED
Start: 2020-02-05

## 2020-02-04 RX ORDER — BISACODYL 10 MG
10 SUPPOSITORY, RECTAL RECTAL DAILY PRN
Status: DISCONTINUED | OUTPATIENT
Start: 2020-02-04 | End: 2020-02-05 | Stop reason: HOSPADM

## 2020-02-04 RX ADMIN — ENOXAPARIN SODIUM 40 MG: 40 INJECTION SUBCUTANEOUS at 08:39

## 2020-02-04 RX ADMIN — ALPRAZOLAM 0.25 MG: 0.25 TABLET ORAL at 22:46

## 2020-02-04 RX ADMIN — DOCUSATE SODIUM 100 MG: 100 CAPSULE, LIQUID FILLED ORAL at 08:39

## 2020-02-04 RX ADMIN — ACETAMINOPHEN 975 MG: 325 TABLET ORAL at 18:05

## 2020-02-04 RX ADMIN — CALCIUM CARBONATE 500 MG (1,250 MG)-VITAMIN D3 200 UNIT TABLET 1 TABLET: at 18:05

## 2020-02-04 RX ADMIN — Medication 1 TABLET: at 08:38

## 2020-02-04 RX ADMIN — FAMOTIDINE 20 MG: 20 TABLET ORAL at 08:39

## 2020-02-04 RX ADMIN — SENNOSIDES AND DOCUSATE SODIUM 1 TABLET: 8.6; 5 TABLET ORAL at 22:46

## 2020-02-04 RX ADMIN — LOSARTAN POTASSIUM 25 MG: 25 TABLET, FILM COATED ORAL at 08:39

## 2020-02-04 RX ADMIN — FAMOTIDINE 20 MG: 20 TABLET ORAL at 18:02

## 2020-02-04 RX ADMIN — CALCIUM CARBONATE 500 MG (1,250 MG)-VITAMIN D3 200 UNIT TABLET 1 TABLET: at 08:39

## 2020-02-04 RX ADMIN — ACETAMINOPHEN 975 MG: 325 TABLET ORAL at 08:38

## 2020-02-04 RX ADMIN — ALPRAZOLAM 0.25 MG: 0.25 TABLET ORAL at 09:23

## 2020-02-04 RX ADMIN — BISACODYL 10 MG: 10 SUPPOSITORY RECTAL at 09:23

## 2020-02-04 NOTE — ASSESSMENT & PLAN NOTE
- Cephalomedullary Nail Left Proximal Femur 2/1/2020  - POD # 3 - Hemoglobin stable  - PT/OT - recommending rehab placement  Stable for placement   CM assisting     - WBAT  - Venodynes and Lovenox

## 2020-02-04 NOTE — SOCIAL WORK
CM met with pt to inform that we received insurance authorization for [facility]  Pt is able to sit in a wheelchair for about an hour and will be taking [facility] transportation to the facility  Pt aware and agreeable to out of pocket cost  CM attempted to contact pt's daughter, Shwetha Leonardo ([phone]) to inform of same per pt's request but CM had to leave voicemail  Awaiting return call

## 2020-02-04 NOTE — SOCIAL WORK
CM called pt's [facility], [facility] ([phone]) and spoke with [facility] to receive pending auth # [id number]  Awaiting confirmation of ELISABETH gill to follow

## 2020-02-04 NOTE — SOCIAL WORK
CM received call from Deni with [employer] confirming insurance authorization for [address]  Auth # [id number] from Feb 4th through Feb 10th  Review will be Feb 11th with [employer] [place] (P: [phone], F: [phone])

## 2020-02-04 NOTE — PROGRESS NOTES
Progress Note - Geriatric Medicine   Kareem De León 80 y o  female MRN: [id number]  Unit/Bed#: [facility] 608-01 Encounter: [id number]      Assessment/Plan:    Left intertrochanteric femur fracture  -s/p IM nail 2/1/20  -pain well controlled with current regimen, continue multimodal pain control  -acute blood loss anemia noted, Hb 13 3 on admission, dropped to 11 today, continue to monitor vitals and fluid status  -currently on DVT prophylaxis with Lovenox  -PT/OT following and recommend STR    Acute blood loss anemia  -Hb 13 3>11  -asymptomatic   -continue to monitor closely and transfuse as necessary per trauma/ortho     Left distal radial fracture   -s/p soft arm cast 2/1/20  -continue NWB per ortho  -close o/p f/u with ortho    Left knee pain  -XR knee 2/3/20 revealed severe OA with no acute abn  -XR knee right 2/3/20 revealed severe OA with concern for loose body   -orthopedics following, will follow for further recommendations   -continue acute pain control     Acute pain due to trauma  -well controlled on geriatric pain protocol  -continue to taper medications as possible and appropriate  -continue bowel regimen, will add Dulcolax suppository for constipation    Constipation  -patient has had no significant BM since Friday, will add Dulcolax suppository  -continue conservative measures including ambulation, hydration, and prune juice    Anxiety  -symptoms significantly improved since restarting of home Xanax  -continue supportive care and, environment  -encourage outpatient follow-up with Psychology/Psychiatry for counseling and stress reduction techniques  -limit caffeine which can often increase anxiety and palpitations    Impaired mastication  -requires use of dentures, encourage use of appropriate times  -ensure consistency is appropriate for abilities    Deconditioning/debility/frailty  -continue nutritional support  -continue good control chronic medical conditions  -continue early and frequent ambulation with assistance  -continue to discuss long-term care management as patient continues through the expected aging process    Subjective:   Patient seen examined at bedside where she sitting resting comfortably eating breakfast   She reports that she did not sleep well overnight because the mattresses is terrible and lumpy but her appetite is good and her pain is well controlled  She feels much less anxious this morning since restarting her home Xanax  Review of Systems   Constitutional: Negative for appetite change, chills and fever  HENT: Positive for dental problem (dentures)  Negative for hearing loss and trouble swallowing  Eyes: Positive for visual disturbance (wears glasses)  Respiratory: Negative for cough, chest tightness, shortness of breath and wheezing  Cardiovascular: Negative for chest pain and palpitations  Gastrointestinal: Positive for abdominal distention and constipation (has not had BM since Fri)  Negative for abdominal pain, diarrhea, nausea and vomiting  Endocrine: Negative  Genitourinary: Negative for difficulty urinating and urgency  Musculoskeletal: Positive for gait problem  Skin: Negative  Neurological: Negative for dizziness, weakness and headaches  Psychiatric/Behavioral: Positive for decreased concentration and sleep disturbance (due to discomfort in bed )  All other systems reviewed and are negative  Objective:     Vitals: Blood pressure 131/69, pulse 93, temperature 98 1 °F (36 7 °C), resp  rate 18, height 4' 11" (1 499 m), weight 61 2 kg (135 lb), SpO2 95 %  ,Body mass index is 27 27 kg/m²  Intake/Output Summary (Last 24 hours) at 2/4/2020 0839  Last data filed at 2/4/2020 0600  Gross per 24 hour   Intake 1220 ml   Output 1150 ml   Net 70 ml     Current Medications: Reviewed    Physical Exam:   Physical Exam   Constitutional: She is oriented to person, place, and time  She appears well-developed and well-nourished  No distress     Sitting in bed eating breakfast, appears comfortable   HENT:   Head: Normocephalic and atraumatic  Mouth/Throat: Oropharynx is clear and moist    Eyes: Conjunctivae and EOM are normal  No scleral icterus  Neck: Normal range of motion  Neck supple  No tracheal deviation present  Cardiovascular: Normal rate, normal heart sounds and intact distal pulses  Pulmonary/Chest: Effort normal and breath sounds normal  No respiratory distress  She has no wheezes  She has no rales  Abdominal: Bowel sounds are normal  There is no tenderness  Abdomen firm, non tender   Musculoskeletal: She exhibits tenderness (LLE with movement )  She exhibits no edema  Neurological: She is alert and oriented to person, place, and time  Skin: Skin is warm and dry  She is not diaphoretic  Psychiatric: She has a normal mood and affect  Anxiety significantly improved from yesterday   Nursing note and vitals reviewed  Invasive Devices     Peripheral Intravenous Line            Peripheral IV 02/04/20 Right Hand less than 1 day              Lab, Imaging and other studies: I have personally reviewed pertinent reports

## 2020-02-04 NOTE — PLAN OF CARE
Problem: OCCUPATIONAL THERAPY ADULT  Goal: Performs self-care activities at highest level of function for planned discharge setting  See evaluation for individualized goals  Description  Treatment Interventions: ADL retraining, Functional transfer training, Endurance training, Cognitive reorientation, Patient/family training, Equipment evaluation/education, Compensatory technique education, Energy conservation, Activityengagement          See flowsheet documentation for full assessment, interventions and recommendations  Outcome: Progressing  Note:   Limitation: Decreased ADL status, Decreased Safe judgement during ADL, Decreased cognition, Decreased endurance, Decreased self-care trans, Decreased high-level ADLs, Mood limitation  Prognosis: Good  Assessment: pt participated in am ot session and was seen focusing on bed mobility, sit to stand and spt using platform rw and lb drsesing seated on eob  pt  required mod asst x 1 for all aspects of bed mobility and sit to stand  pt with noted improvement in functional transfers and mobility  pt was able to walk with platform rw with minimal asst   pt remained  in chair after encouragement for lunch  pt was cooperative andm otivated for ot session  pt reports taking tylenol for pain as needed  pt declined offering for ice packs      OT Discharge Recommendation: Short Term Rehab  OT - OK to Discharge:  Yes  MEGAN Ayala

## 2020-02-04 NOTE — OCCUPATIONAL THERAPY NOTE
Occupational Therapy Treatment Note:       02/04/20 1155   Restrictions/Precautions   LUE Weight Bearing Per Order NWB   LLE Weight Bearing Per Order WBAT   Pain Assessment   Pain Assessment FLACC   Pain Rating: FLACC (Rest) - Face 0   Pain Rating: FLACC (Rest) - Legs 0   Pain Rating: FLACC (Rest) - Activity 0   Pain Rating: FLACC (Rest) - Cry 0   Pain Rating: FLACC (Rest) - Consolability 0   Score: FLACC (Rest) 0   Pain Rating: FLACC (Activity) - Face 1   Pain Rating: FLACC (Activity) - Legs 0   Pain Rating: FLACC (Activity) - Activity 1   Pain Rating: FLACC (Activity) - Cry 1   Pain Rating: FLACC (Activity) - Consolability 0   Score: FLACC (Activity) 3   ADL   LB Dressing Assistance 3  Moderate Assistance   LB Dressing Deficit Don/doff R sock; Don/doff L sock;Pull up over hips   LB Dressing Comments seated eob for underwear and sock donning  Functional Standing Tolerance   Time fair minus balance   Bed Mobility   Supine to Sit 3  Moderate assistance   Additional items Assist x 1;LE management   Transfers   Sit to Stand 3  Moderate assistance   Stand to Sit 3  Moderate assistance   Stand pivot 4  Minimal assistance   Additional items Assist x 1  (platform rw)   Functional Mobility   Functional Mobility 4  Minimal assistance   Additional Comments asst x 1   Additional items   (platform rw)   Cognition   Arousal/Participation Alert   Attention Within functional limits   Memory Decreased recall of precautions   Following Commands Follows one step commands without difficulty   Activity Tolerance   Activity Tolerance Patient tolerated treatment well   Assessment   Assessment pt participated in am ot session and was seen focusing on bed mobility, sit to stand and spt using platform rw and lb drsesing seated on eob  pt  required mod asst x 1 for all aspects of bed mobility and sit to stand  pt with noted improvement in functional transfers and mobility    pt was able to walk with platform rw with minimal asst   pt remained  in chair after encouragement for lunch  pt was cooperative andm otivated for ot session  pt reports taking tylenol for pain as needed  pt declined offering for ice packs    Plan   Treatment Interventions ADL retraining;Functional transfer training; Activityengagement; Endurance training;Patient/family training;Equipment evaluation/education   Goal Expiration Date 02/16/20   OT Treatment Day 1   OT Frequency 3-5x/wk   Recommendation   OT Discharge Recommendation Short Term Rehab   OT - OK to Discharge Yes   April NINA Agarwal

## 2020-02-04 NOTE — ASSESSMENT & PLAN NOTE
- acute on chronic: Xrays completed  No acute fractures  Severe chronic osteoporosis noted  Patient reports her pain is much improved today and back to baseline

## 2020-02-04 NOTE — PROGRESS NOTES
Progress Note - Alyssa Shah 6/30/1931, 80 y o  female MRN: [id number]    Unit/Bed#: [facility] 118-02 Encounter: [id number]    Primary Care Provider: Dru Sarmiento DO   Date and time admitted to hospital: 1/31/2020  6:18 PM        Left knee pain  Assessment & Plan  - acute on chronic: Xrays completed  No acute fractures  Severe chronic osteoporosis noted  Patient reports her pain is much improved today and back to baseline  Fall  Assessment & Plan  - PT/OT  - Geriatrics following     Closed fracture of left distal radius  Assessment & Plan  - Ortho consulted, cast applied  Fingers warm and mobile  Pain controlled  NWB LUE but ok to use platform walker, weight bear through elbow per ortho     * Fracture of femur, intertrochanteric, left, closed (HCC)  Assessment & Plan  - Cephalomedullary Nail Left Proximal Femur 2/1/2020  - POD # 3 - Hemoglobin stable  - PT/OT - recommending rehab placement  Stable for placement  CM assisting     - WBAT  - Venodynes and Lovenox          Disposition: stable for DC planning, STR      SUBJECTIVE:  Chief Complaint: "I am trying to have a bowel movement"    Subjective: reports pain is overall controlled, knee pain is improved  Denies nausea or vomiting, denies headache or dizziness, denies SOB or CP  Offers no complaints         OBJECTIVE:     Meds/Allergies     Current Facility-Administered Medications:     acetaminophen (TYLENOL) tablet 975 mg, 975 mg, Oral, Q8H EILEEN, KIZZY Swain, 975 mg at 02/04/20 3114    ALPRAZolam (XANAX) tablet 0 25 mg, 0 25 mg, Oral, BID PRN, Teodora Brown MD, 0 25 mg at 02/04/20 3032    bisacodyl (DULCOLAX) rectal suppository 10 mg, 10 mg, Rectal, Daily PRN, Jon Kunz DO, 10 mg at 02/04/20 0392    calcium carbonate-vitamin D (OSCAL-D) 500 mg-200 units per tablet 1 tablet, 1 tablet, Oral, BID With Meals, KIZZY Renner, 1 tablet at 02/04/20 9993    docusate sodium (COLACE) capsule 100 mg, 100 mg, Oral, Daily, Alpesh Zarate MD, 100 mg at 02/04/20 0839    enoxaparin (LOVENOX) subcutaneous injection 40 mg, 40 mg, Subcutaneous, Q24H [address], Cass Lee PA-C, 40 mg at 02/04/20 0839    famotidine (PEPCID) tablet 20 mg, 20 mg, Oral, BID, Zara Arriaga PA-C, 20 mg at 02/04/20 0839    HYDROmorphone (DILAUDID) injection 0 2 mg, 0 2 mg, Intravenous, Q3H PRN, Gary Olvera MD    losartan (COZAAR) tablet 25 mg, 25 mg, Oral, Daily, KIZZY Swain, 25 mg at 02/04/20 0839    multivitamin-minerals (CENTRUM) tablet 1 tablet, 1 tablet, Oral, Daily, KIZZY Swain, 1 tablet at 02/04/20 0838    oxyCODONE (ROXICODONE) IR tablet 2 5 mg, 2 5 mg, Oral, Q4H PRN, Gary Olvera MD    oxyCODONE (ROXICODONE) IR tablet 5 mg, 5 mg, Oral, Q4H PRN, Gary Olvera MD, 5 mg at 02/02/20 0821    senna-docusate sodium (SENOKOT S) 8 6-50 mg per tablet 1 tablet, 1 tablet, Oral, HS, Oscar Mcfarlane MD, 1 tablet at 02/03/20 2213     Vitals:   Vitals:    02/04/20 0800   BP:    Pulse:    Resp:    Temp:    SpO2: 95%       Intake/Output:  I/O       02/02 0701 - 02/03 0700 02/03 0701 - 02/04 0700 02/04 0701 - 02/05 0700    P  O  800 1220 480    I V  (mL/kg)       Total Intake(mL/kg) 800 (10 2) 1220 (19 9) 480 (7 8)    Urine (mL/kg/hr) 1725 (0 9) 1150 (0 8) 400 (0 8)    Stool  0 0    Blood       Total Output 1725 1150 400    Net -925 +70 +80           Unmeasured Urine Occurrence  1 x 2 x    Unmeasured Stool Occurrence  1 x 2 x           Nutrition/GI Proph/Bowel Reg: regular     Physical Exam:   Physical Exam   Constitutional: She is oriented to person, place, and time  No distress  HENT:   Head: Normocephalic and atraumatic  Eyes: Pupils are equal, round, and reactive to light  Conjunctivae are normal  Right eye exhibits no discharge  Left eye exhibits no discharge  Neck: Normal range of motion  No tracheal deviation present  Cardiovascular: Normal rate, regular rhythm, normal heart sounds and intact distal pulses     Pulmonary/Chest: Effort normal and breath sounds normal  No stridor  No respiratory distress  She has no wheezes  She has no rales  She exhibits no tenderness  Abdominal: Soft  Bowel sounds are normal  She exhibits no distension and no mass  There is no tenderness  There is no rebound and no guarding  Musculoskeletal: She exhibits no edema or deformity  Bilateral chronic knee pain, improved from yesterday  No bruising or swelling  Left hip incision CDI, compartment soft  L arm in splint, fingers warm, + motor and sensation, no edema   Neurological: She is alert and oriented to person, place, and time  Moves all extremities, equal strength bilaterally   Facial symmetry  Clear Speech    Skin: Skin is warm and dry  Capillary refill takes less than 2 seconds  She is not diaphoretic  Psychiatric: She has a normal mood and affect  Invasive Devices     Peripheral Intravenous Line            Peripheral IV 02/04/20 Right Hand less than 1 day                 Lab Results:   Results: I have personally reviewed pertinent reports  and I have personally reviewed pertinent films in PACS, BMP/CMP:   Lab Results   Component Value Date    SODIUM 140 02/04/2020    K 4 4 02/04/2020     (H) 02/04/2020    CO2 28 02/04/2020    BUN 15 02/04/2020    CREATININE 0 65 02/04/2020    CALCIUM 8 8 02/04/2020    EGFR 80 02/04/2020    and CBC:   Lab Results   Component Value Date    WBC 6 03 02/04/2020    HGB 11 0 (L) 02/04/2020    HCT 34 0 (L) 02/04/2020     (H) 02/04/2020     02/04/2020    MCH 34 0 02/04/2020    MCHC 32 4 02/04/2020    RDW 13 3 02/04/2020    MPV 11 2 02/04/2020    NRBC 0 02/04/2020     Imaging/EKG Studies: Results: I have personally reviewed pertinent reports     and I have personally reviewed pertinent films in PACS  Other Studies:   VTE Prophylaxis: Sequential compression device (Venodyne)  and Enoxaparin (Lovenox)

## 2020-02-04 NOTE — SOCIAL WORK
CM called [facility] who did not have [facility] transportation for this evening or tomorrow morning  CM spoke with Nitish Madrigal at [facility] who reported that they have [address] availability at 10:00 AM tomorrow  CM spoke with pt while pt's daughter was on speaker phone to make them aware of transportation time  Facility, pt's nurse, and pt's physician aware

## 2020-02-04 NOTE — PLAN OF CARE
Problem: Potential for Falls  Goal: Patient will remain free of falls  Description  INTERVENTIONS:  - Assess patient frequently for physical needs  -  Identify cognitive and physical deficits and behaviors that affect risk of falls    -  [place] fall precautions as indicated by assessment   - Educate patient/family on patient safety including physical limitations  - Instruct patient to call for assistance with activity based on assessment  - Modify environment to reduce risk of injury  - Consider OT/PT consult to assist with strengthening/mobility  Outcome: Progressing     Problem: Prexisting or High Potential for Compromised Skin Integrity  Goal: Skin integrity is maintained or improved  Description  INTERVENTIONS:  - Identify patients at risk for skin breakdown  - Assess and monitor skin integrity  - Assess and monitor nutrition and hydration status  - Monitor labs   - Assess for incontinence   - Turn and reposition patient  - Assist with mobility/ambulation  - Relieve pressure over bony prominences  - Avoid friction and shearing  - Provide appropriate hygiene as needed including keeping skin clean and dry  - Evaluate need for skin moisturizer/barrier cream  - Collaborate with interdisciplinary team   - Patient/family teaching  - Consider wound care consult   Outcome: Progressing     Problem: PAIN - ADULT  Goal: Verbalizes/displays adequate comfort level or baseline comfort level  Description  Interventions:  - Encourage patient to monitor pain and request assistance  - Assess pain using appropriate pain scale  - Administer analgesics based on type and severity of pain and evaluate response  - Implement non-pharmacological measures as appropriate and evaluate response  - Consider cultural and social influences on pain and pain management  - Notify physician/advanced practitioner if interventions unsuccessful or patient reports new pain  Outcome: Progressing     Problem: DISCHARGE PLANNING  Goal: Discharge to home or other facility with appropriate resources  Description  INTERVENTIONS:  - Identify barriers to discharge w/patient and caregiver  - Arrange for needed discharge resources and transportation as appropriate  - Identify discharge learning needs (meds, wound care, etc )  - Arrange for interpretive services to assist at discharge as needed  - Refer to Case Management Department for coordinating discharge planning if the patient needs post-hospital services based on physician/advanced practitioner order or complex needs related to functional status, cognitive ability, or social support system  Outcome: Progressing     Problem: Nutrition/Hydration-ADULT  Goal: Nutrient/Hydration intake appropriate for improving, restoring or maintaining nutritional needs  Description  Monitor and assess patient's nutrition/hydration status for malnutrition  Collaborate with interdisciplinary team and initiate plan and interventions as ordered  Monitor patient's weight and dietary intake as ordered or per policy  Utilize nutrition screening tool and intervene as necessary  Determine patient's food preferences and provide high-protein, high-caloric foods as appropriate       INTERVENTIONS:  - Monitor oral intake, urinary output, labs, and treatment plans  - Assess nutrition and hydration status and recommend course of action  - Evaluate amount of meals eaten  - Assist patient with eating if necessary   - Allow adequate time for meals  - Recommend/ encourage appropriate diets, oral nutritional supplements, and vitamin/mineral supplements  - Order, calculate, and assess calorie counts as needed  - Recommend, monitor, and adjust tube feedings and TPN/PPN based on assessed needs  - Assess need for intravenous fluids  - Provide specific nutrition/hydration education as appropriate  - Include patient/family/caregiver in decisions related to nutrition  Outcome: Progressing

## 2020-02-05 ENCOUNTER — HOSPITAL ENCOUNTER (INPATIENT)
Facility: HOSPITAL | Age: 85
LOS: 20 days | Discharge: HOME HEALTH | DRG: 561 | End: 2020-02-25
Attending: INTERNAL MEDICINE | Admitting: INTERNAL MEDICINE
Payer: COMMERCIAL

## 2020-02-05 VITALS
HEART RATE: 93 BPM | HEIGHT: 59 IN | TEMPERATURE: 98.5 F | DIASTOLIC BLOOD PRESSURE: 73 MMHG | WEIGHT: 137 LBS | OXYGEN SATURATION: 97 % | RESPIRATION RATE: 17 BRPM | SYSTOLIC BLOOD PRESSURE: 141 MMHG | BODY MASS INDEX: 27.62 KG/M2

## 2020-02-05 DIAGNOSIS — Z48.89 AFTERCARE FOLLOWING SURGERY: Primary | ICD-10-CM

## 2020-02-05 PROCEDURE — 97116 GAIT TRAINING THERAPY: CPT

## 2020-02-05 PROCEDURE — 97535 SELF CARE MNGMENT TRAINING: CPT

## 2020-02-05 PROCEDURE — 97163 PT EVAL HIGH COMPLEX 45 MIN: CPT

## 2020-02-05 PROCEDURE — 97167 OT EVAL HIGH COMPLEX 60 MIN: CPT

## 2020-02-05 PROCEDURE — 99238 HOSP IP/OBS DSCHRG MGMT 30/<: CPT | Performed by: PHYSICIAN ASSISTANT

## 2020-02-05 RX ORDER — ALPRAZOLAM 0.25 MG/1
0.25 TABLET ORAL 2 TIMES DAILY PRN
Qty: 60 TABLET | Refills: 0 | Status: SHIPPED | OUTPATIENT
Start: 2020-02-05 | End: 2020-03-11 | Stop reason: SDUPTHER

## 2020-02-05 RX ORDER — AMOXICILLIN 250 MG
2 CAPSULE ORAL
Refills: 0 | Status: ON HOLD
Start: 2020-02-05 | End: 2020-09-08

## 2020-02-05 RX ORDER — ACETAMINOPHEN 325 MG/1
975 TABLET ORAL EVERY 8 HOURS SCHEDULED
Qty: 30 TABLET | Refills: 0
Start: 2020-02-05

## 2020-02-05 RX ORDER — BISACODYL 10 MG
10 SUPPOSITORY, RECTAL RECTAL DAILY PRN
Qty: 12 SUPPOSITORY | Refills: 0
Start: 2020-02-05 | End: 2020-03-11 | Stop reason: ALTCHOICE

## 2020-02-05 RX ORDER — OXYCODONE HYDROCHLORIDE 5 MG/1
2.5-5 TABLET ORAL EVERY 6 HOURS PRN
Qty: 5 TABLET | Refills: 0 | Status: SHIPPED | OUTPATIENT
Start: 2020-02-05 | End: 2020-03-11 | Stop reason: ALTCHOICE

## 2020-02-05 RX ADMIN — CALCIUM CARBONATE 500 MG (1,250 MG)-VITAMIN D3 200 UNIT TABLET 1 TABLET: at 08:27

## 2020-02-05 RX ADMIN — ACETAMINOPHEN 975 MG: 325 TABLET ORAL at 08:30

## 2020-02-05 RX ADMIN — FAMOTIDINE 20 MG: 20 TABLET ORAL at 08:27

## 2020-02-05 RX ADMIN — DOCUSATE SODIUM 100 MG: 100 CAPSULE, LIQUID FILLED ORAL at 08:28

## 2020-02-05 RX ADMIN — Medication 1 TABLET: at 08:27

## 2020-02-05 RX ADMIN — ENOXAPARIN SODIUM 40 MG: 40 INJECTION SUBCUTANEOUS at 08:27

## 2020-02-05 RX ADMIN — ALPRAZOLAM 0.25 MG: 0.25 TABLET ORAL at 08:31

## 2020-02-05 RX ADMIN — LOSARTAN POTASSIUM 25 MG: 25 TABLET, FILM COATED ORAL at 08:27

## 2020-02-05 NOTE — ASSESSMENT & PLAN NOTE
Ortho consulted, cast applied  Fingers warm and mobile  Pain controlled  SANTIAGO ALEX  Cleared for use of platform walker with weight-bearing through the elbow  Follow up with Orthopedics as an outpatient

## 2020-02-05 NOTE — DISCHARGE SUMMARY
Discharge- Braden Wooten 6/30/1931, 80 y o  female MRN: [id number]    Unit/Bed#: [facility] 965-93 Encounter: [id number]    Primary Care Provider: Ghada Chandra DO   Date and time admitted to hospital: 1/31/2020  6:18 PM        Fall  Assessment & Plan  - PT/OT recommending discharge to inpatient rehab  - Geriatrics following , appreciate recommendations  -discharge to skilled nursing facility today at 10:00 a m  Closed fracture of left distal radius  Assessment & Plan  Ortho consulted, cast applied  Fingers warm and mobile  Pain controlled  NWB LUE  Cleared for use of platform walker with weight-bearing through the elbow  Follow up with Orthopedics as an outpatient    * Fracture of femur, intertrochanteric, left, closed (HCC)  Assessment & Plan  - Cephalomedullary Nail Left Proximal Femur 2/1/2020  - PT/OT - recommending rehab placement   - WBAT left lower extremity  - Venodynes and Lovenox for DVT prophylaxis  -discharge to rehab today  -follow-up with orthopedics as an outpatient 2 weeks    Left knee pain  Assessment & Plan  - acute on chronic: Xrays completed  No acute fractures  Severe chronic osteoporosis noted  Patient reports her pain is much improved today and back to baseline  Resolved Problems  Date Reviewed: 2/5/2020    None          Admission Date:   Admission Orders (From admission, onward)     Ordered        01/31/20 1907  Inpatient Admission  Once                     Admitting Diagnosis: Closed fracture of left distal radius [S52 502A]  Fracture of femur, intertrochanteric, left, closed ([facility] [address] ) [S72 142A]  Unspecified multiple injuries, initial encounter [T07  XXXA]    HPI: As documented by Dr Michael Kat who evaluated the patient on admission, "Braden Wooten is a 80 y o  female who [facility] as a [facility] after a fall at home  Patient says she was going down her stairs when she slipped on the last step and landed on her left hip  Patient denies hitting her head or LOC   Patient says she was unable to stand back up or move her leg due to left hip pain  At [facility], patient found to have a left radial fracture which was splinted and a left intertrochanteric fracture  Patient complains of left hip pain at this time  Denies AC/AP  Patient does not have other complaints at this time  "    Procedures Performed: No orders of the defined types were placed in this encounter  Summary of Hospital Course:   Patient was placed on the trauma service following fall when she sustained a left intertrochanteric femur fracture  She has seen evaluated by Orthopedics suture to suture to the operating room on 02/01 for left femoral IM nail  She was also found to have a left distal radius fracture which was placed in a cast after reduction by Orthopedics  She is nonweightbearing on the left upper extremity and weightbear as tolerated on left lower extremity  She was up and ambulatory with PT/OT recommended discharge to inpatient rehab  Her pain was controlled and she was placed on will Lovenox for DVT prophylaxis  Her hemoglobin is stable postop  She was sleeping tolerating a diet and moving her bowels at time of discharge  She is medically stable for discharge on 02/05 to inpatient rehab at [address] [place] [address]  She will follow up with Ortho in 2 weeks  She can follow up with Trauma on an as-needed basis  This morning the patient is having a bowel movement  She is in good spirits  She states her pain is controlled and she is motivated to go to rehab  She has no complaints  She slept much better last night      Exam:  GEN:  No acute distress  HEENT:  Normocephalic, atraumatic  NEURO:  GCS 15 pulse exam  CV:  regular rate and rhythm, no murmurs gallops or rubs  PULM:  Clear to auscultation bilaterally  GI:  Soft, nontender, nondistended  :  Voiding  MSK:  With moving all extremities equally; +left hip incision clean dry and intact; +any left upper extremity in a cast  SKIN: Pink, warm, dry      Significant Findings, Care, Treatment and Services Provided:   Xr Wrist 3+ Views Left    Result Date: 1/31/2020  Impression: Nondisplaced fracture of the distal radius  The study was marked in [facility] for immediate notification  Workstation performed: [id number]     Xr Hip/pelv 2-3 Vws Left If Performed    Result Date: 2/1/2020  Impression: Fluoroscopic guidance provided for left hip ORIF  Please refer to the separate procedure notes for additional details  Workstation performed: [id number]     Xr Hip/pelv 2-3 Vws Left If Performed    Result Date: 1/31/2020  Impression: Acute left femoral intertrochanteric fracture with mild varus angulation  Workstation performed: [id number]     Xr Femur 2 Vw Left    Result Date: 2/1/2020  Impression: Intertrochanteric fracture with posttraumatic coxa vara but no significant displacement  Workstation performed: [id number]     Xr Knee 1 Or 2 Vw Left    Result Date: 2/3/2020  Impression: No acute osseous abnormality Severe osteoarthritis of the medial joint compartment Workstation performed: [id number]     Xr Knee 1 Or 2 Vw Right    Result Date: 2/3/2020  Impression: Severe right knee osteoarthritis and suspicion of loose body  No acute fracture or dislocation Workstation performed: [id number]     Trauma - Ct Head Wo Contrast    Result Date: 1/31/2020  Impression: No acute intracranial abnormality  Workstation performed: [id number]     Trauma - Ct Spine Cervical Wo Contrast    Result Date: 1/31/2020  Impression: No cervical spine fracture or traumatic malalignment  Workstation performed: [id number]     Trauma - Ct Chest Abdomen Pelvis W Contrast    Result Date: 1/31/2020  Impression: Degraded by respiratory motion  1   Comminuted left intertrochanteric hip fracture  2   Otherwise no traumatic injury to the chest, abdomen, or pelvis  The study was marked in [facility] for immediate notification   Workstation performed: [id number]       Complications: none    Condition at Discharge: good         Discharge instructions/Information to patient and family:   See after visit summary for information provided to patient and family  Provisions for Follow-Up Care:  See after visit summary for information related to follow-up care and any pertinent home health orders  PCP: Katharina Oquendo DO    Disposition: Skilled nursing facility at [facility]    Planned Readmission: No    Discharge Statement   I spent 30 minutes discharging the patient  This time was spent on the day of discharge  I had direct contact with the patient on the day of discharge  Additional documentation is required if more than 30 minutes were spent on discharge  Discharge Medications:  See after visit summary for reconciled discharge medications provided to patient and family

## 2020-02-05 NOTE — ASSESSMENT & PLAN NOTE
- Cephalomedullary Nail Left Proximal Femur 2/1/2020  - PT/OT - recommending rehab placement   - WBAT left lower extremity  - Venodynes and Lovenox for DVT prophylaxis  -discharge to rehab today  -follow-up with orthopedics as an outpatient 2 weeks

## 2020-02-05 NOTE — INCIDENTAL FINDINGS
The following findings require follow up:  Radiographic finding   Finding:  There is colonic diverticulosis without evidence of acute diverticulitis   Follow up required:  Family doctor   Follow up should be done within 2 week(s)

## 2020-02-05 NOTE — ASSESSMENT & PLAN NOTE
- PT/OT recommending discharge to inpatient rehab  - Geriatrics following , appreciate recommendations  -discharge to skilled nursing facility today at 10:00 a m

## 2020-02-06 ENCOUNTER — PATIENT OUTREACH (OUTPATIENT)
Dept: CASE MANAGEMENT | Facility: OTHER | Age: 85
End: 2020-02-06

## 2020-02-06 DIAGNOSIS — Z71.89 COMPLEX CARE COORDINATION: Primary | ICD-10-CM

## 2020-02-06 PROCEDURE — 97535 SELF CARE MNGMENT TRAINING: CPT

## 2020-02-06 PROCEDURE — 97116 GAIT TRAINING THERAPY: CPT

## 2020-02-06 PROCEDURE — 97110 THERAPEUTIC EXERCISES: CPT

## 2020-02-06 PROCEDURE — 97530 THERAPEUTIC ACTIVITIES: CPT

## 2020-02-07 PROCEDURE — 97530 THERAPEUTIC ACTIVITIES: CPT

## 2020-02-07 PROCEDURE — 97110 THERAPEUTIC EXERCISES: CPT

## 2020-02-07 PROCEDURE — 97535 SELF CARE MNGMENT TRAINING: CPT

## 2020-02-07 PROCEDURE — 97116 GAIT TRAINING THERAPY: CPT

## 2020-02-09 PROCEDURE — 97110 THERAPEUTIC EXERCISES: CPT

## 2020-02-09 PROCEDURE — 97535 SELF CARE MNGMENT TRAINING: CPT

## 2020-02-09 PROCEDURE — 97530 THERAPEUTIC ACTIVITIES: CPT

## 2020-02-10 PROCEDURE — 97530 THERAPEUTIC ACTIVITIES: CPT

## 2020-02-10 PROCEDURE — 97116 GAIT TRAINING THERAPY: CPT

## 2020-02-10 PROCEDURE — 97535 SELF CARE MNGMENT TRAINING: CPT

## 2020-02-10 PROCEDURE — 97110 THERAPEUTIC EXERCISES: CPT

## 2020-02-11 PROCEDURE — 97530 THERAPEUTIC ACTIVITIES: CPT

## 2020-02-11 PROCEDURE — 97535 SELF CARE MNGMENT TRAINING: CPT

## 2020-02-11 PROCEDURE — 97110 THERAPEUTIC EXERCISES: CPT

## 2020-02-11 PROCEDURE — 97116 GAIT TRAINING THERAPY: CPT

## 2020-02-12 PROCEDURE — 97110 THERAPEUTIC EXERCISES: CPT

## 2020-02-12 PROCEDURE — 97530 THERAPEUTIC ACTIVITIES: CPT

## 2020-02-12 PROCEDURE — 97116 GAIT TRAINING THERAPY: CPT

## 2020-02-12 PROCEDURE — 97535 SELF CARE MNGMENT TRAINING: CPT

## 2020-02-13 PROCEDURE — 97110 THERAPEUTIC EXERCISES: CPT

## 2020-02-13 PROCEDURE — 97535 SELF CARE MNGMENT TRAINING: CPT

## 2020-02-13 PROCEDURE — 97530 THERAPEUTIC ACTIVITIES: CPT

## 2020-02-13 PROCEDURE — 97116 GAIT TRAINING THERAPY: CPT

## 2020-02-14 PROCEDURE — 97530 THERAPEUTIC ACTIVITIES: CPT

## 2020-02-14 PROCEDURE — 97110 THERAPEUTIC EXERCISES: CPT

## 2020-02-14 PROCEDURE — 97116 GAIT TRAINING THERAPY: CPT

## 2020-02-15 PROCEDURE — 97110 THERAPEUTIC EXERCISES: CPT

## 2020-02-15 PROCEDURE — 97116 GAIT TRAINING THERAPY: CPT

## 2020-02-17 ENCOUNTER — HOSPITAL ENCOUNTER (OUTPATIENT)
Dept: RADIOLOGY | Facility: HOSPITAL | Age: 85
Discharge: HOME | End: 2020-02-17
Payer: COMMERCIAL

## 2020-02-17 ENCOUNTER — OFFICE VISIT (OUTPATIENT)
Dept: OBGYN CLINIC | Facility: HOSPITAL | Age: 85
End: 2020-02-17

## 2020-02-17 VITALS — HEART RATE: 90 BPM | SYSTOLIC BLOOD PRESSURE: 133 MMHG | DIASTOLIC BLOOD PRESSURE: 77 MMHG

## 2020-02-17 DIAGNOSIS — Z48.89 AFTERCARE FOLLOWING SURGERY: ICD-10-CM

## 2020-02-17 DIAGNOSIS — Z48.89 AFTERCARE FOLLOWING SURGERY: Primary | ICD-10-CM

## 2020-02-17 DIAGNOSIS — Z09 FRACTURE FOLLOW-UP: ICD-10-CM

## 2020-02-17 PROCEDURE — 97110 THERAPEUTIC EXERCISES: CPT

## 2020-02-17 PROCEDURE — 73110 X-RAY EXAM OF WRIST: CPT

## 2020-02-17 PROCEDURE — 97530 THERAPEUTIC ACTIVITIES: CPT

## 2020-02-17 PROCEDURE — 1160F RVW MEDS BY RX/DR IN RCRD: CPT | Performed by: PHYSICIAN ASSISTANT

## 2020-02-17 PROCEDURE — 99024 POSTOP FOLLOW-UP VISIT: CPT | Performed by: PHYSICIAN ASSISTANT

## 2020-02-17 PROCEDURE — 73502 X-RAY EXAM HIP UNI 2-3 VIEWS: CPT

## 2020-02-17 PROCEDURE — 1111F DSCHRG MED/CURRENT MED MERGE: CPT | Performed by: PHYSICIAN ASSISTANT

## 2020-02-17 PROCEDURE — 97116 GAIT TRAINING THERAPY: CPT

## 2020-02-17 PROCEDURE — 97535 SELF CARE MNGMENT TRAINING: CPT

## 2020-02-17 PROCEDURE — 4040F PNEUMOC VAC/ADMIN/RCVD: CPT | Performed by: PHYSICIAN ASSISTANT

## 2020-02-17 PROCEDURE — 3075F SYST BP GE 130 - 139MM HG: CPT | Performed by: PHYSICIAN ASSISTANT

## 2020-02-17 PROCEDURE — 3078F DIAST BP <80 MM HG: CPT | Performed by: PHYSICIAN ASSISTANT

## 2020-02-17 NOTE — PROGRESS NOTES
Assessment:       1  Aftercare following surgery    2  Fracture follow-up          Plan:        Patient is doing well postoperatively for left hip fracture with IM nail fixation  Operative note, images, and rehab protocol were discussed  All questions were addressed to the patient's satisfaction  Patient is currently staying in a at a nursing home where she is receiving physical therapy  Regarding her left wrist, patient cast removed and cock-up wrist splint applied  She is to stay in splint except for hygiene  Follow-up will be in 6 weeks to assess patient's progress  Subjective:     Patient ID: Darleen Spann is a 80 y o  female  Chief Complaint:    HPI  Patient presents to the office for follow-up status post ORIF with IMN of left hip fracture on 2020  She also had conservative treatment with casting of her left distal radius fracture  She complains of groin pain on the left side but states this has improved  She complains of irritation with her cast      Social History     Occupational History    Not on file   Tobacco Use    Smoking status: Former Smoker     Last attempt to quit:      Years since quittin 1    Smokeless tobacco: Never Used   Substance and Sexual Activity    Alcohol use: Never     Frequency: Never    Drug use: No    Sexual activity: Not on file      Review of Systems   Constitutional: Negative  Respiratory: Negative  Musculoskeletal: Positive for arthralgias, gait problem and myalgias  Skin: Positive for wound  Neurological: Positive for weakness  Negative for numbness  Psychiatric/Behavioral: Negative  Objective:     Ortho ExamPhysical Exam   Constitutional: She is oriented to person, place, and time  She appears well-nourished  HENT:   Head: Atraumatic  Cardiovascular: Intact distal pulses  Pulmonary/Chest: Effort normal    Musculoskeletal:   Range of motion of hip or wrist not tested          Neurological: She is alert and oriented to person, place, and time  No sensory deficit  Skin: Skin is warm and dry  Capillary refill takes less than 2 seconds  Left hip and thigh incisions dry and clean  Staples removed, Steri-Strips applied  Skin under cast intact no lesions or pressure sores noted  Psychiatric: She has a normal mood and affect  Her behavior is normal          I have personally reviewed pertinent films in PACS and my interpretation is X-rays of left hip show excellent position of IM nail  Wrist x-rays show acceptable alignment of distal radius fracture  Shamika Dietz

## 2020-02-18 ENCOUNTER — TELEPHONE (OUTPATIENT)
Dept: OBGYN CLINIC | Facility: HOSPITAL | Age: 85
End: 2020-02-18

## 2020-02-18 PROCEDURE — 97110 THERAPEUTIC EXERCISES: CPT

## 2020-02-18 PROCEDURE — 97530 THERAPEUTIC ACTIVITIES: CPT

## 2020-02-18 PROCEDURE — 97116 GAIT TRAINING THERAPY: CPT

## 2020-02-18 PROCEDURE — 97535 SELF CARE MNGMENT TRAINING: CPT

## 2020-02-18 NOTE — TELEPHONE ENCOUNTER
Patient is at [facility]  She was seen in the office yesterday and provided weightbearing instructions only for the leg but nothing was mentioned about her left wrist  Could you please provide a weightbearing status for her wrist so I could call the physical therapy at rehab back with the instructions?     Thank you    # [phone] or [phone]

## 2020-02-18 NOTE — TELEPHONE ENCOUNTER
As discussed in person    Weight bearing status did not change - she remains non-weight bearing for upper extremity but they can use platform walker

## 2020-02-18 NOTE — TELEPHONE ENCOUNTER
Sorry for the confusion  She do full active and passive ROM of the left upper extremity but should still maintain NWB of the left wrist/hand as the fracture continues to heal   She will be able to use that hand/wrist at 6 weeks post injury  She can also use that UE to use the platform walker    Thanks   Jesus Ramos

## 2020-02-18 NOTE — TELEPHONE ENCOUNTER
Sanchez Yee, occupational therapist at [facility] in [facility] [phone]    Sanchez Yee is calling to clarify the pt order because it states that the patient cannot lift her lt hand  Sanchez Yee needs to know if this was meant to say that she can not lift with her left hand  Sanchez Yee can take a verbal to let her know what was meant

## 2020-02-18 NOTE — TELEPHONE ENCOUNTER
A full discussion of the nature of anticoagulants has been carried out. A benefit risk analysis has been presented to the patient, so that they understand the justification for choosing anticoagulation at this time. The need for frequent and regular monitoring, precise dosage adjustment and compliance is stressed. Side effects of potential bleeding are discussed. The patient should avoid any OTC items containing aspirin or ibuprofen, and should avoid great swings in general diet. Avoid alcohol consumption. Call if any signs of abnormal bleeding. Next PT/INR test in 1 week. Patient reports 2 missed dose after last appt. Instructed to decrease dose to 5 mg daily except Monday/Thursday take 10 mg. Written and verbal instructions given to patient. Patient verbalized understanding      Anticoagulation Summary  As of 3/11/2019    INR goal:   2.0-3.0   TTR:   42.3 % (3 wk)   INR used for dosing:   3.2!  (3/11/2019)   Warfarin maintenance plan:   10 mg (5 mg x 2) on Mon, Thu; 5 mg (5 mg x 1) all other days   Weekly warfarin total:   45 mg   Plan last modified:   Bianka Johansen RN (3/11/2019)   Next INR check:   3/18/2019   Target end date:       Indications    Impending cerebrovascular accident [facility]) [I63.9]  Long term (current) use of anticoagulants [Z79.01]             Anticoagulation Episode Summary     INR check location:       Preferred lab:       Send INR reminders to:       Comments:         Anticoagulation Care Providers     Provider Role Specialty Phone number    Dana Mc MD Responsible Cardiology [phone]          Future Appointments   Date Time Provider Jack Lopez   3/18/2019  7:40 AM COUMADIN, [address]   4/22/2019  9:40 AM Dana Mc  [address] No answer on number provided  Will call back tomorrow during office hours

## 2020-02-19 PROCEDURE — 97535 SELF CARE MNGMENT TRAINING: CPT

## 2020-02-19 PROCEDURE — 97110 THERAPEUTIC EXERCISES: CPT

## 2020-02-19 PROCEDURE — 97530 THERAPEUTIC ACTIVITIES: CPT

## 2020-02-19 PROCEDURE — 97116 GAIT TRAINING THERAPY: CPT

## 2020-02-19 NOTE — TELEPHONE ENCOUNTER
Spoke to Oren benitez in the OT office, advised above  She stated she would have Marylu call back if she needs further explanation   Thanks

## 2020-02-20 PROCEDURE — 97535 SELF CARE MNGMENT TRAINING: CPT

## 2020-02-20 PROCEDURE — 97110 THERAPEUTIC EXERCISES: CPT

## 2020-02-20 PROCEDURE — 97116 GAIT TRAINING THERAPY: CPT

## 2020-02-20 PROCEDURE — 97530 THERAPEUTIC ACTIVITIES: CPT

## 2020-02-21 PROCEDURE — 97110 THERAPEUTIC EXERCISES: CPT

## 2020-02-21 PROCEDURE — 97535 SELF CARE MNGMENT TRAINING: CPT

## 2020-02-21 PROCEDURE — 97530 THERAPEUTIC ACTIVITIES: CPT

## 2020-02-21 PROCEDURE — 97116 GAIT TRAINING THERAPY: CPT

## 2020-02-22 PROCEDURE — 97116 GAIT TRAINING THERAPY: CPT

## 2020-02-24 PROCEDURE — 97530 THERAPEUTIC ACTIVITIES: CPT

## 2020-02-24 PROCEDURE — 97116 GAIT TRAINING THERAPY: CPT

## 2020-02-24 PROCEDURE — 97110 THERAPEUTIC EXERCISES: CPT

## 2020-02-24 PROCEDURE — 97535 SELF CARE MNGMENT TRAINING: CPT

## 2020-02-25 LAB
ALBUMIN SERPL BCP-MCNC: 3.3 G/DL (ref 3.5–5)
ALP SERPL-CCNC: 169 U/L (ref 46–116)
ALT SERPL W P-5'-P-CCNC: 17 U/L (ref 12–78)
ANION GAP SERPL CALCULATED.3IONS-SCNC: 7 MMOL/L (ref 4–13)
AST SERPL W P-5'-P-CCNC: 14 U/L (ref 5–45)
BILIRUB SERPL-MCNC: 0.7 MG/DL (ref 0.2–1)
BUN SERPL-MCNC: 13 MG/DL (ref 5–25)
CALCIUM SERPL-MCNC: 9.6 MG/DL (ref 8.3–10.1)
CHLORIDE SERPL-SCNC: 105 MMOL/L (ref 100–108)
CO2 SERPL-SCNC: 30 MMOL/L (ref 21–32)
CREAT SERPL-MCNC: 1 MG/DL (ref 0.6–1.3)
ERYTHROCYTE [DISTWIDTH] IN BLOOD BY AUTOMATED COUNT: 14.1 % (ref 11.6–15.1)
FERRITIN SERPL-MCNC: 456 NG/ML (ref 8–388)
GFR SERPL CREATININE-BSD FRML MDRD: 50 ML/MIN/1.73SQ M
GLUCOSE P FAST SERPL-MCNC: 95 MG/DL (ref 65–99)
GLUCOSE SERPL-MCNC: 95 MG/DL (ref 65–140)
HCT VFR BLD AUTO: 36 % (ref 34.8–46.1)
HGB BLD-MCNC: 11.5 G/DL (ref 11.5–15.4)
IRON SATN MFR SERPL: 34 %
IRON SERPL-MCNC: 96 UG/DL (ref 50–170)
MCH RBC QN AUTO: 34.2 PG (ref 26.8–34.3)
MCHC RBC AUTO-ENTMCNC: 31.9 G/DL (ref 31.4–37.4)
MCV RBC AUTO: 107 FL (ref 82–98)
PLATELET # BLD AUTO: 290 THOUSANDS/UL (ref 149–390)
PMV BLD AUTO: 10 FL (ref 8.9–12.7)
POTASSIUM SERPL-SCNC: 4 MMOL/L (ref 3.5–5.3)
PROT SERPL-MCNC: 6.9 G/DL (ref 6.4–8.2)
RBC # BLD AUTO: 3.36 MILLION/UL (ref 3.81–5.12)
SODIUM SERPL-SCNC: 142 MMOL/L (ref 136–145)
TIBC SERPL-MCNC: 286 UG/DL (ref 250–450)
WBC # BLD AUTO: 4.03 THOUSAND/UL (ref 4.31–10.16)

## 2020-02-25 PROCEDURE — 85027 COMPLETE CBC AUTOMATED: CPT | Performed by: NURSE PRACTITIONER

## 2020-02-25 PROCEDURE — 82728 ASSAY OF FERRITIN: CPT | Performed by: NURSE PRACTITIONER

## 2020-02-25 PROCEDURE — 83540 ASSAY OF IRON: CPT | Performed by: NURSE PRACTITIONER

## 2020-02-25 PROCEDURE — 80053 COMPREHEN METABOLIC PANEL: CPT | Performed by: NURSE PRACTITIONER

## 2020-02-25 PROCEDURE — 83550 IRON BINDING TEST: CPT | Performed by: NURSE PRACTITIONER

## 2020-02-26 ENCOUNTER — PATIENT OUTREACH (OUTPATIENT)
Dept: FAMILY MEDICINE CLINIC | Facility: CLINIC | Age: 85
End: 2020-02-26

## 2020-02-26 DIAGNOSIS — M25.562 LEFT KNEE PAIN, UNSPECIFIED CHRONICITY: Primary | ICD-10-CM

## 2020-02-26 NOTE — PROGRESS NOTES
Outpatient Care Management Note:  AMARJIT  referral received  Patient is active with [facility]  Spoke with Malia Perales to make her aware that Enoch Goff is part of the trial and will need a BMP drawn  Outreach attempt made to introduce myself and my role  Message left for patient to please return call  Contact information left on message

## 2020-02-28 ENCOUNTER — TELEPHONE (OUTPATIENT)
Dept: OBGYN CLINIC | Facility: HOSPITAL | Age: 85
End: 2020-02-28

## 2020-02-28 NOTE — TELEPHONE ENCOUNTER
Ava Mims PT  cb [phone]    Celeste is calling because she will see her next week, twice a week for 3 to 4 weeks  Patient had a fx of lt hip but she wants to know if there are any precautions

## 2020-03-04 ENCOUNTER — PATIENT OUTREACH (OUTPATIENT)
Dept: FAMILY MEDICINE CLINIC | Facility: CLINIC | Age: 85
End: 2020-03-04

## 2020-03-04 NOTE — PROGRESS NOTES
Outpatient Care Management Note:  Follow up call attempted  Message left for patient to please return call  Contact information left on message

## 2020-03-05 ENCOUNTER — LAB REQUISITION (OUTPATIENT)
Dept: LAB | Facility: HOSPITAL | Age: 85
End: 2020-03-05
Payer: COMMERCIAL

## 2020-03-05 DIAGNOSIS — M25.562 PAIN IN LEFT KNEE: ICD-10-CM

## 2020-03-05 LAB
ANION GAP SERPL CALCULATED.3IONS-SCNC: 7 MMOL/L (ref 4–13)
BUN SERPL-MCNC: 17 MG/DL (ref 5–25)
CALCIUM SERPL-MCNC: 9.6 MG/DL (ref 8.3–10.1)
CHLORIDE SERPL-SCNC: 105 MMOL/L (ref 100–108)
CO2 SERPL-SCNC: 29 MMOL/L (ref 21–32)
CREAT SERPL-MCNC: 0.77 MG/DL (ref 0.6–1.3)
GFR SERPL CREATININE-BSD FRML MDRD: 69 ML/MIN/1.73SQ M
GLUCOSE SERPL-MCNC: 95 MG/DL (ref 65–140)
POTASSIUM SERPL-SCNC: 4.1 MMOL/L (ref 3.5–5.3)
SODIUM SERPL-SCNC: 141 MMOL/L (ref 136–145)

## 2020-03-05 PROCEDURE — 80048 BASIC METABOLIC PNL TOTAL CA: CPT | Performed by: FAMILY MEDICINE

## 2020-03-09 ENCOUNTER — PATIENT OUTREACH (OUTPATIENT)
Dept: FAMILY MEDICINE CLINIC | Facility: CLINIC | Age: 85
End: 2020-03-09

## 2020-03-11 ENCOUNTER — OFFICE VISIT (OUTPATIENT)
Dept: FAMILY MEDICINE CLINIC | Facility: CLINIC | Age: 85
End: 2020-03-11
Payer: COMMERCIAL

## 2020-03-11 ENCOUNTER — PATIENT OUTREACH (OUTPATIENT)
Dept: FAMILY MEDICINE CLINIC | Facility: CLINIC | Age: 85
End: 2020-03-11

## 2020-03-11 VITALS
DIASTOLIC BLOOD PRESSURE: 74 MMHG | WEIGHT: 146 LBS | HEIGHT: 59 IN | BODY MASS INDEX: 29.43 KG/M2 | SYSTOLIC BLOOD PRESSURE: 132 MMHG

## 2020-03-11 DIAGNOSIS — K21.9 GASTROESOPHAGEAL REFLUX DISEASE WITHOUT ESOPHAGITIS: ICD-10-CM

## 2020-03-11 DIAGNOSIS — I10 ESSENTIAL HYPERTENSION: ICD-10-CM

## 2020-03-11 DIAGNOSIS — I10 BENIGN ESSENTIAL HYPERTENSION: Primary | ICD-10-CM

## 2020-03-11 DIAGNOSIS — F41.1 GENERALIZED ANXIETY DISORDER: ICD-10-CM

## 2020-03-11 PROCEDURE — 3075F SYST BP GE 130 - 139MM HG: CPT | Performed by: FAMILY MEDICINE

## 2020-03-11 PROCEDURE — 1111F DSCHRG MED/CURRENT MED MERGE: CPT | Performed by: FAMILY MEDICINE

## 2020-03-11 PROCEDURE — 99214 OFFICE O/P EST MOD 30 MIN: CPT | Performed by: FAMILY MEDICINE

## 2020-03-11 PROCEDURE — 4040F PNEUMOC VAC/ADMIN/RCVD: CPT | Performed by: FAMILY MEDICINE

## 2020-03-11 PROCEDURE — 3078F DIAST BP <80 MM HG: CPT | Performed by: FAMILY MEDICINE

## 2020-03-11 PROCEDURE — 3288F FALL RISK ASSESSMENT DOCD: CPT | Performed by: FAMILY MEDICINE

## 2020-03-11 PROCEDURE — 3008F BODY MASS INDEX DOCD: CPT | Performed by: FAMILY MEDICINE

## 2020-03-11 PROCEDURE — 1160F RVW MEDS BY RX/DR IN RCRD: CPT | Performed by: FAMILY MEDICINE

## 2020-03-11 PROCEDURE — 1100F PTFALLS ASSESS-DOCD GE2>/YR: CPT | Performed by: FAMILY MEDICINE

## 2020-03-11 PROCEDURE — 1036F TOBACCO NON-USER: CPT | Performed by: FAMILY MEDICINE

## 2020-03-11 RX ORDER — LOSARTAN POTASSIUM 25 MG/1
25 TABLET ORAL DAILY
Qty: 30 TABLET | Refills: 5 | Status: SHIPPED | OUTPATIENT
Start: 2020-03-11 | End: 2020-04-16 | Stop reason: SDUPTHER

## 2020-03-11 RX ORDER — RANITIDINE 150 MG/1
150 TABLET ORAL 2 TIMES DAILY
Qty: 30 TABLET | Refills: 5 | Status: SHIPPED | OUTPATIENT
Start: 2020-03-11 | End: 2020-04-17 | Stop reason: RX

## 2020-03-11 RX ORDER — ALPRAZOLAM 0.25 MG/1
0.25 TABLET ORAL 2 TIMES DAILY PRN
Qty: 60 TABLET | Refills: 0 | Status: SHIPPED | OUTPATIENT
Start: 2020-03-11 | End: 2020-04-17 | Stop reason: SDUPTHER

## 2020-03-11 RX ORDER — RANITIDINE 150 MG/1
TABLET ORAL
COMMUNITY
Start: 2020-02-25 | End: 2020-03-11 | Stop reason: SDUPTHER

## 2020-03-11 NOTE — PROGRESS NOTES
Outpatient Care Management Note:  Unable to contact letter mailed to patient after unsuccessful phone outreach

## 2020-03-11 NOTE — PROGRESS NOTES
BMI Counseling: Body mass index is 29 49 kg/m²  The BMI is above normal  Nutrition recommendations include decreasing portion sizes, encouraging healthy choices of fruits and vegetables, decreasing fast food intake, consuming healthier snacks, moderation in carbohydrate intake and increasing intake of lean protein  Exercise recommendations include exercising 3-5 times per week  No pharmacotherapy was ordered  Patient referred to PCP due to patient being overweight  Assessment/Plan:  Patient will be seen again in about 3 months    Problem List Items Addressed This Visit        Digestive    GERD (gastroesophageal reflux disease) (Chronic)    Relevant Medications    ranitidine (ZANTAC) 150 mg tablet       Cardiovascular and Mediastinum    Benign essential hypertension - Primary    Relevant Medications    losartan (COZAAR) 25 mg tablet      Other Visit Diagnoses     Essential hypertension        Relevant Medications    losartan (COZAAR) 25 mg tablet    Generalized anxiety disorder        Relevant Medications    ALPRAZolam (XANAX) 0 25 mg tablet           Diagnoses and all orders for this visit:    Benign essential hypertension    Essential hypertension  -     losartan (COZAAR) 25 mg tablet; Take 1 tablet (25 mg total) by mouth daily    Gastroesophageal reflux disease without esophagitis  -     ranitidine (ZANTAC) 150 mg tablet; Take 1 tablet (150 mg total) by mouth 2 (two) times a day    Generalized anxiety disorder  -     ALPRAZolam (XANAX) 0 25 mg tablet; Take 1 tablet (0 25 mg total) by mouth 2 (two) times a day as needed for anxiety for up to 10 days        No problem-specific Assessment & Plan notes found for this encounter  Subjective:      Patient ID: Jamie Parks is a 80 y o  female      Follow-up after recent left hip fracture with surgical fixation and patient has completed rehab she is now living with relatives and doing quite well no complaints except that she can't lie on her left side to sleep so she is having a little bit of insomnia but that she that is probably going to resolve over time      The following portions of the patient's history were reviewed and updated as appropriate:   She has a past medical history of Diverticulosis, GERD (gastroesophageal reflux disease), Hiatal hernia, Irritable bowel disease, Lactose intolerance, and Osteoporosis  ,  does not have any pertinent problems on file  ,   has a past surgical history that includes Carpal tunnel release; Hysterectomy; Tonsillectomy; Cholecystectomy; Ear tube removal; Skin cancer excision; Hemorroidectomy; Vagina reconstruction surgery (09/13/2019); pr open rx femur fx+intramed riley (Left, 5/6/7686); and Cast application (Left, 4/6/7654)  ,  family history includes Colon cancer in her mother; Stomach cancer in her father  ,   reports that she quit smoking about 31 years ago  She has never used smokeless tobacco  She reports that she does not drink alcohol or use drugs  ,  is allergic to escitalopram; mepenzolate; maalox anti-gas [simethicone]; other; protonix [pantoprazole]; and reglan [metoclopramide]     Current Outpatient Medications   Medication Sig Dispense Refill    acetaminophen (TYLENOL) 325 mg tablet Take 3 tablets (975 mg total) by mouth every 8 (eight) hours 30 tablet 0    ALPRAZolam (XANAX) 0 25 mg tablet Take 1 tablet (0 25 mg total) by mouth 2 (two) times a day as needed for anxiety for up to 10 days 60 tablet 0    Calcium Citrate (CITRACAL PO) Take 1 tablet by mouth daily   cetirizine (ZyrTEC) 10 mg tablet Take 10 mg by mouth daily      Cholecalciferol (VITAMIN D PO) Take 1,000 Units by mouth 2 (two) times a day   losartan (COZAAR) 25 mg tablet Take 1 tablet (25 mg total) by mouth daily 30 tablet 5    Multiple Vitamin (MULTIVITAMIN) capsule Take 1 capsule by mouth daily        ranitidine (ZANTAC) 150 mg tablet Take 1 tablet (150 mg total) by mouth 2 (two) times a day 30 tablet 5    senna-docusate sodium (SENOKOT S) 8 6-50 mg per tablet Take 2 tablets by mouth daily at bedtime  0     No current facility-administered medications for this visit  Review of Systems   Constitutional: Negative for activity change, appetite change, diaphoresis, fatigue and fever  HENT: Negative  Eyes: Negative  Respiratory: Negative for apnea, cough, chest tightness, shortness of breath and wheezing  Cardiovascular: Negative for chest pain, palpitations and leg swelling  Gastrointestinal: Negative for abdominal distention, abdominal pain, anal bleeding, constipation, diarrhea, nausea and vomiting  Endocrine: Negative for cold intolerance, heat intolerance, polydipsia, polyphagia and polyuria  Genitourinary: Negative for difficulty urinating, dysuria, flank pain, hematuria and urgency  Musculoskeletal: Positive for arthralgias  Negative for back pain, gait problem, joint swelling and myalgias  Recent left hip surgery   Skin: Negative for color change, rash and wound  Allergic/Immunologic: Negative for environmental allergies, food allergies and immunocompromised state  Neurological: Negative for dizziness, seizures, syncope, speech difficulty, numbness and headaches  Hematological: Negative for adenopathy  Does not bruise/bleed easily  Psychiatric/Behavioral: Negative for agitation, behavioral problems, hallucinations, sleep disturbance and suicidal ideas  Objective:  Vitals:    03/11/20 1303   BP: 132/74   BP Location: Left arm   Patient Position: Sitting   Cuff Size: Standard   Weight: 66 2 kg (146 lb)   Height: 4' 11" (1 499 m)     Body mass index is 29 49 kg/m²  Physical Exam   Constitutional: She is oriented to person, place, and time  She appears well-developed and well-nourished  No distress  HENT:   Head: Normocephalic     Right Ear: External ear normal    Left Ear: External ear normal    Nose: Nose normal    Mouth/Throat: Oropharynx is clear and moist    Eyes: Pupils are equal, round, and reactive to light  Conjunctivae and EOM are normal  Right eye exhibits no discharge  Left eye exhibits no discharge  No scleral icterus  Neck: Normal range of motion  No tracheal deviation present  No thyromegaly present  Cardiovascular: Normal rate, regular rhythm and normal heart sounds  Exam reveals no gallop and no friction rub  No murmur heard  Pulmonary/Chest: Effort normal and breath sounds normal  No respiratory distress  She has no wheezes  Abdominal: Soft  Bowel sounds are normal  She exhibits no mass  There is no tenderness  There is no guarding  Musculoskeletal: She exhibits no edema or deformity  Surgical wound left hip   Lymphadenopathy:     She has no cervical adenopathy  Neurological: She is alert and oriented to person, place, and time  No cranial nerve deficit  Skin: Skin is warm and dry  No rash noted  She is not diaphoretic  No erythema  Psychiatric: She has a normal mood and affect   Thought content normal

## 2020-03-11 NOTE — LETTER
Date: 03/11/20    Dear Enriqueta Dye,   My name is Alejandro Boudreaux; I am a registered nurse care manager working with [address] [place] [address]  I have not been able to reach you and would like to set a time that I can talk with you over the phone or in-person  My work is to help patients that have complex medical conditions get the care they need  This includes patients who may have been in the hospital or emergency room  Please call me with any questions you may have at [phone]  I look forward to speaking with you    Sincerely,  Alejandro Boudreaux RN, BSN    Copy:  Dr Meek Smith

## 2020-03-16 ENCOUNTER — OFFICE VISIT (OUTPATIENT)
Dept: OBGYN CLINIC | Facility: HOSPITAL | Age: 85
End: 2020-03-16

## 2020-03-16 ENCOUNTER — HOSPITAL ENCOUNTER (OUTPATIENT)
Dept: RADIOLOGY | Facility: HOSPITAL | Age: 85
Discharge: HOME | End: 2020-03-16
Attending: ORTHOPAEDIC SURGERY
Payer: COMMERCIAL

## 2020-03-16 VITALS
HEIGHT: 59 IN | SYSTOLIC BLOOD PRESSURE: 162 MMHG | BODY MASS INDEX: 29.64 KG/M2 | DIASTOLIC BLOOD PRESSURE: 91 MMHG | HEART RATE: 115 BPM | WEIGHT: 147 LBS

## 2020-03-16 DIAGNOSIS — M25.532 PAIN IN LEFT WRIST: ICD-10-CM

## 2020-03-16 DIAGNOSIS — S72.142D CLOSED DISPLACED INTERTROCHANTERIC FRACTURE OF LEFT FEMUR WITH ROUTINE HEALING, SUBSEQUENT ENCOUNTER: ICD-10-CM

## 2020-03-16 DIAGNOSIS — M25.552 PAIN IN LEFT HIP: ICD-10-CM

## 2020-03-16 DIAGNOSIS — S52.592D OTHER CLOSED FRACTURE OF DISTAL END OF LEFT RADIUS WITH ROUTINE HEALING, SUBSEQUENT ENCOUNTER: ICD-10-CM

## 2020-03-16 DIAGNOSIS — M25.552 PAIN IN LEFT HIP: Primary | ICD-10-CM

## 2020-03-16 PROCEDURE — 3080F DIAST BP >= 90 MM HG: CPT | Performed by: ORTHOPAEDIC SURGERY

## 2020-03-16 PROCEDURE — 73110 X-RAY EXAM OF WRIST: CPT

## 2020-03-16 PROCEDURE — 3008F BODY MASS INDEX DOCD: CPT | Performed by: ORTHOPAEDIC SURGERY

## 2020-03-16 PROCEDURE — 1160F RVW MEDS BY RX/DR IN RCRD: CPT | Performed by: ORTHOPAEDIC SURGERY

## 2020-03-16 PROCEDURE — 1111F DSCHRG MED/CURRENT MED MERGE: CPT | Performed by: ORTHOPAEDIC SURGERY

## 2020-03-16 PROCEDURE — 73502 X-RAY EXAM HIP UNI 2-3 VIEWS: CPT

## 2020-03-16 PROCEDURE — 3077F SYST BP >= 140 MM HG: CPT | Performed by: ORTHOPAEDIC SURGERY

## 2020-03-16 PROCEDURE — 4040F PNEUMOC VAC/ADMIN/RCVD: CPT | Performed by: ORTHOPAEDIC SURGERY

## 2020-03-16 PROCEDURE — 99024 POSTOP FOLLOW-UP VISIT: CPT | Performed by: ORTHOPAEDIC SURGERY

## 2020-03-16 NOTE — PROGRESS NOTES
Assessment  Diagnoses and all orders for this visit:    Closed displaced intertrochanteric fracture of left femur with routine healing, subsequent encounter    Other closed fracture of distal end of left radius with routine healing, subsequent encounter            Discussion and Plan:  Patient is doing well post operatively on exam today  WBAT and activities as tolerated  Patient may discontinue her wrist brace  Follow up as needed  Subjective:   Patient ID: Ricci Hussein is a 80 y o  female      HPI  Patient presents today s/p Insertion Nail Im Femur Antegrade left intertrochanteric fracture 2/01/20 and left distal radius fracture which was treated conservatively in a cast   Patient has been receiving home PT  Patient presents with the assistance of platform walker today  She states overall she is doing well with minimal pain  The following portions of the patient's history were reviewed and updated as appropriate: allergies, current medications, past family history, past medical history, past social history, past surgical history and problem list       Objective:  /91   Pulse (!) 115   Ht 4' 11" (1 499 m)   Wt 66 7 kg (147 lb)   BMI 29 69 kg/m²     Left Hip Exam     Tenderness   The patient is experiencing no tenderness  Range of Motion   The patient has normal left hip ROM  Other   Erythema: absent  Sensation: normal  Pulse: present    Comments: Well healed surgical     Left wrist is non tender on exam today            I have personally reviewed pertinent films in PACS and my interpretation is as follows    Left hip x-rays demonstrates hardware in acceptable alignment and position  Left wrist x-rays demonstrates healed fracture remain in acceptable alignment and position      Scribe Attestation    I,:   Jeremy Goodrich am acting as a scribe while in the presence of the attending physician :        I,:   Jeanette Hull MD personally performed the services described in this documentation    as scribed in my presence :

## 2020-03-17 ENCOUNTER — TELEPHONE (OUTPATIENT)
Dept: OBGYN CLINIC | Facility: HOSPITAL | Age: 85
End: 2020-03-17

## 2020-03-17 NOTE — TELEPHONE ENCOUNTER
Patient sees Dr Rhett Alonso the at home PT is calling in wanting to know if she is able to put weight on her left wrist  She is currently walking with a platform walker and wanting to know if they can remove that platform? Celeste is asking for a call back relating this        Call back# [phone]

## 2020-03-17 NOTE — TELEPHONE ENCOUNTER
Yes she can use the wrist to tolerance including using a regular walker as long as she can tolerate it    Thanks  Amber Vernon

## 2020-03-18 ENCOUNTER — PATIENT OUTREACH (OUTPATIENT)
Dept: FAMILY MEDICINE CLINIC | Facility: CLINIC | Age: 85
End: 2020-03-18

## 2020-03-18 NOTE — PROGRESS NOTES
Outpatient Care Management Note:  No response to multiple outreach attempts as well as unable to contact letter  AMARJIT/Complex care management episode closed

## 2020-03-24 ENCOUNTER — TELEPHONE (OUTPATIENT)
Dept: OBGYN CLINIC | Facility: HOSPITAL | Age: 85
End: 2020-03-24

## 2020-03-24 DIAGNOSIS — S72.142D CLOSED DISPLACED INTERTROCHANTERIC FRACTURE OF LEFT FEMUR WITH ROUTINE HEALING, SUBSEQUENT ENCOUNTER: Primary | ICD-10-CM

## 2020-03-24 DIAGNOSIS — S52.592D OTHER CLOSED FRACTURE OF DISTAL END OF LEFT RADIUS WITH ROUTINE HEALING, SUBSEQUENT ENCOUNTER: ICD-10-CM

## 2020-03-24 NOTE — TELEPHONE ENCOUNTER
Celeste, from [facility] for PT   cb [phone]  Fax [phone]    Celeste is calling because she will see the patient this week & next week but then she will be released to outpatient pt  Celeste is asking that we fax a script for outpatient faxed to Shawna Willis at the fax number above for the week of 4/6

## 2020-03-26 ENCOUNTER — TELEPHONE (OUTPATIENT)
Dept: FAMILY MEDICINE CLINIC | Facility: CLINIC | Age: 85
End: 2020-03-26

## 2020-03-26 NOTE — TELEPHONE ENCOUNTER
The knee pain is most likely due to the hip replacement she is now walking differently than she normally would walk since she has the new hip    It is not unusual for individuals to have pain in her other joints when they have 1 joint replaced so on I would tell her to take Tylenol apply ice to the knee

## 2020-04-16 DIAGNOSIS — I10 ESSENTIAL HYPERTENSION: ICD-10-CM

## 2020-04-16 RX ORDER — LOSARTAN POTASSIUM 25 MG/1
25 TABLET ORAL DAILY
Qty: 90 TABLET | Refills: 1 | Status: SHIPPED | OUTPATIENT
Start: 2020-04-16 | End: 2020-11-23 | Stop reason: SDUPTHER

## 2020-04-17 DIAGNOSIS — F41.1 GENERALIZED ANXIETY DISORDER: ICD-10-CM

## 2020-04-17 DIAGNOSIS — K21.9 GASTROESOPHAGEAL REFLUX DISEASE WITHOUT ESOPHAGITIS: Primary | ICD-10-CM

## 2020-04-17 RX ORDER — FAMOTIDINE 40 MG/1
40 TABLET, FILM COATED ORAL DAILY
Qty: 30 TABLET | Refills: 1 | Status: SHIPPED | OUTPATIENT
Start: 2020-04-17 | End: 2020-12-29 | Stop reason: ALTCHOICE

## 2020-04-17 RX ORDER — ALPRAZOLAM 0.25 MG/1
0.25 TABLET ORAL 2 TIMES DAILY PRN
Qty: 60 TABLET | Refills: 0 | Status: SHIPPED | OUTPATIENT
Start: 2020-04-17 | End: 2020-04-17 | Stop reason: SDUPTHER

## 2020-04-17 RX ORDER — ALPRAZOLAM 0.25 MG/1
0.25 TABLET ORAL 2 TIMES DAILY PRN
Qty: 60 TABLET | Refills: 0 | Status: SHIPPED | OUTPATIENT
Start: 2020-04-17 | End: 2020-05-18 | Stop reason: SDUPTHER

## 2020-05-18 DIAGNOSIS — F41.1 GENERALIZED ANXIETY DISORDER: ICD-10-CM

## 2020-05-19 RX ORDER — ALPRAZOLAM 0.25 MG/1
0.25 TABLET ORAL 2 TIMES DAILY PRN
Qty: 60 TABLET | Refills: 0 | Status: SHIPPED | OUTPATIENT
Start: 2020-05-19 | End: 2020-06-25 | Stop reason: SDUPTHER

## 2020-06-25 ENCOUNTER — OFFICE VISIT (OUTPATIENT)
Dept: FAMILY MEDICINE CLINIC | Facility: CLINIC | Age: 85
End: 2020-06-25
Payer: COMMERCIAL

## 2020-06-25 VITALS
SYSTOLIC BLOOD PRESSURE: 146 MMHG | TEMPERATURE: 97.3 F | DIASTOLIC BLOOD PRESSURE: 74 MMHG | HEIGHT: 59 IN | BODY MASS INDEX: 30.76 KG/M2 | WEIGHT: 152.6 LBS

## 2020-06-25 DIAGNOSIS — Z00.00 MEDICARE ANNUAL WELLNESS VISIT, SUBSEQUENT: Primary | ICD-10-CM

## 2020-06-25 DIAGNOSIS — F41.1 GENERALIZED ANXIETY DISORDER: ICD-10-CM

## 2020-06-25 PROCEDURE — 1036F TOBACCO NON-USER: CPT | Performed by: FAMILY MEDICINE

## 2020-06-25 PROCEDURE — 3077F SYST BP >= 140 MM HG: CPT | Performed by: FAMILY MEDICINE

## 2020-06-25 PROCEDURE — 1170F FXNL STATUS ASSESSED: CPT | Performed by: FAMILY MEDICINE

## 2020-06-25 PROCEDURE — 3008F BODY MASS INDEX DOCD: CPT | Performed by: FAMILY MEDICINE

## 2020-06-25 PROCEDURE — 1160F RVW MEDS BY RX/DR IN RCRD: CPT | Performed by: FAMILY MEDICINE

## 2020-06-25 PROCEDURE — 4040F PNEUMOC VAC/ADMIN/RCVD: CPT | Performed by: FAMILY MEDICINE

## 2020-06-25 PROCEDURE — 1125F AMNT PAIN NOTED PAIN PRSNT: CPT | Performed by: FAMILY MEDICINE

## 2020-06-25 PROCEDURE — 3078F DIAST BP <80 MM HG: CPT | Performed by: FAMILY MEDICINE

## 2020-06-25 PROCEDURE — G0439 PPPS, SUBSEQ VISIT: HCPCS | Performed by: FAMILY MEDICINE

## 2020-06-25 RX ORDER — ALPRAZOLAM 0.25 MG/1
0.25 TABLET ORAL 2 TIMES DAILY PRN
Qty: 60 TABLET | Refills: 0 | Status: SHIPPED | OUTPATIENT
Start: 2020-06-25 | End: 2020-07-27 | Stop reason: SDUPTHER

## 2020-07-08 ENCOUNTER — OFFICE VISIT (OUTPATIENT)
Dept: FAMILY MEDICINE CLINIC | Facility: CLINIC | Age: 85
End: 2020-07-08
Payer: COMMERCIAL

## 2020-07-08 VITALS
TEMPERATURE: 97 F | HEIGHT: 59 IN | WEIGHT: 150 LBS | SYSTOLIC BLOOD PRESSURE: 166 MMHG | DIASTOLIC BLOOD PRESSURE: 84 MMHG | BODY MASS INDEX: 30.24 KG/M2

## 2020-07-08 DIAGNOSIS — B02.9 HERPES ZOSTER WITHOUT COMPLICATION: Primary | ICD-10-CM

## 2020-07-08 PROCEDURE — 1036F TOBACCO NON-USER: CPT | Performed by: FAMILY MEDICINE

## 2020-07-08 PROCEDURE — 3079F DIAST BP 80-89 MM HG: CPT | Performed by: FAMILY MEDICINE

## 2020-07-08 PROCEDURE — 1170F FXNL STATUS ASSESSED: CPT | Performed by: FAMILY MEDICINE

## 2020-07-08 PROCEDURE — 1160F RVW MEDS BY RX/DR IN RCRD: CPT | Performed by: FAMILY MEDICINE

## 2020-07-08 PROCEDURE — 4040F PNEUMOC VAC/ADMIN/RCVD: CPT | Performed by: FAMILY MEDICINE

## 2020-07-08 PROCEDURE — 99213 OFFICE O/P EST LOW 20 MIN: CPT | Performed by: FAMILY MEDICINE

## 2020-07-08 PROCEDURE — 3077F SYST BP >= 140 MM HG: CPT | Performed by: FAMILY MEDICINE

## 2020-07-08 PROCEDURE — 3008F BODY MASS INDEX DOCD: CPT | Performed by: FAMILY MEDICINE

## 2020-07-08 RX ORDER — VALACYCLOVIR HYDROCHLORIDE 1 G/1
1000 TABLET, FILM COATED ORAL 3 TIMES DAILY
Qty: 15 TABLET | Refills: 0 | Status: ON HOLD | OUTPATIENT
Start: 2020-07-08 | End: 2020-09-08

## 2020-07-08 NOTE — PROGRESS NOTES
[facility] Plan of Care: Recommended assistive device to help with gait and balance  Assessment/Plan:    Problem List Items Addressed This Visit     None      Visit Diagnoses     Herpes zoster without complication    -  Primary           Diagnoses and all orders for this visit:    Herpes zoster without complication        No problem-specific Assessment & Plan notes found for this encounter  Subjective:      Patient ID: Tejas Cruz is a 80 y o  female  Patient went for her so injections into her hip and shoulder and the doctor noticed that she had a rash on her right flank he recommended she come in to be checked for shingles she has about 5 lesions on not sure if they follow a dermatome or not she said they started as hives a now they have some cross on them so they may be a shingles rash she does not have any pain with them no systemic symptoms      The following portions of the patient's history were reviewed and updated as appropriate:   She has a past medical history of Diverticulosis, GERD (gastroesophageal reflux disease), Hiatal hernia, Irritable bowel disease, Lactose intolerance, and Osteoporosis  ,  does not have any pertinent problems on file  ,   has a past surgical history that includes Carpal tunnel release; Hysterectomy; Tonsillectomy; Cholecystectomy; Ear tube removal; Skin cancer excision; Hemorroidectomy; Vagina reconstruction surgery (09/13/2019); pr open rx femur fx+intramed riley (Left, 6/4/3501); and Cast application (Left, 6/5/4170)  ,  family history includes Colon cancer in her mother; Stomach cancer in her father  ,   reports that she quit smoking about 31 years ago  She has never used smokeless tobacco  She reports that she does not drink alcohol or use drugs  ,  is allergic to escitalopram; mepenzolate; maalox anti-gas [simethicone]; other; prednisone; protonix [pantoprazole]; and reglan [metoclopramide]     Current Outpatient Medications   Medication Sig Dispense Refill    acetaminophen (TYLENOL) 325 mg tablet Take 3 tablets (975 mg total) by mouth every 8 (eight) hours 30 tablet 0    ALPRAZolam (XANAX) 0 25 mg tablet Take 1 tablet (0 25 mg total) by mouth 2 (two) times a day as needed for anxiety for up to 10 days 60 tablet 0    Calcium Citrate (CITRACAL PO) Take 1 tablet by mouth daily   cetirizine (ZyrTEC) 10 mg tablet Take 10 mg by mouth daily      Cholecalciferol (VITAMIN D PO) Take 1,000 Units by mouth 2 (two) times a day   famotidine (PEPCID) 40 MG tablet Take 1 tablet (40 mg total) by mouth daily 30 tablet 1    losartan (COZAAR) 25 mg tablet Take 1 tablet (25 mg total) by mouth daily 90 tablet 1    Multiple Vitamin (MULTIVITAMIN) capsule Take 1 capsule by mouth daily   senna-docusate sodium (SENOKOT S) 8 6-50 mg per tablet Take 2 tablets by mouth daily at bedtime  0     No current facility-administered medications for this visit  Review of Systems   Constitutional: Negative for activity change, appetite change, diaphoresis, fatigue and fever  HENT: Negative  Eyes: Negative  Respiratory: Negative for apnea, cough, chest tightness, shortness of breath and wheezing  Cardiovascular: Negative for chest pain, palpitations and leg swelling  Gastrointestinal: Negative for abdominal distention, abdominal pain, anal bleeding, constipation, diarrhea, nausea and vomiting  Endocrine: Negative for cold intolerance, heat intolerance, polydipsia, polyphagia and polyuria  Genitourinary: Negative for difficulty urinating, dysuria, flank pain, hematuria and urgency  Musculoskeletal: Negative for arthralgias, back pain, gait problem, joint swelling and myalgias  Skin: Positive for rash  Negative for color change and wound  Allergic/Immunologic: Negative for environmental allergies, food allergies and immunocompromised state  Neurological: Negative for dizziness, seizures, syncope, speech difficulty, numbness and headaches     Hematological: Negative for adenopathy  Does not bruise/bleed easily  Psychiatric/Behavioral: Negative for agitation, behavioral problems, hallucinations, sleep disturbance and suicidal ideas  Objective:  Vitals:    07/08/20 1157   BP: 166/84   BP Location: Left arm   Patient Position: Sitting   Cuff Size: Standard   Temp: (!) 97 °F (36 1 °C)   TempSrc: Temporal   Weight: 68 kg (150 lb)   Height: 4' 11" (1 499 m)     Body mass index is 30 3 kg/m²  Physical Exam   Constitutional: She is oriented to person, place, and time  She appears well-developed and well-nourished  No distress  HENT:   Head: Normocephalic  Right Ear: External ear normal    Left Ear: External ear normal    Nose: Nose normal    Mouth/Throat: Oropharynx is clear and moist    Eyes: Pupils are equal, round, and reactive to light  Conjunctivae and EOM are normal  Right eye exhibits no discharge  Left eye exhibits no discharge  No scleral icterus  Neck: Normal range of motion  No tracheal deviation present  No thyromegaly present  Cardiovascular: Normal rate, regular rhythm and normal heart sounds  Exam reveals no gallop and no friction rub  No murmur heard  Pulmonary/Chest: Effort normal and breath sounds normal  No respiratory distress  She has no wheezes  Abdominal: Soft  Bowel sounds are normal  She exhibits no mass  There is no tenderness  There is no guarding  Musculoskeletal: She exhibits no edema or deformity  Lymphadenopathy:     She has no cervical adenopathy  Neurological: She is alert and oriented to person, place, and time  No cranial nerve deficit  Skin: Skin is warm and dry  Rash noted  She is not diaphoretic  No erythema  Psychiatric: She has a normal mood and affect   Thought content normal

## 2020-07-27 DIAGNOSIS — F41.1 GENERALIZED ANXIETY DISORDER: ICD-10-CM

## 2020-07-27 RX ORDER — ALPRAZOLAM 0.25 MG/1
0.25 TABLET ORAL 2 TIMES DAILY PRN
Qty: 60 TABLET | Refills: 0 | Status: SHIPPED | OUTPATIENT
Start: 2020-07-27 | End: 2020-08-28 | Stop reason: SDUPTHER

## 2020-08-28 ENCOUNTER — OFFICE VISIT (OUTPATIENT)
Dept: FAMILY MEDICINE CLINIC | Facility: CLINIC | Age: 85
End: 2020-08-28
Payer: COMMERCIAL

## 2020-08-28 VITALS
BODY MASS INDEX: 31.7 KG/M2 | HEIGHT: 58 IN | TEMPERATURE: 97.8 F | SYSTOLIC BLOOD PRESSURE: 138 MMHG | WEIGHT: 151 LBS | DIASTOLIC BLOOD PRESSURE: 82 MMHG

## 2020-08-28 DIAGNOSIS — S72.142A: ICD-10-CM

## 2020-08-28 DIAGNOSIS — L03.116 CELLULITIS OF LEFT LOWER EXTREMITY: Primary | ICD-10-CM

## 2020-08-28 DIAGNOSIS — F41.1 GENERALIZED ANXIETY DISORDER: ICD-10-CM

## 2020-08-28 DIAGNOSIS — D49.2 SKIN NEOPLASM: ICD-10-CM

## 2020-08-28 PROCEDURE — 3079F DIAST BP 80-89 MM HG: CPT | Performed by: FAMILY MEDICINE

## 2020-08-28 PROCEDURE — 99213 OFFICE O/P EST LOW 20 MIN: CPT | Performed by: FAMILY MEDICINE

## 2020-08-28 PROCEDURE — 1160F RVW MEDS BY RX/DR IN RCRD: CPT | Performed by: FAMILY MEDICINE

## 2020-08-28 PROCEDURE — 1036F TOBACCO NON-USER: CPT | Performed by: FAMILY MEDICINE

## 2020-08-28 PROCEDURE — 3008F BODY MASS INDEX DOCD: CPT | Performed by: FAMILY MEDICINE

## 2020-08-28 PROCEDURE — 4040F PNEUMOC VAC/ADMIN/RCVD: CPT | Performed by: FAMILY MEDICINE

## 2020-08-28 PROCEDURE — 3075F SYST BP GE 130 - 139MM HG: CPT | Performed by: FAMILY MEDICINE

## 2020-08-28 RX ORDER — ALPRAZOLAM 0.25 MG/1
0.25 TABLET ORAL 2 TIMES DAILY PRN
Qty: 60 TABLET | Refills: 0 | Status: SHIPPED | OUTPATIENT
Start: 2020-08-28 | End: 2020-09-29 | Stop reason: SDUPTHER

## 2020-08-28 NOTE — PROGRESS NOTES
Assessment/Plan:    Problem List Items Addressed This Visit        Musculoskeletal and Integument    Fracture of femur, intertrochanteric, left, closed ([address] )      Other Visit Diagnoses     Cellulitis of left lower extremity    -  Primary    Generalized anxiety disorder               Diagnoses and all orders for this visit:    Cellulitis of left lower extremity    Fracture of femur, intertrochanteric, left, closed ([facility])    Generalized anxiety disorder  -     ALPRAZolam (XANAX) 0 25 mg tablet; Take 1 tablet (0 25 mg total) by mouth 2 (two) times a day as needed for anxiety for up to 10 days        No problem-specific Assessment & Plan notes found for this encounter  Subjective:      Patient ID: Efraín Rodriguez is a 80 y o  female  Mrs Alexander Patel has a mole on the inner aspect of her left calf it was irritated had some drainage he should tried putting some black drawing salve on it it is irritated red on and looks like it has grown a bit on were going to treat her for infection with mupirocin ointment and refer her to general surgery to have this removed      The following portions of the patient's history were reviewed and updated as appropriate:   She has a past medical history of Diverticulosis, GERD (gastroesophageal reflux disease), Hiatal hernia, Irritable bowel disease, Lactose intolerance, and Osteoporosis  ,  does not have any pertinent problems on file  ,   has a past surgical history that includes Carpal tunnel release; Hysterectomy; Tonsillectomy; Cholecystectomy; Ear tube removal; Skin cancer excision; Hemorroidectomy; Vagina reconstruction surgery (09/13/2019); pr open rx femur fx+intramed riley (Left, 0/5/5515); and Cast application (Left, 3/1/9413)  ,  family history includes Colon cancer in her mother; Stomach cancer in her father  ,   reports that she quit smoking about 31 years ago  She has never used smokeless tobacco  She reports that she does not drink alcohol or use drugs  ,  is allergic to escitalopram; mepenzolate; maalox anti-gas [simethicone]; other; prednisone; protonix [pantoprazole]; and reglan [metoclopramide]     Current Outpatient Medications   Medication Sig Dispense Refill    acetaminophen (TYLENOL) 325 mg tablet Take 3 tablets (975 mg total) by mouth every 8 (eight) hours 30 tablet 0    ALPRAZolam (XANAX) 0 25 mg tablet Take 1 tablet (0 25 mg total) by mouth 2 (two) times a day as needed for anxiety for up to 10 days 60 tablet 0    Calcium Citrate (CITRACAL PO) Take 1 tablet by mouth daily   Cholecalciferol (VITAMIN D PO) Take 1,000 Units by mouth 2 (two) times a day   famotidine (PEPCID) 40 MG tablet Take 1 tablet (40 mg total) by mouth daily 30 tablet 1    losartan (COZAAR) 25 mg tablet Take 1 tablet (25 mg total) by mouth daily 90 tablet 1    Multiple Vitamin (MULTIVITAMIN) capsule Take 1 capsule by mouth daily   senna-docusate sodium (SENOKOT S) 8 6-50 mg per tablet Take 2 tablets by mouth daily at bedtime  0    valACYclovir (VALTREX) 1,000 mg tablet Take 1 tablet (1,000 mg total) by mouth 3 (three) times a day for 5 days 15 tablet 0     No current facility-administered medications for this visit  Review of Systems   Constitutional: Negative for activity change, appetite change, diaphoresis, fatigue and fever  HENT: Negative  Eyes: Negative  Respiratory: Negative for apnea, cough, chest tightness, shortness of breath and wheezing  Cardiovascular: Negative for chest pain, palpitations and leg swelling  Gastrointestinal: Negative for abdominal distention, abdominal pain, anal bleeding, constipation, diarrhea, nausea and vomiting  Endocrine: Negative for cold intolerance, heat intolerance, polydipsia, polyphagia and polyuria  Genitourinary: Negative for difficulty urinating, dysuria, flank pain, hematuria and urgency  Musculoskeletal: Negative for arthralgias, back pain, gait problem, joint swelling and myalgias     Skin: Negative for color change, rash and wound  Nodule left calf    Allergic/Immunologic: Negative for environmental allergies, food allergies and immunocompromised state  Neurological: Negative for dizziness, seizures, syncope, speech difficulty, numbness and headaches  Hematological: Negative for adenopathy  Does not bruise/bleed easily  Psychiatric/Behavioral: Negative for agitation, behavioral problems, hallucinations, sleep disturbance and suicidal ideas  Objective:  Vitals:    08/28/20 1157   BP: 138/82   BP Location: Left arm   Patient Position: Sitting   Cuff Size: Standard   Temp: 97 8 °F (36 6 °C)   TempSrc: Temporal   Weight: 68 5 kg (151 lb)   Height: 4' 10" (1 473 m)     Body mass index is 31 56 kg/m²  Physical Exam  Constitutional:       General: She is not in acute distress  Appearance: She is well-developed  She is not diaphoretic  HENT:      Head: Normocephalic  Right Ear: External ear normal       Left Ear: External ear normal       Nose: Nose normal    Eyes:      General: No scleral icterus  Right eye: No discharge  Left eye: No discharge  Conjunctiva/sclera: Conjunctivae normal       Pupils: Pupils are equal, round, and reactive to light  Neck:      Musculoskeletal: Normal range of motion  Thyroid: No thyromegaly  Trachea: No tracheal deviation  Cardiovascular:      Rate and Rhythm: Normal rate and regular rhythm  Heart sounds: Normal heart sounds  No murmur  No friction rub  No gallop  Pulmonary:      Effort: Pulmonary effort is normal  No respiratory distress  Breath sounds: Normal breath sounds  No wheezing  Abdominal:      General: Bowel sounds are normal       Palpations: Abdomen is soft  There is no mass  Tenderness: There is no abdominal tenderness  There is no guarding  Musculoskeletal:         General: No deformity  Lymphadenopathy:      Cervical: No cervical adenopathy  Skin:     General: Skin is warm and dry  Findings: No erythema or rash  Neurological:      Mental Status: She is alert and oriented to person, place, and time  Cranial Nerves: No cranial nerve deficit  Psychiatric:         Thought Content:  Thought content normal

## 2020-09-03 ENCOUNTER — CONSULT (OUTPATIENT)
Dept: SURGERY | Facility: CLINIC | Age: 85
End: 2020-09-03
Payer: COMMERCIAL

## 2020-09-03 ENCOUNTER — HOSPITAL ENCOUNTER (OUTPATIENT)
Dept: NON INVASIVE DIAGNOSTICS | Facility: HOSPITAL | Age: 85
Discharge: HOME | End: 2020-09-03
Attending: SURGERY
Payer: COMMERCIAL

## 2020-09-03 ENCOUNTER — APPOINTMENT (OUTPATIENT)
Dept: LAB | Facility: HOSPITAL | Age: 85
End: 2020-09-03
Attending: SURGERY
Payer: COMMERCIAL

## 2020-09-03 VITALS
SYSTOLIC BLOOD PRESSURE: 146 MMHG | WEIGHT: 151 LBS | BODY MASS INDEX: 31.7 KG/M2 | TEMPERATURE: 98.6 F | DIASTOLIC BLOOD PRESSURE: 83 MMHG | HEART RATE: 103 BPM | HEIGHT: 58 IN

## 2020-09-03 DIAGNOSIS — L98.9 SKIN LESION OF LEFT LEG: ICD-10-CM

## 2020-09-03 DIAGNOSIS — L98.9 SKIN LESION OF LEFT LEG: Primary | ICD-10-CM

## 2020-09-03 LAB
ANION GAP SERPL CALCULATED.3IONS-SCNC: 10 MMOL/L (ref 4–13)
BASOPHILS # BLD AUTO: 0.07 THOUSANDS/ΜL (ref 0–0.1)
BASOPHILS NFR BLD AUTO: 1 % (ref 0–1)
BUN SERPL-MCNC: 18 MG/DL (ref 5–25)
CALCIUM SERPL-MCNC: 9.7 MG/DL (ref 8.3–10.1)
CHLORIDE SERPL-SCNC: 103 MMOL/L (ref 100–108)
CO2 SERPL-SCNC: 24 MMOL/L (ref 21–32)
CREAT SERPL-MCNC: 0.91 MG/DL (ref 0.6–1.3)
EOSINOPHIL # BLD AUTO: 0.06 THOUSAND/ΜL (ref 0–0.61)
EOSINOPHIL NFR BLD AUTO: 1 % (ref 0–6)
ERYTHROCYTE [DISTWIDTH] IN BLOOD BY AUTOMATED COUNT: 14.1 % (ref 11.6–15.1)
GFR SERPL CREATININE-BSD FRML MDRD: 56 ML/MIN/1.73SQ M
GLUCOSE SERPL-MCNC: 95 MG/DL (ref 65–140)
HCT VFR BLD AUTO: 40.7 % (ref 34.8–46.1)
HGB BLD-MCNC: 13.4 G/DL (ref 11.5–15.4)
IMM GRANULOCYTES # BLD AUTO: 0.02 THOUSAND/UL (ref 0–0.2)
IMM GRANULOCYTES NFR BLD AUTO: 0 % (ref 0–2)
LYMPHOCYTES # BLD AUTO: 2.56 THOUSANDS/ΜL (ref 0.6–4.47)
LYMPHOCYTES NFR BLD AUTO: 33 % (ref 14–44)
MCH RBC QN AUTO: 34.4 PG (ref 26.8–34.3)
MCHC RBC AUTO-ENTMCNC: 32.9 G/DL (ref 31.4–37.4)
MCV RBC AUTO: 105 FL (ref 82–98)
MONOCYTES # BLD AUTO: 0.79 THOUSAND/ΜL (ref 0.17–1.22)
MONOCYTES NFR BLD AUTO: 10 % (ref 4–12)
NEUTROPHILS # BLD AUTO: 4.27 THOUSANDS/ΜL (ref 1.85–7.62)
NEUTS SEG NFR BLD AUTO: 55 % (ref 43–75)
NRBC BLD AUTO-RTO: 0 /100 WBCS
PLATELET # BLD AUTO: 255 THOUSANDS/UL (ref 149–390)
PMV BLD AUTO: 10 FL (ref 8.9–12.7)
POTASSIUM SERPL-SCNC: 4 MMOL/L (ref 3.5–5.3)
RBC # BLD AUTO: 3.89 MILLION/UL (ref 3.81–5.12)
SODIUM SERPL-SCNC: 137 MMOL/L (ref 136–145)
WBC # BLD AUTO: 7.77 THOUSAND/UL (ref 4.31–10.16)

## 2020-09-03 PROCEDURE — 99204 OFFICE O/P NEW MOD 45 MIN: CPT | Performed by: SURGERY

## 2020-09-03 PROCEDURE — 85025 COMPLETE CBC W/AUTO DIFF WBC: CPT

## 2020-09-03 PROCEDURE — 80048 BASIC METABOLIC PNL TOTAL CA: CPT

## 2020-09-03 PROCEDURE — 93005 ELECTROCARDIOGRAM TRACING: CPT

## 2020-09-03 PROCEDURE — 36415 COLL VENOUS BLD VENIPUNCTURE: CPT

## 2020-09-03 RX ORDER — HEPARIN SODIUM 5000 [USP'U]/ML
5000 INJECTION, SOLUTION INTRAVENOUS; SUBCUTANEOUS ONCE
Status: CANCELLED | OUTPATIENT
Start: 2020-09-08 | End: 2020-09-03

## 2020-09-03 RX ORDER — CEFAZOLIN SODIUM 1 G/50ML
1000 SOLUTION INTRAVENOUS ONCE
Status: CANCELLED | OUTPATIENT
Start: 2020-09-08 | End: 2020-09-03

## 2020-09-03 RX ORDER — SODIUM CHLORIDE, SODIUM LACTATE, POTASSIUM CHLORIDE, CALCIUM CHLORIDE 600; 310; 30; 20 MG/100ML; MG/100ML; MG/100ML; MG/100ML
75 INJECTION, SOLUTION INTRAVENOUS CONTINUOUS
Status: CANCELLED | OUTPATIENT
Start: 2020-09-08

## 2020-09-03 NOTE — PROGRESS NOTES
Assessment/Plan:    Skin lesion of left leg  Symptomatic and rapidly growing an ulcerating skin lesion of the left lower extremity  There is some surrounding erythema  This is suspicious for potentially squamous cell carcinoma  Patient wished to have this removed I think this is reasonable  Although she is 80years old she is extremely healthy for her age  She recently underwent a hip surgery for fracture earlier this year and did very well  We will schedule her for excision of left lower extremity skin lesion under some sedation  After long talk she would prefer to treat this is morbid cancer and do the appropriate margins to avoid having to possibly go back for repeat surgery if this is cancer  The procedure self including all associated risks and benefits were discussed the patient great length  Patient verbalized understands risks is willing proceed, consent was signed  We will obtain preoperative labs and EKG  Diagnoses and all orders for this visit:    Skin lesion of left leg          Subjective:      Patient ID: Michelene Najjar is a 80 y o  female  59-year-old female with history of hypertension, GERD, presents today in consultation for evaluation of a rapidly growing lesion of the skin the left lower extremity  Patient states she noticed this lesion a week or so ago and felt it was a small pimple or boil a did come to a head  However it rapidly grew and is now raised with some mild redness around the  She states there is never any significant drainage or pus that she thought there would be period is currently very sort  Denies any fevers or chills  No nausea vomiting  No unintentional weight loss  No significant history for skin cancer  No significant blistering sunburns in the past   No history of similar skin lesions        The following portions of the patient's history were reviewed and updated as appropriate:   She  has a past medical history of Diverticulosis, GERD (gastroesophageal reflux disease), Hiatal hernia, Irritable bowel disease, Lactose intolerance, and Osteoporosis  She   Patient Active Problem List    Diagnosis Date Noted    Skin lesion of left leg 09/03/2020    Aftercare following surgery 02/17/2020    Fracture follow-up 02/17/2020    Left knee pain 02/03/2020    Fall 02/01/2020    Fracture of femur, intertrochanteric, left, closed ([address] ) 01/31/2020    Closed fracture of left distal radius 01/31/2020    Benign essential hypertension 10/27/2017    Macular cyst, hole, or pseudohole, left eye 10/27/2017    Atypical chest pain 02/17/2017    GERD (gastroesophageal reflux disease) 02/17/2017    Anxiety 02/17/2017    Chronic urticaria 01/05/2017     She  has a past surgical history that includes Carpal tunnel release; Hysterectomy; Tonsillectomy; Cholecystectomy; Ear tube removal; Skin cancer excision; Hemorroidectomy; Vagina reconstruction surgery (09/13/2019); pr open rx femur fx+intramed riley (Left, 9/4/4374); and Cast application (Left, 5/7/5501)  Her family history includes Colon cancer in her mother; Stomach cancer in her father  She  reports that she quit smoking about 31 years ago  She has never used smokeless tobacco  She reports that she does not drink alcohol or use drugs  Current Outpatient Medications   Medication Sig Dispense Refill    acetaminophen (TYLENOL) 325 mg tablet Take 3 tablets (975 mg total) by mouth every 8 (eight) hours 30 tablet 0    ALPRAZolam (XANAX) 0 25 mg tablet Take 1 tablet (0 25 mg total) by mouth 2 (two) times a day as needed for anxiety for up to 10 days 60 tablet 0    Calcium Citrate (CITRACAL PO) Take 1 tablet by mouth daily   Cholecalciferol (VITAMIN D PO) Take 1,000 Units by mouth 2 (two) times a day        famotidine (PEPCID) 40 MG tablet Take 1 tablet (40 mg total) by mouth daily 30 tablet 1    losartan (COZAAR) 25 mg tablet Take 1 tablet (25 mg total) by mouth daily 90 tablet 1    Multiple Vitamin (MULTIVITAMIN) capsule Take 1 capsule by mouth daily   mupirocin (BACTROBAN) 2 % ointment Apply topically 3 (three) times a day 22 g 0    senna-docusate sodium (SENOKOT S) 8 6-50 mg per tablet Take 2 tablets by mouth daily at bedtime  0    valACYclovir (VALTREX) 1,000 mg tablet Take 1 tablet (1,000 mg total) by mouth 3 (three) times a day for 5 days 15 tablet 0     No current facility-administered medications for this visit  She is allergic to escitalopram; mepenzolate; maalox anti-gas [simethicone]; other; prednisone; protonix [pantoprazole]; and reglan [metoclopramide]       Review of Systems      A review systems was completed, all negative except as noted above HPI  Objective:      /83   Pulse 103   Temp 98 6 °F (37 °C)   Ht 4' 10" (1 473 m)   Wt 68 5 kg (151 lb)   BMI 31 56 kg/m²          Physical Exam  Vitals signs reviewed  Constitutional:       Appearance: Normal appearance  She is not toxic-appearing or diaphoretic  HENT:      Head: Normocephalic and atraumatic  Eyes:      General: No scleral icterus  Cardiovascular:      Rate and Rhythm: Normal rate and regular rhythm  Heart sounds: No murmur  No friction rub  No gallop  Pulmonary:      Effort: Pulmonary effort is normal  No respiratory distress  Breath sounds: Normal breath sounds  No stridor  No wheezing, rhonchi or rales  Abdominal:      General: Abdomen is flat  There is no distension  Tenderness: There is no abdominal tenderness  Musculoskeletal: Normal range of motion  Skin:     General: Skin is warm  Coloration: Skin is not jaundiced or pale  Findings: Lesion (Ulcerated skin lesion with some surrounding erythema measuring approximately 1 cm in diameter with some surrounding erythema, raised and tender to the touch  the lesion is circular in shape with no irregular borders  It is symmetrical) present  No bruising or erythema  Neurological:      General: No focal deficit present  Mental Status: She is alert and oriented to person, place, and time  Cranial Nerves: No cranial nerve deficit  Psychiatric:         Mood and Affect: Mood normal          Behavior: Behavior normal          Thought Content:  Thought content normal          Judgment: Judgment normal

## 2020-09-03 NOTE — H&P
Assessment/Plan:    Skin lesion of left leg  Symptomatic and rapidly growing an ulcerating skin lesion of the left lower extremity  There is some surrounding erythema  This is suspicious for potentially squamous cell carcinoma  Patient wished to have this removed I think this is reasonable  Although she is 80years old she is extremely healthy for her age  She recently underwent a hip surgery for fracture earlier this year and did very well  We will schedule her for excision of left lower extremity skin lesion under some sedation  After long talk she would prefer to treat this is morbid cancer and do the appropriate margins to avoid having to possibly go back for repeat surgery if this is cancer  The procedure self including all associated risks and benefits were discussed the patient great length  Patient verbalized understands risks is willing proceed, consent was signed  We will obtain preoperative labs and EKG  Diagnoses and all orders for this visit:    Skin lesion of left leg          Subjective:      Patient ID: Ephraim Slaughter is a 80 y o  female  80-year-old female with history of hypertension, GERD, presents today in consultation for evaluation of a rapidly growing lesion of the skin the left lower extremity  Patient states she noticed this lesion a week or so ago and felt it was a small pimple or boil a did come to a head  However it rapidly grew and is now raised with some mild redness around the  She states there is never any significant drainage or pus that she thought there would be period is currently very sort  Denies any fevers or chills  No nausea vomiting  No unintentional weight loss  No significant history for skin cancer  No significant blistering sunburns in the past   No history of similar skin lesions        The following portions of the patient's history were reviewed and updated as appropriate:   She  has a past medical history of Diverticulosis, GERD (gastroesophageal reflux disease), Hiatal hernia, Irritable bowel disease, Lactose intolerance, and Osteoporosis  She   Patient Active Problem List    Diagnosis Date Noted    Skin lesion of left leg 09/03/2020    Aftercare following surgery 02/17/2020    Fracture follow-up 02/17/2020    Left knee pain 02/03/2020    Fall 02/01/2020    Fracture of femur, intertrochanteric, left, closed ([address] ) 01/31/2020    Closed fracture of left distal radius 01/31/2020    Benign essential hypertension 10/27/2017    Macular cyst, hole, or pseudohole, left eye 10/27/2017    Atypical chest pain 02/17/2017    GERD (gastroesophageal reflux disease) 02/17/2017    Anxiety 02/17/2017    Chronic urticaria 01/05/2017     She  has a past surgical history that includes Carpal tunnel release; Hysterectomy; Tonsillectomy; Cholecystectomy; Ear tube removal; Skin cancer excision; Hemorroidectomy; Vagina reconstruction surgery (09/13/2019); pr open rx femur fx+intramed riley (Left, 1/7/6976); and Cast application (Left, 1/1/8327)  Her family history includes Colon cancer in her mother; Stomach cancer in her father  She  reports that she quit smoking about 31 years ago  She has never used smokeless tobacco  She reports that she does not drink alcohol or use drugs  Current Outpatient Medications   Medication Sig Dispense Refill    acetaminophen (TYLENOL) 325 mg tablet Take 3 tablets (975 mg total) by mouth every 8 (eight) hours 30 tablet 0    ALPRAZolam (XANAX) 0 25 mg tablet Take 1 tablet (0 25 mg total) by mouth 2 (two) times a day as needed for anxiety for up to 10 days 60 tablet 0    Calcium Citrate (CITRACAL PO) Take 1 tablet by mouth daily   Cholecalciferol (VITAMIN D PO) Take 1,000 Units by mouth 2 (two) times a day        famotidine (PEPCID) 40 MG tablet Take 1 tablet (40 mg total) by mouth daily 30 tablet 1    losartan (COZAAR) 25 mg tablet Take 1 tablet (25 mg total) by mouth daily 90 tablet 1    Multiple Vitamin (MULTIVITAMIN) capsule Take 1 capsule by mouth daily   mupirocin (BACTROBAN) 2 % ointment Apply topically 3 (three) times a day 22 g 0    senna-docusate sodium (SENOKOT S) 8 6-50 mg per tablet Take 2 tablets by mouth daily at bedtime  0    valACYclovir (VALTREX) 1,000 mg tablet Take 1 tablet (1,000 mg total) by mouth 3 (three) times a day for 5 days 15 tablet 0     No current facility-administered medications for this visit  She is allergic to escitalopram; mepenzolate; maalox anti-gas [simethicone]; other; prednisone; protonix [pantoprazole]; and reglan [metoclopramide]       Review of Systems      A review systems was completed, all negative except as noted above HPI  Objective:      /83   Pulse 103   Temp 98 6 °F (37 °C)   Ht 4' 10" (1 473 m)   Wt 68 5 kg (151 lb)   BMI 31 56 kg/m²          Physical Exam  Vitals signs reviewed  Constitutional:       Appearance: Normal appearance  She is not toxic-appearing or diaphoretic  HENT:      Head: Normocephalic and atraumatic  Eyes:      General: No scleral icterus  Cardiovascular:      Rate and Rhythm: Normal rate and regular rhythm  Heart sounds: No murmur  No friction rub  No gallop  Pulmonary:      Effort: Pulmonary effort is normal  No respiratory distress  Breath sounds: Normal breath sounds  No stridor  No wheezing, rhonchi or rales  Abdominal:      General: Abdomen is flat  There is no distension  Tenderness: There is no abdominal tenderness  Musculoskeletal: Normal range of motion  Skin:     General: Skin is warm  Coloration: Skin is not jaundiced or pale  Findings: Lesion (Ulcerated skin lesion with some surrounding erythema measuring approximately 1 cm in diameter with some surrounding erythema, raised and tender to the touch  the lesion is circular in shape with no irregular borders  It is symmetrical) present  No bruising or erythema  Neurological:      General: No focal deficit present  Mental Status: She is alert and oriented to person, place, and time  Cranial Nerves: No cranial nerve deficit  Psychiatric:         Mood and Affect: Mood normal          Behavior: Behavior normal          Thought Content:  Thought content normal          Judgment: Judgment normal

## 2020-09-03 NOTE — H&P (VIEW-ONLY)
Assessment/Plan:    Skin lesion of left leg  Symptomatic and rapidly growing an ulcerating skin lesion of the left lower extremity  There is some surrounding erythema  This is suspicious for potentially squamous cell carcinoma  Patient wished to have this removed I think this is reasonable  Although she is 80years old she is extremely healthy for her age  She recently underwent a hip surgery for fracture earlier this year and did very well  We will schedule her for excision of left lower extremity skin lesion under some sedation  After long talk she would prefer to treat this is morbid cancer and do the appropriate margins to avoid having to possibly go back for repeat surgery if this is cancer  The procedure self including all associated risks and benefits were discussed the patient great length  Patient verbalized understands risks is willing proceed, consent was signed  We will obtain preoperative labs and EKG  Diagnoses and all orders for this visit:    Skin lesion of left leg          Subjective:      Patient ID: Roel Roman is a 80 y o  female  80-year-old female with history of hypertension, GERD, presents today in consultation for evaluation of a rapidly growing lesion of the skin the left lower extremity  Patient states she noticed this lesion a week or so ago and felt it was a small pimple or boil a did come to a head  However it rapidly grew and is now raised with some mild redness around the  She states there is never any significant drainage or pus that she thought there would be period is currently very sort  Denies any fevers or chills  No nausea vomiting  No unintentional weight loss  No significant history for skin cancer  No significant blistering sunburns in the past   No history of similar skin lesions        The following portions of the patient's history were reviewed and updated as appropriate:   She  has a past medical history of Diverticulosis, GERD (gastroesophageal reflux disease), Hiatal hernia, Irritable bowel disease, Lactose intolerance, and Osteoporosis  She   Patient Active Problem List    Diagnosis Date Noted    Skin lesion of left leg 09/03/2020    Aftercare following surgery 02/17/2020    Fracture follow-up 02/17/2020    Left knee pain 02/03/2020    Fall 02/01/2020    Fracture of femur, intertrochanteric, left, closed ([address] ) 01/31/2020    Closed fracture of left distal radius 01/31/2020    Benign essential hypertension 10/27/2017    Macular cyst, hole, or pseudohole, left eye 10/27/2017    Atypical chest pain 02/17/2017    GERD (gastroesophageal reflux disease) 02/17/2017    Anxiety 02/17/2017    Chronic urticaria 01/05/2017     She  has a past surgical history that includes Carpal tunnel release; Hysterectomy; Tonsillectomy; Cholecystectomy; Ear tube removal; Skin cancer excision; Hemorroidectomy; Vagina reconstruction surgery (09/13/2019); pr open rx femur fx+intramed riley (Left, 7/6/7290); and Cast application (Left, 5/0/9986)  Her family history includes Colon cancer in her mother; Stomach cancer in her father  She  reports that she quit smoking about 31 years ago  She has never used smokeless tobacco  She reports that she does not drink alcohol or use drugs  Current Outpatient Medications   Medication Sig Dispense Refill    acetaminophen (TYLENOL) 325 mg tablet Take 3 tablets (975 mg total) by mouth every 8 (eight) hours 30 tablet 0    ALPRAZolam (XANAX) 0 25 mg tablet Take 1 tablet (0 25 mg total) by mouth 2 (two) times a day as needed for anxiety for up to 10 days 60 tablet 0    Calcium Citrate (CITRACAL PO) Take 1 tablet by mouth daily   Cholecalciferol (VITAMIN D PO) Take 1,000 Units by mouth 2 (two) times a day        famotidine (PEPCID) 40 MG tablet Take 1 tablet (40 mg total) by mouth daily 30 tablet 1    losartan (COZAAR) 25 mg tablet Take 1 tablet (25 mg total) by mouth daily 90 tablet 1    Multiple Vitamin (MULTIVITAMIN) capsule Take 1 capsule by mouth daily   mupirocin (BACTROBAN) 2 % ointment Apply topically 3 (three) times a day 22 g 0    senna-docusate sodium (SENOKOT S) 8 6-50 mg per tablet Take 2 tablets by mouth daily at bedtime  0    valACYclovir (VALTREX) 1,000 mg tablet Take 1 tablet (1,000 mg total) by mouth 3 (three) times a day for 5 days 15 tablet 0     No current facility-administered medications for this visit  She is allergic to escitalopram; mepenzolate; maalox anti-gas [simethicone]; other; prednisone; protonix [pantoprazole]; and reglan [metoclopramide]       Review of Systems      A review systems was completed, all negative except as noted above HPI  Objective:      /83   Pulse 103   Temp 98 6 °F (37 °C)   Ht 4' 10" (1 473 m)   Wt 68 5 kg (151 lb)   BMI 31 56 kg/m²          Physical Exam  Vitals signs reviewed  Constitutional:       Appearance: Normal appearance  She is not toxic-appearing or diaphoretic  HENT:      Head: Normocephalic and atraumatic  Eyes:      General: No scleral icterus  Cardiovascular:      Rate and Rhythm: Normal rate and regular rhythm  Heart sounds: No murmur  No friction rub  No gallop  Pulmonary:      Effort: Pulmonary effort is normal  No respiratory distress  Breath sounds: Normal breath sounds  No stridor  No wheezing, rhonchi or rales  Abdominal:      General: Abdomen is flat  There is no distension  Tenderness: There is no abdominal tenderness  Musculoskeletal: Normal range of motion  Skin:     General: Skin is warm  Coloration: Skin is not jaundiced or pale  Findings: Lesion (Ulcerated skin lesion with some surrounding erythema measuring approximately 1 cm in diameter with some surrounding erythema, raised and tender to the touch  the lesion is circular in shape with no irregular borders  It is symmetrical) present  No bruising or erythema  Neurological:      General: No focal deficit present  Mental Status: She is alert and oriented to person, place, and time  Cranial Nerves: No cranial nerve deficit  Psychiatric:         Mood and Affect: Mood normal          Behavior: Behavior normal          Thought Content:  Thought content normal          Judgment: Judgment normal

## 2020-09-03 NOTE — ASSESSMENT & PLAN NOTE
Symptomatic and rapidly growing an ulcerating skin lesion of the left lower extremity  There is some surrounding erythema  This is suspicious for potentially squamous cell carcinoma  Patient wished to have this removed I think this is reasonable  Although she is 80years old she is extremely healthy for her age  She recently underwent a hip surgery for fracture earlier this year and did very well  We will schedule her for excision of left lower extremity skin lesion under some sedation  After long talk she would prefer to treat this is morbid cancer and do the appropriate margins to avoid having to possibly go back for repeat surgery if this is cancer  The procedure self including all associated risks and benefits were discussed the patient great length  Patient verbalized understands risks is willing proceed, consent was signed  We will obtain preoperative labs and EKG

## 2020-09-04 LAB
ATRIAL RATE: 95 BPM
P AXIS: 57 DEGREES
PR INTERVAL: 172 MS
QRS AXIS: -56 DEGREES
QRSD INTERVAL: 92 MS
QT INTERVAL: 350 MS
QTC INTERVAL: 439 MS
T WAVE AXIS: 62 DEGREES
VENTRICULAR RATE: 95 BPM

## 2020-09-04 PROCEDURE — 93010 ELECTROCARDIOGRAM REPORT: CPT | Performed by: INTERNAL MEDICINE

## 2020-09-07 ENCOUNTER — ANESTHESIA EVENT (OUTPATIENT)
Dept: PERIOP | Facility: HOSPITAL | Age: 85
End: 2020-09-07
Payer: COMMERCIAL

## 2020-09-08 ENCOUNTER — HOSPITAL ENCOUNTER (OUTPATIENT)
Facility: HOSPITAL | Age: 85
Setting detail: OUTPATIENT SURGERY
Discharge: HOME | End: 2020-09-08
Attending: SURGERY | Admitting: SURGERY
Payer: COMMERCIAL

## 2020-09-08 ENCOUNTER — ANESTHESIA (OUTPATIENT)
Dept: PERIOP | Facility: HOSPITAL | Age: 85
End: 2020-09-08
Payer: COMMERCIAL

## 2020-09-08 VITALS
TEMPERATURE: 97.8 F | HEART RATE: 67 BPM | SYSTOLIC BLOOD PRESSURE: 135 MMHG | DIASTOLIC BLOOD PRESSURE: 78 MMHG | OXYGEN SATURATION: 97 % | RESPIRATION RATE: 18 BRPM

## 2020-09-08 VITALS — HEART RATE: 90 BPM

## 2020-09-08 DIAGNOSIS — L98.9 SKIN LESION OF LEFT LEG: Primary | ICD-10-CM

## 2020-09-08 PROBLEM — Z98.890 PONV (POSTOPERATIVE NAUSEA AND VOMITING): Status: ACTIVE | Noted: 2020-09-08

## 2020-09-08 PROBLEM — R11.2 PONV (POSTOPERATIVE NAUSEA AND VOMITING): Status: ACTIVE | Noted: 2020-09-08

## 2020-09-08 PROCEDURE — 88305 TISSUE EXAM BY PATHOLOGIST: CPT | Performed by: PATHOLOGY

## 2020-09-08 PROCEDURE — 11602 EXC TR-EXT MAL+MARG 1.1-2 CM: CPT | Performed by: SURGERY

## 2020-09-08 PROCEDURE — 11602 EXC TR-EXT MAL+MARG 1.1-2 CM: CPT | Performed by: PHYSICIAN ASSISTANT

## 2020-09-08 RX ORDER — ONDANSETRON 2 MG/ML
4 INJECTION INTRAMUSCULAR; INTRAVENOUS EVERY 8 HOURS PRN
Status: DISCONTINUED | OUTPATIENT
Start: 2020-09-08 | End: 2020-09-08 | Stop reason: HOSPADM

## 2020-09-08 RX ORDER — HEPARIN SODIUM 5000 [USP'U]/ML
5000 INJECTION, SOLUTION INTRAVENOUS; SUBCUTANEOUS ONCE
Status: COMPLETED | OUTPATIENT
Start: 2020-09-08 | End: 2020-09-08

## 2020-09-08 RX ORDER — SENNOSIDES 8.6 MG
650 CAPSULE ORAL EVERY 8 HOURS PRN
Qty: 30 TABLET | Refills: 0 | Status: SHIPPED | OUTPATIENT
Start: 2020-09-08 | End: 2020-12-08 | Stop reason: ALTCHOICE

## 2020-09-08 RX ORDER — BUPIVACAINE HYDROCHLORIDE 5 MG/ML
INJECTION, SOLUTION EPIDURAL; INTRACAUDAL AS NEEDED
Status: DISCONTINUED | OUTPATIENT
Start: 2020-09-08 | End: 2020-09-08 | Stop reason: HOSPADM

## 2020-09-08 RX ORDER — SODIUM CHLORIDE, SODIUM LACTATE, POTASSIUM CHLORIDE, CALCIUM CHLORIDE 600; 310; 30; 20 MG/100ML; MG/100ML; MG/100ML; MG/100ML
75 INJECTION, SOLUTION INTRAVENOUS CONTINUOUS
Status: DISCONTINUED | OUTPATIENT
Start: 2020-09-08 | End: 2020-09-08 | Stop reason: HOSPADM

## 2020-09-08 RX ORDER — CEFAZOLIN SODIUM 1 G/50ML
1000 SOLUTION INTRAVENOUS ONCE
Status: COMPLETED | OUTPATIENT
Start: 2020-09-08 | End: 2020-09-08

## 2020-09-08 RX ORDER — FENTANYL CITRATE 50 UG/ML
INJECTION, SOLUTION INTRAMUSCULAR; INTRAVENOUS AS NEEDED
Status: DISCONTINUED | OUTPATIENT
Start: 2020-09-08 | End: 2020-09-08

## 2020-09-08 RX ORDER — ONDANSETRON 2 MG/ML
4 INJECTION INTRAMUSCULAR; INTRAVENOUS ONCE AS NEEDED
Status: DISCONTINUED | OUTPATIENT
Start: 2020-09-08 | End: 2020-09-08 | Stop reason: HOSPADM

## 2020-09-08 RX ORDER — PROPOFOL 10 MG/ML
INJECTION, EMULSION INTRAVENOUS CONTINUOUS PRN
Status: DISCONTINUED | OUTPATIENT
Start: 2020-09-08 | End: 2020-09-08

## 2020-09-08 RX ADMIN — CEFAZOLIN SODIUM 1000 MG: 1 SOLUTION INTRAVENOUS at 12:43

## 2020-09-08 RX ADMIN — HEPARIN SODIUM 5000 UNITS: 5000 INJECTION INTRAVENOUS; SUBCUTANEOUS at 12:13

## 2020-09-08 RX ADMIN — PROPOFOL 75 MCG/KG/MIN: 10 INJECTION, EMULSION INTRAVENOUS at 12:50

## 2020-09-08 RX ADMIN — FENTANYL CITRATE 50 MCG: 50 INJECTION, SOLUTION INTRAMUSCULAR; INTRAVENOUS at 12:50

## 2020-09-08 RX ADMIN — SODIUM CHLORIDE, SODIUM LACTATE, POTASSIUM CHLORIDE, AND CALCIUM CHLORIDE 75 ML/HR: .6; .31; .03; .02 INJECTION, SOLUTION INTRAVENOUS at 11:45

## 2020-09-08 RX ADMIN — PHENYLEPHRINE HYDROCHLORIDE 25 MCG/MIN: 10 INJECTION INTRAVENOUS at 12:50

## 2020-09-08 NOTE — OP NOTE
OPERATIVE REPORT  PATIENT NAME: Andreia Webster    :  1931  MRN: [id number]  Pt Location: [facility] OR ROOM 01    SURGERY DATE: 2020    Surgeon(s) and Role: Ervin Gallardo,  - Primary     * Carmen Amezcua PA-C - Assisting    Preop Diagnosis:  Skin lesion of left leg [L98 9]    Post-Op Diagnosis Codes:     * Skin lesion of left leg [L98 9]    Procedure(s) (LRB):  EXCISION BIOPSY TISSUE LESION/MASS LOWER EXTREMITY (Left)    Specimen(s):  ID Type Source Tests Collected by Time Destination   1 : see comments Tissue Lesion TISSUE EXAM Stephanie ZelayaDO 2020 1259        Estimated Blood Loss:   Minimal    Drains:  * No LDAs found *    Anesthesia Type:   IV Sedation with Anesthesia    Operative Indications:  Skin lesion of left leg [L98 9]      Operative Findings:  1 cm suspicious appearing raised pigmented ulcerated lesion of the left lower extremity  The lesion was excised with 5 mm margins  Complications:   None    Procedure and Technique:  Patient brought to operating arena and placed in supine position operating table  All the regular monitor devices were then connected  Patient received IV sedation with monitored anesthesia  Patient received right lower sequential compressive devices in addition to subcutaneous heparin for DVT prophylaxis  Patient received perioperative antibiotics  The patient then prepped draped usual sterile fashion  Time-out was performed to verify correct patient, procedure, position, and site  The lesion of the left lower extremities visualized and appeared to be pigmented, ulcerated, res, and measured approximately 1 cm in diameter  Five millimeter margins were then marked on either side of the lesion in doing so a 5 x 2 cm elliptical incision was marked  The skin was then anesthetized 1% lidocaine with epinephrine  Using a 15 blade scalpel the lesion was then circumferentially excised through the skin and into the subcutaneous fat    Lesion was then marked for orientation with a silk suture indicating short superior and long lateral margins  Given the tension on the skin closure, skin flaps were then raised circumferentially  The wound was then irrigated with normal saline  Any bleeding points were controlled with direct pressure and/or Bovie electrocautery  The skin was then approximated with interrupted 3-0 nylon suture using a Smead-Mccray technique  Bacitracin, 4 x 4, Abdelrahman, and Ace wrap were then placed  The patient tolerated procedure well taken to postanesthesia care unit stable condition  All lap, needle, instrument counts were correct         I was present for the entire procedure, A qualified resident physician was not available and A physician assistant was required during the procedure for retraction tissue handling,dissection and suturing    Patient Disposition:  PACU     SIGNATURE: Makenzie Mclean DO  DATE: September 8, 2020  TIME: 1:32 PM

## 2020-09-08 NOTE — ANESTHESIA POSTPROCEDURE EVALUATION
Post-Op Assessment Note    CV Status:  Stable    Pain management: adequate     Mental Status:  Alert and awake   Hydration Status:  Euvolemic   PONV Controlled:  Controlled   Airway Patency:  Patent      Post Op Vitals Reviewed: Yes      Staff: CRNA         No complications documented      /59 (09/08/20 1330)    Temp 97 8 °F (36 6 °C) (09/08/20 1330)    Pulse 86 (09/08/20 1330)   Resp 20 (09/08/20 1330)    SpO2 95 % (09/08/20 1330)

## 2020-09-08 NOTE — DISCHARGE INSTRUCTIONS
Follow-up with Dr Jolly Sanchez in 2 weeks per point  For that as tolerated  Low intensity activity as tolerated  He should keep the left leg elevated when sitting or laying  Dressing may be removed on Thursday  You may shower on Thursday  No baths or swimming  If developed fever, worsening pain, redness or drainage from the incision then call the office or go to the ER

## 2020-09-08 NOTE — ANESTHESIA PREPROCEDURE EVALUATION
Procedure:  EXCISION BIOPSY TISSUE LESION/MASS LOWER EXTREMITY (Left Leg Lower)    Relevant Problems   ANESTHESIA   (+) PONV (postoperative nausea and vomiting)      CARDIO   (+) Atypical chest pain   (+) Benign essential hypertension      ENDO (within normal limits)      GI/HEPATIC   (+) GERD (gastroesophageal reflux disease)      HEMATOLOGY (within normal limits)      NEURO/PSYCH   (+) Anxiety      PULMONARY (within normal limits)        Physical Exam    Airway    Mallampati score: II  TM Distance: >3 FB  Neck ROM: full     Dental   upper dentures and lower dentures,     Cardiovascular  Rhythm: regular, Rate: normal,     Pulmonary  Breath sounds clear to auscultation,     Other Findings        Anesthesia Plan  ASA Score- 3     Anesthesia Type- IV sedation with anesthesia with ASA Monitors  Additional Monitors:   Airway Plan:           Plan Factors-Exercise tolerance (METS): >4 METS  Chart reviewed  EKG reviewed  Patient summary reviewed  Patient is not a current smoker  Patient did not smoke on day of surgery  Induction- intravenous  Postoperative Plan-     Informed Consent- Anesthetic plan and risks discussed with patient  I personally reviewed this patient with the CRNA  Discussed and agreed on the Anesthesia Plan with the CRNA  Marylin Rader

## 2020-09-08 NOTE — INTERVAL H&P NOTE
H&P reviewed  After examining the patient I find no changes in the patients condition since the H&P had been written      Vitals:    09/08/20 1132   BP: 154/87   Pulse: 97   Resp: 18   Temp: 98 °F (36 7 °C)   SpO2: 97%

## 2020-09-23 ENCOUNTER — OFFICE VISIT (OUTPATIENT)
Dept: SURGERY | Facility: CLINIC | Age: 85
End: 2020-09-23

## 2020-09-23 VITALS
TEMPERATURE: 98.6 F | DIASTOLIC BLOOD PRESSURE: 84 MMHG | HEIGHT: 58 IN | BODY MASS INDEX: 31.49 KG/M2 | HEART RATE: 96 BPM | WEIGHT: 150 LBS | SYSTOLIC BLOOD PRESSURE: 142 MMHG

## 2020-09-23 DIAGNOSIS — C44.729 SQUAMOUS CELL CARCINOMA OF LEG, LEFT: Primary | ICD-10-CM

## 2020-09-23 PROCEDURE — 99024 POSTOP FOLLOW-UP VISIT: CPT | Performed by: SURGERY

## 2020-09-23 NOTE — ASSESSMENT & PLAN NOTE
Status post excision of left lower extremity squamous cell carcinoma  Pathology reviewed discussed with the patient  Margins uninvolved  No noted lymphadenopathy  Incision healed well  Sutures removed Steri-Strips placed  Patient should continue to follow up with her PCP at least yearly for skin checks  Recommend use of sunscreen if going to be on the son  Follow-up with General surgery rossana Barriga

## 2020-09-23 NOTE — PROGRESS NOTES
Assessment/Plan:    Squamous cell carcinoma of leg, left  Status post excision of left lower extremity squamous cell carcinoma  Pathology reviewed discussed with the patient  Margins uninvolved  No noted lymphadenopathy  Incision healed well  Sutures removed Steri-Strips placed  Patient should continue to follow up with her PCP at least yearly for skin checks  Recommend use of sunscreen if going to be on the son  Follow-up with General surgery rossana Gregory Diagnoses and all orders for this visit:    Squamous cell carcinoma of leg, left          Subjective:      Patient ID: Graham Ventura is a 80 y o  female  58-year-old female status post excision of skin lesion of left lower extremity  She presents today for postop check  Overall doing well  Still complains of some mild soreness around the incision  Denies any significant redness or drainage  No fevers or chills  The following portions of the patient's history were reviewed and updated as appropriate:   She  has a past medical history of Diverticulosis, GERD (gastroesophageal reflux disease), Hiatal hernia, Irritable bowel disease, Lactose intolerance, and Osteoporosis  She   Patient Active Problem List    Diagnosis Date Noted    Squamous cell carcinoma of leg, left 09/23/2020    PONV (postoperative nausea and vomiting) 09/08/2020    Skin lesion of left leg 09/03/2020    Aftercare following surgery 02/17/2020    Fracture follow-up 02/17/2020    Left knee pain 02/03/2020    Fall 02/01/2020    Fracture of femur, intertrochanteric, left, closed ([facility] [address] ) 01/31/2020    Closed fracture of left distal radius 01/31/2020    Benign essential hypertension 10/27/2017    Macular cyst, hole, or pseudohole, left eye 10/27/2017    Atypical chest pain 02/17/2017    GERD (gastroesophageal reflux disease) 02/17/2017    Anxiety 02/17/2017    Chronic urticaria 01/05/2017     She  has a past surgical history that includes Hysterectomy;  Tonsillectomy; Cholecystectomy; Ear tube removal; Skin cancer excision; Hemorroidectomy; Vagina reconstruction surgery (09/13/2019); pr open rx femur fx+intramed riley (Left, 2/1/2020); Cast application (Left, 3/0/4656); Carpal tunnel release (Right); and pr exc tumor soft tissue leg/ankle subfascial <3cm (Left, 9/8/2020)  Her family history includes Colon cancer in her mother; Stomach cancer in her father  She  reports that she quit smoking about 31 years ago  She has never used smokeless tobacco  She reports that she does not drink alcohol or use drugs  Current Outpatient Medications   Medication Sig Dispense Refill    acetaminophen (TYLENOL) 325 mg tablet Take 3 tablets (975 mg total) by mouth every 8 (eight) hours 30 tablet 0    acetaminophen (TYLENOL) 650 mg CR tablet Take 1 tablet (650 mg total) by mouth every 8 (eight) hours as needed for mild pain 30 tablet 0    ALPRAZolam (XANAX) 0 25 mg tablet Take 1 tablet (0 25 mg total) by mouth 2 (two) times a day as needed for anxiety for up to 10 days 60 tablet 0    Calcium Citrate (CITRACAL PO) Take 1 tablet by mouth daily   Cholecalciferol (VITAMIN D PO) Take 1,000 Units by mouth 2 (two) times a day   famotidine (PEPCID) 40 MG tablet Take 1 tablet (40 mg total) by mouth daily 30 tablet 1    losartan (COZAAR) 25 mg tablet Take 1 tablet (25 mg total) by mouth daily 90 tablet 1    Multiple Vitamin (MULTIVITAMIN) capsule Take 1 capsule by mouth daily  No current facility-administered medications for this visit  She is allergic to escitalopram; mepenzolate; maalox anti-gas [simethicone]; other; prednisone; protonix [pantoprazole]; and reglan [metoclopramide]       Review of Systems   Constitutional: Negative  Skin: Positive for wound (Left lower extremity incision)  Negative for color change, pallor and rash           Objective:      /84   Pulse 96   Temp 98 6 °F (37 °C)   Ht 4' 10" (1 473 m)   Wt 68 kg (150 lb)   BMI 31 35 kg/m² Physical Exam  Vitals signs reviewed  Constitutional:       General: She is not in acute distress  Appearance: Normal appearance  She is not ill-appearing, toxic-appearing or diaphoretic  Skin:     General: Skin is warm  Coloration: Skin is not jaundiced or pale  Findings: No bruising or erythema  Comments: Left lower extremity incision clean dry intact  No significant dehiscence  Mild erythema likely secondary to sutures  Neurological:      Mental Status: She is alert  Procedure:    Sutures removed and Steri-Strips placed  No complications  Patient tolerated procedure well

## 2020-09-27 ENCOUNTER — TELEPHONE (OUTPATIENT)
Dept: OTHER | Facility: OTHER | Age: 85
End: 2020-09-27

## 2020-09-28 ENCOUNTER — OFFICE VISIT (OUTPATIENT)
Dept: SURGERY | Facility: CLINIC | Age: 85
End: 2020-09-28

## 2020-09-28 VITALS
HEIGHT: 58 IN | SYSTOLIC BLOOD PRESSURE: 133 MMHG | TEMPERATURE: 97.2 F | BODY MASS INDEX: 31.91 KG/M2 | DIASTOLIC BLOOD PRESSURE: 84 MMHG | HEART RATE: 100 BPM | RESPIRATION RATE: 18 BRPM | WEIGHT: 152 LBS

## 2020-09-28 DIAGNOSIS — M79.662 PAIN OF LEFT CALF: Primary | ICD-10-CM

## 2020-09-28 DIAGNOSIS — C44.729 SQUAMOUS CELL CARCINOMA OF LEG, LEFT: ICD-10-CM

## 2020-09-28 PROCEDURE — 99024 POSTOP FOLLOW-UP VISIT: CPT | Performed by: SURGERY

## 2020-09-28 NOTE — PROGRESS NOTES
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  Calf is soft on exam   Patient states overall pain is improved but she is worried that there may be a blood clot  I think this is reasonable to evaluate this given the fact that she did have surgery on her left leg in addition to the fact this was a cancerous lesion  We will schedule duplex venous of the left lower extremity overall DVT  Squamous cell carcinoma of leg, left  Status post excision of squamous cell carcinoma left lower extremity  Patient came in today for worsening pain in addition to some swelling and redness  Fortunately for the patient in the left 24 hours this is all subsided  Pain is improved  Swelling is down  In addition her erythema is also almost completely resolved  Her daughter is a nurse and has weakness this is well  I see no obvious signs or symptoms of infection at this point  Will continue to watch this for now  Will follow up with her in 2 weeks  Patient instructed to follow up earlier if signs or symptoms change       Diagnoses and all orders for this visit:    Pain of left calf  -     VAS lower limb venous duplex study, unilateral/limited; Future    Squamous cell carcinoma of leg, left          Subjective:      Patient ID: Jorje Bang is a 80 y o  female  51-year-old female status post excision of squamous cell carcinoma left lower extremity presents today for follow-up  Patient called yesterday stating that her area around the incision was swollen more painful and significantly more red  At that time patient denied any fevers or chills  She states she has been elevating her leg  On today she states that majority of the symptoms have resolved  The swelling is down  Addition the redness is remarkably review been reduced  She still does complain of some calf pain    Denies any significant drainage from the site       The following portions of the patient's history were reviewed and updated as appropriate:   She  has a past medical history of Diverticulosis, GERD (gastroesophageal reflux disease), Hiatal hernia, Irritable bowel disease, Lactose intolerance, and Osteoporosis  She   Patient Active Problem List    Diagnosis Date Noted    Pain of left calf 09/28/2020    Squamous cell carcinoma of leg, left 09/23/2020    PONV (postoperative nausea and vomiting) 09/08/2020    Skin lesion of left leg 09/03/2020    Aftercare following surgery 02/17/2020    Fracture follow-up 02/17/2020    Left knee pain 02/03/2020    Fall 02/01/2020    Fracture of femur, intertrochanteric, left, closed ([facility] [address] ) 01/31/2020    Closed fracture of left distal radius 01/31/2020    Benign essential hypertension 10/27/2017    Macular cyst, hole, or pseudohole, left eye 10/27/2017    Atypical chest pain 02/17/2017    GERD (gastroesophageal reflux disease) 02/17/2017    Anxiety 02/17/2017    Chronic urticaria 01/05/2017     She  has a past surgical history that includes Hysterectomy; Tonsillectomy; Cholecystectomy; Ear tube removal; Skin cancer excision; Hemorroidectomy; Vagina reconstruction surgery (09/13/2019); pr open rx femur fx+intramed riley (Left, 2/1/2020); Cast application (Left, 1/1/0939); Carpal tunnel release (Right); and pr exc tumor soft tissue leg/ankle subfascial <3cm (Left, 9/8/2020)  Her family history includes Colon cancer in her mother; Stomach cancer in her father  She  reports that she quit smoking about 31 years ago  She has never used smokeless tobacco  She reports that she does not drink alcohol or use drugs    Current Outpatient Medications   Medication Sig Dispense Refill    acetaminophen (TYLENOL) 325 mg tablet Take 3 tablets (975 mg total) by mouth every 8 (eight) hours 30 tablet 0    acetaminophen (TYLENOL) 650 mg CR tablet Take 1 tablet (650 mg total) by mouth every 8 (eight) hours as needed for mild pain 30 tablet 0    Calcium Citrate (CITRACAL PO) Take 1 tablet by mouth daily   Cholecalciferol (VITAMIN D PO) Take 1,000 Units by mouth 2 (two) times a day   famotidine (PEPCID) 40 MG tablet Take 1 tablet (40 mg total) by mouth daily 30 tablet 1    losartan (COZAAR) 25 mg tablet Take 1 tablet (25 mg total) by mouth daily 90 tablet 1    Multiple Vitamin (MULTIVITAMIN) capsule Take 1 capsule by mouth daily   ALPRAZolam (XANAX) 0 25 mg tablet Take 1 tablet (0 25 mg total) by mouth 2 (two) times a day as needed for anxiety for up to 10 days 60 tablet 0     No current facility-administered medications for this visit  She is allergic to escitalopram; mepenzolate; maalox anti-gas [simethicone]; other; prednisone; protonix [pantoprazole]; and reglan [metoclopramide]       Review of Systems   Constitutional: Negative for activity change, appetite change, chills, diaphoresis, fatigue, fever and unexpected weight change  Musculoskeletal:        Left calf pain   Skin: Positive for color change ( redness as per patient at previous incision) and wound (Incision from left lower extremity)  Negative for pallor and rash  Objective:      /84 (BP Location: Right arm, Patient Position: Sitting, Cuff Size: Large)   Pulse 100   Temp (!) 97 2 °F (36 2 °C)   Resp 18   Ht 4' 10" (1 473 m)   Wt 68 9 kg (152 lb)   BMI 31 77 kg/m²          Physical Exam  Constitutional:       General: She is not in acute distress  Appearance: Normal appearance  She is not ill-appearing, toxic-appearing or diaphoretic  Skin:     General: Skin is warm  Coloration: Skin is not jaundiced or pale  Findings: No bruising or erythema  Comments: Left lower extremity appears clean dry intact  Some dried blood in possible scant drainage from beneath the Steri-Strips  Steri-Strips were all in place  Mild edema of the superior aspect of the incision but no obvious collection of fluid noted    No significant erythema to suggest cellulitis  Mild tenderness palpation  Neurological:      Mental Status: She is alert

## 2020-09-28 NOTE — ASSESSMENT & PLAN NOTE
Status post excision of squamous cell carcinoma left lower extremity  Patient came in today for worsening pain in addition to some swelling and redness  Fortunately for the patient in the left 24 hours this is all subsided  Pain is improved  Swelling is down  In addition her erythema is also almost completely resolved  Her daughter is a nurse and has weakness this is well  I see no obvious signs or symptoms of infection at this point  Will continue to watch this for now  Will follow up with her in 2 weeks    Patient instructed to follow up earlier if signs or symptoms change

## 2020-09-28 NOTE — ASSESSMENT & PLAN NOTE
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

## 2020-09-29 ENCOUNTER — OFFICE VISIT (OUTPATIENT)
Dept: FAMILY MEDICINE CLINIC | Facility: CLINIC | Age: 85
End: 2020-09-29
Payer: COMMERCIAL

## 2020-09-29 VITALS
WEIGHT: 154 LBS | BODY MASS INDEX: 32.32 KG/M2 | TEMPERATURE: 97.4 F | SYSTOLIC BLOOD PRESSURE: 134 MMHG | DIASTOLIC BLOOD PRESSURE: 70 MMHG | HEIGHT: 58 IN

## 2020-09-29 DIAGNOSIS — F41.9 ANXIETY: Primary | Chronic | ICD-10-CM

## 2020-09-29 DIAGNOSIS — F41.1 GENERALIZED ANXIETY DISORDER: ICD-10-CM

## 2020-09-29 PROCEDURE — 1160F RVW MEDS BY RX/DR IN RCRD: CPT | Performed by: FAMILY MEDICINE

## 2020-09-29 PROCEDURE — 99213 OFFICE O/P EST LOW 20 MIN: CPT | Performed by: FAMILY MEDICINE

## 2020-09-29 PROCEDURE — 1036F TOBACCO NON-USER: CPT | Performed by: FAMILY MEDICINE

## 2020-09-29 RX ORDER — ALPRAZOLAM 0.25 MG/1
0.25 TABLET ORAL 2 TIMES DAILY PRN
Qty: 60 TABLET | Refills: 0 | Status: SHIPPED | OUTPATIENT
Start: 2020-09-29 | End: 2020-10-28 | Stop reason: SDUPTHER

## 2020-09-29 NOTE — PROGRESS NOTES
Assessment/Plan:    Problem List Items Addressed This Visit        Other    Anxiety - Primary (Chronic)      Other Visit Diagnoses     Generalized anxiety disorder               Diagnoses and all orders for this visit:    Anxiety    Generalized anxiety disorder        No problem-specific Assessment & Plan notes found for this encounter  Subjective:      Patient ID: Ephraim Slaughter is a 80 y o  female  Mrs  Hortencia here for a follow-up visit she did have a squamous carcinoma removed from the leg doing well incision looks good she did have a Doppler done tomorrow because she is complaining of some leg pain and with her history of varicosities he Dr Jen regalado wants to rule out a DVT on I doubt that that is the case so will wait and see what the Doppler shows      The following portions of the patient's history were reviewed and updated as appropriate:   She has a past medical history of Diverticulosis, GERD (gastroesophageal reflux disease), Hiatal hernia, Irritable bowel disease, Lactose intolerance, and Osteoporosis  ,  does not have any pertinent problems on file  ,   has a past surgical history that includes Hysterectomy; Tonsillectomy; Cholecystectomy; Ear tube removal; Skin cancer excision; Hemorroidectomy; Vagina reconstruction surgery (09/13/2019); pr open rx femur fx+intramed riley (Left, 2/1/2020); Cast application (Left, 8/2/0971); Carpal tunnel release (Right); pr exc tumor soft tissue leg/ankle subfascial <3cm (Left, 9/8/2020); and Squamous cell carcinoma excision (Left, 09/08/2020)  ,  family history includes Colon cancer in her mother; Stomach cancer in her father  ,   reports that she quit smoking about 31 years ago  She has never used smokeless tobacco  She reports that she does not drink alcohol or use drugs  ,  is allergic to escitalopram; mepenzolate; maalox anti-gas [simethicone]; other; prednisone; protonix [pantoprazole]; and reglan [metoclopramide]     Current Outpatient Medications   Medication Sig Dispense Refill    acetaminophen (TYLENOL) 325 mg tablet Take 3 tablets (975 mg total) by mouth every 8 (eight) hours 30 tablet 0    acetaminophen (TYLENOL) 650 mg CR tablet Take 1 tablet (650 mg total) by mouth every 8 (eight) hours as needed for mild pain 30 tablet 0    ALPRAZolam (XANAX) 0 25 mg tablet Take 1 tablet (0 25 mg total) by mouth 2 (two) times a day as needed for anxiety for up to 10 days 60 tablet 0    Calcium Citrate (CITRACAL PO) Take 1 tablet by mouth daily   Cholecalciferol (VITAMIN D PO) Take 1,000 Units by mouth 2 (two) times a day   famotidine (PEPCID) 40 MG tablet Take 1 tablet (40 mg total) by mouth daily 30 tablet 1    losartan (COZAAR) 25 mg tablet Take 1 tablet (25 mg total) by mouth daily 90 tablet 1    Multiple Vitamin (MULTIVITAMIN) capsule Take 1 capsule by mouth daily  No current facility-administered medications for this visit  Review of Systems   Constitutional: Negative for activity change, appetite change, diaphoresis, fatigue and fever  HENT: Negative  Eyes: Negative  Respiratory: Negative for apnea, cough, chest tightness, shortness of breath and wheezing  Cardiovascular: Negative for chest pain, palpitations and leg swelling  Gastrointestinal: Negative for abdominal distention, abdominal pain, anal bleeding, constipation, diarrhea, nausea and vomiting  Endocrine: Negative for cold intolerance, heat intolerance, polydipsia, polyphagia and polyuria  Genitourinary: Negative for difficulty urinating, dysuria, flank pain, hematuria and urgency  Musculoskeletal: Negative for arthralgias, back pain, gait problem, joint swelling and myalgias  Skin: Negative for color change, rash and wound  Allergic/Immunologic: Negative for environmental allergies, food allergies and immunocompromised state  Neurological: Negative for dizziness, seizures, syncope, speech difficulty, numbness and headaches  Hematological: Negative for adenopathy  Does not bruise/bleed easily  Psychiatric/Behavioral: Negative for agitation, behavioral problems, hallucinations, sleep disturbance and suicidal ideas  Objective:  Vitals:    09/29/20 1601   BP: 134/70   BP Location: Right leg   Patient Position: Sitting   Cuff Size: Large   Temp: (!) 97 4 °F (36 3 °C)   Weight: 69 9 kg (154 lb)   Height: 4' 10" (1 473 m)     Body mass index is 32 19 kg/m²  Physical Exam  Constitutional:       General: She is not in acute distress  Appearance: She is well-developed  She is not diaphoretic  HENT:      Head: Normocephalic  Right Ear: External ear normal       Left Ear: External ear normal       Nose: Nose normal    Eyes:      General: No scleral icterus  Right eye: No discharge  Left eye: No discharge  Conjunctiva/sclera: Conjunctivae normal       Pupils: Pupils are equal, round, and reactive to light  Neck:      Musculoskeletal: Normal range of motion  Thyroid: No thyromegaly  Trachea: No tracheal deviation  Cardiovascular:      Rate and Rhythm: Normal rate and regular rhythm  Heart sounds: Normal heart sounds  No murmur  No friction rub  No gallop  Pulmonary:      Effort: Pulmonary effort is normal  No respiratory distress  Breath sounds: Normal breath sounds  No wheezing  Abdominal:      General: Bowel sounds are normal       Palpations: Abdomen is soft  There is no mass  Tenderness: There is no abdominal tenderness  There is no guarding  Musculoskeletal:         General: No deformity  Lymphadenopathy:      Cervical: No cervical adenopathy  Skin:     General: Skin is warm and dry  Findings: No erythema or rash  Neurological:      Mental Status: She is alert and oriented to person, place, and time  Cranial Nerves: No cranial nerve deficit  Psychiatric:         Thought Content:  Thought content normal

## 2020-09-30 ENCOUNTER — HOSPITAL ENCOUNTER (OUTPATIENT)
Dept: NON INVASIVE DIAGNOSTICS | Facility: HOSPITAL | Age: 85
Discharge: HOME | End: 2020-09-30
Attending: SURGERY
Payer: COMMERCIAL

## 2020-09-30 DIAGNOSIS — M79.662 PAIN OF LEFT CALF: ICD-10-CM

## 2020-09-30 PROCEDURE — 93971 EXTREMITY STUDY: CPT | Performed by: SURGERY

## 2020-09-30 PROCEDURE — 93971 EXTREMITY STUDY: CPT

## 2020-10-06 ENCOUNTER — OFFICE VISIT (OUTPATIENT)
Dept: SURGERY | Facility: CLINIC | Age: 85
End: 2020-10-06

## 2020-10-06 VITALS
SYSTOLIC BLOOD PRESSURE: 149 MMHG | HEART RATE: 94 BPM | WEIGHT: 154 LBS | HEIGHT: 58 IN | BODY MASS INDEX: 32.32 KG/M2 | DIASTOLIC BLOOD PRESSURE: 84 MMHG | TEMPERATURE: 98.6 F

## 2020-10-06 DIAGNOSIS — M79.662 PAIN OF LEFT CALF: ICD-10-CM

## 2020-10-06 DIAGNOSIS — C44.729 SQUAMOUS CELL CARCINOMA OF LEG, LEFT: Primary | ICD-10-CM

## 2020-10-06 PROCEDURE — 99024 POSTOP FOLLOW-UP VISIT: CPT | Performed by: SURGERY

## 2020-10-14 ENCOUNTER — OFFICE VISIT (OUTPATIENT)
Dept: SURGERY | Facility: CLINIC | Age: 85
End: 2020-10-14

## 2020-10-14 VITALS
SYSTOLIC BLOOD PRESSURE: 139 MMHG | HEIGHT: 58 IN | DIASTOLIC BLOOD PRESSURE: 79 MMHG | BODY MASS INDEX: 32.54 KG/M2 | WEIGHT: 155 LBS | HEART RATE: 93 BPM | TEMPERATURE: 98 F

## 2020-10-14 DIAGNOSIS — C44.729 SQUAMOUS CELL CARCINOMA OF LEG, LEFT: Primary | ICD-10-CM

## 2020-10-14 PROCEDURE — 99024 POSTOP FOLLOW-UP VISIT: CPT | Performed by: SURGERY

## 2020-10-28 DIAGNOSIS — F41.1 GENERALIZED ANXIETY DISORDER: ICD-10-CM

## 2020-10-28 RX ORDER — ALPRAZOLAM 0.25 MG/1
0.25 TABLET ORAL 2 TIMES DAILY PRN
Qty: 60 TABLET | Refills: 0 | Status: SHIPPED | OUTPATIENT
Start: 2020-10-28 | End: 2020-12-02 | Stop reason: SDUPTHER

## 2020-11-23 DIAGNOSIS — I10 ESSENTIAL HYPERTENSION: ICD-10-CM

## 2020-11-23 RX ORDER — LOSARTAN POTASSIUM 25 MG/1
25 TABLET ORAL DAILY
Qty: 90 TABLET | Refills: 1 | Status: SHIPPED | OUTPATIENT
Start: 2020-11-23 | End: 2021-05-21 | Stop reason: SDUPTHER

## 2020-12-02 DIAGNOSIS — F41.1 GENERALIZED ANXIETY DISORDER: ICD-10-CM

## 2020-12-02 RX ORDER — ALPRAZOLAM 0.25 MG/1
0.25 TABLET ORAL 2 TIMES DAILY PRN
Qty: 60 TABLET | Refills: 0 | Status: SHIPPED | OUTPATIENT
Start: 2020-12-02 | End: 2021-01-04 | Stop reason: SDUPTHER

## 2020-12-08 ENCOUNTER — TELEMEDICINE (OUTPATIENT)
Dept: FAMILY MEDICINE CLINIC | Facility: CLINIC | Age: 85
End: 2020-12-08
Payer: COMMERCIAL

## 2020-12-08 VITALS — WEIGHT: 155 LBS | HEIGHT: 58 IN | BODY MASS INDEX: 32.54 KG/M2 | TEMPERATURE: 96.8 F

## 2020-12-08 DIAGNOSIS — K21.9 GASTROESOPHAGEAL REFLUX DISEASE WITHOUT ESOPHAGITIS: Primary | Chronic | ICD-10-CM

## 2020-12-08 PROCEDURE — 99213 OFFICE O/P EST LOW 20 MIN: CPT | Performed by: FAMILY MEDICINE

## 2020-12-08 PROCEDURE — 1036F TOBACCO NON-USER: CPT | Performed by: FAMILY MEDICINE

## 2020-12-08 PROCEDURE — 1160F RVW MEDS BY RX/DR IN RCRD: CPT | Performed by: FAMILY MEDICINE

## 2020-12-08 RX ORDER — OMEPRAZOLE 40 MG/1
40 CAPSULE, DELAYED RELEASE ORAL DAILY
Qty: 30 CAPSULE | Refills: 1 | Status: SHIPPED | OUTPATIENT
Start: 2020-12-08 | End: 2021-02-08 | Stop reason: SDUPTHER

## 2020-12-28 ENCOUNTER — TELEPHONE (OUTPATIENT)
Dept: FAMILY MEDICINE CLINIC | Facility: CLINIC | Age: 85
End: 2020-12-28

## 2020-12-29 ENCOUNTER — TELEMEDICINE (OUTPATIENT)
Dept: FAMILY MEDICINE CLINIC | Facility: CLINIC | Age: 85
End: 2020-12-29
Payer: COMMERCIAL

## 2020-12-29 VITALS — WEIGHT: 155 LBS | HEIGHT: 58 IN | BODY MASS INDEX: 32.54 KG/M2 | TEMPERATURE: 98.6 F

## 2020-12-29 DIAGNOSIS — K21.9 GASTROESOPHAGEAL REFLUX DISEASE WITHOUT ESOPHAGITIS: Chronic | ICD-10-CM

## 2020-12-29 DIAGNOSIS — R05.3 CHRONIC COUGH: ICD-10-CM

## 2020-12-29 DIAGNOSIS — I10 BENIGN ESSENTIAL HYPERTENSION: Primary | ICD-10-CM

## 2020-12-29 PROCEDURE — 99441 PR PHYS/QHP TELEPHONE EVALUATION 5-10 MIN: CPT | Performed by: FAMILY MEDICINE

## 2020-12-29 RX ORDER — LORATADINE 10 MG/1
10 TABLET ORAL DAILY
Qty: 30 TABLET | Refills: 0 | Status: SHIPPED | OUTPATIENT
Start: 2020-12-29 | End: 2021-03-08 | Stop reason: ALTCHOICE

## 2020-12-29 RX ORDER — AMOXICILLIN 500 MG/1
500 CAPSULE ORAL EVERY 8 HOURS SCHEDULED
Qty: 30 CAPSULE | Refills: 0 | Status: SHIPPED | OUTPATIENT
Start: 2020-12-29 | End: 2021-01-08

## 2020-12-30 ENCOUNTER — APPOINTMENT (OUTPATIENT)
Dept: RADIOLOGY | Facility: MEDICAL CENTER | Age: 85
End: 2020-12-30
Payer: COMMERCIAL

## 2020-12-30 DIAGNOSIS — R05.3 CHRONIC COUGH: ICD-10-CM

## 2020-12-30 PROCEDURE — 71046 X-RAY EXAM CHEST 2 VIEWS: CPT

## 2021-01-04 DIAGNOSIS — F41.1 GENERALIZED ANXIETY DISORDER: ICD-10-CM

## 2021-01-05 RX ORDER — ALPRAZOLAM 0.25 MG/1
0.25 TABLET ORAL 2 TIMES DAILY PRN
Qty: 60 TABLET | Refills: 0 | Status: SHIPPED | OUTPATIENT
Start: 2021-01-05 | End: 2021-02-08 | Stop reason: SDUPTHER

## 2021-01-29 ENCOUNTER — TELEPHONE (OUTPATIENT)
Dept: FAMILY MEDICINE CLINIC | Facility: CLINIC | Age: 86
End: 2021-01-29

## 2021-01-29 DIAGNOSIS — B34.9 VIRAL INFECTION, UNSPECIFIED: ICD-10-CM

## 2021-01-29 DIAGNOSIS — Z03.818 ENCOUNTER FOR OBSERVATION FOR SUSPECTED EXPOSURE TO OTHER BIOLOGICAL AGENTS RULED OUT: ICD-10-CM

## 2021-01-29 PROCEDURE — U0003 INFECTIOUS AGENT DETECTION BY NUCLEIC ACID (DNA OR RNA); SEVERE ACUTE RESPIRATORY SYNDROME CORONAVIRUS 2 (SARS-COV-2) (CORONAVIRUS DISEASE [COVID-19]), AMPLIFIED PROBE TECHNIQUE, MAKING USE OF HIGH THROUGHPUT TECHNOLOGIES AS DESCRIBED BY CMS-2020-01-R: HCPCS | Performed by: FAMILY MEDICINE

## 2021-01-29 PROCEDURE — U0005 INFEC AGEN DETEC AMPLI PROBE: HCPCS | Performed by: FAMILY MEDICINE

## 2021-01-29 NOTE — TELEPHONE ENCOUNTER
Patient should have her sinuses swabbed for COVID unfortunately a frequent symptom of COVID can be sinus symptoms so I will put an order in for her to get swabbed at our old office next to [employer] it is a drive-through swabbing facility she does not eat any paperwork she just goes and get swabbed she should do this today since it is the last day that that facility will be open

## 2021-01-29 NOTE — TELEPHONE ENCOUNTER
Pt called back stating that she will go for testing,  - NO other s/s than sinusitis   - Been sick since Nov   - NO FEVER, OR LOSS OF TASTE OR SMELL

## 2021-01-29 NOTE — TELEPHONE ENCOUNTER
CC: Sinusitis with post nasal drip causing cough & wheezing - since Thanksgiving    Telemed visit 12/08 - Gastro reflux - Rx'd Omeprazole  Phone call 12/28 - requested antibiotic - Amoxicillin 500mg #30 q8hrs  Telemed visit 12/29 - Xray (Normal)    Looking for recommendations - Rx or do you want her to come for appt?

## 2021-01-30 LAB — SARS-COV-2 N GENE RESP QL NAA+PROBE: NEGATIVE

## 2021-02-08 DIAGNOSIS — K21.9 GASTROESOPHAGEAL REFLUX DISEASE WITHOUT ESOPHAGITIS: Chronic | ICD-10-CM

## 2021-02-08 DIAGNOSIS — F41.1 GENERALIZED ANXIETY DISORDER: ICD-10-CM

## 2021-02-08 RX ORDER — OMEPRAZOLE 40 MG/1
40 CAPSULE, DELAYED RELEASE ORAL DAILY
Qty: 30 CAPSULE | Refills: 1 | Status: SHIPPED | OUTPATIENT
Start: 2021-02-08 | End: 2021-04-12 | Stop reason: SDUPTHER

## 2021-02-08 RX ORDER — ALPRAZOLAM 0.25 MG/1
0.25 TABLET ORAL 2 TIMES DAILY PRN
Qty: 60 TABLET | Refills: 0 | Status: SHIPPED | OUTPATIENT
Start: 2021-02-08 | End: 2021-03-08 | Stop reason: SDUPTHER

## 2021-03-08 ENCOUNTER — OFFICE VISIT (OUTPATIENT)
Dept: FAMILY MEDICINE CLINIC | Facility: CLINIC | Age: 86
End: 2021-03-08
Payer: COMMERCIAL

## 2021-03-08 VITALS
TEMPERATURE: 97.7 F | BODY MASS INDEX: 33.37 KG/M2 | OXYGEN SATURATION: 96 % | HEIGHT: 58 IN | SYSTOLIC BLOOD PRESSURE: 142 MMHG | HEART RATE: 95 BPM | DIASTOLIC BLOOD PRESSURE: 90 MMHG | WEIGHT: 159 LBS

## 2021-03-08 DIAGNOSIS — J30.9 ALLERGIC RHINITIS, UNSPECIFIED SEASONALITY, UNSPECIFIED TRIGGER: Primary | ICD-10-CM

## 2021-03-08 DIAGNOSIS — F41.1 GENERALIZED ANXIETY DISORDER: ICD-10-CM

## 2021-03-08 PROCEDURE — 3725F SCREEN DEPRESSION PERFORMED: CPT | Performed by: PHYSICIAN ASSISTANT

## 2021-03-08 PROCEDURE — 1160F RVW MEDS BY RX/DR IN RCRD: CPT | Performed by: PHYSICIAN ASSISTANT

## 2021-03-08 PROCEDURE — 99214 OFFICE O/P EST MOD 30 MIN: CPT | Performed by: PHYSICIAN ASSISTANT

## 2021-03-08 RX ORDER — FLUTICASONE PROPIONATE 50 MCG
1 SPRAY, SUSPENSION (ML) NASAL DAILY
Qty: 1 BOTTLE | Refills: 2 | Status: SHIPPED | OUTPATIENT
Start: 2021-03-08

## 2021-03-08 RX ORDER — CETIRIZINE HYDROCHLORIDE 10 MG/1
10 TABLET ORAL DAILY
Qty: 30 TABLET | Refills: 2 | Status: SHIPPED | OUTPATIENT
Start: 2021-03-08

## 2021-03-08 RX ORDER — VITAMIN E 268 MG
400 CAPSULE ORAL DAILY
COMMUNITY

## 2021-03-08 RX ORDER — ALPRAZOLAM 0.25 MG/1
0.25 TABLET ORAL 2 TIMES DAILY PRN
Qty: 60 TABLET | Refills: 0 | Status: SHIPPED | OUTPATIENT
Start: 2021-03-08 | End: 2021-04-12 | Stop reason: SDUPTHER

## 2021-03-08 NOTE — PROGRESS NOTES
Assessment/Plan:    Problem List Items Addressed This Visit     None      Visit Diagnoses     Allergic rhinitis, unspecified seasonality, unspecified trigger    -  Primary    Relevant Medications    cetirizine (ZyrTEC) 10 mg tablet    fluticasone (FLONASE) 50 mcg/act nasal spray    Generalized anxiety disorder        Relevant Medications    ALPRAZolam (XANAX) 0 25 mg tablet           Diagnoses and all orders for this visit:    Allergic rhinitis, unspecified seasonality, unspecified trigger  -     cetirizine (ZyrTEC) 10 mg tablet; Take 1 tablet (10 mg total) by mouth daily  -     fluticasone (FLONASE) 50 mcg/act nasal spray; 1 spray into each nostril daily    Generalized anxiety disorder  -     ALPRAZolam (XANAX) 0 25 mg tablet; Take 1 tablet (0 25 mg total) by mouth 2 (two) times a day as needed for anxiety for up to 10 days    Other orders  -     vitamin E, tocopherol, 400 units capsule; Take 400 Units by mouth daily        Will try switching her back to Zyrtec and will start Flonase  She will let us know if symptoms do not improve or worsen  Subjective:      Patient ID: Cindy Johns is a 80 y o  female  Caotsair Dickey is a very pleasant 80year old female who is here today accompanied by her daughter for a chronic cough  This started at the end of November  She had a COVID test and chest xray, which were normal  She admits to a lot of post nasal drainage and blocked ears  She has been trying nasal saline, which does help a little bit  She has been using a humidifier and air purifier  Dr Leonel Schmitz recommended that she take Claritin  She has been taking this for a little over a month  This has seemed to provide some relief  She has not been able to identify a trigger  She is constantly clearing her throat  She does admit to post nasal drip  She denies any fevers, chills, body aches, shortness of breath, abdominal pain, worsening GERD, nausea, vomiting, bowel changes, or leg swelling  She has a history of allergies   She used to take Zyrtec, which helped  She is also asking for a refill on her alprazolam        The following portions of the patient's history were reviewed and updated as appropriate:   She has a past medical history of Diverticulosis, GERD (gastroesophageal reflux disease), Hiatal hernia, Irritable bowel disease, Lactose intolerance, and Osteoporosis  ,  does not have any pertinent problems on file  ,   has a past surgical history that includes Hysterectomy; Tonsillectomy; Cholecystectomy; Ear tube removal; Skin cancer excision; Hemorroidectomy; Vagina reconstruction surgery (09/13/2019); pr open rx femur fx+intramed riley (Left, 2/1/2020); Cast application (Left, 6/8/7382); Carpal tunnel release (Right); pr exc tumor soft tissue leg/ankle subfascial <3cm (Left, 9/8/2020); and Squamous cell carcinoma excision (Left, 09/08/2020)  ,  family history includes Colon cancer in her mother; Stomach cancer in her father  ,   reports that she quit smoking about 32 years ago  She has never used smokeless tobacco  She reports that she does not drink alcohol  No history on file for drug ,  is allergic to escitalopram; mepenzolate; maalox anti-gas [simethicone]; other; prednisone; and reglan [metoclopramide]     Current Outpatient Medications   Medication Sig Dispense Refill    ALPRAZolam (XANAX) 0 25 mg tablet Take 1 tablet (0 25 mg total) by mouth 2 (two) times a day as needed for anxiety for up to 10 days 60 tablet 0    Calcium Citrate (CITRACAL PO) Take 1 tablet by mouth daily   Cholecalciferol (VITAMIN D PO) Take 1,000 Units by mouth 2 (two) times a day   losartan (COZAAR) 25 mg tablet Take 1 tablet (25 mg total) by mouth daily 90 tablet 1    Multiple Vitamin (MULTIVITAMIN) capsule Take 1 capsule by mouth daily        omeprazole (PriLOSEC) 40 MG capsule Take 1 capsule (40 mg total) by mouth daily 30 capsule 1    vitamin E, tocopherol, 400 units capsule Take 400 Units by mouth daily      acetaminophen (TYLENOL) 325 mg tablet Take 3 tablets (975 mg total) by mouth every 8 (eight) hours (Patient not taking: Reported on 3/8/2021) 30 tablet 0    cetirizine (ZyrTEC) 10 mg tablet Take 1 tablet (10 mg total) by mouth daily 30 tablet 2    fluticasone (FLONASE) 50 mcg/act nasal spray 1 spray into each nostril daily 1 Bottle 2     No current facility-administered medications for this visit  Review of Systems   Constitutional: Negative for chills, diaphoresis, fatigue and fever  HENT: Positive for postnasal drip, rhinorrhea and sinus pressure  Negative for congestion, ear pain, sneezing, sore throat and trouble swallowing  +Blocked sensation in bilateral ears   Eyes: Negative for pain and visual disturbance  Respiratory: Negative for apnea, cough, shortness of breath and wheezing  Cardiovascular: Negative for chest pain and palpitations  Gastrointestinal: Negative for abdominal pain, constipation, diarrhea, nausea and vomiting  Genitourinary: Negative for dysuria and hematuria  Musculoskeletal: Positive for arthralgias  Negative for gait problem and myalgias  Neurological: Negative for dizziness, syncope, weakness, light-headedness, numbness and headaches  Psychiatric/Behavioral: Negative for suicidal ideas  The patient is not nervous/anxious  Objective:  Vitals:    03/08/21 1532   BP: 142/90   Pulse: 95   Temp: 97 7 °F (36 5 °C)   SpO2: 96%   Weight: 72 1 kg (159 lb)   Height: 4' 10" (1 473 m)     Body mass index is 33 23 kg/m²  Physical Exam  Vitals signs and nursing note reviewed  Constitutional:       Appearance: She is well-developed  HENT:      Head: Normocephalic and atraumatic  Right Ear: Ear canal and external ear normal  A middle ear effusion is present  Tympanic membrane is not erythematous, retracted or bulging  Left Ear: Ear canal and external ear normal  A middle ear effusion is present  Tympanic membrane is not erythematous, retracted or bulging        Nose: Rhinorrhea present  Rhinorrhea is clear  Mouth/Throat:      Pharynx: No oropharyngeal exudate or posterior oropharyngeal erythema  Comments: +Clear post nasal drainage  Eyes:      Pupils: Pupils are equal, round, and reactive to light  Neck:      Musculoskeletal: Normal range of motion and neck supple  Cardiovascular:      Rate and Rhythm: Normal rate and regular rhythm  Heart sounds: Normal heart sounds  No murmur  No friction rub  No gallop  Pulmonary:      Effort: Pulmonary effort is normal  No respiratory distress  Breath sounds: Normal breath sounds  No wheezing or rales  Abdominal:      General: Bowel sounds are normal       Palpations: Abdomen is soft  Tenderness: There is no abdominal tenderness  There is no guarding or rebound  Musculoskeletal: Normal range of motion  Lymphadenopathy:      Cervical: No cervical adenopathy  Skin:     General: Skin is warm and dry  Neurological:      Mental Status: She is alert and oriented to person, place, and time  Psychiatric:         Behavior: Behavior normal          Thought Content: Thought content normal          Judgment: Judgment normal          BMI Counseling: Body mass index is 33 23 kg/m²  The BMI is above normal  Nutrition recommendations include 3-5 servings of fruits/vegetables daily and consuming healthier snacks  Exercise recommendations include exercising 3-5 times per week

## 2021-04-12 DIAGNOSIS — F41.1 GENERALIZED ANXIETY DISORDER: ICD-10-CM

## 2021-04-12 DIAGNOSIS — K21.9 GASTROESOPHAGEAL REFLUX DISEASE WITHOUT ESOPHAGITIS: Chronic | ICD-10-CM

## 2021-04-12 RX ORDER — OMEPRAZOLE 40 MG/1
40 CAPSULE, DELAYED RELEASE ORAL DAILY
Qty: 30 CAPSULE | Refills: 1 | Status: SHIPPED | OUTPATIENT
Start: 2021-04-12 | End: 2021-06-14 | Stop reason: SDUPTHER

## 2021-04-12 RX ORDER — ALPRAZOLAM 0.25 MG/1
0.25 TABLET ORAL 2 TIMES DAILY PRN
Qty: 60 TABLET | Refills: 0 | Status: SHIPPED | OUTPATIENT
Start: 2021-04-12 | End: 2021-05-13 | Stop reason: SDUPTHER

## 2021-05-13 DIAGNOSIS — F41.1 GENERALIZED ANXIETY DISORDER: ICD-10-CM

## 2021-05-13 RX ORDER — ALPRAZOLAM 0.25 MG/1
0.25 TABLET ORAL 2 TIMES DAILY PRN
Qty: 60 TABLET | Refills: 0 | Status: SHIPPED | OUTPATIENT
Start: 2021-05-13 | End: 2021-06-14 | Stop reason: SDUPTHER

## 2021-05-21 DIAGNOSIS — I10 ESSENTIAL HYPERTENSION: ICD-10-CM

## 2021-05-21 RX ORDER — LOSARTAN POTASSIUM 25 MG/1
25 TABLET ORAL DAILY
Qty: 90 TABLET | Refills: 1 | Status: SHIPPED | OUTPATIENT
Start: 2021-05-21 | End: 2021-08-12 | Stop reason: SDUPTHER

## 2021-06-11 ENCOUNTER — OFFICE VISIT (OUTPATIENT)
Dept: FAMILY MEDICINE CLINIC | Facility: CLINIC | Age: 86
End: 2021-06-11
Payer: COMMERCIAL

## 2021-06-11 VITALS
DIASTOLIC BLOOD PRESSURE: 90 MMHG | TEMPERATURE: 96.6 F | BODY MASS INDEX: 33.8 KG/M2 | SYSTOLIC BLOOD PRESSURE: 148 MMHG | WEIGHT: 161 LBS | HEIGHT: 58 IN

## 2021-06-11 DIAGNOSIS — I10 ESSENTIAL HYPERTENSION: ICD-10-CM

## 2021-06-11 DIAGNOSIS — M54.40 CHRONIC LOW BACK PAIN WITH SCIATICA, SCIATICA LATERALITY UNSPECIFIED, UNSPECIFIED BACK PAIN LATERALITY: ICD-10-CM

## 2021-06-11 DIAGNOSIS — G89.29 CHRONIC LOW BACK PAIN WITH SCIATICA, SCIATICA LATERALITY UNSPECIFIED, UNSPECIFIED BACK PAIN LATERALITY: ICD-10-CM

## 2021-06-11 DIAGNOSIS — M25.50 ARTHRALGIA, UNSPECIFIED JOINT: Primary | ICD-10-CM

## 2021-06-11 PROCEDURE — 1036F TOBACCO NON-USER: CPT | Performed by: FAMILY MEDICINE

## 2021-06-11 PROCEDURE — 99213 OFFICE O/P EST LOW 20 MIN: CPT | Performed by: FAMILY MEDICINE

## 2021-06-11 PROCEDURE — 1160F RVW MEDS BY RX/DR IN RCRD: CPT | Performed by: FAMILY MEDICINE

## 2021-06-11 NOTE — PROGRESS NOTES
Falls Plan of Care: balance, strength, and gait training instructions were provided  Assessment/Plan:  Patient will limit her sodium she will try to elevate her legs for short period of time a couple times a day she will on follow-up with her ophthalmologist regarding her macular degeneration on I did not increase her antihypertensive medication at the present time    Problem List Items Addressed This Visit     None      Visit Diagnoses     Arthralgia, unspecified joint    -  Primary    Chronic low back pain with sciatica, sciatica laterality unspecified, unspecified back pain laterality        Essential hypertension               Diagnoses and all orders for this visit:    Arthralgia, unspecified joint    Chronic low back pain with sciatica, sciatica laterality unspecified, unspecified back pain laterality    Essential hypertension        No problem-specific Assessment & Plan notes found for this encounter  Subjective:      Patient ID: Ezio Stephen is a 80 y o  female  Mrs Givens Bree is here low back pain dependent edema arthralgia and on possibly a wheeze in the lungs patient has dependent edema +1 on her diet is relatively high in sodium      The following portions of the patient's history were reviewed and updated as appropriate:   She has a past medical history of Diverticulosis, GERD (gastroesophageal reflux disease), Hiatal hernia, Irritable bowel disease, Lactose intolerance, and Osteoporosis  ,  does not have any pertinent problems on file  ,   has a past surgical history that includes Hysterectomy; Tonsillectomy; Cholecystectomy; Ear tube removal; Skin cancer excision; Hemorroidectomy; Vagina reconstruction surgery (09/13/2019); pr open rx femur fx+intramed riley (Left, 2/1/2020); Cast application (Left, 5/2/9486); Carpal tunnel release (Right); pr exc tumor soft tissue leg/ankle subfascial <3cm (Left, 9/8/2020); and Squamous cell carcinoma excision (Left, 09/08/2020)  ,  family history includes Colon cancer in her mother; Stomach cancer in her father  ,   reports that she quit smoking about 32 years ago  She has never used smokeless tobacco  She reports that she does not drink alcohol  No history on file for drug ,  is allergic to escitalopram; mepenzolate; maalox anti-gas [simethicone]; other; prednisone; and reglan [metoclopramide]     Current Outpatient Medications   Medication Sig Dispense Refill    acetaminophen (TYLENOL) 325 mg tablet Take 3 tablets (975 mg total) by mouth every 8 (eight) hours 30 tablet 0    ALPRAZolam (XANAX) 0 25 mg tablet Take 1 tablet (0 25 mg total) by mouth 2 (two) times a day as needed for anxiety for up to 10 days 60 tablet 0    Calcium Citrate (CITRACAL PO) Take 1 tablet by mouth daily   cetirizine (ZyrTEC) 10 mg tablet Take 1 tablet (10 mg total) by mouth daily 30 tablet 2    Cholecalciferol (VITAMIN D PO) Take 1,000 Units by mouth 2 (two) times a day   fluticasone (FLONASE) 50 mcg/act nasal spray 1 spray into each nostril daily 1 Bottle 2    losartan (COZAAR) 25 mg tablet Take 1 tablet (25 mg total) by mouth daily 90 tablet 1    Multiple Vitamin (MULTIVITAMIN) capsule Take 1 capsule by mouth daily   omeprazole (PriLOSEC) 40 MG capsule Take 1 capsule (40 mg total) by mouth daily 30 capsule 1    vitamin E, tocopherol, 400 units capsule Take 400 Units by mouth daily       No current facility-administered medications for this visit  Review of Systems   Constitutional: Positive for activity change and fatigue  Negative for appetite change, diaphoresis and fever  HENT: Negative  Negative for dental problem  Eyes: Positive for visual disturbance  Respiratory: Positive for wheezing  Negative for apnea, cough, chest tightness and shortness of breath  Cardiovascular: Negative for chest pain, palpitations and leg swelling     Gastrointestinal: Negative for abdominal distention, abdominal pain, anal bleeding, constipation, diarrhea, nausea and vomiting  Endocrine: Negative for cold intolerance, heat intolerance, polydipsia, polyphagia and polyuria  Genitourinary: Negative for difficulty urinating, dysuria, flank pain, hematuria and urgency  Musculoskeletal: Positive for arthralgias and back pain  Negative for gait problem, joint swelling and myalgias  Skin: Negative for color change, rash and wound  Allergic/Immunologic: Negative for environmental allergies, food allergies and immunocompromised state  Neurological: Negative for dizziness, seizures, syncope, speech difficulty, numbness and headaches  Hematological: Negative for adenopathy  Does not bruise/bleed easily  Psychiatric/Behavioral: Negative for agitation, behavioral problems, hallucinations, sleep disturbance and suicidal ideas  Objective:  Vitals:    06/11/21 1041 06/11/21 1043   BP: 152/90 148/90   BP Location: Left arm Right arm   Patient Position: Sitting Sitting   Cuff Size: Large Large   Temp: (!) 96 6 °F (35 9 °C)    TempSrc: Temporal    Weight: 73 kg (161 lb)    Height: 4' 10" (1 473 m)      Body mass index is 33 65 kg/m²  Physical Exam  Constitutional:       General: She is not in acute distress  Appearance: She is well-developed  She is obese  She is not diaphoretic  HENT:      Head: Normocephalic  Right Ear: External ear normal       Left Ear: External ear normal       Nose: Nose normal    Eyes:      General: No scleral icterus  Right eye: No discharge  Left eye: No discharge  Conjunctiva/sclera: Conjunctivae normal       Pupils: Pupils are equal, round, and reactive to light  Neck:      Musculoskeletal: Normal range of motion  Thyroid: No thyromegaly  Trachea: No tracheal deviation  Cardiovascular:      Rate and Rhythm: Normal rate and regular rhythm  Heart sounds: Normal heart sounds  No murmur  No friction rub  No gallop  Pulmonary:      Effort: Pulmonary effort is normal  No respiratory distress  Breath sounds: Normal breath sounds  No wheezing  Abdominal:      General: Bowel sounds are normal       Palpations: Abdomen is soft  There is no mass  Tenderness: There is no abdominal tenderness  There is no guarding  Musculoskeletal:         General: No deformity  Lymphadenopathy:      Cervical: No cervical adenopathy  Skin:     General: Skin is warm and dry  Findings: No erythema or rash  Neurological:      Mental Status: She is alert and oriented to person, place, and time  Cranial Nerves: No cranial nerve deficit  Psychiatric:         Thought Content:  Thought content normal

## 2021-06-14 DIAGNOSIS — K21.9 GASTROESOPHAGEAL REFLUX DISEASE WITHOUT ESOPHAGITIS: Chronic | ICD-10-CM

## 2021-06-14 DIAGNOSIS — F41.1 GENERALIZED ANXIETY DISORDER: ICD-10-CM

## 2021-06-14 RX ORDER — ALPRAZOLAM 0.25 MG/1
0.25 TABLET ORAL 2 TIMES DAILY PRN
Qty: 60 TABLET | Refills: 0 | Status: SHIPPED | OUTPATIENT
Start: 2021-06-14 | End: 2021-07-16 | Stop reason: SDUPTHER

## 2021-06-14 RX ORDER — OMEPRAZOLE 40 MG/1
40 CAPSULE, DELAYED RELEASE ORAL DAILY
Qty: 30 CAPSULE | Refills: 1 | Status: SHIPPED | OUTPATIENT
Start: 2021-06-14 | End: 2021-08-12 | Stop reason: SDUPTHER

## 2021-07-16 DIAGNOSIS — F41.1 GENERALIZED ANXIETY DISORDER: ICD-10-CM

## 2021-07-16 RX ORDER — ALPRAZOLAM 0.25 MG/1
0.25 TABLET ORAL 2 TIMES DAILY PRN
Qty: 60 TABLET | Refills: 0 | Status: SHIPPED | OUTPATIENT
Start: 2021-07-16 | End: 2021-08-12 | Stop reason: SDUPTHER

## 2021-08-12 ENCOUNTER — NURSE TRIAGE (OUTPATIENT)
Dept: OTHER | Facility: OTHER | Age: 86
End: 2021-08-12

## 2021-08-12 DIAGNOSIS — I10 ESSENTIAL HYPERTENSION: ICD-10-CM

## 2021-08-12 DIAGNOSIS — K21.9 GASTROESOPHAGEAL REFLUX DISEASE WITHOUT ESOPHAGITIS: Chronic | ICD-10-CM

## 2021-08-12 DIAGNOSIS — F41.1 GENERALIZED ANXIETY DISORDER: ICD-10-CM

## 2021-08-12 RX ORDER — LOSARTAN POTASSIUM 25 MG/1
25 TABLET ORAL DAILY
Qty: 7 TABLET | Refills: 0 | Status: SHIPPED | OUTPATIENT
Start: 2021-08-12 | End: 2021-08-12 | Stop reason: SDUPTHER

## 2021-08-12 NOTE — TELEPHONE ENCOUNTER
Patient is requesting refills for Xanax, Cozaar, and Omeprazole  Patient does not have enough Cozaar until the office is reopened  A 7 day supply of Cozaar was sent to the patients pharmacy  Receipt confirmed by pharmacy  All medications are pending for full refills

## 2021-08-12 NOTE — TELEPHONE ENCOUNTER
Reason for Disposition   [1] Caller requesting a prescription renewal (no refills left), no triage required, AND [2] triager able to renew prescription per department policy   Prescription refill request for a CONTROLLED substance (e g , narcotics, ADHD medicines)   [1] Prescription refill request for NON-ESSENTIAL medicine (i e , no harm to patient if med not taken) AND [2] triager unable to refill per department policy    Answer Assessment - Initial Assessment Questions  1  NAME of MEDICATION: "What medicine are you calling about?"      Xanax, Cozaar, Omeprazole  2  QUESTION: "What is your question?" (e g , medication refill, side effect)      "I am going to be out of my cozaar  I need a refill  Can I also refill my Xanax and omeprazole?"   3  PRESCRIBING HCP: "Who prescribed it?" Reason: if prescribed by specialist, call should be referred to that group  Dr Del Valle Plan   4  SYMPTOMS: "Do you have any symptoms?"      Denies   5   SEVERITY: If symptoms are present, ask "Are they mild, moderate or severe?"      N/A    Protocols used: MEDICATION QUESTION CALL-ADULT-

## 2021-08-13 RX ORDER — OMEPRAZOLE 40 MG/1
40 CAPSULE, DELAYED RELEASE ORAL DAILY
Qty: 30 CAPSULE | Refills: 0 | Status: SHIPPED | OUTPATIENT
Start: 2021-08-13 | End: 2021-09-15 | Stop reason: SDUPTHER

## 2021-08-13 RX ORDER — LOSARTAN POTASSIUM 25 MG/1
25 TABLET ORAL DAILY
Qty: 30 TABLET | Refills: 0 | Status: SHIPPED | OUTPATIENT
Start: 2021-08-19 | End: 2021-09-15 | Stop reason: SDUPTHER

## 2021-08-13 RX ORDER — ALPRAZOLAM 0.25 MG/1
0.25 TABLET ORAL 2 TIMES DAILY PRN
Qty: 60 TABLET | Refills: 0 | Status: SHIPPED | OUTPATIENT
Start: 2021-08-13 | End: 2021-09-15 | Stop reason: SDUPTHER

## 2021-09-02 ENCOUNTER — OFFICE VISIT (OUTPATIENT)
Dept: FAMILY MEDICINE CLINIC | Facility: CLINIC | Age: 86
End: 2021-09-02
Payer: COMMERCIAL

## 2021-09-02 VITALS
HEIGHT: 58 IN | TEMPERATURE: 97.4 F | SYSTOLIC BLOOD PRESSURE: 138 MMHG | WEIGHT: 159 LBS | DIASTOLIC BLOOD PRESSURE: 80 MMHG | BODY MASS INDEX: 33.37 KG/M2

## 2021-09-02 DIAGNOSIS — L72.3 SEBACEOUS CYST: Primary | ICD-10-CM

## 2021-09-02 DIAGNOSIS — I10 BENIGN ESSENTIAL HYPERTENSION: ICD-10-CM

## 2021-09-02 PROCEDURE — 99214 OFFICE O/P EST MOD 30 MIN: CPT | Performed by: FAMILY MEDICINE

## 2021-09-02 PROCEDURE — 1036F TOBACCO NON-USER: CPT | Performed by: FAMILY MEDICINE

## 2021-09-02 PROCEDURE — 1160F RVW MEDS BY RX/DR IN RCRD: CPT | Performed by: FAMILY MEDICINE

## 2021-09-02 RX ORDER — CEPHALEXIN 500 MG/1
500 CAPSULE ORAL EVERY 8 HOURS SCHEDULED
Qty: 30 CAPSULE | Refills: 0 | Status: SHIPPED | OUTPATIENT
Start: 2021-09-02 | End: 2021-09-12

## 2021-09-02 RX ORDER — SULFAMETHOXAZOLE AND TRIMETHOPRIM 800; 160 MG/1; MG/1
1 TABLET ORAL EVERY 12 HOURS SCHEDULED
Qty: 10 TABLET | Refills: 0 | Status: SHIPPED | OUTPATIENT
Start: 2021-09-02 | End: 2021-09-12

## 2021-09-02 NOTE — PROGRESS NOTES
Assessment/Plan:    Problem List Items Addressed This Visit     None      Visit Diagnoses     Sebaceous cyst    -  Primary           Diagnoses and all orders for this visit:    Sebaceous cyst        No problem-specific Assessment & Plan notes found for this encounter  Subjective:      Patient ID: Mendez Gonzalez is a 80 y o  female  And has a infected sebaceous cyst on her back that I would estimate is about the size of a tangerine she will be placed on antibiotic therapy and we will set her up with Dr Dana regalado who has previously operated upon her      The following portions of the patient's history were reviewed and updated as appropriate:   She has a past medical history of Diverticulosis, GERD (gastroesophageal reflux disease), Hiatal hernia, Irritable bowel disease, Lactose intolerance, and Osteoporosis  ,  does not have any pertinent problems on file  ,   has a past surgical history that includes Hysterectomy; Tonsillectomy; Cholecystectomy; Ear tube removal; Skin cancer excision; Hemorroidectomy; Vagina reconstruction surgery (09/13/2019); pr open rx femur fx+intramed riley (Left, 2/1/2020); Cast application (Left, 5/7/6819); Carpal tunnel release (Right); pr exc tumor soft tissue leg/ankle subfascial <3cm (Left, 9/8/2020); and Squamous cell carcinoma excision (Left, 09/08/2020)  ,  family history includes Colon cancer in her mother; Stomach cancer in her father  ,   reports that she quit smoking about 32 years ago  She has never used smokeless tobacco  She reports that she does not drink alcohol  No history on file for drug use ,  is allergic to escitalopram, mepenzolate, maalox anti-gas [simethicone], other, prednisone, and reglan [metoclopramide]     Current Outpatient Medications   Medication Sig Dispense Refill    acetaminophen (TYLENOL) 325 mg tablet Take 3 tablets (975 mg total) by mouth every 8 (eight) hours 30 tablet 0    ALPRAZolam (XANAX) 0 25 mg tablet Take 1 tablet (0 25 mg total) by mouth 2 (two) times a day as needed for anxiety for up to 10 days 60 tablet 0    Calcium Citrate (CITRACAL PO) Take 1 tablet by mouth daily   cetirizine (ZyrTEC) 10 mg tablet Take 1 tablet (10 mg total) by mouth daily 30 tablet 2    Cholecalciferol (VITAMIN D PO) Take 1,000 Units by mouth 2 (two) times a day   fluticasone (FLONASE) 50 mcg/act nasal spray 1 spray into each nostril daily 1 Bottle 2    losartan (COZAAR) 25 mg tablet Take 1 tablet (25 mg total) by mouth daily 30 tablet 0    Multiple Vitamin (MULTIVITAMIN) capsule Take 1 capsule by mouth daily   omeprazole (PriLOSEC) 40 MG capsule Take 1 capsule (40 mg total) by mouth daily 30 capsule 0    vitamin E, tocopherol, 400 units capsule Take 400 Units by mouth daily       No current facility-administered medications for this visit  Review of Systems   Constitutional: Negative for activity change, appetite change, diaphoresis, fatigue and fever  HENT: Negative  Eyes: Negative  Respiratory: Negative for apnea, cough, chest tightness, shortness of breath and wheezing  Cardiovascular: Negative for chest pain, palpitations and leg swelling  Gastrointestinal: Negative for abdominal distention, abdominal pain, anal bleeding, constipation, diarrhea, nausea and vomiting  Endocrine: Negative for cold intolerance, heat intolerance, polydipsia, polyphagia and polyuria  Genitourinary: Negative for difficulty urinating, dysuria, flank pain, hematuria and urgency  Musculoskeletal: Negative for arthralgias, back pain, gait problem, joint swelling and myalgias  Skin: Positive for wound  Negative for color change and rash  Patient has a sebaceous cyst about the size of a tangerine on her back it is red and hot   Allergic/Immunologic: Negative for environmental allergies, food allergies and immunocompromised state  Neurological: Negative for dizziness, seizures, syncope, speech difficulty, numbness and headaches     Hematological: Negative for adenopathy  Does not bruise/bleed easily  Psychiatric/Behavioral: Negative for agitation, behavioral problems, hallucinations, sleep disturbance and suicidal ideas  Objective:  Vitals:    09/02/21 1141   BP: 138/80   BP Location: Left arm   Patient Position: Sitting   Cuff Size: Standard   Temp: (!) 97 4 °F (36 3 °C)   TempSrc: Temporal   Weight: 72 1 kg (159 lb)   Height: 4' 10" (1 473 m)     Body mass index is 33 23 kg/m²  Physical Exam  Constitutional:       General: She is not in acute distress  Appearance: She is well-developed  She is not diaphoretic  HENT:      Head: Normocephalic  Right Ear: External ear normal       Left Ear: External ear normal       Nose: Nose normal    Eyes:      General: No scleral icterus  Right eye: No discharge  Left eye: No discharge  Conjunctiva/sclera: Conjunctivae normal       Pupils: Pupils are equal, round, and reactive to light  Neck:      Thyroid: No thyromegaly  Trachea: No tracheal deviation  Cardiovascular:      Rate and Rhythm: Normal rate and regular rhythm  Heart sounds: Normal heart sounds  No murmur heard  No friction rub  No gallop  Pulmonary:      Effort: Pulmonary effort is normal  No respiratory distress  Breath sounds: Normal breath sounds  No wheezing  Abdominal:      General: Bowel sounds are normal       Palpations: Abdomen is soft  There is no mass  Tenderness: There is no abdominal tenderness  There is no guarding  Musculoskeletal:         General: No deformity  Cervical back: Normal range of motion  Lymphadenopathy:      Cervical: No cervical adenopathy  Skin:     General: Skin is warm and dry  Findings: Erythema and lesion present  No rash  Neurological:      Mental Status: She is alert and oriented to person, place, and time  Cranial Nerves: No cranial nerve deficit  Psychiatric:         Thought Content:  Thought content normal

## 2021-09-08 ENCOUNTER — LAB REQUISITION (OUTPATIENT)
Dept: LAB | Facility: HOSPITAL | Age: 86
End: 2021-09-08
Payer: COMMERCIAL

## 2021-09-08 ENCOUNTER — CONSULT (OUTPATIENT)
Dept: SURGERY | Facility: CLINIC | Age: 86
End: 2021-09-08
Payer: COMMERCIAL

## 2021-09-08 DIAGNOSIS — L72.3 SEBACEOUS CYST: ICD-10-CM

## 2021-09-08 DIAGNOSIS — L08.9 INFECTED EPIDERMOID CYST: Primary | ICD-10-CM

## 2021-09-08 DIAGNOSIS — L72.0 INFECTED EPIDERMOID CYST: Primary | ICD-10-CM

## 2021-09-08 DIAGNOSIS — L72.3 SEBACEOUS CYST: Primary | ICD-10-CM

## 2021-09-08 PROCEDURE — 1160F RVW MEDS BY RX/DR IN RCRD: CPT | Performed by: SURGERY

## 2021-09-08 PROCEDURE — 1036F TOBACCO NON-USER: CPT | Performed by: SURGERY

## 2021-09-08 PROCEDURE — 87070 CULTURE OTHR SPECIMN AEROBIC: CPT | Performed by: SURGERY

## 2021-09-08 PROCEDURE — 87205 SMEAR GRAM STAIN: CPT | Performed by: SURGERY

## 2021-09-08 PROCEDURE — 10060 I&D ABSCESS SIMPLE/SINGLE: CPT | Performed by: SURGERY

## 2021-09-08 PROCEDURE — 99203 OFFICE O/P NEW LOW 30 MIN: CPT | Performed by: SURGERY

## 2021-09-08 NOTE — PROGRESS NOTES
Assessment/Plan:    Infected epidermoid cyst    Infected epidermal inclusion cyst of the left mid back  On antibiotics per PCP,  She should complete the course  Incision drainage undertaken in the office today showing some purulence  With minimal cheeselike material   A portion of the capsule was removed  Cavity was packed  Packing may be removed in 24 hours  Continue local wound care with soap water cleansing daily  Keep covered  Follow-up 1 week for wound check  Cultures taken  Diagnoses and all orders for this visit:    Infected epidermoid cyst    Sebaceous cyst  -     Ambulatory referral to General Surgery          Subjective:      Patient ID: Janny Dorado is a 80 y o  female  80-year-old female with known diverticular disease, GERD, hiatal hernia, osteoporosis, squamous cell cancer,  Presents to the office today in consultation for evaluation of what appears to be infected cystic lesion of the back  Patient states that I have no where she developed a large painful lump on her back  She was seen by her PCP started her on antibiotics  He was she was subsequent referred here for definitive therapy  Denies any active drainage  She has been doing warm compresses  No fevers or chills  No nausea vomiting  No similar issues in the past   Patient states she did any rales something was there until all the sudden was big and painful  The following portions of the patient's history were reviewed and updated as appropriate: She  has a past medical history of Diverticulosis, GERD (gastroesophageal reflux disease), Hiatal hernia, Irritable bowel disease, Lactose intolerance, and Osteoporosis    She   Patient Active Problem List    Diagnosis Date Noted    Infected epidermoid cyst 09/08/2021    Pain of left calf 09/28/2020    Squamous cell carcinoma of leg, left 09/23/2020    PONV (postoperative nausea and vomiting) 09/08/2020    Skin lesion of left leg 09/03/2020    Aftercare following surgery 02/17/2020    Fracture follow-up 02/17/2020    Left knee pain 02/03/2020    Fall 02/01/2020    Fracture of femur, intertrochanteric, left, closed ([address] ) 01/31/2020    Closed fracture of left distal radius 01/31/2020    Benign essential hypertension 10/27/2017    Macular cyst, hole, or pseudohole, left eye 10/27/2017    Atypical chest pain 02/17/2017    GERD (gastroesophageal reflux disease) 02/17/2017    Anxiety 02/17/2017    Chronic urticaria 01/05/2017     She  has a past surgical history that includes Hysterectomy; Tonsillectomy; Cholecystectomy; Ear tube removal; Skin cancer excision; Hemorroidectomy; Vagina reconstruction surgery (09/13/2019); pr open rx femur fx+intramed riley (Left, 2/1/2020); Cast application (Left, 5/9/9862); Carpal tunnel release (Right); pr exc tumor soft tissue leg/ankle subfascial <3cm (Left, 9/8/2020); and Squamous cell carcinoma excision (Left, 09/08/2020)  Her family history includes Colon cancer in her mother; Stomach cancer in her father  She  reports that she quit smoking about 32 years ago  She has never used smokeless tobacco  She reports that she does not drink alcohol  No history on file for drug use  Current Outpatient Medications   Medication Sig Dispense Refill    acetaminophen (TYLENOL) 325 mg tablet Take 3 tablets (975 mg total) by mouth every 8 (eight) hours 30 tablet 0    ALPRAZolam (XANAX) 0 25 mg tablet Take 1 tablet (0 25 mg total) by mouth 2 (two) times a day as needed for anxiety for up to 10 days 60 tablet 0    Calcium Citrate (CITRACAL PO) Take 1 tablet by mouth daily   cephalexin (KEFLEX) 500 mg capsule Take 1 capsule (500 mg total) by mouth every 8 (eight) hours for 10 days 30 capsule 0    cetirizine (ZyrTEC) 10 mg tablet Take 1 tablet (10 mg total) by mouth daily 30 tablet 2    Cholecalciferol (VITAMIN D PO) Take 1,000 Units by mouth 2 (two) times a day        fluticasone (FLONASE) 50 mcg/act nasal spray 1 spray into each nostril daily 1 Bottle 2    losartan (COZAAR) 25 mg tablet Take 1 tablet (25 mg total) by mouth daily 30 tablet 0    Multiple Vitamin (MULTIVITAMIN) capsule Take 1 capsule by mouth daily   omeprazole (PriLOSEC) 40 MG capsule Take 1 capsule (40 mg total) by mouth daily 30 capsule 0    sulfamethoxazole-trimethoprim (BACTRIM DS) 800-160 mg per tablet Take 1 tablet by mouth every 12 (twelve) hours for 10 days 10 tablet 0    vitamin E, tocopherol, 400 units capsule Take 400 Units by mouth daily       No current facility-administered medications for this visit  She is allergic to escitalopram, mepenzolate, maalox anti-gas [simethicone], other, prednisone, and reglan [metoclopramide]       Review of Systems   Constitutional: Negative for activity change, appetite change, chills, diaphoresis, fatigue, fever and unexpected weight change  Respiratory: Negative for shortness of breath  Cardiovascular: Negative for chest pain  Gastrointestinal: Negative for abdominal pain  Skin: Positive for color change  Negative for pallor and rash  Infected cyst of back         Objective: There were no vitals taken for this visit  Physical Exam  Vitals reviewed  Constitutional:       General: She is not in acute distress  Appearance: Normal appearance  She is not ill-appearing, toxic-appearing or diaphoretic  HENT:      Head: Normocephalic and atraumatic  Eyes:      General: No scleral icterus  Right eye: No discharge  Left eye: No discharge  Cardiovascular:      Rate and Rhythm: Normal rate  Pulmonary:      Effort: Pulmonary effort is normal  No respiratory distress  Musculoskeletal:         General: No tenderness or signs of injury  Cervical back: Normal range of motion and neck supple  Skin:     General: Skin is warm and dry  Coloration: Skin is not jaundiced or pale  Findings: Erythema present        Comments: Large fluctuant for approximately 5 cm lesion of the left mid back  Noted erythema  Small punctum sinus noted  No active drainage  Consistent with infected epidermal inclusion cyst   Neurological:      General: No focal deficit present  Mental Status: She is alert and oriented to person, place, and time  Cranial Nerves: No cranial nerve deficit  Psychiatric:         Mood and Affect: Mood normal          Behavior: Behavior normal          Thought Content: Thought content normal          Judgment: Judgment normal          Incision and Drainage    Date/Time: 9/8/2021 8:57 AM  Performed by: Wing Quigley DO  Authorized by: Wing Quigley DO   Universal Protocol:  Consent: Verbal consent obtained  Risks and benefits: risks, benefits and alternatives were discussed  Consent given by: patient  Time out: Immediately prior to procedure a "time out" was called to verify the correct patient, procedure, equipment, support staff and site/side marked as required  Patient understanding: patient states understanding of the procedure being performed  Patient consent: the patient's understanding of the procedure matches consent given  Patient identity confirmed: verbally with patient      Patient location:  Clinic  Location:     Type:  Abscess and cyst    Location:  Trunk    Trunk location:  Back  Pre-procedure details:     Skin preparation:  Chloraprep  Anesthesia (see MAR for exact dosages): Anesthesia method:  Local infiltration    Local anesthetic:  Lidocaine 1% WITH epi  Procedure details:     Complexity:  Simple    Needle aspiration: no      Incision types:  Cruciate    Scalpel blade:  11    Approach:  Open    Incision depth:  Subcutaneous    Wound management:  Probed and deloculated and irrigated with saline    Drainage:  Bloody and purulent    Drainage amount: Moderate    Wound treatment:  Wound left open    Packing materials:  1/4 in gauze  Post-procedure details:     Patient tolerance of procedure:   Tolerated well, no immediate complications

## 2021-09-08 NOTE — ASSESSMENT & PLAN NOTE
Infected epidermal inclusion cyst of the left mid back  On antibiotics per PCP,  She should complete the course  Incision drainage undertaken in the office today showing some purulence  With minimal cheeselike material   A portion of the capsule was removed  Cavity was packed  Packing may be removed in 24 hours  Continue local wound care with soap water cleansing daily  Keep covered  Follow-up 1 week for wound check  Cultures taken

## 2021-09-11 LAB
BACTERIA WND AEROBE CULT: ABNORMAL
GRAM STN SPEC: ABNORMAL
GRAM STN SPEC: ABNORMAL

## 2021-09-15 DIAGNOSIS — I10 ESSENTIAL HYPERTENSION: ICD-10-CM

## 2021-09-15 DIAGNOSIS — F41.1 GENERALIZED ANXIETY DISORDER: ICD-10-CM

## 2021-09-15 DIAGNOSIS — Z13.820 SCREENING FOR OSTEOPOROSIS: Primary | ICD-10-CM

## 2021-09-15 DIAGNOSIS — K21.9 GASTROESOPHAGEAL REFLUX DISEASE WITHOUT ESOPHAGITIS: Chronic | ICD-10-CM

## 2021-09-15 RX ORDER — ALPRAZOLAM 0.25 MG/1
0.25 TABLET ORAL 2 TIMES DAILY PRN
Qty: 60 TABLET | Refills: 0 | Status: SHIPPED | OUTPATIENT
Start: 2021-09-15 | End: 2021-10-15 | Stop reason: SDUPTHER

## 2021-09-15 RX ORDER — LOSARTAN POTASSIUM 25 MG/1
25 TABLET ORAL DAILY
Qty: 30 TABLET | Refills: 5 | Status: SHIPPED | OUTPATIENT
Start: 2021-09-15 | End: 2021-10-15 | Stop reason: SDUPTHER

## 2021-09-15 RX ORDER — OMEPRAZOLE 40 MG/1
40 CAPSULE, DELAYED RELEASE ORAL DAILY
Qty: 30 CAPSULE | Refills: 5 | Status: SHIPPED | OUTPATIENT
Start: 2021-09-15 | End: 2021-10-15 | Stop reason: SDUPTHER

## 2021-09-16 ENCOUNTER — OFFICE VISIT (OUTPATIENT)
Dept: SURGERY | Facility: CLINIC | Age: 86
End: 2021-09-16

## 2021-09-16 VITALS
HEIGHT: 58 IN | WEIGHT: 159 LBS | SYSTOLIC BLOOD PRESSURE: 144 MMHG | BODY MASS INDEX: 33.37 KG/M2 | HEART RATE: 89 BPM | DIASTOLIC BLOOD PRESSURE: 85 MMHG | TEMPERATURE: 98.8 F

## 2021-09-16 DIAGNOSIS — L08.9 INFECTED EPIDERMOID CYST: Primary | ICD-10-CM

## 2021-09-16 DIAGNOSIS — L72.0 INFECTED EPIDERMOID CYST: Primary | ICD-10-CM

## 2021-09-16 PROCEDURE — 99024 POSTOP FOLLOW-UP VISIT: CPT | Performed by: SURGERY

## 2021-09-16 NOTE — PROGRESS NOTES
Assessment/Plan:    Infected epidermoid cyst  Status post incision drainage  Healing well  Scant drainage noted  Still some mild induration  Likely underlying cystic material remains  I will have the patient follow-up in 3 weeks to see how things are healing  She may require excision of the cystic lesion on the next visit  Diagnoses and all orders for this visit:    Infected epidermoid cyst          Subjective:      Patient ID: Silvia Payne is a 80 y o  female  A 51-year-old female with multiple comorbidities presents today for follow-up after undergoing incision drainage of infected epidermal inclusion cyst of the back  Overall doing well  States that she has no pain  There is minimal dried to no drainage  No fevers or chills  The following portions of the patient's history were reviewed and updated as appropriate:   She  has a past medical history of Diverticulosis, GERD (gastroesophageal reflux disease), Hiatal hernia, Irritable bowel disease, Lactose intolerance, and Osteoporosis  She   Patient Active Problem List    Diagnosis Date Noted    Infected epidermoid cyst 09/08/2021    Pain of left calf 09/28/2020    Squamous cell carcinoma of leg, left 09/23/2020    PONV (postoperative nausea and vomiting) 09/08/2020    Skin lesion of left leg 09/03/2020    Aftercare following surgery 02/17/2020    Fracture follow-up 02/17/2020    Left knee pain 02/03/2020    Fall 02/01/2020    Fracture of femur, intertrochanteric, left, closed ([facility] [address] ) 01/31/2020    Closed fracture of left distal radius 01/31/2020    Benign essential hypertension 10/27/2017    Macular cyst, hole, or pseudohole, left eye 10/27/2017    Atypical chest pain 02/17/2017    GERD (gastroesophageal reflux disease) 02/17/2017    Anxiety 02/17/2017    Chronic urticaria 01/05/2017     She  has a past surgical history that includes Hysterectomy; Tonsillectomy; Cholecystectomy;  Ear tube removal; Skin cancer excision; Hemorroidectomy; Vagina reconstruction surgery (09/13/2019); pr open rx femur fx+intramed riley (Left, 2/1/2020); Cast application (Left, 8/7/9732); Carpal tunnel release (Right); pr exc tumor soft tissue leg/ankle subfascial <3cm (Left, 9/8/2020); and Squamous cell carcinoma excision (Left, 09/08/2020)  Her family history includes Colon cancer in her mother; Stomach cancer in her father  She  reports that she quit smoking about 32 years ago  She has never used smokeless tobacco  She reports that she does not drink alcohol  No history on file for drug use  Current Outpatient Medications   Medication Sig Dispense Refill    acetaminophen (TYLENOL) 325 mg tablet Take 3 tablets (975 mg total) by mouth every 8 (eight) hours 30 tablet 0    ALPRAZolam (XANAX) 0 25 mg tablet Take 1 tablet (0 25 mg total) by mouth 2 (two) times a day as needed for anxiety for up to 10 days 60 tablet 0    Calcium Citrate (CITRACAL PO) Take 1 tablet by mouth daily   cetirizine (ZyrTEC) 10 mg tablet Take 1 tablet (10 mg total) by mouth daily 30 tablet 2    Cholecalciferol (VITAMIN D PO) Take 1,000 Units by mouth 2 (two) times a day   fluticasone (FLONASE) 50 mcg/act nasal spray 1 spray into each nostril daily 1 Bottle 2    losartan (COZAAR) 25 mg tablet Take 1 tablet (25 mg total) by mouth daily 30 tablet 5    Multiple Vitamin (MULTIVITAMIN) capsule Take 1 capsule by mouth daily   omeprazole (PriLOSEC) 40 MG capsule Take 1 capsule (40 mg total) by mouth daily 30 capsule 5    vitamin E, tocopherol, 400 units capsule Take 400 Units by mouth daily       No current facility-administered medications for this visit  She is allergic to escitalopram, mepenzolate, maalox anti-gas [simethicone], other, prednisone, and reglan [metoclopramide]       Review of Systems   Constitutional: Negative  Skin: Positive for color change and wound (Upper back)  Negative for pallor and rash           Objective:      /85   Pulse 89 Temp 98 8 °F (37 1 °C)   Ht 4' 10" (1 473 m)   Wt 72 1 kg (159 lb)   BMI 33 23 kg/m²          Physical Exam  Vitals reviewed  Constitutional:       General: She is not in acute distress  Appearance: Normal appearance  She is not ill-appearing, toxic-appearing or diaphoretic  Skin:     General: Skin is dry  Coloration: Skin is not jaundiced or pale  Findings: No bruising or erythema  Comments: Mildly indurated area of the upper back after undergoing incision drainage of infected cyst   Scant serous drainage noted  Nontender  There is no significant fluctuance however there may still be some cystic material that has remained  Neurological:      Mental Status: She is alert

## 2021-09-16 NOTE — ASSESSMENT & PLAN NOTE
Status post incision drainage  Healing well  Scant drainage noted  Still some mild induration  Likely underlying cystic material remains  I will have the patient follow-up in 3 weeks to see how things are healing  She may require excision of the cystic lesion on the next visit

## 2021-10-07 ENCOUNTER — OFFICE VISIT (OUTPATIENT)
Dept: SURGERY | Facility: CLINIC | Age: 86
End: 2021-10-07
Payer: COMMERCIAL

## 2021-10-07 VITALS
DIASTOLIC BLOOD PRESSURE: 82 MMHG | WEIGHT: 159 LBS | TEMPERATURE: 98.8 F | SYSTOLIC BLOOD PRESSURE: 142 MMHG | HEIGHT: 58 IN | HEART RATE: 96 BPM | BODY MASS INDEX: 33.37 KG/M2

## 2021-10-07 DIAGNOSIS — L72.0 INFECTED EPIDERMOID CYST: Primary | ICD-10-CM

## 2021-10-07 DIAGNOSIS — L08.9 INFECTED EPIDERMOID CYST: Primary | ICD-10-CM

## 2021-10-07 PROCEDURE — 99212 OFFICE O/P EST SF 10 MIN: CPT | Performed by: SURGERY

## 2021-10-07 PROCEDURE — 1160F RVW MEDS BY RX/DR IN RCRD: CPT | Performed by: SURGERY

## 2021-10-07 PROCEDURE — 1036F TOBACCO NON-USER: CPT | Performed by: SURGERY

## 2021-10-13 ENCOUNTER — HOSPITAL ENCOUNTER (OUTPATIENT)
Dept: BONE DENSITY | Facility: HOSPITAL | Age: 86
Discharge: HOME | End: 2021-10-13
Attending: FAMILY MEDICINE
Payer: COMMERCIAL

## 2021-10-13 DIAGNOSIS — Z13.820 SCREENING FOR OSTEOPOROSIS: ICD-10-CM

## 2021-10-13 PROCEDURE — 77080 DXA BONE DENSITY AXIAL: CPT

## 2021-10-15 DIAGNOSIS — I10 ESSENTIAL HYPERTENSION: ICD-10-CM

## 2021-10-15 DIAGNOSIS — K21.9 GASTROESOPHAGEAL REFLUX DISEASE WITHOUT ESOPHAGITIS: Chronic | ICD-10-CM

## 2021-10-15 DIAGNOSIS — F41.1 GENERALIZED ANXIETY DISORDER: ICD-10-CM

## 2021-10-15 RX ORDER — ALPRAZOLAM 0.25 MG/1
0.25 TABLET ORAL 2 TIMES DAILY PRN
Qty: 60 TABLET | Refills: 0 | Status: SHIPPED | OUTPATIENT
Start: 2021-10-15 | End: 2021-11-15 | Stop reason: SDUPTHER

## 2021-10-15 RX ORDER — OMEPRAZOLE 40 MG/1
40 CAPSULE, DELAYED RELEASE ORAL DAILY
Qty: 30 CAPSULE | Refills: 5 | Status: SHIPPED | OUTPATIENT
Start: 2021-10-15 | End: 2022-04-19 | Stop reason: SDUPTHER

## 2021-10-15 RX ORDER — LOSARTAN POTASSIUM 25 MG/1
25 TABLET ORAL DAILY
Qty: 30 TABLET | Refills: 5 | Status: SHIPPED | OUTPATIENT
Start: 2021-10-15 | End: 2022-01-20 | Stop reason: SDUPTHER

## 2021-11-15 ENCOUNTER — OFFICE VISIT (OUTPATIENT)
Dept: FAMILY MEDICINE CLINIC | Facility: CLINIC | Age: 86
End: 2021-11-15
Payer: COMMERCIAL

## 2021-11-15 VITALS
TEMPERATURE: 96.8 F | BODY MASS INDEX: 33.8 KG/M2 | WEIGHT: 161 LBS | SYSTOLIC BLOOD PRESSURE: 138 MMHG | HEIGHT: 58 IN | DIASTOLIC BLOOD PRESSURE: 84 MMHG

## 2021-11-15 DIAGNOSIS — Z00.00 MEDICARE ANNUAL WELLNESS VISIT, SUBSEQUENT: Primary | ICD-10-CM

## 2021-11-15 DIAGNOSIS — F41.1 GENERALIZED ANXIETY DISORDER: ICD-10-CM

## 2021-11-15 DIAGNOSIS — N18.31 STAGE 3A CHRONIC KIDNEY DISEASE (HCC): ICD-10-CM

## 2021-11-15 PROCEDURE — 1160F RVW MEDS BY RX/DR IN RCRD: CPT | Performed by: FAMILY MEDICINE

## 2021-11-15 PROCEDURE — 1036F TOBACCO NON-USER: CPT | Performed by: FAMILY MEDICINE

## 2021-11-15 PROCEDURE — G0439 PPPS, SUBSEQ VISIT: HCPCS | Performed by: FAMILY MEDICINE

## 2021-11-15 PROCEDURE — 1125F AMNT PAIN NOTED PAIN PRSNT: CPT | Performed by: FAMILY MEDICINE

## 2021-11-15 PROCEDURE — 3725F SCREEN DEPRESSION PERFORMED: CPT | Performed by: FAMILY MEDICINE

## 2021-11-15 PROCEDURE — 3288F FALL RISK ASSESSMENT DOCD: CPT | Performed by: FAMILY MEDICINE

## 2021-11-15 PROCEDURE — 1170F FXNL STATUS ASSESSED: CPT | Performed by: FAMILY MEDICINE

## 2021-11-15 RX ORDER — ALPRAZOLAM 0.25 MG/1
0.25 TABLET ORAL 2 TIMES DAILY PRN
Qty: 60 TABLET | Refills: 0 | Status: SHIPPED | OUTPATIENT
Start: 2021-11-15 | End: 2021-12-16 | Stop reason: SDUPTHER

## 2021-12-16 DIAGNOSIS — F41.1 GENERALIZED ANXIETY DISORDER: ICD-10-CM

## 2021-12-16 RX ORDER — ALPRAZOLAM 0.25 MG/1
0.25 TABLET ORAL 2 TIMES DAILY PRN
Qty: 60 TABLET | Refills: 0 | Status: SHIPPED | OUTPATIENT
Start: 2021-12-16 | End: 2022-01-17 | Stop reason: SDUPTHER

## 2022-01-17 DIAGNOSIS — F41.1 GENERALIZED ANXIETY DISORDER: ICD-10-CM

## 2022-01-17 RX ORDER — ALPRAZOLAM 0.25 MG/1
0.25 TABLET ORAL 2 TIMES DAILY PRN
Qty: 60 TABLET | Refills: 0 | Status: SHIPPED | OUTPATIENT
Start: 2022-01-17 | End: 2022-02-18 | Stop reason: SDUPTHER

## 2022-01-20 ENCOUNTER — TELEPHONE (OUTPATIENT)
Dept: FAMILY MEDICINE CLINIC | Facility: CLINIC | Age: 87
End: 2022-01-20

## 2022-01-20 DIAGNOSIS — I10 ESSENTIAL HYPERTENSION: ICD-10-CM

## 2022-01-20 RX ORDER — LOSARTAN POTASSIUM 25 MG/1
25 TABLET ORAL DAILY
Qty: 90 TABLET | Refills: 0 | Status: SHIPPED | OUTPATIENT
Start: 2022-01-20 | End: 2022-04-19 | Stop reason: SDUPTHER

## 2022-01-20 NOTE — TELEPHONE ENCOUNTER
Rx on  back order & not available at [facility]   Rx Sent to [facility] who had 90 tablets available

## 2022-02-18 DIAGNOSIS — F41.1 GENERALIZED ANXIETY DISORDER: ICD-10-CM

## 2022-02-18 RX ORDER — ALPRAZOLAM 0.25 MG/1
0.25 TABLET ORAL 2 TIMES DAILY PRN
Qty: 60 TABLET | Refills: 0 | Status: SHIPPED | OUTPATIENT
Start: 2022-02-18 | End: 2022-03-21 | Stop reason: SDUPTHER

## 2022-03-18 ENCOUNTER — TELEPHONE (OUTPATIENT)
Dept: FAMILY MEDICINE CLINIC | Facility: CLINIC | Age: 87
End: 2022-03-18

## 2022-03-18 NOTE — TELEPHONE ENCOUNTER
She called for a refill on xanax, it has been over 3 months, you told her to come back in May, you did not want her to come out in the winter

## 2022-03-21 DIAGNOSIS — F41.1 GENERALIZED ANXIETY DISORDER: ICD-10-CM

## 2022-03-21 RX ORDER — ALPRAZOLAM 0.25 MG/1
0.25 TABLET ORAL 2 TIMES DAILY PRN
Qty: 60 TABLET | Refills: 0 | Status: CANCELLED | OUTPATIENT
Start: 2022-03-21 | End: 2022-03-31

## 2022-03-21 RX ORDER — ALPRAZOLAM 0.25 MG/1
0.25 TABLET ORAL 2 TIMES DAILY PRN
Qty: 60 TABLET | Refills: 0 | Status: SHIPPED | OUTPATIENT
Start: 2022-03-21 | End: 2022-04-22 | Stop reason: SDUPTHER

## 2022-04-19 DIAGNOSIS — I10 ESSENTIAL HYPERTENSION: ICD-10-CM

## 2022-04-19 DIAGNOSIS — K21.9 GASTROESOPHAGEAL REFLUX DISEASE WITHOUT ESOPHAGITIS: Chronic | ICD-10-CM

## 2022-04-19 DIAGNOSIS — F41.1 GENERALIZED ANXIETY DISORDER: ICD-10-CM

## 2022-04-19 RX ORDER — LOSARTAN POTASSIUM 25 MG/1
25 TABLET ORAL DAILY
Qty: 90 TABLET | Refills: 1 | Status: SHIPPED | OUTPATIENT
Start: 2022-04-19 | End: 2022-07-18

## 2022-04-19 RX ORDER — OMEPRAZOLE 40 MG/1
40 CAPSULE, DELAYED RELEASE ORAL DAILY
Qty: 30 CAPSULE | Refills: 5 | Status: SHIPPED | OUTPATIENT
Start: 2022-04-19

## 2022-04-19 NOTE — TELEPHONE ENCOUNTER
Rx refill request - Alprazolam 0 25mg - When Pt comes in & signs paperwork, Rx refill will be sent to

## 2022-04-19 NOTE — TELEPHONE ENCOUNTER
said today that Pt's can get control contract signed at next OV & he will refill Rx's W/O contract until signed in office

## 2022-04-22 ENCOUNTER — APPOINTMENT (OUTPATIENT)
Dept: RADIOLOGY | Facility: MEDICAL CENTER | Age: 87
End: 2022-04-22
Payer: COMMERCIAL

## 2022-04-22 ENCOUNTER — OFFICE VISIT (OUTPATIENT)
Dept: FAMILY MEDICINE CLINIC | Facility: CLINIC | Age: 87
End: 2022-04-22
Payer: COMMERCIAL

## 2022-04-22 ENCOUNTER — HOSPITAL ENCOUNTER (OUTPATIENT)
Dept: RADIOLOGY | Facility: HOSPITAL | Age: 87
Discharge: HOME | End: 2022-04-22

## 2022-04-22 VITALS
WEIGHT: 163.4 LBS | SYSTOLIC BLOOD PRESSURE: 146 MMHG | DIASTOLIC BLOOD PRESSURE: 82 MMHG | OXYGEN SATURATION: 94 % | HEART RATE: 97 BPM | HEIGHT: 58 IN | BODY MASS INDEX: 34.3 KG/M2

## 2022-04-22 DIAGNOSIS — F41.1 GENERALIZED ANXIETY DISORDER: ICD-10-CM

## 2022-04-22 DIAGNOSIS — G89.29 CHRONIC PAIN OF RIGHT KNEE: ICD-10-CM

## 2022-04-22 DIAGNOSIS — M25.561 CHRONIC PAIN OF RIGHT KNEE: Primary | ICD-10-CM

## 2022-04-22 DIAGNOSIS — I87.2 VENOUS INSUFFICIENCY: ICD-10-CM

## 2022-04-22 DIAGNOSIS — M25.561 CHRONIC PAIN OF RIGHT KNEE: ICD-10-CM

## 2022-04-22 DIAGNOSIS — G89.29 CHRONIC PAIN OF RIGHT KNEE: Primary | ICD-10-CM

## 2022-04-22 PROCEDURE — 73562 X-RAY EXAM OF KNEE 3: CPT

## 2022-04-22 PROCEDURE — 99214 OFFICE O/P EST MOD 30 MIN: CPT | Performed by: PHYSICIAN ASSISTANT

## 2022-04-22 PROCEDURE — 1036F TOBACCO NON-USER: CPT | Performed by: PHYSICIAN ASSISTANT

## 2022-04-22 PROCEDURE — 1160F RVW MEDS BY RX/DR IN RCRD: CPT | Performed by: PHYSICIAN ASSISTANT

## 2022-04-22 RX ORDER — ALPRAZOLAM 0.25 MG/1
0.25 TABLET ORAL 2 TIMES DAILY PRN
Qty: 60 TABLET | Refills: 0 | Status: SHIPPED | OUTPATIENT
Start: 2022-04-22 | End: 2022-05-23 | Stop reason: SDUPTHER

## 2022-04-22 RX ORDER — ALPRAZOLAM 0.25 MG/1
0.25 TABLET ORAL 2 TIMES DAILY PRN
Qty: 60 TABLET | Refills: 0 | OUTPATIENT
Start: 2022-04-22 | End: 2022-05-02

## 2022-04-22 NOTE — TELEPHONE ENCOUNTER
Patient has not been here within 90 days unable to refill this controlled substance until she seen and must be a in office visit

## 2022-04-22 NOTE — PROGRESS NOTES
Assessment/Plan:    Problem List Items Addressed This Visit     None      Visit Diagnoses     Chronic pain of right knee    -  Primary    Relevant Medications    Diclofenac Sodium (VOLTAREN) 1 %    Other Relevant Orders    XR knee 3 vw right non injury    Venous insufficiency        Relevant Orders    Compression Stocking           Diagnoses and all orders for this visit:    Chronic pain of right knee  -     Diclofenac Sodium (VOLTAREN) 1 %; Apply 2 g topically 4 (four) times a day  -     XR knee 3 vw right non injury; Future    Venous insufficiency  -     Compression Stocking        Will get xray of right knee  Script for Voltaren Gel  Suggested PT, but not willing to go at this time  Recommended elevating legs and limiting sodium  Script given for compression stockings  Subjective:      Patient ID: Konstantin Acosta is a 80 y o  female  Virginia Whitmore is a pleasant 80year old female who is here today accompanied by her daughter for right knee pain and bilateral lower extremity edema  She has had the knee pain for a long time  She has not tried any OTC medications  The swelling in her legs has been going on for a few weeks  She does add salt to her food  She does not elevate her legs because it hurts her back  She did try compression stockings in the past, but they were difficult to put on  The following portions of the patient's history were reviewed and updated as appropriate:   She has a past medical history of Diverticulosis, GERD (gastroesophageal reflux disease), Hiatal hernia, Irritable bowel disease, Lactose intolerance, and Osteoporosis  ,  does not have any pertinent problems on file  ,   has a past surgical history that includes Hysterectomy; Tonsillectomy; Cholecystectomy; Ear tube removal; Skin cancer excision; Hemorroidectomy; Vagina reconstruction surgery (09/13/2019); pr open rx femur fx+intramed riley (Left, 2/1/2020); Cast application (Left, 8/3/9513);  Carpal tunnel release (Right); pr exc tumor soft tissue leg/ankle subfascial <3cm (Left, 9/8/2020); and Squamous cell carcinoma excision (Left, 09/08/2020)  ,  family history includes Colon cancer in her mother; Stomach cancer in her father  ,   reports that she quit smoking about 33 years ago  She has never used smokeless tobacco  She reports that she does not drink alcohol  No history on file for drug use ,  is allergic to escitalopram, mepenzolate, maalox anti-gas [simethicone], other, prednisone, and reglan [metoclopramide]     Current Outpatient Medications   Medication Sig Dispense Refill    acetaminophen (TYLENOL) 325 mg tablet Take 3 tablets (975 mg total) by mouth every 8 (eight) hours 30 tablet 0    Calcium Citrate (CITRACAL PO) Take 1 tablet by mouth daily   cetirizine (ZyrTEC) 10 mg tablet Take 1 tablet (10 mg total) by mouth daily 30 tablet 2    Cholecalciferol (VITAMIN D PO) Take 1,000 Units by mouth 2 (two) times a day   fluticasone (FLONASE) 50 mcg/act nasal spray 1 spray into each nostril daily 1 Bottle 2    losartan (COZAAR) 25 mg tablet Take 1 tablet (25 mg total) by mouth daily 90 tablet 1    Multiple Vitamin (MULTIVITAMIN) capsule Take 1 capsule by mouth daily   omeprazole (PriLOSEC) 40 MG capsule Take 1 capsule (40 mg total) by mouth daily 30 capsule 5    vitamin E, tocopherol, 400 units capsule Take 400 Units by mouth daily      ALPRAZolam (XANAX) 0 25 mg tablet Take 1 tablet (0 25 mg total) by mouth 2 (two) times a day as needed for anxiety for up to 10 days 60 tablet 0    Diclofenac Sodium (VOLTAREN) 1 % Apply 2 g topically 4 (four) times a day 100 g 2     No current facility-administered medications for this visit  Review of Systems   Constitutional: Negative for chills, diaphoresis, fatigue and fever  HENT: Negative for congestion, ear pain, postnasal drip, rhinorrhea, sneezing, sore throat and trouble swallowing  Eyes: Negative for pain and visual disturbance     Respiratory: Negative for apnea, cough, shortness of breath and wheezing  Cardiovascular: Positive for leg swelling  Negative for chest pain and palpitations  Gastrointestinal: Negative for abdominal pain, constipation, diarrhea, nausea and vomiting  Genitourinary: Negative for dysuria  Musculoskeletal: Positive for arthralgias, back pain and gait problem  Negative for myalgias  Neurological: Negative for dizziness, syncope, weakness, light-headedness, numbness and headaches  Psychiatric/Behavioral: Negative for suicidal ideas  The patient is not nervous/anxious  Objective:  Vitals:    04/22/22 1207   BP: 146/82   Pulse: 97   SpO2: 94%   Weight: 74 1 kg (163 lb 6 4 oz)   Height: 4' 10" (1 473 m)     Body mass index is 34 15 kg/m²  Physical Exam  Vitals and nursing note reviewed  Constitutional:       Appearance: She is well-developed  HENT:      Head: Normocephalic and atraumatic  Right Ear: External ear normal       Left Ear: External ear normal       Nose: Nose normal       Mouth/Throat:      Pharynx: No oropharyngeal exudate or posterior oropharyngeal erythema  Eyes:      Extraocular Movements: Extraocular movements intact  Cardiovascular:      Rate and Rhythm: Normal rate and regular rhythm  Heart sounds: Normal heart sounds  No murmur heard  No friction rub  No gallop  Pulmonary:      Effort: Pulmonary effort is normal  No respiratory distress  Breath sounds: Normal breath sounds  No wheezing or rales  Musculoskeletal:         General: Deformity (arthritic of right knee) present  Normal range of motion  Cervical back: Normal range of motion and neck supple  Right lower leg: Edema (minimal at ankle) present  Left lower leg: Edema (minimal at ankle) present  Lymphadenopathy:      Cervical: No cervical adenopathy  Skin:     General: Skin is warm and dry  Neurological:      Mental Status: She is alert and oriented to person, place, and time  Motor: No weakness        Gait: Gait abnormal (ambulates with a cane)  Psychiatric:         Behavior: Behavior normal          Thought Content:  Thought content normal          Judgment: Judgment normal

## 2022-04-25 ENCOUNTER — TELEPHONE (OUTPATIENT)
Dept: FAMILY MEDICINE CLINIC | Facility: CLINIC | Age: 87
End: 2022-04-25

## 2022-05-10 ENCOUNTER — RA CDI HCC (OUTPATIENT)
Dept: OTHER | Facility: HOSPITAL | Age: 87
End: 2022-05-10

## 2022-05-10 NOTE — PROGRESS NOTES
[facility]  coding opportunities       Chart reviewed, no opportunity found:   [address]        Patients Insurance     Medicare Insurance: [employer]

## 2022-05-17 ENCOUNTER — OFFICE VISIT (OUTPATIENT)
Dept: FAMILY MEDICINE CLINIC | Facility: CLINIC | Age: 87
End: 2022-05-17
Payer: COMMERCIAL

## 2022-05-17 VITALS
HEIGHT: 58 IN | HEART RATE: 96 BPM | TEMPERATURE: 97.6 F | SYSTOLIC BLOOD PRESSURE: 138 MMHG | BODY MASS INDEX: 34.3 KG/M2 | OXYGEN SATURATION: 95 % | DIASTOLIC BLOOD PRESSURE: 82 MMHG | WEIGHT: 163.4 LBS

## 2022-05-17 DIAGNOSIS — I10 BENIGN ESSENTIAL HYPERTENSION: Primary | ICD-10-CM

## 2022-05-17 DIAGNOSIS — G62.9 PERIPHERAL POLYNEUROPATHY: ICD-10-CM

## 2022-05-17 DIAGNOSIS — M79.671 FOOT PAIN, BILATERAL: ICD-10-CM

## 2022-05-17 DIAGNOSIS — N18.31 STAGE 3A CHRONIC KIDNEY DISEASE (HCC): ICD-10-CM

## 2022-05-17 DIAGNOSIS — M79.672 FOOT PAIN, BILATERAL: ICD-10-CM

## 2022-05-17 PROBLEM — G60.9 IDIOPATHIC PERIPHERAL NEUROPATHY: Status: ACTIVE | Noted: 2022-05-17

## 2022-05-17 PROCEDURE — 1036F TOBACCO NON-USER: CPT | Performed by: FAMILY MEDICINE

## 2022-05-17 PROCEDURE — 99214 OFFICE O/P EST MOD 30 MIN: CPT | Performed by: FAMILY MEDICINE

## 2022-05-17 PROCEDURE — 1160F RVW MEDS BY RX/DR IN RCRD: CPT | Performed by: FAMILY MEDICINE

## 2022-05-17 RX ORDER — GABAPENTIN 100 MG/1
100 CAPSULE ORAL
Qty: 30 CAPSULE | Refills: 3 | Status: SHIPPED | OUTPATIENT
Start: 2022-05-17

## 2022-05-17 NOTE — PROGRESS NOTES
BMI Counseling: Body mass index is 34 15 kg/m²  Follow-up plan was not completed due to elderly patient (72 years old) where weight reduction/weight gain would complicate underlying health condition such as: illness or physical disability  Assessment/Plan:    Problem List Items Addressed This Visit        Cardiovascular and Mediastinum    Benign essential hypertension - Primary       Genitourinary    Stage 3a chronic kidney disease ([facility] [address] )           Diagnoses and all orders for this visit:    Benign essential hypertension    Stage 3a chronic kidney disease ([facility] [address] )        No problem-specific Assessment & Plan notes found for this encounter  Subjective:      Patient ID: Jamie Parks is a 80 y o  female  Rosana Gaucher is here today with prescribe with a problem with both of her knees bothering her x-rays do show complete loss of the joint space on the medial side of her knees she has severe osteoarthritis with subchondral bone cysts and total obliteration of the medial joint space she also is having problems with peripheral neuropathy of her feet she has pain she cannot find comfortable shoes      The following portions of the patient's history were reviewed and updated as appropriate:   She has a past medical history of Diverticulosis, GERD (gastroesophageal reflux disease), Hiatal hernia, Irritable bowel disease, Lactose intolerance, and Osteoporosis  ,  does not have any pertinent problems on file  ,   has a past surgical history that includes Hysterectomy; Tonsillectomy; Cholecystectomy; Ear tube removal; Skin cancer excision; Hemorroidectomy; Vagina reconstruction surgery (09/13/2019); pr open rx femur fx+intramed riley (Left, 2/1/2020); Cast application (Left, 3/6/3192); Carpal tunnel release (Right); pr exc tumor soft tissue leg/ankle subfascial <3cm (Left, 9/8/2020); and Squamous cell carcinoma excision (Left, 09/08/2020)  ,  family history includes Colon cancer in her mother; Stomach cancer in her father  , reports that she quit smoking about 33 years ago  She has never used smokeless tobacco  She reports that she does not drink alcohol  No history on file for drug use ,  is allergic to escitalopram, mepenzolate, maalox anti-gas [simethicone], other, prednisone, and reglan [metoclopramide]     Current Outpatient Medications   Medication Sig Dispense Refill    acetaminophen (TYLENOL) 325 mg tablet Take 3 tablets (975 mg total) by mouth every 8 (eight) hours 30 tablet 0    Calcium Citrate (CITRACAL PO) Take 1 tablet by mouth daily   Cholecalciferol (VITAMIN D PO) Take 1,000 Units by mouth 2 (two) times a day   fluticasone (FLONASE) 50 mcg/act nasal spray 1 spray into each nostril daily 1 Bottle 2    losartan (COZAAR) 25 mg tablet Take 1 tablet (25 mg total) by mouth daily 90 tablet 1    Multiple Vitamin (MULTIVITAMIN) capsule Take 1 capsule by mouth daily   omeprazole (PriLOSEC) 40 MG capsule Take 1 capsule (40 mg total) by mouth daily 30 capsule 5    vitamin E, tocopherol, 400 units capsule Take 400 Units by mouth daily      ALPRAZolam (XANAX) 0 25 mg tablet Take 1 tablet (0 25 mg total) by mouth 2 (two) times a day as needed for anxiety for up to 10 days 60 tablet 0    cetirizine (ZyrTEC) 10 mg tablet Take 1 tablet (10 mg total) by mouth daily (Patient not taking: Reported on 5/17/2022) 30 tablet 2    Diclofenac Sodium (VOLTAREN) 1 % Apply 2 g topically 4 (four) times a day (Patient not taking: Reported on 5/17/2022) 100 g 2     No current facility-administered medications for this visit  Review of Systems   Constitutional: Negative for activity change, appetite change, diaphoresis, fatigue and fever  HENT: Positive for hearing loss  Negative for dental problem  Eyes: Positive for visual disturbance  Respiratory: Negative for apnea, cough, chest tightness, shortness of breath and wheezing  Cardiovascular: Negative for chest pain, palpitations and leg swelling     Gastrointestinal: Negative for abdominal distention, abdominal pain, anal bleeding, constipation, diarrhea, nausea and vomiting  Endocrine: Negative for cold intolerance, heat intolerance, polydipsia, polyphagia and polyuria  Genitourinary: Negative for difficulty urinating, dysuria, flank pain, hematuria and urgency  Musculoskeletal: Positive for arthralgias  Negative for back pain, gait problem, joint swelling and myalgias  Skin: Negative for color change, rash and wound  Allergic/Immunologic: Negative for environmental allergies, food allergies and immunocompromised state  Neurological: Negative for dizziness, seizures, syncope, speech difficulty, numbness and headaches  Peripheral neuropathy   Hematological: Negative for adenopathy  Does not bruise/bleed easily  Psychiatric/Behavioral: Negative for agitation, behavioral problems, hallucinations, sleep disturbance and suicidal ideas  Objective:  Vitals:    05/17/22 1125   BP: 138/82   BP Location: Right arm   Patient Position: Sitting   Cuff Size: Adult   Pulse: 96   Temp: 97 6 °F (36 4 °C)   TempSrc: Temporal   SpO2: 95%   Weight: 74 1 kg (163 lb 6 4 oz)   Height: 4' 10" (1 473 m)     Body mass index is 34 15 kg/m²  Physical Exam  Constitutional:       General: She is not in acute distress  Appearance: She is well-developed  She is not diaphoretic  HENT:      Head: Normocephalic  Right Ear: External ear normal       Left Ear: External ear normal       Nose: Nose normal    Eyes:      General: No scleral icterus  Right eye: No discharge  Left eye: No discharge  Conjunctiva/sclera: Conjunctivae normal       Pupils: Pupils are equal, round, and reactive to light  Neck:      Thyroid: No thyromegaly  Trachea: No tracheal deviation  Cardiovascular:      Rate and Rhythm: Normal rate and regular rhythm  Heart sounds: Normal heart sounds  No murmur heard  No friction rub  No gallop     Pulmonary:      Effort: Pulmonary effort is normal  No respiratory distress  Breath sounds: Normal breath sounds  No wheezing  Abdominal:      General: Bowel sounds are normal       Palpations: Abdomen is soft  There is no mass  Tenderness: There is no abdominal tenderness  There is no guarding  Musculoskeletal:         General: Deformity present  Cervical back: Normal range of motion  Lymphadenopathy:      Cervical: No cervical adenopathy  Skin:     General: Skin is warm and dry  Findings: No erythema or rash  Neurological:      Mental Status: She is alert and oriented to person, place, and time  Cranial Nerves: No cranial nerve deficit  Psychiatric:         Thought Content:  Thought content normal

## 2022-05-20 ENCOUNTER — TELEPHONE (OUTPATIENT)
Dept: OBGYN CLINIC | Facility: HOSPITAL | Age: 87
End: 2022-05-20

## 2022-05-23 DIAGNOSIS — F41.1 GENERALIZED ANXIETY DISORDER: ICD-10-CM

## 2022-05-23 RX ORDER — ALPRAZOLAM 0.25 MG/1
0.25 TABLET ORAL 2 TIMES DAILY PRN
Qty: 60 TABLET | Refills: 0 | Status: SHIPPED | OUTPATIENT
Start: 2022-05-23 | End: 2022-06-23 | Stop reason: SDUPTHER

## 2022-06-23 DIAGNOSIS — F41.1 GENERALIZED ANXIETY DISORDER: ICD-10-CM

## 2022-06-23 RX ORDER — ALPRAZOLAM 0.25 MG/1
0.25 TABLET ORAL 2 TIMES DAILY PRN
Qty: 60 TABLET | Refills: 0 | Status: SHIPPED | OUTPATIENT
Start: 2022-06-23 | End: 2022-07-22 | Stop reason: SDUPTHER

## 2022-07-22 DIAGNOSIS — F41.1 GENERALIZED ANXIETY DISORDER: ICD-10-CM

## 2022-07-22 RX ORDER — ALPRAZOLAM 0.25 MG/1
0.25 TABLET ORAL 2 TIMES DAILY PRN
Qty: 60 TABLET | Refills: 0 | Status: SHIPPED | OUTPATIENT
Start: 2022-07-22 | End: 2022-08-24 | Stop reason: SDUPTHER

## 2022-08-11 ENCOUNTER — OFFICE VISIT (OUTPATIENT)
Dept: PODIATRY | Facility: CLINIC | Age: 87
End: 2022-08-11
Payer: COMMERCIAL

## 2022-08-11 VITALS
HEIGHT: 58 IN | OXYGEN SATURATION: 93 % | WEIGHT: 163 LBS | BODY MASS INDEX: 34.22 KG/M2 | HEART RATE: 95 BPM | DIASTOLIC BLOOD PRESSURE: 76 MMHG | SYSTOLIC BLOOD PRESSURE: 138 MMHG

## 2022-08-11 DIAGNOSIS — I73.9 PERIPHERAL ARTERIAL DISEASE (HCC): ICD-10-CM

## 2022-08-11 DIAGNOSIS — M79.672 FOOT PAIN, BILATERAL: ICD-10-CM

## 2022-08-11 DIAGNOSIS — M79.671 FOOT PAIN, BILATERAL: ICD-10-CM

## 2022-08-11 DIAGNOSIS — M79.674 PAIN IN TOES OF BOTH FEET: ICD-10-CM

## 2022-08-11 DIAGNOSIS — B35.1 ONYCHOMYCOSIS: ICD-10-CM

## 2022-08-11 DIAGNOSIS — G60.9 IDIOPATHIC PERIPHERAL NEUROPATHY: Primary | ICD-10-CM

## 2022-08-11 DIAGNOSIS — M79.675 PAIN IN TOES OF BOTH FEET: ICD-10-CM

## 2022-08-11 PROCEDURE — 99203 OFFICE O/P NEW LOW 30 MIN: CPT | Performed by: PODIATRIST

## 2022-08-11 PROCEDURE — 11720 DEBRIDE NAIL 1-5: CPT | Performed by: PODIATRIST

## 2022-08-11 NOTE — PROGRESS NOTES
HISTORY AND PHYSICAL EXAM  - [facility]    PATIENT:  Isrrael Lanza    6/30/1931      Assessment/Plan     Problem List Items Addressed This Visit        Nervous and Auditory    Idiopathic peripheral neuropathy - Primary      Other Visit Diagnoses     Foot pain, bilateral        Pain in toes of both feet        Onychomycosis        Peripheral arterial disease ([facility] [address] )               Diagnoses and all orders for this visit:    Idiopathic peripheral neuropathy    Foot pain, bilateral  -     Ambulatory Referral to Podiatry    Pain in toes of both feet    Onychomycosis    Peripheral arterial disease ([facility] [address] )        -we discussed options for management of her neuropathy at length, she is very apprehensive about adding another pill to her regimen  I discussed therapeutic effects of gabapentin likelihood of side effects, I discussed that her therapeutic range range from 300 mg to 3200 mg  She was not interested in use of this medication   -she does have Q 8 findings for at-risk foot care, and she is unable to bend down due to her belly and also previous femur fracture   -she is to return to clinic in 9 weeks for continued at risk foot care   -I discussed homeopathic options for management of her neuropathic pain, we discussed capsaicin, but I do not think that this is a good idea for her as she does have intact protective sensation  I recommended for her to begin taking a B vitamin complex and also offload the folic acid over-the-counter  She is to continue this for 6 weeks before assessing for further decrease in pain  Procedure: All mycotic toenails were reduced and debrided in length, width, and girth using a nail nipper and dremel  Patient tolerated procedure(s) well without complications  [address], Q 8        History of Present Illness   Isrrael Lanza is a 80 y o  female who presents with elongated painful toenails which he is unable to bend down and cut herself    She has had a previous femur fracture and has difficulty bending down  She is complaining of numbness and pain to bilateral feet, she does have pain at night which does wake her up it is shooting pains to bilateral feet  She has been prescribed gabapentin and this was 100 mg daily at bedtime  She reports that she has not taken this at all  She read the package insert and is very concerned about all the side effects she is wondering about homeopathic treatments and other options for management of her pain       Review of Systems   Constitutional: Negative for chills and fever  HENT: Negative for ear pain and sore throat  Eyes: Negative for pain and visual disturbance  Respiratory: Negative for cough and shortness of breath  Cardiovascular: Negative for chest pain and palpitations  Gastrointestinal: Negative for abdominal pain and vomiting  Genitourinary: Negative for dysuria and hematuria  Musculoskeletal: Negative for arthralgias and back pain  Skin: Negative for color change and rash  Neurological: Negative for seizures and syncope  All other systems reviewed and are negative        Historical Information   Past Medical History:   Diagnosis Date    Diverticulosis     GERD (gastroesophageal reflux disease)     Hiatal hernia     Irritable bowel disease     Lactose intolerance     Osteoporosis      Past Surgical History:   Procedure Laterality Date    CARPAL TUNNEL RELEASE Right     CAST APPLICATION Left 6/0/7380    Procedure: APPLICATION CAST, LEFT WRIST;  Surgeon: Tomeka Otero MD;  Location:  MAIN OR;  Service: Orthopedics    CHOLECYSTECTOMY      EAR TUBE REMOVAL      HEMORROIDECTOMY      HYSTERECTOMY      PERINEAL  PELVIC RECONSTRUCTION SURGERY  09/13/2019    Dr Araseli Murphy - [facility]    MS EXC TUMOR SOFT TISSUE LEG/ANKLE SUBFASCIAL <3CM Left 9/8/2020    Procedure: EXCISION BIOPSY TISSUE LESION/MASS LOWER EXTREMITY;  Surgeon: Makenzie Mclean DO;  Location: [facility];  Service: General    MS OPEN RX FEMUR FX+INTRAMED PAUL Left 2020    Procedure: INSERTION NAIL IM FEMUR ANTEGRADE (TROCHANTERIC); Surgeon: Peace Lunsford MD;  Location: [facility];  Service: Orthopedics    SKIN CANCER EXCISION      SQUAMOUS CELL CARCINOMA EXCISION Left 2020    lower part of leg    TONSILLECTOMY       Social History   Social History     Substance and Sexual Activity   Alcohol Use Never     Social History     Substance and Sexual Activity   Drug Use Not on file     Social History     Tobacco Use   Smoking Status Former Smoker    Quit date:     Years since quittin 6   Smokeless Tobacco Never Used     Family History: non-contributory    Meds/Allergies   all medications and allergies reviewed  Allergies   Allergen Reactions    Escitalopram Hives    Mepenzolate      Other reaction(s): Urinary Retention    Maalox Anti-Gas [Simethicone] Hives    Other      Seasonal     Prednisone Other (See Comments)    Reglan [Metoclopramide] Other (See Comments)     hyper       Objective   Vitals: Blood pressure 138/76, pulse 95, height 4' 10" (1 473 m), weight 73 9 kg (163 lb), SpO2 93 %  ,Body mass index is 34 07 kg/m²  Physical Exam  Vitals reviewed  Constitutional:       Appearance: Normal appearance  She is obese  HENT:      Head: Normocephalic and atraumatic  Nose: Nose normal       Mouth/Throat:      Mouth: Mucous membranes are moist    Eyes:      Pupils: Pupils are equal, round, and reactive to light  Pulmonary:      Effort: Pulmonary effort is normal    Musculoskeletal:         General: Swelling present  Right lower leg: Edema present  Left lower leg: Edema present  Comments: Skin is shiny and atrophic with no open lesions, nails 1, 5 bilateral feet are thickened elongated with notable subungual debris and discolored  Feet are warm to cool proximal to distal   Pulses DP and PT are nonpalpable to bilateral feet  Positive paresthesias bilateral feet    Presence of mild hammertoes and bunions bilaterally  Skin:     Capillary Refill: Capillary refill takes more than 3 seconds  Neurological:      General: No focal deficit present  Mental Status: She is alert and oriented to person, place, and time           Ortho Exam

## 2022-08-11 NOTE — PATIENT INSTRUCTIONS
Peripheral Artery Disease   AMBULATORY CARE:   Peripheral artery disease (PAD)  is narrow, weak, or blocked arteries  It may affect any arteries outside of your heart and brain  PAD is usually the result of a buildup of fat and cholesterol, also called plaque, along your artery walls  Inflammation, a blood clot, or abnormal cell growth could also block your arteries  PAD prevents normal blood flow to your legs and arms  You are at risk of an amputation if poor blood flow keeps wounds from healing or causes gangrene (tissue death)  Without treatment, PAD can also cause a heart attack or stroke  Common symptoms include:  Mild PAD usually does not cause symptoms  As the disease worsens over time, you may have the following:  Pain or cramps in your leg or hip while you walk    A numb, weak, or heavy feeling in your legs    Dry, scaly, red, or pale skin on your legs    Thick or brittle nails, or hair loss on your arms and legs    Foot sores that will not heal    Burning or aching in your feet and toes while resting (this may be worse when you lie down)    Call your local emergency number (911 in the [address] [facility]) if:   You have any of the following signs of a heart attack:      Squeezing, pressure, or pain in your chest    You may  also have any of the following:     Discomfort or pain in your back, neck, jaw, stomach, or arm    Shortness of breath    Nausea or vomiting    Lightheadedness or a sudden cold sweat    You have any of the following signs of a stroke:      Numbness or drooping on one side of your face     Weakness in an arm or leg    Confusion or difficulty speaking    Dizziness, a severe headache, or vision loss    Seek care immediately if:   You have sores or wounds that will not heal     You notice black or discolored skin on your arm or leg  Your skin is cool to the touch  Call your doctor if:   You have leg pain when you walk 1/8 mile (200 meters) or less, even with treatment      Your legs are red, dry, or pale, even with treatment  You have questions or concerns about your condition or care  Treatment for PAD  can help reduce your risk of a heart attack, stroke, or amputation  You may need more than one of the following:  Medicines  may be given to prevent blood clots and reduce the risk of a heart attack or stroke  You may be given medicine to help prevent your PAD from getting worse  A supervised exercise program  helps you stay active in normal daily activities  Healthcare providers will help you safely walk or do strength training exercises 3 times a week for 30 to 60 minutes  You will do this for several months, then transition to walking on your own  Angioplasty  is a procedure to open your artery so blood can flow through normally  A thin tube called a catheter is used to insert a small balloon into your artery  The balloon is inflated to open your blocked artery, and then removed  A tube called a stent may be placed in your artery to hold it open  Bypass surgery  is used to make a new connection to your artery with a vein from another part of your body, or an artificial graft  The vein or graft is attached to your artery above and below your blockage  This allows blood to flow around the blocked portion of your artery  Manage and prevent PAD:   Walk for 30 to 60 minutes at least 4 times a week  Your healthcare provider may also refer you to a supervised exercise program  The program helps increase how far you can walk without pain  It also helps you stay active in normal daily activities         Do not smoke  Nicotine and other chemicals in cigarettes and cigars can worsen PAD  They can also increase your risk for a heart attack or stroke  Ask your healthcare provider for information if you currently smoke and need help to quit  E-cigarettes or smokeless tobacco still contain nicotine  Talk to your healthcare provider before you use these products  Manage other health conditions  Take your medicines as directed and follow your healthcare provider's instructions if you have high blood pressure or high cholesterol  Perform foot care and check your blood sugar levels as directed if you have diabetes  Eat heart-healthy foods  Eat whole grains, fruits, and vegetables every day  Limit salt and high-fat foods  Ask your healthcare provider for more information on a heart healthy diet  Ask what a healthy weight is for you  Your healthcare provider can help you create a healthy weight-loss plan, if needed  Follow up with your doctor as directed:  Write down your questions so you remember to ask them during your visits  © Copyright [employer] 2022 Information is for End User's use only and may not be sold, redistributed or otherwise used for commercial purposes  All illustrations and images included in CareNotes® are the copyrighted property of [employer]  or [facility]   The above information is an  only  It is not intended as medical advice for individual conditions or treatments  Talk to your doctor, nurse or pharmacist before following any medical regimen to see if it is safe and effective for you

## 2022-08-17 ENCOUNTER — OFFICE VISIT (OUTPATIENT)
Dept: FAMILY MEDICINE CLINIC | Facility: CLINIC | Age: 87
End: 2022-08-17
Payer: COMMERCIAL

## 2022-08-17 VITALS
OXYGEN SATURATION: 98 % | BODY MASS INDEX: 33.58 KG/M2 | SYSTOLIC BLOOD PRESSURE: 152 MMHG | HEART RATE: 100 BPM | TEMPERATURE: 97.7 F | HEIGHT: 58 IN | WEIGHT: 160 LBS | DIASTOLIC BLOOD PRESSURE: 92 MMHG

## 2022-08-17 DIAGNOSIS — N18.31 STAGE 3A CHRONIC KIDNEY DISEASE (HCC): ICD-10-CM

## 2022-08-17 DIAGNOSIS — G60.9 IDIOPATHIC PERIPHERAL NEUROPATHY: ICD-10-CM

## 2022-08-17 DIAGNOSIS — I10 BENIGN ESSENTIAL HYPERTENSION: Primary | ICD-10-CM

## 2022-08-17 DIAGNOSIS — R09.89 BRUIT OF RIGHT CAROTID ARTERY: ICD-10-CM

## 2022-08-17 PROBLEM — Z48.89 AFTERCARE FOLLOWING SURGERY: Status: RESOLVED | Noted: 2020-02-17 | Resolved: 2022-08-17

## 2022-08-17 PROBLEM — L98.9 SKIN LESION OF LEFT LEG: Status: RESOLVED | Noted: 2020-09-03 | Resolved: 2022-08-17

## 2022-08-17 PROBLEM — S72.142A FRACTURE OF FEMUR, INTERTROCHANTERIC, LEFT, CLOSED (HCC): Status: RESOLVED | Noted: 2020-01-31 | Resolved: 2022-08-17

## 2022-08-17 PROBLEM — C44.729 SQUAMOUS CELL CARCINOMA OF LEG, LEFT: Status: RESOLVED | Noted: 2020-09-23 | Resolved: 2022-08-17

## 2022-08-17 PROBLEM — L08.9 INFECTED EPIDERMOID CYST: Status: RESOLVED | Noted: 2021-09-08 | Resolved: 2022-08-17

## 2022-08-17 PROBLEM — L72.0 INFECTED EPIDERMOID CYST: Status: RESOLVED | Noted: 2021-09-08 | Resolved: 2022-08-17

## 2022-08-17 PROBLEM — M79.662 PAIN OF LEFT CALF: Status: RESOLVED | Noted: 2020-09-28 | Resolved: 2022-08-17

## 2022-08-17 PROBLEM — R11.2 PONV (POSTOPERATIVE NAUSEA AND VOMITING): Status: RESOLVED | Noted: 2020-09-08 | Resolved: 2022-08-17

## 2022-08-17 PROBLEM — Z09 FRACTURE FOLLOW-UP: Status: RESOLVED | Noted: 2020-02-17 | Resolved: 2022-08-17

## 2022-08-17 PROBLEM — L50.8 CHRONIC URTICARIA: Status: RESOLVED | Noted: 2017-01-05 | Resolved: 2022-08-17

## 2022-08-17 PROBLEM — Z98.890 PONV (POSTOPERATIVE NAUSEA AND VOMITING): Status: RESOLVED | Noted: 2020-09-08 | Resolved: 2022-08-17

## 2022-08-17 PROBLEM — S52.502A CLOSED FRACTURE OF LEFT DISTAL RADIUS: Status: RESOLVED | Noted: 2020-01-31 | Resolved: 2022-08-17

## 2022-08-17 PROBLEM — M25.562 LEFT KNEE PAIN: Status: RESOLVED | Noted: 2020-02-03 | Resolved: 2022-08-17

## 2022-08-17 PROCEDURE — G0439 PPPS, SUBSEQ VISIT: HCPCS | Performed by: FAMILY MEDICINE

## 2022-08-17 PROCEDURE — 1160F RVW MEDS BY RX/DR IN RCRD: CPT | Performed by: FAMILY MEDICINE

## 2022-08-17 NOTE — PROGRESS NOTES
Assessment/Plan:  Patient will be referred for carotid Dopplers she has discomfort carotidynia in the right carotid distribution and bruit    Problem List Items Addressed This Visit        Cardiovascular and Mediastinum    Benign essential hypertension - Primary       Nervous and Auditory    Idiopathic peripheral neuropathy       Genitourinary    Stage 3a chronic kidney disease ([facility] [address] )           Diagnoses and all orders for this visit:    Benign essential hypertension    Idiopathic peripheral neuropathy    Stage 3a chronic kidney disease ([address] )        No problem-specific Assessment & Plan notes found for this encounter  Subjective:      Patient ID: Sierra King is a 80 y o  female  Scope is here for a follow-up of visit she has a lot of musculoskeletal complaints she has neuropathy in her hands in her feet she also has arthralgia and she has obvious arthritic deformity of her knees patient is still living independently her biggest complaint is her neuropathy she was not able to take gabapentin on she actually did not try it she read about it and she decided shin want to expose herself to those side effects      The following portions of the patient's history were reviewed and updated as appropriate:   She has a past medical history of Diverticulosis, GERD (gastroesophageal reflux disease), Hiatal hernia, Irritable bowel disease, Lactose intolerance, and Osteoporosis  ,  does not have any pertinent problems on file  ,   has a past surgical history that includes Hysterectomy; Tonsillectomy; Cholecystectomy; Ear tube removal; Skin cancer excision; Hemorroidectomy; Vagina reconstruction surgery (09/13/2019); pr open rx femur fx+intramed riley (Left, 2/1/2020); Cast application (Left, 8/9/1688); Carpal tunnel release (Right); pr exc tumor soft tissue leg/ankle subfascial <3cm (Left, 9/8/2020); and Squamous cell carcinoma excision (Left, 09/08/2020)  ,  family history includes Colon cancer in her mother; Stomach cancer in her father  ,   reports that she quit smoking about 33 years ago  She quit after 30 00 years of use  She has never used smokeless tobacco  She reports that she does not drink alcohol  No history on file for drug use ,  is allergic to escitalopram, mepenzolate, maalox anti-gas [simethicone], other, prednisone, and reglan [metoclopramide]     Current Outpatient Medications   Medication Sig Dispense Refill    acetaminophen (TYLENOL) 325 mg tablet Take 3 tablets (975 mg total) by mouth every 8 (eight) hours 30 tablet 0    ALPRAZolam (XANAX) 0 25 mg tablet Take 1 tablet (0 25 mg total) by mouth 2 (two) times a day as needed for anxiety for up to 10 days 60 tablet 0    Calcium Citrate (CITRACAL PO) Take 1 tablet by mouth daily   cetirizine (ZyrTEC) 10 mg tablet Take 1 tablet (10 mg total) by mouth daily 30 tablet 2    Cholecalciferol (VITAMIN D PO) Take 1,000 Units by mouth 2 (two) times a day   fluticasone (FLONASE) 50 mcg/act nasal spray 1 spray into each nostril daily 1 Bottle 2    Multiple Vitamin (MULTIVITAMIN) capsule Take 1 capsule by mouth daily   omeprazole (PriLOSEC) 40 MG capsule Take 1 capsule (40 mg total) by mouth daily 30 capsule 5    vitamin E, tocopherol, 400 units capsule Take 400 Units by mouth daily      gabapentin (Neurontin) 100 mg capsule Take 1 capsule (100 mg total) by mouth daily at bedtime (Patient not taking: Reported on 8/17/2022) 30 capsule 3    losartan (COZAAR) 25 mg tablet Take 1 tablet (25 mg total) by mouth daily 90 tablet 1     No current facility-administered medications for this visit  Review of Systems   Constitutional: Positive for activity change  Negative for appetite change, diaphoresis, fatigue and fever  HENT: Negative  Eyes: Negative  Respiratory: Negative for apnea, cough, chest tightness, shortness of breath and wheezing  Cardiovascular: Negative for chest pain, palpitations and leg swelling     Gastrointestinal: Negative for abdominal distention, abdominal pain, anal bleeding, constipation, diarrhea, nausea and vomiting  Endocrine: Negative for cold intolerance, heat intolerance, polydipsia, polyphagia and polyuria  Genitourinary: Negative for difficulty urinating, dysuria, flank pain, hematuria and urgency  Musculoskeletal: Negative for arthralgias, back pain, gait problem, joint swelling and myalgias  Skin: Negative for color change, rash and wound  Allergic/Immunologic: Negative for environmental allergies, food allergies and immunocompromised state  Neurological: Negative for dizziness, seizures, syncope, speech difficulty, numbness and headaches  Hematological: Negative for adenopathy  Does not bruise/bleed easily  Psychiatric/Behavioral: Negative for agitation, behavioral problems, hallucinations, sleep disturbance and suicidal ideas  Objective:  Vitals:    08/17/22 1131   BP: 152/92   BP Location: Left arm   Patient Position: Sitting   Cuff Size: Standard   Pulse: 100   Temp: 97 7 °F (36 5 °C)   TempSrc: Temporal   SpO2: 98%   Weight: 72 6 kg (160 lb)   Height: 4' 10" (1 473 m)     Body mass index is 33 44 kg/m²  Physical Exam  Constitutional:       General: She is not in acute distress  Appearance: She is well-developed  She is obese  She is ill-appearing  She is not diaphoretic  HENT:      Head: Normocephalic  Right Ear: External ear normal       Left Ear: External ear normal       Nose: Nose normal    Eyes:      General: No scleral icterus  Right eye: No discharge  Left eye: No discharge  Conjunctiva/sclera: Conjunctivae normal       Pupils: Pupils are equal, round, and reactive to light  Neck:      Thyroid: No thyromegaly  Trachea: No tracheal deviation  Cardiovascular:      Rate and Rhythm: Normal rate and regular rhythm  Heart sounds: Normal heart sounds  No murmur heard  No friction rub  No gallop     Pulmonary:      Effort: Pulmonary effort is normal  No respiratory distress  Breath sounds: Normal breath sounds  No wheezing  Abdominal:      General: Bowel sounds are normal       Palpations: Abdomen is soft  There is no mass  Tenderness: There is no abdominal tenderness  There is no guarding  Musculoskeletal:         General: No deformity  Cervical back: Normal range of motion  Lymphadenopathy:      Cervical: No cervical adenopathy  Skin:     General: Skin is warm and dry  Findings: No erythema or rash  Neurological:      Mental Status: She is alert and oriented to person, place, and time  Cranial Nerves: No cranial nerve deficit  Psychiatric:         Thought Content:  Thought content normal

## 2022-08-18 ENCOUNTER — HOSPITAL ENCOUNTER (OUTPATIENT)
Dept: NON INVASIVE DIAGNOSTICS | Facility: HOSPITAL | Age: 87
Discharge: HOME | End: 2022-08-18
Attending: FAMILY MEDICINE
Payer: COMMERCIAL

## 2022-08-18 DIAGNOSIS — R09.89 BRUIT OF RIGHT CAROTID ARTERY: ICD-10-CM

## 2022-08-18 PROCEDURE — 93880 EXTRACRANIAL BILAT STUDY: CPT | Performed by: SURGERY

## 2022-08-18 PROCEDURE — 93880 EXTRACRANIAL BILAT STUDY: CPT

## 2022-08-24 DIAGNOSIS — F41.1 GENERALIZED ANXIETY DISORDER: ICD-10-CM

## 2022-08-24 RX ORDER — ALPRAZOLAM 0.25 MG/1
0.25 TABLET ORAL 2 TIMES DAILY PRN
Qty: 60 TABLET | Refills: 0 | Status: SHIPPED | OUTPATIENT
Start: 2022-08-24 | End: 2022-09-23 | Stop reason: SDUPTHER

## 2022-09-16 ENCOUNTER — OFFICE VISIT (OUTPATIENT)
Dept: FAMILY MEDICINE CLINIC | Facility: CLINIC | Age: 87
End: 2022-09-16
Payer: COMMERCIAL

## 2022-09-16 VITALS
HEART RATE: 106 BPM | TEMPERATURE: 98 F | DIASTOLIC BLOOD PRESSURE: 96 MMHG | HEIGHT: 58 IN | OXYGEN SATURATION: 97 % | SYSTOLIC BLOOD PRESSURE: 176 MMHG | WEIGHT: 161 LBS | BODY MASS INDEX: 33.8 KG/M2

## 2022-09-16 DIAGNOSIS — I10 BENIGN ESSENTIAL HYPERTENSION: ICD-10-CM

## 2022-09-16 DIAGNOSIS — N18.31 STAGE 3A CHRONIC KIDNEY DISEASE (HCC): ICD-10-CM

## 2022-09-16 DIAGNOSIS — J01.01 ACUTE RECURRENT MAXILLARY SINUSITIS: Primary | ICD-10-CM

## 2022-09-16 PROCEDURE — 99214 OFFICE O/P EST MOD 30 MIN: CPT | Performed by: FAMILY MEDICINE

## 2022-09-16 RX ORDER — LOSARTAN POTASSIUM 50 MG/1
50 TABLET ORAL DAILY
Qty: 90 TABLET | Refills: 1 | Status: SHIPPED | OUTPATIENT
Start: 2022-09-16

## 2022-09-16 RX ORDER — AMOXICILLIN 500 MG/1
500 CAPSULE ORAL EVERY 8 HOURS SCHEDULED
Qty: 30 CAPSULE | Refills: 0 | Status: SHIPPED | OUTPATIENT
Start: 2022-09-16 | End: 2022-09-26

## 2022-09-16 NOTE — PROGRESS NOTES
Assessment/Plan:  Patient will be treated with amoxicillin and her losartan dosage will be doubled to 50 mg    Problem List Items Addressed This Visit        Cardiovascular and Mediastinum    Benign essential hypertension    Relevant Medications    losartan (COZAAR) 50 mg tablet       Genitourinary    Stage 3a chronic kidney disease ([address] )      Other Visit Diagnoses     Acute recurrent maxillary sinusitis    -  Primary    Relevant Medications    amoxicillin (AMOXIL) 500 mg capsule           Diagnoses and all orders for this visit:    Acute recurrent maxillary sinusitis  -     amoxicillin (AMOXIL) 500 mg capsule; Take 1 capsule (500 mg total) by mouth every 8 (eight) hours for 10 days    Stage 3a chronic kidney disease (HCC)    Benign essential hypertension  -     losartan (COZAAR) 50 mg tablet; Take 1 tablet (50 mg total) by mouth daily        No problem-specific Assessment & Plan notes found for this encounter  Subjective:      Patient ID: Arnie Vargas is a 80 y o  female  Mrs Jovan Recinos is here with the chief complaint of irritation of her nasal passages and her ears she states that she has felt like her sinuses have become irritated from smelling paint that has been drying slowly in giving off papers otherwise she does not have any fever chills sore throat nausea vomiting diarrhea the other issue is her blood pressure is mildly elevated and the patient will need an increased dose of her losartan to 50 mg once daily as far as her sinuses and ears go were going to treat that with amoxicillin      The following portions of the patient's history were reviewed and updated as appropriate:   She has a past medical history of Diverticulosis, GERD (gastroesophageal reflux disease), Hiatal hernia, Irritable bowel disease, Lactose intolerance, and Osteoporosis  ,  does not have any pertinent problems on file  ,   has a past surgical history that includes Hysterectomy; Tonsillectomy; Cholecystectomy;  Ear tube removal; Skin cancer excision; Hemorroidectomy; Vagina reconstruction surgery (09/13/2019); pr open rx femur fx+intramed riley (Left, 2/1/2020); Cast application (Left, 8/9/9103); Carpal tunnel release (Right); pr exc tumor soft tissue leg/ankle subfascial <3cm (Left, 9/8/2020); and Squamous cell carcinoma excision (Left, 09/08/2020)  ,  family history includes Colon cancer in her mother; Stomach cancer in her father  ,   reports that she quit smoking about 33 years ago  She quit after 30 00 years of use  She has never used smokeless tobacco  She reports that she does not drink alcohol  No history on file for drug use ,  is allergic to escitalopram, mepenzolate, maalox anti-gas [simethicone], other, prednisone, and reglan [metoclopramide]     Current Outpatient Medications   Medication Sig Dispense Refill    acetaminophen (TYLENOL) 325 mg tablet Take 3 tablets (975 mg total) by mouth every 8 (eight) hours 30 tablet 0    ALPRAZolam (XANAX) 0 25 mg tablet Take 1 tablet (0 25 mg total) by mouth 2 (two) times a day as needed for anxiety for up to 10 days 60 tablet 0    amoxicillin (AMOXIL) 500 mg capsule Take 1 capsule (500 mg total) by mouth every 8 (eight) hours for 10 days 30 capsule 0    Calcium Citrate (CITRACAL PO) Take 1 tablet by mouth daily   cetirizine (ZyrTEC) 10 mg tablet Take 1 tablet (10 mg total) by mouth daily 30 tablet 2    Cholecalciferol (VITAMIN D PO) Take 1,000 Units by mouth 2 (two) times a day   fluticasone (FLONASE) 50 mcg/act nasal spray 1 spray into each nostril daily 1 Bottle 2    gabapentin (Neurontin) 100 mg capsule Take 1 capsule (100 mg total) by mouth daily at bedtime 30 capsule 3    losartan (COZAAR) 50 mg tablet Take 1 tablet (50 mg total) by mouth daily 90 tablet 1    Multiple Vitamin (MULTIVITAMIN) capsule Take 1 capsule by mouth daily        omeprazole (PriLOSEC) 40 MG capsule Take 1 capsule (40 mg total) by mouth daily 30 capsule 5    vitamin E, tocopherol, 400 units capsule Take 400 Units by mouth daily       No current facility-administered medications for this visit  Review of Systems   Constitutional: Negative for activity change, appetite change, diaphoresis, fatigue and fever  HENT: Positive for hearing loss, sinus pressure and sinus pain  Eyes: Negative  Respiratory: Negative for apnea, cough, chest tightness, shortness of breath and wheezing  Cardiovascular: Negative for chest pain, palpitations and leg swelling  Gastrointestinal: Negative for abdominal distention, abdominal pain, anal bleeding, constipation, diarrhea, nausea and vomiting  Endocrine: Negative for cold intolerance, heat intolerance, polydipsia, polyphagia and polyuria  Genitourinary: Negative for difficulty urinating, dysuria, flank pain, hematuria and urgency  Musculoskeletal: Negative for arthralgias, back pain, gait problem, joint swelling and myalgias  Skin: Negative for color change, rash and wound  Allergic/Immunologic: Negative for environmental allergies, food allergies and immunocompromised state  Neurological: Negative for dizziness, seizures, syncope, speech difficulty, numbness and headaches  Hematological: Negative for adenopathy  Does not bruise/bleed easily  Psychiatric/Behavioral: Negative for agitation, behavioral problems, hallucinations, sleep disturbance and suicidal ideas  Objective:  Vitals:    09/16/22 1413   BP: (!) 176/96   BP Location: Left arm   Patient Position: Sitting   Cuff Size: Standard   Pulse: (!) 106   Temp: 98 °F (36 7 °C)   TempSrc: Temporal   SpO2: 97%   Weight: 73 kg (161 lb)   Height: 4' 10" (1 473 m)     Body mass index is 33 65 kg/m²  Physical Exam  Constitutional:       General: She is not in acute distress  Appearance: She is well-developed  She is not diaphoretic  HENT:      Head: Normocephalic        Right Ear: External ear normal       Left Ear: External ear normal       Nose: Nose normal    Eyes:      General: No scleral icterus  Right eye: No discharge  Left eye: No discharge  Conjunctiva/sclera: Conjunctivae normal       Pupils: Pupils are equal, round, and reactive to light  Neck:      Thyroid: No thyromegaly  Trachea: No tracheal deviation  Cardiovascular:      Rate and Rhythm: Normal rate and regular rhythm  Heart sounds: Normal heart sounds  No murmur heard  No friction rub  No gallop  Pulmonary:      Effort: Pulmonary effort is normal  No respiratory distress  Breath sounds: Normal breath sounds  No wheezing  Abdominal:      General: Bowel sounds are normal       Palpations: Abdomen is soft  There is no mass  Tenderness: There is no abdominal tenderness  There is no guarding  Musculoskeletal:         General: No deformity  Cervical back: Normal range of motion  Lymphadenopathy:      Cervical: No cervical adenopathy  Skin:     General: Skin is warm and dry  Findings: No erythema or rash  Neurological:      Mental Status: She is alert and oriented to person, place, and time  Cranial Nerves: No cranial nerve deficit  Psychiatric:         Thought Content:  Thought content normal

## 2022-09-20 ENCOUNTER — TELEMEDICINE (OUTPATIENT)
Dept: FAMILY MEDICINE CLINIC | Facility: CLINIC | Age: 87
End: 2022-09-20
Payer: COMMERCIAL

## 2022-09-20 VITALS — HEIGHT: 58 IN | BODY MASS INDEX: 33.8 KG/M2 | WEIGHT: 161 LBS

## 2022-09-20 DIAGNOSIS — U07.1 COVID-19: Primary | ICD-10-CM

## 2022-09-20 PROCEDURE — 99213 OFFICE O/P EST LOW 20 MIN: CPT | Performed by: PHYSICIAN ASSISTANT

## 2022-09-20 RX ORDER — NIRMATRELVIR AND RITONAVIR 150-100 MG
2 KIT ORAL 2 TIMES DAILY
Qty: 20 TABLET | Refills: 0 | Status: SHIPPED | OUTPATIENT
Start: 2022-09-20 | End: 2022-09-25

## 2022-09-20 NOTE — PROGRESS NOTES
COVID-19 Outpatient Progress Note    Assessment/Plan:    Problem List Items Addressed This Visit    None     Visit Diagnoses     COVID-19    -  Primary    Relevant Medications    nirmatrelvir & ritonavir (Paxlovid, 150/100,) tablet therapy pack         Disposition:     Patient has asymptomatic or mild COVID-19 infection  Based off CDC guidelines, they were recommended to isolate for 5 days  If they are asymptomatic or symptoms are improving with no fevers in the past 24 hours, isolation may be ended followed by 5 days of wearing a mask when around othes to minimize risk of infecting others  If still have a fever or other symptoms have not improved, continue to isolate until they improve  Regardless of when they end isolation, avoid being around people who are more likely to get very sick from COVID-19 until at least day 11  Discussed symptom directed medication options with patient  Patient meets criteria for PAXLOVID and they have been counseled appropriately according to EUA documentation released by the FDA  After discussion, patient agrees to treatment  Esmer Defiance is an investigational medicine used to treat mild-to-moderate COVID-19 in adults and children (15years of age and older weighing at least 80 pounds (40 kg)) with positive results of direct SARS-CoV-2 viral testing, and who are at high risk for progression to severe COVID-19, including hospitalization or death  PAXLOVID is investigational because it is still being studied  There is limited information about the safety and effectiveness of using PAXLOVID to treat people with mild-to-moderate COVID-19      The FDA has authorized the emergency use of PAXLOVID for the treatment of mild-tomoderate COVID-19 in adults and children (15years of age and older weighing at least 80 pounds (40 kg)) with a positive test for the virus that causes COVID-19, and who are at high risk for progression to severe COVID-19, including hospitalization or death, under an EUA  What should I tell my healthcare provider before I take PAXLOVID? Tell your healthcare provider if you:  - Have any allergies  - Have liver or kidney disease  - Are pregnant or plan to become pregnant  - Are breastfeeding a child  - Have any serious illnesses    Tell your healthcare provider about all the medicines you take, including prescription and over-the-counter medicines, vitamins, and herbal supplements  Some medicines may interact with PAXLOVID and may cause serious side effects  Keep a list of your medicines to show your healthcare provider and pharmacist when you get a new medicine  You can ask your healthcare provider or pharmacist for a list of medicines that interact with PAXLOVID  Do not start taking a new medicine without telling your healthcare provider  Your healthcare provider can tell you if it is safe to take PAXLOVID with other medicines  Tell your healthcare provider if you are taking combined hormonal contraceptive  PAXLOVID may affect how your birth control pills work  Females who are able to become pregnant should use another effective alternative form of contraception or an additional barrier method of contraception  Talk to your healthcare provider if you have any questions about contraceptive methods that might be right for you  How do I take PAXLOVID? PAXLOVID consists of 2 medicines: nirmatrelvir and ritonavir  - Take 2 pink tablets of nirmatrelvir with 1 white tablet of ritonavir by mouth 2 times each day (in the morning and in the evening) for 5 days  For each dose, take all 3 tablets at the same time  - If you have kidney disease, talk to your healthcare provider  You may need a different dose  - Swallow the tablets whole  Do not chew, break, or crush the tablets  - Take PAXLOVID with or without food  - Do not stop taking PAXLOVID without talking to your healthcare provider, even if you feel better    - If you miss a dose of PAXLOVID within 8 hours of the time it is usually taken, take it as soon as you remember  If you miss a dose by more than 8 hours, skip the missed dose and take the next dose at your regular time  Do not take 2 doses of PAXLOVID at the same time  - If you take too much PAXLOVID, call your healthcare provider or go to the nearest hospital emergency room right away  - If you are taking a ritonavir- or cobicistat-containing medicine to treat hepatitis C or Human Immunodeficiency Virus (HIV), you should continue to take your medicine as prescribed by your healthcare provider   - Talk to your healthcare provider if you do not feel better or if you feel worse after 5 days  Who should generally not take PAXLOVID? Do not take PAXLOVID if:  You are allergic to nirmatrelvir, ritonavir, or any of the ingredients in PAXLOVID  You are taking any of the following medicines:  - Alfuzosin  - Pethidine, piroxicam, propoxyphene  - Ranolazine  - Amiodarone, dronedarone, flecainide, propafenone, quinidine  - Colchicine  - Lurasidone, pimozide, clozapine  - Dihydroergotamine, ergotamine, methylergonovine  - Lovastatin, simvastatin  - Sildenafil (Revatio®) for pulmonary arterial hypertension (PAH)  - Triazolam, oral midazolam  - Apalutamide  - Carbamazepine, phenobarbital, phenytoin  - Rifampin  - St  Charlis Wort (hypericum perforatum)    What are the important possible side effects of PAXLOVID? Possible side effects of PAXLOVID are:  - Liver Problems  Tell your healthcare provider right away if you have any of these signs and symptoms of liver problems: loss of appetite, yellowing of your skin and the whites of eyes (jaundice), dark-colored urine, pale colored stools and itchy skin, stomach area (abdominal) pain  - Resistance to HIV Medicines  If you have untreated HIV infection, PAXLOVID may lead to some HIV medicines not working as well in the future    - Other possible side effects include: altered sense of taste, diarrhea, high blood pressure, or muscle aches    These are not all the possible side effects of PAXLOVID  Not many people have taken PAXLOVID  Serious and unexpected side effects may happen  Iris Emmanuel is still being studied, so it is possible that all of the risks are not known at this time  What other treatment choices are there? Like Yariel Torre may allow for the emergency use of other medicines to treat people with COVID-19  Go to https://Event[employer]/ for information on the emergency use of other medicines that are authorized by FDA to treat people with COVID-19  Your healthcare provider may talk with you about clinical trials for which you may be eligible  It is your choice to be treated or not to be treated with PAXLOVID  Should you decide not to receive it or for your child not to receive it, it will not change your standard medical care  What if I am pregnant or breastfeeding? There is no experience treating pregnant women or breastfeeding mothers with PAXLOVID  For a mother and unborn baby, the benefit of taking PAXLOVID may be greater than the risk from the treatment  If you are pregnant, discuss your options and specific situation with your healthcare provider  It is recommended that you use effective barrier contraception or do not have sexual activity while taking PAXLOVID  If you are breastfeeding, discuss your options and specific situation with your healthcare provider  How do I report side effects with PAXLOVID? Contact your healthcare provider if you have any side effects that bother you or do not go away  Report side effects to FDA MedWatch at www fda gov/medwatch or call 7-866-DVU9643 or you can report side effects to [facility]  at the contact information provided below  Website Fax number Telephone number   [employer] [phone] [phone]     How should I store PAXLOVID? Store PAXLOVID tablets at room temperature between 68°F to 77°F (20°C to 25°C)  Full fact sheet for patients, parents, and caregivers can be found at: [employer]    I have spent 20 minutes directly with the patient  Greater than 50% of this time was spent in counseling/coordination of care regarding: diagnostic results, risks and benefits of treatment options, instructions for management, patient and family education, importance of treatment compliance and impressions  Encounter provider: Tari Gallego PA-C     Provider located at: [address] [id number]     Recent Visits  Date Type Provider Dept   09/16/22 Office Visit Biagio Closs, DO [facility]   Showing recent visits within past 7 days and meeting all other requirements  Today's Visits  Date Type Provider Dept   09/20/22 Telemedicine Tari Gallego PA-C [facility]   Showing today's visits and meeting all other requirements  Future Appointments  No visits were found meeting these conditions  Showing future appointments within next 150 days and meeting all other requirements     This virtual check-in was done via [employer] and patient was informed that this is a secure, HIPAA-compliant platform  She agrees to proceed  Patient agrees to participate in a virtual check in via telephone or video visit instead of presenting to the office to address urgent/immediate medical needs  Patient is aware this is a billable service  She acknowledged consent and understanding of privacy and security of the video platform  The patient has agreed to participate and understands they can discontinue the visit at any time  After connecting through [facility], the patient was identified by name and date of birth  Tejas Cruz was informed that this was a telemedicine visit and that the exam was being conducted confidentially over secure lines   Nickolas Lucero Anita Molina acknowledged consent and understanding of privacy and security of the telemedicine visit  I informed the patient that I have reviewed her record in Epic and presented the opportunity for her to ask any questions regarding the visit today  The patient agreed to participate  Verification of patient location:  Patient is located in the following state in which I hold an active license: [place]    Subjective: Michelene Najjar is a 80 y o  female who has been screened for COVID-19  Symptom change since last report: improving  Patient's symptoms include nasal congestion, rhinorrhea, sore throat, cough, diarrhea and headache  Patient denies fever, chills, fatigue, anosmia, loss of taste, shortness of breath, chest tightness, abdominal pain, nausea, vomiting and myalgias  - Date of symptom onset: 9/16/2022  - Date of positive COVID-19 test: 9/19/2022  Type of test: Home antigen  Patient with typical symptoms of COVID-19 and they attest that they were positive on home rapid antigen testing  Image of positive result is not able to be uploaded into their chart  COVID-19 vaccination status: Not vaccinated    Berenice Elmore has been staying home and has isolated themselves in her home  She is taking care to not share personal items and is cleaning all surfaces that are touched often, like counters, tabletops, and doorknobs using household cleaning sprays or wipes  She is wearing a mask when she leaves her room  Berenice Elmore is a pleasant 80year old female who is here today accompanied by her grandson after testing positive for COVID  She was seen in the office last Friday, and diagnosed with sinusitis  However, she was exposed last Wednesday/Thursday to her relative who ended up testing positive for COVID over the weekend  She admits that she started with stuffy nose, headache, diarrhea, sore throat, and mild cough Sunday  She felt feverish, but did not take her temperature   She is taking tylenol, flonase, claritin, and the amoxicillin  She denies any difficulty breathing  Lab Results   Component Value Date    SARSCOV2 Negative 01/29/2021       Review of Systems   Constitutional: Negative for chills, diaphoresis, fatigue and fever  HENT: Positive for congestion, rhinorrhea and sore throat  Negative for ear pain, postnasal drip, sneezing and trouble swallowing  Eyes: Negative for pain and visual disturbance  Respiratory: Positive for cough  Negative for apnea, chest tightness, shortness of breath and wheezing  Cardiovascular: Negative for chest pain and palpitations  Gastrointestinal: Positive for diarrhea  Negative for abdominal pain, constipation, nausea and vomiting  Genitourinary: Negative for dysuria  Musculoskeletal: Negative for arthralgias, gait problem and myalgias  Neurological: Positive for headaches  Negative for dizziness, syncope, weakness, light-headedness and numbness  Psychiatric/Behavioral: Negative for suicidal ideas  The patient is not nervous/anxious  Current Outpatient Medications on File Prior to Visit   Medication Sig    acetaminophen (TYLENOL) 325 mg tablet Take 3 tablets (975 mg total) by mouth every 8 (eight) hours    amoxicillin (AMOXIL) 500 mg capsule Take 1 capsule (500 mg total) by mouth every 8 (eight) hours for 10 days    Calcium Citrate (CITRACAL PO) Take 1 tablet by mouth daily   cetirizine (ZyrTEC) 10 mg tablet Take 1 tablet (10 mg total) by mouth daily    Cholecalciferol (VITAMIN D PO) Take 1,000 Units by mouth 2 (two) times a day   fluticasone (FLONASE) 50 mcg/act nasal spray 1 spray into each nostril daily    gabapentin (Neurontin) 100 mg capsule Take 1 capsule (100 mg total) by mouth daily at bedtime    losartan (COZAAR) 50 mg tablet Take 1 tablet (50 mg total) by mouth daily    Multiple Vitamin (MULTIVITAMIN) capsule Take 1 capsule by mouth daily      omeprazole (PriLOSEC) 40 MG capsule Take 1 capsule (40 mg total) by mouth daily    vitamin E, tocopherol, 400 units capsule Take 400 Units by mouth daily    ALPRAZolam (XANAX) 0 25 mg tablet Take 1 tablet (0 25 mg total) by mouth 2 (two) times a day as needed for anxiety for up to 10 days       Objective:    Ht 4' 10" (1 473 m)   Wt 73 kg (161 lb)   BMI 33 65 kg/m²      Physical Exam  Vitals and nursing note reviewed  Constitutional:       General: She is not in acute distress  Appearance: She is well-developed  She is not ill-appearing, toxic-appearing or diaphoretic  HENT:      Head: Normocephalic and atraumatic  Pulmonary:      Effort: Pulmonary effort is normal  No respiratory distress (Patient is speaking in full, fluent sentences  She does not appear in any acute distress)  Neurological:      Mental Status: She is alert  Psychiatric:         Behavior: Behavior normal  Behavior is cooperative  Thought Content:  Thought content normal          Judgment: Judgment normal        Wil Howell PA-C

## 2022-09-21 ENCOUNTER — TELEPHONE (OUTPATIENT)
Dept: FAMILY MEDICINE CLINIC | Facility: CLINIC | Age: 87
End: 2022-09-21

## 2022-09-21 NOTE — TELEPHONE ENCOUNTER
Diarrhea is a potential side effect of both medications  I don't know which one is the culprit  I would recommend that she hydrate  If it is tolerable, she should continue both medications

## 2022-09-21 NOTE — TELEPHONE ENCOUNTER
Pt calling is on antibiotic and paxlovid  She now has the diarrhea  She is wondering if she should keep taking both medications or what to do  Please Advise  Thank you

## 2022-09-23 ENCOUNTER — TELEMEDICINE (OUTPATIENT)
Dept: FAMILY MEDICINE CLINIC | Facility: CLINIC | Age: 87
End: 2022-09-23
Payer: COMMERCIAL

## 2022-09-23 VITALS — HEIGHT: 58 IN | BODY MASS INDEX: 33.8 KG/M2 | WEIGHT: 161 LBS

## 2022-09-23 DIAGNOSIS — F41.1 GENERALIZED ANXIETY DISORDER: ICD-10-CM

## 2022-09-23 DIAGNOSIS — U07.1 COVID-19: Primary | ICD-10-CM

## 2022-09-23 PROCEDURE — 3725F SCREEN DEPRESSION PERFORMED: CPT | Performed by: PHYSICIAN ASSISTANT

## 2022-09-23 PROCEDURE — 99213 OFFICE O/P EST LOW 20 MIN: CPT | Performed by: PHYSICIAN ASSISTANT

## 2022-09-23 RX ORDER — ALPRAZOLAM 0.25 MG/1
0.25 TABLET ORAL 2 TIMES DAILY PRN
Qty: 60 TABLET | Refills: 0 | Status: SHIPPED | OUTPATIENT
Start: 2022-09-23 | End: 2022-10-24 | Stop reason: SDUPTHER

## 2022-09-23 NOTE — PROGRESS NOTES
COVID-19 Outpatient Progress Note    Assessment/Plan:    Problem List Items Addressed This Visit    None     Visit Diagnoses     COVID-19    -  Primary         Disposition:     Patient has asymptomatic or mild COVID-19 infection  Based off CDC guidelines, they were recommended to isolate for 5 days  If they are asymptomatic or symptoms are improving with no fevers in the past 24 hours, isolation may be ended followed by 5 days of wearing a mask when around othes to minimize risk of infecting others  If still have a fever or other symptoms have not improved, continue to isolate until they improve  Regardless of when they end isolation, avoid being around people who are more likely to get very sick from COVID-19 until at least day 11  Recommended that she continue medications as prescribed  She may continue symptomatic relief as needed  She will notify us of any new or worsening symptoms  I have spent 15 minutes directly with the patient  Encounter provider: Neva Russell PA-C     Provider located at: [address] [address]     Recent Visits  Date Type Provider Dept   09/21/22 Telephone [facility]   09/20/22 Telemedicine [facility]   09/16/22 Office Visit DO [facility]   Showing recent visits within past 7 days and meeting all other requirements  Today's Visits  Date Type Provider Dept   09/23/22 Telemedicine [facility]   Showing today's visits and meeting all other requirements  Future Appointments  No visits were found meeting these conditions  Showing future appointments within next 150 days and meeting all other requirements     This virtual check-in was done via [employer] and patient was informed that this is a secure, HIPAA-compliant platform  She agrees to proceed      Patient agrees to participate in a virtual check in via telephone or video visit instead of presenting to the office to address urgent/immediate medical needs  Patient is aware this is a billable service  She acknowledged consent and understanding of privacy and security of the video platform  The patient has agreed to participate and understands they can discontinue the visit at any time  After connecting through [facility], the patient was identified by name and date of birth  Vaishnavi Nobles was informed that this was a telemedicine visit and that the exam was being conducted confidentially over secure lines  My office door was closed  No one else was in the room  Vaishnavi Nobles acknowledged consent and understanding of privacy and security of the telemedicine visit  I informed the patient that I have reviewed her record in Epic and presented the opportunity for her to ask any questions regarding the visit today  The patient agreed to participate  Verification of patient location:  Patient is located in the following state in which I hold an active license: [place]    Subjective: Vaishnavi Nobles is a 80 y o  female who has been screened for COVID-19  Symptom change since last report: resolving  Patient's symptoms include nasal congestion and cough  Patient denies fever, chills, fatigue, rhinorrhea, sore throat, anosmia, loss of taste, shortness of breath, chest tightness, abdominal pain, nausea, vomiting, diarrhea, myalgias and headaches  - Date of symptom onset: 9/16/2022  - Date of positive COVID-19 test: 9/19/2022  Type of test: Home antigen  Patient with typical symptoms of COVID-19 and they attest that they were positive on home rapid antigen testing  Image of positive result is not able to be uploaded into their chart  COVID-19 vaccination status: Not vaccinated    Kenya Thomas has been staying home and has isolated themselves in her home   She is taking care to not share personal items and is cleaning all surfaces that are touched often, like counters, tabletops, and doorknobs using household cleaning sprays or wipes  She is wearing a mask when she leaves her room  Tasha Montoya is a pleasant 80year old female who is here today for a follow-up for COVID  She is doing much better  She still has a cough and some nasal congestion  She is eating and drinking without difficulty  She denies any fevers or chills  The diarrhea is resolving  Lab Results   Component Value Date    SARSCOV2 Negative 01/29/2021       Review of Systems   Constitutional: Negative for chills, diaphoresis, fatigue and fever  HENT: Positive for congestion  Negative for ear pain, postnasal drip, rhinorrhea, sneezing, sore throat and trouble swallowing  Eyes: Negative for pain and visual disturbance  Respiratory: Positive for cough  Negative for apnea, chest tightness, shortness of breath and wheezing  Cardiovascular: Negative for chest pain and palpitations  Gastrointestinal: Negative for abdominal pain, constipation, diarrhea, nausea and vomiting  Genitourinary: Negative for dysuria and hematuria  Musculoskeletal: Negative for arthralgias, gait problem and myalgias  Neurological: Negative for dizziness, syncope, weakness, light-headedness, numbness and headaches  Psychiatric/Behavioral: Negative for suicidal ideas  The patient is not nervous/anxious  Current Outpatient Medications on File Prior to Visit   Medication Sig    acetaminophen (TYLENOL) 325 mg tablet Take 3 tablets (975 mg total) by mouth every 8 (eight) hours    amoxicillin (AMOXIL) 500 mg capsule Take 1 capsule (500 mg total) by mouth every 8 (eight) hours for 10 days    Calcium Citrate (CITRACAL PO) Take 1 tablet by mouth daily   cetirizine (ZyrTEC) 10 mg tablet Take 1 tablet (10 mg total) by mouth daily    Cholecalciferol (VITAMIN D PO) Take 1,000 Units by mouth 2 (two) times a day      fluticasone (FLONASE) 50 mcg/act nasal spray 1 spray into each nostril daily    gabapentin (Neurontin) 100 mg capsule Take 1 capsule (100 mg total) by mouth daily at bedtime    losartan (COZAAR) 50 mg tablet Take 1 tablet (50 mg total) by mouth daily    Multiple Vitamin (MULTIVITAMIN) capsule Take 1 capsule by mouth daily   nirmatrelvir & ritonavir (Paxlovid, 150/100,) tablet therapy pack Take 2 tablets by mouth 2 (two) times a day for 5 days Take 1 nirmatrelvir tablet + 1 ritonavir tablet together per dose    omeprazole (PriLOSEC) 40 MG capsule Take 1 capsule (40 mg total) by mouth daily    vitamin E, tocopherol, 400 units capsule Take 400 Units by mouth daily    ALPRAZolam (XANAX) 0 25 mg tablet Take 1 tablet (0 25 mg total) by mouth 2 (two) times a day as needed for anxiety for up to 10 days       Objective:    Ht 4' 10" (1 473 m)   Wt 73 kg (161 lb)   BMI 33 65 kg/m²      Physical Exam  Vitals and nursing note reviewed  Constitutional:       General: She is not in acute distress  Appearance: She is well-developed  She is not ill-appearing, toxic-appearing or diaphoretic  HENT:      Head: Normocephalic and atraumatic  Pulmonary:      Effort: Pulmonary effort is normal  No respiratory distress (Patient is speaking in full, fluent sentences  She does not appear in any acute distress)  Neurological:      Mental Status: She is alert  Psychiatric:         Behavior: Behavior normal  Behavior is cooperative  Thought Content:  Thought content normal          Judgment: Judgment normal        Neva Russell PA-C

## 2022-09-27 ENCOUNTER — TELEPHONE (OUTPATIENT)
Dept: FAMILY MEDICINE CLINIC | Facility: CLINIC | Age: 87
End: 2022-09-27

## 2022-09-27 ENCOUNTER — OFFICE VISIT (OUTPATIENT)
Dept: FAMILY MEDICINE CLINIC | Facility: CLINIC | Age: 87
End: 2022-09-27
Payer: COMMERCIAL

## 2022-09-27 VITALS
TEMPERATURE: 98.8 F | BODY MASS INDEX: 33.8 KG/M2 | DIASTOLIC BLOOD PRESSURE: 74 MMHG | SYSTOLIC BLOOD PRESSURE: 136 MMHG | OXYGEN SATURATION: 94 % | HEIGHT: 58 IN | WEIGHT: 161 LBS | HEART RATE: 98 BPM

## 2022-09-27 DIAGNOSIS — J30.9 ALLERGIC RHINITIS, UNSPECIFIED SEASONALITY, UNSPECIFIED TRIGGER: ICD-10-CM

## 2022-09-27 DIAGNOSIS — G93.31 POST VIRAL SYNDROME: Primary | ICD-10-CM

## 2022-09-27 DIAGNOSIS — Z86.16 HISTORY OF COVID-19: ICD-10-CM

## 2022-09-27 PROCEDURE — 99213 OFFICE O/P EST LOW 20 MIN: CPT | Performed by: PHYSICIAN ASSISTANT

## 2022-09-27 PROCEDURE — 1160F RVW MEDS BY RX/DR IN RCRD: CPT | Performed by: PHYSICIAN ASSISTANT

## 2022-09-27 RX ORDER — FLUTICASONE PROPIONATE 50 MCG
1 SPRAY, SUSPENSION (ML) NASAL DAILY
Qty: 9.9 ML | Refills: 2 | Status: SHIPPED | OUTPATIENT
Start: 2022-09-27 | End: 2022-10-28 | Stop reason: ALTCHOICE

## 2022-09-27 RX ORDER — LORATADINE 10 MG/1
10 TABLET ORAL DAILY
Qty: 30 TABLET | Refills: 2 | Status: SHIPPED | OUTPATIENT
Start: 2022-09-27

## 2022-09-27 NOTE — TELEPHONE ENCOUNTER
Patient has completed 10 days of quarantine she should come to the office for a visit but must remain masked while here so that we can listen to her lungs and also check her ears

## 2022-09-27 NOTE — TELEPHONE ENCOUNTER
Seen by Dr Kanchan Mckeon for Sinusitis 9/16/22 - Virtual appointment x 2 for COVID - 9/16/22 Rx'd Amoxil TID #30    Infection traveled to ears & into chest - Not sure what to do

## 2022-09-27 NOTE — PROGRESS NOTES
Assessment/Plan:    Problem List Items Addressed This Visit    None     Visit Diagnoses     Post viral syndrome    -  Primary    History of COVID-19        Allergic rhinitis, unspecified seasonality, unspecified trigger        Relevant Medications    fluticasone (FLONASE) 50 mcg/act nasal spray    loratadine (CLARITIN) 10 mg tablet           Diagnoses and all orders for this visit:    Post viral syndrome    History of COVID-19    Allergic rhinitis, unspecified seasonality, unspecified trigger  -     fluticasone (FLONASE) 50 mcg/act nasal spray; 1 spray into each nostril daily  -     loratadine (CLARITIN) 10 mg tablet; Take 1 tablet (10 mg total) by mouth daily      Recommended that she start Claritin and Flonase  Explained that her symptoms are likely a combination of allergies and post-viral syndrome from [facility]  She will notify us if symptoms do not improve or worsen    Subjective:      Patient ID: Ezio Stephen is a 80 y o  female  Albert Kinney is a 80year old female accompanied by her daughter for sinus pressure, congestion, and ear pain  She was seen 9/16/22 for similar symptoms and diagnosed with sinusitis  She was prescribed amoxicillin  The following week, she was diagnosed with COVID  She was prescribed Paxlovid  She finished the Paxlovid  She states that the sinus issues are not resolving  She denies any fevers, chills, body aches, nausea, or vomiting  She still has some loose stools  She has had some loss of taste and smell  The following portions of the patient's history were reviewed and updated as appropriate:   She has a past medical history of Diverticulosis, GERD (gastroesophageal reflux disease), Hiatal hernia, Irritable bowel disease, Lactose intolerance, and Osteoporosis  ,  does not have any pertinent problems on file  ,   has a past surgical history that includes Hysterectomy; Tonsillectomy; Cholecystectomy; Ear tube removal; Skin cancer excision;  Hemorroidectomy; Vagina reconstruction surgery (09/13/2019); pr open rx femur fx+intramed riley (Left, 2/1/2020); Cast application (Left, 5/0/6451); Carpal tunnel release (Right); pr exc tumor soft tissue leg/ankle subfascial <3cm (Left, 9/8/2020); and Squamous cell carcinoma excision (Left, 09/08/2020)  ,  family history includes Colon cancer in her mother; Stomach cancer in her father  ,   reports that she quit smoking about 33 years ago  She quit after 30 00 years of use  She has never used smokeless tobacco  She reports that she does not drink alcohol  No history on file for drug use ,  is allergic to escitalopram, mepenzolate, maalox anti-gas [simethicone], other, prednisone, and reglan [metoclopramide]     Current Outpatient Medications   Medication Sig Dispense Refill    acetaminophen (TYLENOL) 325 mg tablet Take 3 tablets (975 mg total) by mouth every 8 (eight) hours 30 tablet 0    ALPRAZolam (XANAX) 0 25 mg tablet Take 1 tablet (0 25 mg total) by mouth 2 (two) times a day as needed for anxiety for up to 10 days 60 tablet 0    Calcium Citrate (CITRACAL PO) Take 1 tablet by mouth daily   Cholecalciferol (VITAMIN D PO) Take 1,000 Units by mouth 2 (two) times a day   fluticasone (FLONASE) 50 mcg/act nasal spray 1 spray into each nostril daily 9 9 mL 2    gabapentin (Neurontin) 100 mg capsule Take 1 capsule (100 mg total) by mouth daily at bedtime 30 capsule 3    loratadine (CLARITIN) 10 mg tablet Take 1 tablet (10 mg total) by mouth daily 30 tablet 2    losartan (COZAAR) 50 mg tablet Take 1 tablet (50 mg total) by mouth daily 90 tablet 1    Multiple Vitamin (MULTIVITAMIN) capsule Take 1 capsule by mouth daily   omeprazole (PriLOSEC) 40 MG capsule Take 1 capsule (40 mg total) by mouth daily 30 capsule 5    vitamin E, tocopherol, 400 units capsule Take 400 Units by mouth daily       No current facility-administered medications for this visit  Review of Systems   Constitutional: Negative for chills, diaphoresis, fatigue and fever  HENT: Positive for congestion, rhinorrhea and sinus pressure  Negative for ear pain, postnasal drip, sneezing, sore throat and trouble swallowing          +blocked ears  +loss of taste & smell   Eyes: Negative for pain and visual disturbance  Respiratory: Positive for cough (mild)  Negative for apnea, shortness of breath and wheezing  Cardiovascular: Negative for chest pain and palpitations  Gastrointestinal: Negative for abdominal pain, constipation, diarrhea, nausea and vomiting  Genitourinary: Negative for dysuria  Musculoskeletal: Negative for arthralgias, gait problem and myalgias  Neurological: Negative for dizziness, syncope, weakness, light-headedness, numbness and headaches  Psychiatric/Behavioral: Negative for suicidal ideas  The patient is not nervous/anxious  Objective:  Vitals:    09/27/22 1418   BP: 136/74   Pulse: 98   Temp: 98 8 °F (37 1 °C)   SpO2: 94%   Weight: 73 kg (161 lb)   Height: 4' 10" (1 473 m)     Body mass index is 33 65 kg/m²  Physical Exam  Vitals and nursing note reviewed  Constitutional:       Appearance: She is well-developed  HENT:      Head: Normocephalic and atraumatic  Right Ear: Tympanic membrane, ear canal and external ear normal       Left Ear: Tympanic membrane, ear canal and external ear normal       Nose: Congestion and rhinorrhea present  Rhinorrhea is clear  Mouth/Throat:      Pharynx: No oropharyngeal exudate or posterior oropharyngeal erythema  Eyes:      Pupils: Pupils are equal, round, and reactive to light  Cardiovascular:      Rate and Rhythm: Normal rate and regular rhythm  Heart sounds: Normal heart sounds  No murmur heard  No friction rub  No gallop  Pulmonary:      Effort: Pulmonary effort is normal  No respiratory distress  Breath sounds: Normal breath sounds  No wheezing or rales  Abdominal:      Palpations: Abdomen is soft  Tenderness: There is no abdominal tenderness     Musculoskeletal: General: Normal range of motion  Cervical back: Normal range of motion and neck supple  Lymphadenopathy:      Cervical: No cervical adenopathy  Skin:     General: Skin is warm and dry  Neurological:      Mental Status: She is alert and oriented to person, place, and time  Psychiatric:         Behavior: Behavior normal          Thought Content:  Thought content normal          Judgment: Judgment normal

## 2022-09-30 ENCOUNTER — TELEPHONE (OUTPATIENT)
Dept: FAMILY MEDICINE CLINIC | Facility: CLINIC | Age: 87
End: 2022-09-30

## 2022-09-30 DIAGNOSIS — J01.01 ACUTE RECURRENT MAXILLARY SINUSITIS: Primary | ICD-10-CM

## 2022-09-30 RX ORDER — AZITHROMYCIN 250 MG/1
TABLET, FILM COATED ORAL
Qty: 6 TABLET | Refills: 0 | Status: SHIPPED | OUTPATIENT
Start: 2022-09-30 | End: 2022-10-04

## 2022-09-30 NOTE — TELEPHONE ENCOUNTER
Provider update - Pt was ask to call office on Friday with up-date   - NO IMPROVEMENT   - Using the Loratadine & Flonase daily

## 2022-09-30 NOTE — TELEPHONE ENCOUNTER
Continue Loratadine and Flonase  I sent a script for zithromax to the pharmacy  She should take this as directed

## 2022-10-07 ENCOUNTER — TELEPHONE (OUTPATIENT)
Dept: FAMILY MEDICINE CLINIC | Facility: CLINIC | Age: 87
End: 2022-10-07

## 2022-10-07 DIAGNOSIS — U07.1 COVID-19: Primary | ICD-10-CM

## 2022-10-07 RX ORDER — BUDESONIDE 90 UG/1
2 AEROSOL, POWDER RESPIRATORY (INHALATION) 2 TIMES DAILY
Qty: 1 EACH | Refills: 0 | Status: SHIPPED | OUTPATIENT
Start: 2022-10-07 | End: 2022-10-28 | Stop reason: ALTCHOICE

## 2022-10-07 NOTE — TELEPHONE ENCOUNTER
S/S: Allergy type symptoms - Tight in chest - Nasal congestion - Post nasal - Pressure in ears   - Still taking the Claritin - Stopped the Flonase NO relief - switched to [place]   - Has the symptoms when at her daughter's home - Unable to stay in her own home at this time      Hx: COVID   Hx Rx Amoxil TID x 10 days 9/16/22, Paxlovid 9/20/22 & Zpack 9/30/22

## 2022-10-07 NOTE — TELEPHONE ENCOUNTER
No further antibiotics for the patient she is already been on 2 sets of antibiotics her symptoms are from her COVID and they may take a while to resolve the best thing we could do would be to start her on a small dose of steroids I will send a prescription for steroids to trial I will give her inhaled steroids because I know she has adverse reactions to the pills

## 2022-10-13 ENCOUNTER — TELEPHONE (OUTPATIENT)
Dept: FAMILY MEDICINE CLINIC | Facility: CLINIC | Age: 87
End: 2022-10-13

## 2022-10-13 NOTE — TELEPHONE ENCOUNTER
Reassure the patient that she will only be using the Pulmicort inhaler for another week or so once she has fully recovered from the COVID it will be necessary she is not causing any damage to her eye which she is sensing is the aerosol a traveling up to tear duct

## 2022-10-13 NOTE — TELEPHONE ENCOUNTER
Called Pt L/M with 's message & recommendations - It was recommended that Pt try the Flonase previously Rx'd to see if it works better for her symptoms

## 2022-10-13 NOTE — TELEPHONE ENCOUNTER
Rx'd Pulmicort Flexhaler    Hx eye surgery L eye repain of hole in eye & macular degeneration    When she uses inhaler she has burning & irritation in Left eye    S/S ARE NASAL (Hx Flonase - NO relief)     Does  want to change Rx?

## 2022-10-17 ENCOUNTER — OFFICE VISIT (OUTPATIENT)
Dept: PODIATRY | Facility: CLINIC | Age: 87
End: 2022-10-17
Payer: COMMERCIAL

## 2022-10-17 VITALS — WEIGHT: 161 LBS | HEIGHT: 58 IN | BODY MASS INDEX: 33.8 KG/M2

## 2022-10-17 DIAGNOSIS — G60.9 IDIOPATHIC PERIPHERAL NEUROPATHY: ICD-10-CM

## 2022-10-17 DIAGNOSIS — B35.1 ONYCHOMYCOSIS: Primary | ICD-10-CM

## 2022-10-17 DIAGNOSIS — I73.9 PERIPHERAL ARTERIAL DISEASE (HCC): ICD-10-CM

## 2022-10-17 PROCEDURE — 11720 DEBRIDE NAIL 1-5: CPT | Performed by: PODIATRIST

## 2022-10-17 NOTE — PROGRESS NOTES
[facility]  6/30/1931  AT RISK FOOT CARE    1  Onychomycosis     2  Peripheral arterial disease ([facility] [address] )     3  Idiopathic peripheral neuropathy         Patient presents for at-risk foot care  Patient has no acute concerns today  Patient has significant lower extremity risk due to diminished pulses in the feet and trophic skin changes to the lower extremity including thick toenail, atrophic skin, and decreased hair growth  On exam patient has thickened, hypertrophic, discolored, brittle toenails with subungual debris and tenderness x4   Callus: 0  Patient has diminished pedal pulses and decreased perfusion to the lower extremities  Patient has significant trophic changes to the skin including thick toenails, decreased pedal hair and atrophic skin  Today's treatment includes:  Debridement of toenails  Using nail nipper, vilma, and curette, nails were sharply debrided, reduced in thickness and length  Devitalized nail tissue and fungal debris excised and removed  Patient tolerated well  Discussed proper shoe gear, daily inspections of feet, and general foot health with patient  Patient has Q8  findings and is recommended for at risk foot care every 9-10 weeks      Patients most recent complete clinical foot exam was on: 8/11

## 2022-10-20 DIAGNOSIS — K21.9 GASTROESOPHAGEAL REFLUX DISEASE WITHOUT ESOPHAGITIS: Chronic | ICD-10-CM

## 2022-10-20 RX ORDER — OMEPRAZOLE 40 MG/1
40 CAPSULE, DELAYED RELEASE ORAL DAILY
Qty: 30 CAPSULE | Refills: 5 | Status: SHIPPED | OUTPATIENT
Start: 2022-10-20

## 2022-10-24 DIAGNOSIS — F41.1 GENERALIZED ANXIETY DISORDER: ICD-10-CM

## 2022-10-25 RX ORDER — ALPRAZOLAM 0.25 MG/1
0.25 TABLET ORAL 2 TIMES DAILY PRN
Qty: 60 TABLET | Refills: 0 | Status: SHIPPED | OUTPATIENT
Start: 2022-10-25 | End: 2022-11-04

## 2022-10-28 ENCOUNTER — OFFICE VISIT (OUTPATIENT)
Dept: FAMILY MEDICINE CLINIC | Facility: CLINIC | Age: 87
End: 2022-10-28
Payer: COMMERCIAL

## 2022-10-28 VITALS
HEIGHT: 58 IN | WEIGHT: 159 LBS | HEART RATE: 100 BPM | SYSTOLIC BLOOD PRESSURE: 142 MMHG | DIASTOLIC BLOOD PRESSURE: 90 MMHG | OXYGEN SATURATION: 98 % | BODY MASS INDEX: 33.37 KG/M2 | TEMPERATURE: 97.8 F

## 2022-10-28 DIAGNOSIS — J32.4 CHRONIC PANSINUSITIS: Primary | ICD-10-CM

## 2022-10-28 PROCEDURE — 99214 OFFICE O/P EST MOD 30 MIN: CPT | Performed by: FAMILY MEDICINE

## 2022-10-28 RX ORDER — ECHINACEA PURPUREA EXTRACT 125 MG
1 TABLET ORAL AS NEEDED
COMMUNITY

## 2022-10-28 NOTE — PROGRESS NOTES
Assessment/Plan:    Problem List Items Addressed This Visit    None     Visit Diagnoses     Chronic pansinusitis    -  Primary           Diagnoses and all orders for this visit:    Chronic pansinusitis    Other orders  -     sodium chloride (OCEAN) 0 65 % nasal spray; 1 spray into each nostril as needed for congestion        No problem-specific Assessment & Plan notes found for this encounter  Subjective:      Patient ID: Joselin Olmos is a 80 y o  female  Mrs Wood is here chief complaint is continued pressure in her sinuses and a sensation of burning in her sinuses she has been she has had a couple of courses of antibiotic therapy no relief she has used Flonase nasal spray no relief in fact it made it worse she does hydrate her sinuses with a ocean mist nasal spray on is very uncomfortable needs workup for seasonal allergies tree and not allergies and E no air Nose and Throat consult for scoping of the sinuses      The following portions of the patient's history were reviewed and updated as appropriate:   She has a past medical history of Diverticulosis, GERD (gastroesophageal reflux disease), Hiatal hernia, Irritable bowel disease, Lactose intolerance, and Osteoporosis  ,  does not have any pertinent problems on file  ,   has a past surgical history that includes Hysterectomy; Tonsillectomy; Cholecystectomy; Ear tube removal; Skin cancer excision; Hemorroidectomy; Vagina reconstruction surgery (09/13/2019); pr open rx femur fx+intramed riley (Left, 2/1/2020); Cast application (Left, 4/2/9701); Carpal tunnel release (Right); pr exc tumor soft tissue leg/ankle subfascial <3cm (Left, 9/8/2020); and Squamous cell carcinoma excision (Left, 09/08/2020)  ,  family history includes Colon cancer in her mother; Stomach cancer in her father  ,   reports that she quit smoking about 33 years ago  She quit after 30 00 years of use  She has never used smokeless tobacco  She reports that she does not drink alcohol   No history on file for drug use ,  is allergic to escitalopram, mepenzolate, maalox anti-gas [simethicone], other, prednisone, and reglan [metoclopramide]     Current Outpatient Medications   Medication Sig Dispense Refill   • acetaminophen (TYLENOL) 325 mg tablet Take 3 tablets (975 mg total) by mouth every 8 (eight) hours 30 tablet 0   • ALPRAZolam (XANAX) 0 25 mg tablet Take 1 tablet (0 25 mg total) by mouth 2 (two) times a day as needed for anxiety for up to 10 days 60 tablet 0   • Calcium Citrate (CITRACAL PO) Take 1 tablet by mouth daily  • Cholecalciferol (VITAMIN D PO) Take 1,000 Units by mouth 2 (two) times a day  • loratadine (CLARITIN) 10 mg tablet Take 1 tablet (10 mg total) by mouth daily 30 tablet 2   • losartan (COZAAR) 50 mg tablet Take 1 tablet (50 mg total) by mouth daily 90 tablet 1   • Multiple Vitamin (MULTIVITAMIN) capsule Take 1 capsule by mouth daily  • omeprazole (PriLOSEC) 40 MG capsule Take 1 capsule (40 mg total) by mouth daily 30 capsule 5   • sodium chloride (OCEAN) 0 65 % nasal spray 1 spray into each nostril as needed for congestion     • vitamin E, tocopherol, 400 units capsule Take 400 Units by mouth daily       No current facility-administered medications for this visit  Review of Systems   Constitutional: Negative for activity change, appetite change, diaphoresis, fatigue and fever  HENT: Positive for sinus pressure and sinus pain  Eyes: Negative  Respiratory: Negative for apnea, cough, chest tightness, shortness of breath and wheezing  Cardiovascular: Negative for chest pain, palpitations and leg swelling  Gastrointestinal: Negative for abdominal distention, abdominal pain, anal bleeding, constipation, diarrhea, nausea and vomiting  Endocrine: Negative for cold intolerance, heat intolerance, polydipsia, polyphagia and polyuria  Genitourinary: Negative for difficulty urinating, dysuria, flank pain, hematuria and urgency     Musculoskeletal: Negative for arthralgias, back pain, gait problem, joint swelling and myalgias  Skin: Negative for color change, rash and wound  Allergic/Immunologic: Negative for environmental allergies, food allergies and immunocompromised state  Neurological: Negative for dizziness, seizures, syncope, speech difficulty, numbness and headaches  Hematological: Negative for adenopathy  Does not bruise/bleed easily  Psychiatric/Behavioral: Negative for agitation, behavioral problems, hallucinations, sleep disturbance and suicidal ideas  Objective:  Vitals:    10/28/22 1432   BP: 142/90   BP Location: Left arm   Patient Position: Sitting   Cuff Size: Large   Pulse: 100   Temp: 97 8 °F (36 6 °C)   TempSrc: Temporal   SpO2: 98%   Weight: 72 1 kg (159 lb)   Height: 4' 10" (1 473 m)     Body mass index is 33 23 kg/m²  Physical Exam  Constitutional:       General: She is not in acute distress  Appearance: She is well-developed  She is not diaphoretic  HENT:      Head: Normocephalic  Right Ear: External ear normal       Left Ear: External ear normal       Nose: Nose normal    Eyes:      General: No scleral icterus  Right eye: No discharge  Left eye: No discharge  Conjunctiva/sclera: Conjunctivae normal       Pupils: Pupils are equal, round, and reactive to light  Neck:      Thyroid: No thyromegaly  Trachea: No tracheal deviation  Cardiovascular:      Rate and Rhythm: Normal rate and regular rhythm  Heart sounds: Normal heart sounds  No murmur heard  No friction rub  No gallop  Pulmonary:      Effort: Pulmonary effort is normal  No respiratory distress  Breath sounds: Normal breath sounds  No wheezing  Abdominal:      General: Bowel sounds are normal       Palpations: Abdomen is soft  There is no mass  Tenderness: There is no abdominal tenderness  There is no guarding  Musculoskeletal:         General: No deformity  Cervical back: Normal range of motion  Lymphadenopathy:      Cervical: No cervical adenopathy  Skin:     General: Skin is warm and dry  Findings: No erythema or rash  Neurological:      Mental Status: She is alert and oriented to person, place, and time  Cranial Nerves: No cranial nerve deficit  Psychiatric:         Thought Content:  Thought content normal

## 2022-10-29 ENCOUNTER — LAB (OUTPATIENT)
Dept: LAB | Facility: MEDICAL CENTER | Age: 87
End: 2022-10-29

## 2022-10-29 DIAGNOSIS — J32.4 CHRONIC PANSINUSITIS: Primary | ICD-10-CM

## 2022-10-31 LAB
A ALTERNATA IGE QN: <0.1 KUA/I
A FUMIGATUS IGE QN: <0.1 KUA/I
ALMOND IGE QN: <0.1 KUA/I
ALMOND IGE QN: <0.1 KUA/I
BERMUDA GRASS IGE QN: <0.1 KUA/I
BOXELDER IGE QN: <0.1 KUA/I
BRAZIL NUT IGE QN: <0.1 KUA/I
C HERBARUM IGE QN: <0.1 KUA/I
CASHEW NUT IGE QN: <0.1 KUA/I
CASHEW NUT IGE QN: <0.1 KUA/I
CAT DANDER IGE QN: <0.1 KUA/I
CMN PIGWEED IGE QN: <0.1 KUA/I
CODFISH IGE QN: <0.1 KUA/I
COMMON RAGWEED IGE QN: <0.1 KUA/I
COTTONWOOD IGE QN: <0.1 KUA/I
D FARINAE IGE QN: <0.1 KUA/I
D PTERONYSS IGE QN: <0.1 KUA/I
DOG DANDER IGE QN: <0.1 KUA/I
EGG WHITE IGE QN: <0.1 KUA/I
GLUTEN IGE QN: <0.1 KUA/I
HAZELNUT IGE QN: <0.1 KUA/L
HAZELNUT IGE QN: <0.1 KUA/L
LONDON PLANE IGE QN: <0.1 KUA/I
MILK IGE QN: <0.1 KUA/I
MOUSE URINE PROT IGE QN: <0.1 KUA/I
MT JUNIPER IGE QN: <0.1 KUA/I
MUGWORT IGE QN: <0.1 KUA/I
P NOTATUM IGE QN: <0.1 KUA/I
PEANUT IGE QN: <0.1 KUA/I
PEANUT IGE QN: <0.1 KUA/I
PECAN/HICK NUT IGE QN: <0.1 KUA/I
PISTACHIO IGE QN: <0.1 KUA/I
ROACH IGE QN: <0.1 KUA/I
SALMON IGE QN: <0.1 KUA/I
SCALLOP IGE QN: <0.1 KUA/L
SESAME SEED IGE QN: <0.1 KUA/I
SHEEP SORREL IGE QN: <0.1 KUA/I
SHRIMP IGE QN: <0.1 KUA/L
SILVER BIRCH IGE QN: <0.1 KUA/I
SOYBEAN IGE QN: <0.1 KUA/I
TIMOTHY IGE QN: <0.1 KUA/I
TOTAL IGE SMQN RAST: 73.2 KU/L (ref 0–113)
TOTAL IGE SMQN RAST: 74.2 KU/L (ref 0–113)
TOTAL IGE SMQN RAST: 74.2 KU/L (ref 0–113)
TUNA IGE QN: <0.1 KUA/I
WALNUT IGE QN: <0.1 KUA/I
WHEAT IGE QN: <0.1 KUA/I
WHITE ASH IGE QN: <0.1 KUA/I
WHITE ELM IGE QN: <0.1 KUA/I
WHITE MULBERRY IGE QN: <0.1 KUA/I
WHITE OAK IGE QN: <0.1 KUA/I

## 2022-11-03 ENCOUNTER — TELEPHONE (OUTPATIENT)
Dept: SLEEP CENTER | Facility: CLINIC | Age: 87
End: 2022-11-03

## 2022-11-03 NOTE — TELEPHONE ENCOUNTER
Spoke to [place] and explained to her that I had Dr Homa Simon go over her chart  After looking over her chart he told me to schedule her in January  I explained to her that he did go over her chart and I could get her in to see ENT on 1/3/23  She said NO that is to long to wait, she would go somewhere else

## 2022-11-07 ENCOUNTER — TELEPHONE (OUTPATIENT)
Dept: BARIATRICS | Facility: CLINIC | Age: 87
End: 2022-11-07

## 2022-11-08 ENCOUNTER — APPOINTMENT (OUTPATIENT)
Dept: RADIOLOGY | Facility: MEDICAL CENTER | Age: 87
End: 2022-11-08

## 2022-11-08 DIAGNOSIS — R05.3 CHRONIC COUGH: ICD-10-CM

## 2022-11-15 ENCOUNTER — APPOINTMENT (OUTPATIENT)
Dept: LAB | Facility: HOSPITAL | Age: 87
End: 2022-11-15

## 2022-11-15 DIAGNOSIS — R05.9 COUGH, UNSPECIFIED: ICD-10-CM

## 2022-11-15 LAB
BUN SERPL-MCNC: 14 MG/DL (ref 5–25)
CREAT SERPL-MCNC: 0.84 MG/DL (ref 0.6–1.3)
GFR SERPL CREATININE-BSD FRML MDRD: 60 ML/MIN/1.73SQ M

## 2022-11-16 ENCOUNTER — OFFICE VISIT (OUTPATIENT)
Dept: FAMILY MEDICINE CLINIC | Facility: CLINIC | Age: 87
End: 2022-11-16

## 2022-11-16 VITALS
BODY MASS INDEX: 33.37 KG/M2 | HEART RATE: 99 BPM | OXYGEN SATURATION: 96 % | HEIGHT: 58 IN | TEMPERATURE: 96.8 F | DIASTOLIC BLOOD PRESSURE: 80 MMHG | SYSTOLIC BLOOD PRESSURE: 160 MMHG | WEIGHT: 159 LBS

## 2022-11-16 DIAGNOSIS — Z00.00 MEDICARE ANNUAL WELLNESS VISIT, SUBSEQUENT: Primary | ICD-10-CM

## 2022-11-16 DIAGNOSIS — R05.9 COUGH IN ADULT: ICD-10-CM

## 2022-11-16 RX ORDER — MONTELUKAST SODIUM 10 MG/1
10 TABLET ORAL DAILY
COMMUNITY
Start: 2022-11-08

## 2022-11-16 RX ORDER — BUDESONIDE 90 UG/1
AEROSOL, POWDER RESPIRATORY (INHALATION)
COMMUNITY
Start: 2022-11-14

## 2022-11-16 RX ORDER — BENZONATATE 100 MG/1
100 CAPSULE ORAL 3 TIMES DAILY PRN
Qty: 20 CAPSULE | Refills: 0 | Status: SHIPPED | OUTPATIENT
Start: 2022-11-16

## 2022-11-16 NOTE — PATIENT INSTRUCTIONS
Medicare Preventive Visit Patient Instructions  Thank you for completing your Welcome to Medicare Visit or Medicare Annual Wellness Visit today  Your next wellness visit will be due in one year (11/17/2023)  The screening/preventive services that you may require over the next 5-10 years are detailed below  Some tests may not apply to you based off risk factors and/or age  Screening tests ordered at today's visit but not completed yet may show as past due  Also, please note that scanned in results may not display below  Preventive Screenings:  Service Recommendations Previous Testing/Comments   Colorectal Cancer Screening  * Colonoscopy    * Fecal Occult Blood Test (FOBT)/Fecal Immunochemical Test (FIT)  * Fecal DNA/Cologuard Test  * Flexible Sigmoidoscopy Age: 39-70 years old   Colonoscopy: every 10 years (may be performed more frequently if at higher risk)  OR  FOBT/FIT: every 1 year  OR  Cologuard: every 3 years  OR  Sigmoidoscopy: every 5 years  Screening may be recommended earlier than age 39 if at higher risk for colorectal cancer  Also, an individualized decision between you and your healthcare provider will decide whether screening between the ages of 74-80 would be appropriate  Colonoscopy: 04/25/2013  FOBT/FIT: Not on file  Cologuard: Not on file  Sigmoidoscopy: Not on file    Screening Not Indicated     Breast Cancer Screening Age: 36 years old  Frequency: every 1-2 years  Not required if history of left and right mastectomy Mammogram: 12/29/2015        Cervical Cancer Screening Between the ages of 21-29, pap smear recommended once every 3 years  Between the ages of 33-67, can perform pap smear with HPV co-testing every 5 years     Recommendations may differ for women with a history of total hysterectomy, cervical cancer, or abnormal pap smears in past  Pap Smear: Not on file    Screening Not Indicated   Hepatitis C Screening Once for adults born between 1945 and 1965  More frequently in patients at high risk for Hepatitis C Hep C Antibody: Not on file        Diabetes Screening 1-2 times per year if you're at risk for diabetes or have pre-diabetes Fasting glucose: 95 mg/dL (2/25/2020)  A1C: No results in last 5 years (No results in last 5 years)      Cholesterol Screening Once every 5 years if you don't have a lipid disorder  May order more often based on risk factors  Lipid panel: 08/20/2019    Screening Current     Other Preventive Screenings Covered by Medicare:  1  Abdominal Aortic Aneurysm (AAA) Screening: covered once if your at risk  You're considered to be at risk if you have a family history of AAA  2  Lung Cancer Screening: covers low dose CT scan once per year if you meet all of the following conditions: (1) Age 50-69; (2) No signs or symptoms of lung cancer; (3) Current smoker or have quit smoking within the last 15 years; (4) You have a tobacco smoking history of at least 20 pack years (packs per day multiplied by number of years you smoked); (5) You get a written order from a healthcare provider  3  Glaucoma Screening: covered annually if you're considered high risk: (1) You have diabetes OR (2) Family history of glaucoma OR (3)  aged 48 and older OR (3)  American aged 72 and older  3  Osteoporosis Screening: covered every 2 years if you meet one of the following conditions: (1) You're estrogen deficient and at risk for osteoporosis based off medical history and other findings; (2) Have a vertebral abnormality; (3) On glucocorticoid therapy for more than 3 months; (4) Have primary hyperparathyroidism; (5) On osteoporosis medications and need to assess response to drug therapy  · Last bone density test (DXA Scan): 10/13/2021   5  HIV Screening: covered annually if you're between the age of 15-65  Also covered annually if you are younger than 13 and older than 72 with risk factors for HIV infection   For pregnant patients, it is covered up to 3 times per pregnancy  Immunizations:  Immunization Recommendations   Influenza Vaccine Annual influenza vaccination during flu season is recommended for all persons aged >= 6 months who do not have contraindications   Pneumococcal Vaccine   * Pneumococcal conjugate vaccine = PCV13 (Prevnar 13), PCV15 (Vaxneuvance), PCV20 (Prevnar 20)  * Pneumococcal polysaccharide vaccine = PPSV23 (Pneumovax) Adults 25-60 years old: 1-3 doses may be recommended based on certain risk factors  Adults 72 years old: 1-2 doses may be recommended based off what pneumonia vaccine you previously received   Hepatitis B Vaccine 3 dose series if at intermediate or high risk (ex: diabetes, end stage renal disease, liver disease)   Tetanus (Td) Vaccine - COST NOT COVERED BY MEDICARE PART B Following completion of primary series, a booster dose should be given every 10 years to maintain immunity against tetanus  Td may also be given as tetanus wound prophylaxis  Tdap Vaccine - COST NOT COVERED BY MEDICARE PART B Recommended at least once for all adults  For pregnant patients, recommended with each pregnancy  Shingles Vaccine (Shingrix) - COST NOT COVERED BY MEDICARE PART B  2 shot series recommended in those aged 48 and above     Health Maintenance Due:  There are no preventive care reminders to display for this patient  Immunizations Due:      Topic Date Due   • Hepatitis B Vaccine (1 of 3 - 3-dose series) Never done   • COVID-19 Vaccine (1) Never done   • Pneumococcal Vaccine: 65+ Years (2 - PCV) 10/22/2008     Advance Directives   What are advance directives? Advance directives are legal documents that state your wishes and plans for medical care  These plans are made ahead of time in case you lose your ability to make decisions for yourself  Advance directives can apply to any medical decision, such as the treatments you want, and if you want to donate organs  What are the types of advance directives?   There are many types of advance directives, and each state has rules about how to use them  You may choose a combination of any of the following:  · Living will: This is a written record of the treatment you want  You can also choose which treatments you do not want, which to limit, and which to stop at a certain time  This includes surgery, medicine, IV fluid, and tube feedings  · Durable power of  for healthcare [facility]): This is a written record that states who you want to make healthcare choices for you when you are unable to make them for yourself  This person, called a proxy, is usually a family member or a friend  You may choose more than 1 proxy  · Do not resuscitate (DNR) order:  A DNR order is used in case your heart stops beating or you stop breathing  It is a request not to have certain forms of treatment, such as CPR  A DNR order may be included in other types of advance directives  · Medical directive: This covers the care that you want if you are in a coma, near death, or unable to make decisions for yourself  You can list the treatments you want for each condition  Treatment may include pain medicine, surgery, blood transfusions, dialysis, IV or tube feedings, and a ventilator (breathing machine)  · Values history: This document has questions about your views, beliefs, and how you feel and think about life  This information can help others choose the care that you would choose  Why are advance directives important? An advance directive helps you control your care  Although spoken wishes may be used, it is better to have your wishes written down  Spoken wishes can be misunderstood, or not followed  Treatments may be given even if you do not want them  An advance directive may make it easier for your family to make difficult choices about your care     Weight Management   Why it is important to manage your weight:  Being overweight increases your risk of health conditions such as heart disease, high blood pressure, type 2 diabetes, and certain types of cancer  It can also increase your risk for osteoarthritis, sleep apnea, and other respiratory problems  Aim for a slow, steady weight loss  Even a small amount of weight loss can lower your risk of health problems  How to lose weight safely:  A safe and healthy way to lose weight is to eat fewer calories and get regular exercise  You can lose up about 1 pound a week by decreasing the number of calories you eat by 500 calories each day  Healthy meal plan for weight management:  A healthy meal plan includes a variety of foods, contains fewer calories, and helps you stay healthy  A healthy meal plan includes the following:  · Eat whole-grain foods more often  A healthy meal plan should contain fiber  Fiber is the part of grains, fruits, and vegetables that is not broken down by your body  Whole-grain foods are healthy and provide extra fiber in your diet  Some examples of whole-grain foods are whole-wheat breads and pastas, oatmeal, brown rice, and bulgur  · Eat a variety of vegetables every day  Include dark, leafy greens such as spinach, kale, yoan greens, and mustard greens  Eat yellow and orange vegetables such as carrots, sweet potatoes, and winter squash  · Eat a variety of fruits every day  Choose fresh or canned fruit (canned in its own juice or light syrup) instead of juice  Fruit juice has very little or no fiber  · Eat low-fat dairy foods  Drink fat-free (skim) milk or 1% milk  Eat fat-free yogurt and low-fat cottage cheese  Try low-fat cheeses such as mozzarella and other reduced-fat cheeses  · Choose meat and other protein foods that are low in fat  Choose beans or other legumes such as split peas or lentils  Choose fish, skinless poultry (chicken or turkey), or lean cuts of red meat (beef or pork)  Before you cook meat or poultry, cut off any visible fat  · Use less fat and oil  Try baking foods instead of frying them   Add less fat, such as margarine, sour cream, regular salad dressing and mayonnaise to foods  Eat fewer high-fat foods  Some examples of high-fat foods include french fries, doughnuts, ice cream, and cakes  · Eat fewer sweets  Limit foods and drinks that are high in sugar  This includes candy, cookies, regular soda, and sweetened drinks  Exercise:  Exercise at least 30 minutes per day on most days of the week  Some examples of exercise include walking, biking, dancing, and swimming  You can also fit in more physical activity by taking the stairs instead of the elevator or parking farther away from stores  Ask your healthcare provider about the best exercise plan for you  © Copyright [employer] 2018 Information is for End User's use only and may not be sold, redistributed or otherwise used for commercial purposes   All illustrations and images included in CareNotes® are the copyrighted property of A D A M , Inc  or [address] [place] [address]

## 2022-11-16 NOTE — PROGRESS NOTES
Assessment and Plan:     Problem List Items Addressed This Visit    None       Preventive health issues were discussed with patient, and age appropriate screening tests were ordered as noted in patient's After Visit Summary  Personalized health advice and appropriate referrals for health education or preventive services given if needed, as noted in patient's After Visit Summary  History of Present Illness:     Patient presents for a Medicare Wellness Visit    Patient is here for a Medicare well visit any acute issues will be addressed during the well visit     Patient Care Team:  Altaf Zepeda DO as PCP - General  Clay Hammonds MD    [facility] [address]     Review of Systems:     Review of Systems   Constitutional: Negative for activity change, appetite change, diaphoresis, fatigue and fever  HENT: Positive for hearing loss  Negative for dental problem  Eyes: Positive for visual disturbance  Respiratory: Positive for cough and shortness of breath  Negative for apnea, chest tightness and wheezing  Patient has a noted 2 4 cm pulmonary nodule in the left hilar region which is going to be CT scanned tomorrow   Cardiovascular: Negative for chest pain, palpitations and leg swelling  Gastrointestinal: Negative for abdominal distention, abdominal pain, anal bleeding, constipation, diarrhea, nausea and vomiting  Endocrine: Negative for cold intolerance, heat intolerance, polydipsia, polyphagia and polyuria  Genitourinary: Negative for difficulty urinating, dysuria, flank pain, hematuria and urgency  Musculoskeletal: Negative for arthralgias, back pain, gait problem, joint swelling and myalgias  Skin: Negative for color change, rash and wound  Allergic/Immunologic: Negative for environmental allergies, food allergies and immunocompromised state  Neurological: Negative for dizziness, seizures, syncope, speech difficulty, numbness and headaches     Hematological: Negative for adenopathy  Does not bruise/bleed easily  Psychiatric/Behavioral: Negative for agitation, behavioral problems, hallucinations, sleep disturbance and suicidal ideas  Problem List:     Patient Active Problem List   Diagnosis   • Atypical chest pain   • GERD (gastroesophageal reflux disease)   • Anxiety   • Benign essential hypertension   • Macular cyst, hole, or pseudohole, left eye   • Fall   • Stage 3a chronic kidney disease ([facility] [address] )   • Idiopathic peripheral neuropathy      Past Medical and Surgical History:     Past Medical History:   Diagnosis Date   • Diverticulosis    • GERD (gastroesophageal reflux disease)    • Hiatal hernia    • Irritable bowel disease    • Lactose intolerance    • Osteoporosis      Past Surgical History:   Procedure Laterality Date   • CARPAL TUNNEL RELEASE Right    • CAST APPLICATION Left 0/4/4584    Procedure: APPLICATION CAST, LEFT WRIST;  Surgeon: Erick Freeman MD;  Location: [facility];  Service: Orthopedics   • CHOLECYSTECTOMY     • EAR TUBE REMOVAL     • HEMORROIDECTOMY     • HYSTERECTOMY     • PERINEAL  PELVIC RECONSTRUCTION SURGERY  09/13/2019    Dr Jose Flores - [facility]   • MI EXC TUMOR SOFT TISSUE LEG/ANKLE SUBFASCIAL <3CM Left 9/8/2020    Procedure: EXCISION BIOPSY TISSUE LESION/MASS LOWER EXTREMITY;  Surgeon: Wes Jacobs DO;  Location: [facility];  Service: General   • MI OPEN RX FEMUR FX+INTRAMED PAUL Left 2/1/2020    Procedure: INSERTION NAIL IM FEMUR ANTEGRADE (TROCHANTERIC); Surgeon: Erick Freeman MD;  Location:  MAIN OR;  Service: Orthopedics   • SKIN CANCER EXCISION     • SQUAMOUS CELL CARCINOMA EXCISION Left 09/08/2020    lower part of leg   • TONSILLECTOMY        Family History:     Family History   Problem Relation Age of Onset   • Colon cancer Mother    • Stomach cancer Father       Social History:     Social History     Socioeconomic History   • Marital status:       Spouse name: None   • Number of children: None   • Years of education: None   • Highest education level: None   Occupational History   • None   Tobacco Use   • Smoking status: Former     Years: 30 00     Types: Cigarettes     Quit date:      Years since quittin 8   • Smokeless tobacco: Never   Vaping Use   • Vaping Use: Never used   Substance and Sexual Activity   • Alcohol use: Never   • Drug use: None   • Sexual activity: None   Other Topics Concern   • None   Social History Narrative    No advanced directives      Social Determinants of Health     Financial Resource Strain: Not on file   Food Insecurity: Not on file   Transportation Needs: Not on file   Physical Activity: Not on file   Stress: Not on file   Social Connections: Not on file   Intimate Partner Violence: Not on file   Housing Stability: Not on file      Medications and Allergies:     Current Outpatient Medications   Medication Sig Dispense Refill   • acetaminophen (TYLENOL) 325 mg tablet Take 3 tablets (975 mg total) by mouth every 8 (eight) hours 30 tablet 0   • ALPRAZolam (XANAX) 0 25 mg tablet Take 1 tablet (0 25 mg total) by mouth 2 (two) times a day as needed for anxiety for up to 10 days 60 tablet 0   • Calcium Citrate (CITRACAL PO) Take 1 tablet by mouth daily  • Cholecalciferol (VITAMIN D PO) Take 1,000 Units by mouth 2 (two) times a day  • loratadine (CLARITIN) 10 mg tablet Take 1 tablet (10 mg total) by mouth daily 30 tablet 2   • losartan (COZAAR) 50 mg tablet Take 1 tablet (50 mg total) by mouth daily 90 tablet 1   • montelukast (SINGULAIR) 10 mg tablet Take 10 mg by mouth daily     • Multiple Vitamin (MULTIVITAMIN) capsule Take 1 capsule by mouth daily       • omeprazole (PriLOSEC) 40 MG capsule Take 1 capsule (40 mg total) by mouth daily 30 capsule 5   • Pulmicort Flexhaler 90 MCG/ACT inhaler      • sodium chloride (OCEAN) 0 65 % nasal spray 1 spray into each nostril as needed for congestion     • vitamin E, tocopherol, 400 units capsule Take 400 Units by mouth daily       No current facility-administered medications for this visit  Allergies   Allergen Reactions   • Escitalopram Hives   • Mepenzolate      Other reaction(s): Urinary Retention   • Maalox Anti-Gas [Simethicone] Hives   • Other      Seasonal    • Prednisone Other (See Comments)   • Reglan [Metoclopramide] Other (See Comments)     hyper      Immunizations:     Immunization History   Administered Date(s) Administered   • Influenza Split High Dose Preservative Free IM 06/30/1931   • Pneumococcal Polysaccharide PPV23 10/22/2007   • Tuberculin Skin Test 02/05/2020, 02/12/2020      Health Maintenance: There are no preventive care reminders to display for this patient  Topic Date Due   • Hepatitis B Vaccine (1 of 3 - 3-dose series) Never done   • COVID-19 Vaccine (1) Never done   • Pneumococcal Vaccine: 65+ Years (2 - PCV) 10/22/2008      Medicare Screening Tests and Risk Assessments: Alexx Deng is here for her Subsequent Wellness visit  Health Risk Assessment:   Patient rates overall health as fair  Patient feels that their physical health rating is same  Patient is satisfied with their life  Eyesight was rated as same  Hearing was rated as same  Patient feels that their emotional and mental health rating is slightly worse  Patients states they are never, rarely angry  Patient states they are never, rarely unusually tired/fatigued  Pain experienced in the last 7 days has been some  Patient's pain rating has been 5/10  Patient states that she has experienced no weight loss or gain in last 6 months  Fall Risk Screening: In the past year, patient has experienced: no history of falling in past year      Urinary Incontinence Screening:   Patient has not leaked urine accidently in the last six months  Home Safety:  Patient does not have trouble with stairs inside or outside of their home  Patient has working smoke alarms and has working carbon monoxide detector  Home safety hazards include: none       Nutrition: Current diet is Regular and Limited junk food  Medications:   Patient is currently taking over-the-counter supplements  OTC medications include: see medication list  Patient is able to manage medications  Activities of Daily Living (ADLs)/Instrumental Activities of Daily Living (IADLs):   Walk and transfer into and out of bed and chair?: Yes  Dress and groom yourself?: Yes    Bathe or shower yourself?: Yes    Feed yourself? Yes  Do your laundry/housekeeping?: Yes  Manage your money, pay your bills and track your expenses?: Yes  Make your own meals?: Yes    Do your own shopping?: Yes    Previous Hospitalizations:   Any hospitalizations or ED visits within the last 12 months?: No      Advance Care Planning:   Living will: Yes    Durable POA for healthcare: No    Advanced directive: Yes    Advanced directive counseling given: Yes    Five wishes given: No    Patient declined ACP directive: No    End of Life Decisions reviewed with patient: Yes    Provider agrees with end of life decisions: Yes      Cognitive Screening:   Provider or family/friend/caregiver concerned regarding cognition?: No    PREVENTIVE SCREENINGS      Cardiovascular Screening:    General: Screening Current      Diabetes Screening:     General: Screening Not Indicated      Colorectal Cancer Screening:     General: Screening Not Indicated      Breast Cancer Screening:     General: Screening Not Indicated      Cervical Cancer Screening:    General: Screening Not Indicated      Osteoporosis Screening:    General: Screening Not Indicated      Abdominal Aortic Aneurysm (AAA) Screening:        General: Screening Not Indicated      Lung Cancer Screening:     General: Screening Not Indicated    Screening, Brief Intervention, and Referral to Treatment (SBIRT)    Screening  Typical number of drinks in a day: 0  Typical number of drinks in a week: 0  Interpretation: Low risk drinking behavior      Single Item Drug Screening:  How often have you used an illegal drug (including marijuana) or a prescription medication for non-medical reasons in the past year? never    Single Item Drug Screen Score: 0  Interpretation: Negative screen for possible drug use disorder    No results found  Physical Exam:     /80 (BP Location: Right arm, Patient Position: Sitting, Cuff Size: Standard)   Pulse 99   Temp (!) 96 8 °F (36 °C) (Temporal)   Ht 4' 10" (1 473 m)   Wt 72 1 kg (159 lb)   SpO2 96%   BMI 33 23 kg/m²     Physical Exam  Constitutional:       Appearance: She is well-developed and well-nourished  HENT:      Head: Normocephalic and atraumatic  Right Ear: External ear normal       Left Ear: External ear normal       Nose: Nose normal       Mouth/Throat:      Mouth: Oropharynx is clear and moist    Eyes:      Extraocular Movements: EOM normal       Conjunctiva/sclera: Conjunctivae normal       Pupils: Pupils are equal, round, and reactive to light  Cardiovascular:      Rate and Rhythm: Normal rate and regular rhythm  Pulses: Intact distal pulses  Heart sounds: Normal heart sounds  No murmur heard  No friction rub  Pulmonary:      Effort: Pulmonary effort is normal  No respiratory distress  Breath sounds: Rhonchi present  No wheezing or rales  Chest:      Chest wall: No tenderness  Abdominal:      General: Bowel sounds are normal       Palpations: Abdomen is soft  Musculoskeletal:         General: Normal range of motion  Cervical back: Normal range of motion and neck supple  Skin:     General: Skin is warm and dry  Capillary Refill: Capillary refill takes 2 to 3 seconds  Neurological:      Mental Status: She is alert and oriented to person, place, and time  Psychiatric:         Mood and Affect: Mood and affect normal          Behavior: Behavior normal          Thought Content:  Thought content normal          Judgment: Judgment normal           Lucio Patrick, DO

## 2022-11-17 ENCOUNTER — HOSPITAL ENCOUNTER (OUTPATIENT)
Dept: CT IMAGING | Facility: HOSPITAL | Age: 87
Discharge: HOME | End: 2022-11-17

## 2022-11-17 DIAGNOSIS — R05.9 COUGH, UNSPECIFIED: ICD-10-CM

## 2022-11-17 RX ADMIN — IOHEXOL 85 ML: 350 INJECTION, SOLUTION INTRAVENOUS at 08:28

## 2022-11-23 DIAGNOSIS — F41.1 GENERALIZED ANXIETY DISORDER: ICD-10-CM

## 2022-11-23 RX ORDER — ALPRAZOLAM 0.25 MG/1
0.25 TABLET ORAL 2 TIMES DAILY PRN
Qty: 60 TABLET | Refills: 0 | Status: SHIPPED | OUTPATIENT
Start: 2022-11-23 | End: 2023-01-22

## 2022-12-21 DIAGNOSIS — F41.1 GENERALIZED ANXIETY DISORDER: ICD-10-CM

## 2022-12-21 RX ORDER — ALPRAZOLAM 0.25 MG/1
0.25 TABLET ORAL 2 TIMES DAILY PRN
Qty: 60 TABLET | Refills: 0 | Status: SHIPPED | OUTPATIENT
Start: 2022-12-21 | End: 2023-02-19

## 2023-01-18 ENCOUNTER — TELEPHONE (OUTPATIENT)
Dept: FAMILY MEDICINE CLINIC | Facility: CLINIC | Age: 88
End: 2023-01-18

## 2023-01-18 NOTE — TELEPHONE ENCOUNTER
Mayra Granado from [facility] [address] ([phone]) called regarding pt having 2 wellness visits in 2022 on 8/17 and 11/16  The  8/17/22 ov should have been a problem visit but was coded with  (AWV) in error and 11/16/22 was coded with correct code of  (AWV)    Asked Dr Nate Pickard to update 8/17/22 visit with a problem visit code to reflect the note for that dos so I may send to billing to update [facility]

## 2023-01-26 DIAGNOSIS — F41.1 GENERALIZED ANXIETY DISORDER: ICD-10-CM

## 2023-01-26 RX ORDER — ALPRAZOLAM 0.25 MG/1
0.25 TABLET ORAL 2 TIMES DAILY PRN
Qty: 60 TABLET | Refills: 0 | Status: SHIPPED | OUTPATIENT
Start: 2023-01-26 | End: 2023-01-27 | Stop reason: SDUPTHER

## 2023-01-27 DIAGNOSIS — F41.1 GENERALIZED ANXIETY DISORDER: ICD-10-CM

## 2023-01-30 RX ORDER — ALPRAZOLAM 0.25 MG/1
0.25 TABLET ORAL 2 TIMES DAILY PRN
Qty: 60 TABLET | Refills: 0 | Status: SHIPPED | OUTPATIENT
Start: 2023-01-30 | End: 2023-02-10

## 2023-02-10 ENCOUNTER — OFFICE VISIT (OUTPATIENT)
Dept: FAMILY MEDICINE CLINIC | Facility: CLINIC | Age: 88
End: 2023-02-10

## 2023-02-10 VITALS
BODY MASS INDEX: 32.54 KG/M2 | DIASTOLIC BLOOD PRESSURE: 80 MMHG | OXYGEN SATURATION: 96 % | WEIGHT: 155 LBS | HEART RATE: 110 BPM | HEIGHT: 58 IN | TEMPERATURE: 97.1 F | SYSTOLIC BLOOD PRESSURE: 146 MMHG

## 2023-02-10 DIAGNOSIS — R91.1 PULMONARY NODULE: Primary | ICD-10-CM

## 2023-02-10 DIAGNOSIS — I73.9 PERIPHERAL ARTERIAL DISEASE (HCC): ICD-10-CM

## 2023-02-10 DIAGNOSIS — F41.9 ANXIETY: Chronic | ICD-10-CM

## 2023-02-10 DIAGNOSIS — F41.1 GENERALIZED ANXIETY DISORDER: ICD-10-CM

## 2023-02-10 DIAGNOSIS — N18.31 STAGE 3A CHRONIC KIDNEY DISEASE (HCC): ICD-10-CM

## 2023-02-10 RX ORDER — ALPRAZOLAM 0.25 MG/1
0.25 TABLET ORAL 3 TIMES DAILY PRN
Qty: 90 TABLET | Refills: 0 | Status: SHIPPED | OUTPATIENT
Start: 2023-02-10 | End: 2023-04-11

## 2023-02-10 NOTE — ASSESSMENT & PLAN NOTE
Lab Results   Component Value Date    EGFR 60 11/15/2022    EGFR 56 09/03/2020    EGFR 69 03/05/2020    CREATININE 0 84 11/15/2022    CREATININE 0 91 09/03/2020    CREATININE 0 77 03/05/2020

## 2023-02-10 NOTE — PROGRESS NOTES
Assessment/Plan:    Problem List Items Addressed This Visit        Other    Anxiety (Chronic)    Pulmonary nodule - Primary        Diagnoses and all orders for this visit:    Pulmonary nodule    Anxiety        No problem-specific Assessment & Plan notes found for this encounter  Subjective:      Patient ID: Che Donovan is a 80 y o  female  Mrs Bland Gist is here with is and teary-eyed also is very concerned about her pulmonary nodule which was discovered incidentally in November and she is actually due for her 3-month repeat CT scan which we will is going to be going back to see her ear nose and throat specialist I will set her up with pulmonary and also I have asked her to visit the behavioral crisis unit at the [facility]      The following portions of the patient's history were reviewed and updated as appropriate:   She has a past medical history of Diverticulosis, GERD (gastroesophageal reflux disease), Hiatal hernia, Irritable bowel disease, Lactose intolerance, and Osteoporosis  ,  does not have any pertinent problems on file  ,   has a past surgical history that includes Hysterectomy; Tonsillectomy; Cholecystectomy; Ear tube removal; Skin cancer excision; Hemorroidectomy; Vagina reconstruction surgery (09/13/2019); pr optx fem shft fx w/insj imed implt w/wo screw (Left, 2/1/2020); Cast application (Left, 9/1/6377); Carpal tunnel release (Right); pr exc tumor soft tissue leg/ankle subfascial <5cm (Left, 9/8/2020); and Squamous cell carcinoma excision (Left, 09/08/2020)  ,  family history includes Colon cancer in her mother; Stomach cancer in her father  ,   reports that she quit smoking about 34 years ago  Her smoking use included cigarettes  She has never used smokeless tobacco  She reports that she does not drink alcohol   No history on file for drug use ,  is allergic to escitalopram, mepenzolate, maalox anti-gas [simethicone], other, prednisone, and reglan [metoclopramide]     Current Outpatient Medications   Medication Sig Dispense Refill   • acetaminophen (TYLENOL) 325 mg tablet Take 3 tablets (975 mg total) by mouth every 8 (eight) hours 30 tablet 0   • ALPRAZolam (XANAX) 0 25 mg tablet Take 1 tablet (0 25 mg total) by mouth 2 (two) times a day as needed for anxiety 60 tablet 0   • benzonatate (TESSALON PERLES) 100 mg capsule Take 1 capsule (100 mg total) by mouth 3 (three) times a day as needed for cough 20 capsule 0   • Calcium Citrate (CITRACAL PO) Take 1 tablet by mouth daily  • Cholecalciferol (VITAMIN D PO) Take 1,000 Units by mouth 2 (two) times a day  • loratadine (CLARITIN) 10 mg tablet Take 1 tablet (10 mg total) by mouth daily 30 tablet 2   • losartan (COZAAR) 50 mg tablet Take 1 tablet (50 mg total) by mouth daily 90 tablet 1   • montelukast (SINGULAIR) 10 mg tablet Take 10 mg by mouth daily     • Multiple Vitamin (MULTIVITAMIN) capsule Take 1 capsule by mouth daily  • omeprazole (PriLOSEC) 40 MG capsule Take 1 capsule (40 mg total) by mouth daily 30 capsule 5   • Pulmicort Flexhaler 90 MCG/ACT inhaler      • sodium chloride (OCEAN) 0 65 % nasal spray 1 spray into each nostril as needed for congestion     • vitamin E, tocopherol, 400 units capsule Take 400 Units by mouth daily       No current facility-administered medications for this visit  Review of Systems   Constitutional: Negative for activity change, appetite change, diaphoresis, fatigue and fever  HENT: Negative  Eyes: Negative  Respiratory: Negative for apnea, cough, chest tightness, shortness of breath and wheezing  Cardiovascular: Negative for chest pain, palpitations and leg swelling  Gastrointestinal: Negative for abdominal distention, abdominal pain, anal bleeding, constipation, diarrhea, nausea and vomiting  Endocrine: Negative for cold intolerance, heat intolerance, polydipsia, polyphagia and polyuria     Genitourinary: Negative for difficulty urinating, dysuria, flank pain, hematuria and urgency  Musculoskeletal: Negative for arthralgias, back pain, gait problem, joint swelling and myalgias  Skin: Negative for color change, rash and wound  Allergic/Immunologic: Negative for environmental allergies, food allergies and immunocompromised state  Neurological: Negative for dizziness, seizures, syncope, speech difficulty, numbness and headaches  Hematological: Negative for adenopathy  Does not bruise/bleed easily  Psychiatric/Behavioral: Negative for agitation, behavioral problems, hallucinations, sleep disturbance and suicidal ideas  The patient is nervous/anxious  Objective:  Vitals:    02/10/23 1137   BP: 146/80   BP Location: Left arm   Patient Position: Sitting   Cuff Size: Standard   Pulse: (!) 110   Temp: (!) 97 1 °F (36 2 °C)   TempSrc: Temporal   SpO2: 96%   Weight: 70 3 kg (155 lb)   Height: 4' 10" (1 473 m)     Body mass index is 32 4 kg/m²  Physical Exam  Constitutional:       General: She is not in acute distress  Appearance: She is well-developed  She is not diaphoretic  HENT:      Head: Normocephalic  Right Ear: External ear normal       Left Ear: External ear normal       Nose: Nose normal    Eyes:      General: No scleral icterus  Right eye: No discharge  Left eye: No discharge  Conjunctiva/sclera: Conjunctivae normal       Pupils: Pupils are equal, round, and reactive to light  Neck:      Thyroid: No thyromegaly  Trachea: No tracheal deviation  Cardiovascular:      Rate and Rhythm: Normal rate and regular rhythm  Heart sounds: Normal heart sounds  No murmur heard  No friction rub  No gallop  Pulmonary:      Effort: Pulmonary effort is normal  No respiratory distress  Breath sounds: Normal breath sounds  No wheezing  Abdominal:      General: Bowel sounds are normal       Palpations: Abdomen is soft  There is no mass  Tenderness: There is no abdominal tenderness   There is no guarding  Musculoskeletal:         General: No deformity  Cervical back: Normal range of motion  Lymphadenopathy:      Cervical: No cervical adenopathy  Skin:     General: Skin is warm and dry  Findings: No erythema or rash  Neurological:      General: No focal deficit present  Mental Status: She is alert and oriented to person, place, and time  Mental status is at baseline  Cranial Nerves: No cranial nerve deficit  Psychiatric:         Thought Content:  Thought content normal

## 2023-02-21 ENCOUNTER — HOSPITAL ENCOUNTER (OUTPATIENT)
Dept: CT IMAGING | Facility: HOSPITAL | Age: 88
Discharge: HOME | End: 2023-02-21
Attending: FAMILY MEDICINE

## 2023-02-21 DIAGNOSIS — R91.1 PULMONARY NODULE: ICD-10-CM

## 2023-02-27 DIAGNOSIS — F41.1 GENERALIZED ANXIETY DISORDER: ICD-10-CM

## 2023-02-27 RX ORDER — ALPRAZOLAM 0.25 MG/1
0.25 TABLET ORAL 3 TIMES DAILY PRN
Qty: 90 TABLET | Refills: 0 | Status: SHIPPED | OUTPATIENT
Start: 2023-02-27 | End: 2023-04-28

## 2023-03-14 DIAGNOSIS — I10 BENIGN ESSENTIAL HYPERTENSION: ICD-10-CM

## 2023-03-15 RX ORDER — LOSARTAN POTASSIUM 50 MG/1
50 TABLET ORAL DAILY
Qty: 90 TABLET | Refills: 1 | Status: SHIPPED | OUTPATIENT
Start: 2023-03-15 | End: 2023-03-24 | Stop reason: SDUPTHER

## 2023-03-24 DIAGNOSIS — I10 BENIGN ESSENTIAL HYPERTENSION: ICD-10-CM

## 2023-03-24 DIAGNOSIS — F41.1 GENERALIZED ANXIETY DISORDER: ICD-10-CM

## 2023-03-24 RX ORDER — LOSARTAN POTASSIUM 50 MG/1
50 TABLET ORAL DAILY
Qty: 90 TABLET | Refills: 1 | Status: SHIPPED | OUTPATIENT
Start: 2023-03-24

## 2023-03-24 RX ORDER — ALPRAZOLAM 0.25 MG/1
0.25 TABLET ORAL 3 TIMES DAILY PRN
Qty: 90 TABLET | Refills: 0 | Status: SHIPPED | OUTPATIENT
Start: 2023-03-24 | End: 2023-05-23

## 2023-04-27 ENCOUNTER — CONSULT (OUTPATIENT)
Dept: PULMONOLOGY | Facility: CLINIC | Age: 88
End: 2023-04-27

## 2023-04-27 VITALS
HEIGHT: 58 IN | TEMPERATURE: 98.6 F | WEIGHT: 158 LBS | OXYGEN SATURATION: 97 % | DIASTOLIC BLOOD PRESSURE: 85 MMHG | SYSTOLIC BLOOD PRESSURE: 133 MMHG | BODY MASS INDEX: 33.17 KG/M2 | HEART RATE: 105 BPM

## 2023-04-27 DIAGNOSIS — K21.9 GASTROESOPHAGEAL REFLUX DISEASE WITHOUT ESOPHAGITIS: Primary | Chronic | ICD-10-CM

## 2023-04-27 DIAGNOSIS — R91.1 PULMONARY NODULE: ICD-10-CM

## 2023-04-27 DIAGNOSIS — Z91.09 ENVIRONMENTAL ALLERGIES: ICD-10-CM

## 2023-04-27 PROBLEM — J30.1 NON-SEASONAL ALLERGIC RHINITIS DUE TO POLLEN: Status: ACTIVE | Noted: 2023-04-27

## 2023-04-27 RX ORDER — MONTELUKAST SODIUM 10 MG/1
10 TABLET ORAL DAILY
Qty: 30 TABLET | Refills: 2 | Status: SHIPPED | OUTPATIENT
Start: 2023-04-27

## 2023-04-27 NOTE — ASSESSMENT & PLAN NOTE
Patient had an abnormal chest CT in November 2022 with bibasilar consolidation and nonspecific nodularity, since resolved  I do not see the need for any further chest imaging at this time

## 2023-04-27 NOTE — ASSESSMENT & PLAN NOTE
Symptoms today seem to be related to allergic rhinitis, both due to seasonal allergies, but also provoking irritants in her daughter's home  It was suggested that she go on weekly allergy shots, but she declined    I suggested that she use the Claritin-D in the morning and Singulair in the evenings and follow-up with the ENT for any further recommendations

## 2023-04-27 NOTE — PROGRESS NOTES
Pulmonary Outpatient Consultation Note   Franky Last 80 y o  female MRN: [id number]  4/27/2023    Referring provider: Sony Kim,   [address] [facility],  [address], [address]     Assessment/Plan:      Pulmonary nodule  Patient had an abnormal chest CT in November 2022 with bibasilar consolidation and nonspecific nodularity, since resolved  I do not see the need for any further chest imaging at this time  Non-seasonal allergic rhinitis due to pollen  Symptoms today seem to be related to allergic rhinitis, both due to seasonal allergies, but also provoking irritants in her daughter's home  It was suggested that she go on weekly allergy shots, but she declined  I suggested that she use the Claritin-D in the morning and Singulair in the evenings and follow-up with the ENT for any further recommendations    She can follow-up in our office on an as-needed basis  Visit orders:    Diagnoses and all orders for this visit:    Gastroesophageal reflux disease without esophagitis    Pulmonary nodule  -     Ambulatory Referral to Pulmonology    Environmental allergies  -     montelukast (SINGULAIR) 10 mg tablet; Take 1 tablet (10 mg total) by mouth daily        History of Present Illness   HPI:  Franky Last is a 80 y o  female who was referred for findings of pulmonary nodule on CT of the chest done in November 2022  At that time, the patient was struggling with sinusitis and excessive coughing  CT of the chest showed nonspecific groundglass nodularity for which repeat scan was suggested  Scan was done in February which showed interval resolution of the findings  Patient denies any history of lung disease including asthma or emphysema  She does seem to have some seasonal allergies and has followed with Dr Milad Garg with ENT in the past   He felt that her symptoms may improve with weekly allergy shots, but she was not willing to participate and commit to traveling to get them weekly    Interestingly, when she moved in with her friend, out of her daughter's house, her symptoms seem to have improved  However she is now back to living with her daughter and her symptoms of raspy voice, sinus congestion and upper chest congestion persist   She has no associated shortness of breath  No fevers or chills  She does not use inhalers  She has intermittent reflux despite taking medications  She has a known hiatal hernia  Her weight is overall stable  She finds Claritin-D to be beneficial   She has Singulair, but stopped taking it when she started taking the Claritin  Review of Systems otherwise negative    Historical Information   Past Medical History:   Diagnosis Date   • Diverticulosis    • GERD (gastroesophageal reflux disease)    • Hiatal hernia    • Irritable bowel disease    • Lactose intolerance    • Osteoporosis      Past Surgical History:   Procedure Laterality Date   • CARPAL TUNNEL RELEASE Right    • CAST APPLICATION Left 2/2/0706    Procedure: APPLICATION CAST, LEFT WRIST;  Surgeon: Randee Bermudez MD;  Location: [facility];  Service: Orthopedics   • CHOLECYSTECTOMY     • EAR TUBE REMOVAL     • HEMORROIDECTOMY     • HYSTERECTOMY     • PERINEAL  PELVIC RECONSTRUCTION SURGERY  09/13/2019    Dr Omid Rabago - [facility]   • AZ EXC TUMOR SOFT TISSUE LEG/ANKLE SUBFASCIAL <5CM Left 9/8/2020    Procedure: EXCISION BIOPSY TISSUE LESION/MASS LOWER EXTREMITY;  Surgeon: Juan Perry DO;  Location: [facility];  Service: General   • AZ OPTX FEM SHFT FX W/INSJ IMED IMPLT W/WO SCREW Left 2/1/2020    Procedure: INSERTION NAIL IM FEMUR ANTEGRADE (TROCHANTERIC);   Surgeon: Randee Bermudez MD;  Location:  MAIN OR;  Service: Orthopedics   • SKIN CANCER EXCISION     • SQUAMOUS CELL CARCINOMA EXCISION Left 09/08/2020    lower part of leg   • TONSILLECTOMY       Family History   Problem Relation Age of Onset   • Colon cancer Mother    • Stomach cancer Father          Social History     Tobacco Use   Smoking Status Former   • Years: 30 00   • Types: "Cigarettes   • Quit date: 46   • Years since quittin 3   Smokeless Tobacco Never       Meds/Allergies     Current Outpatient Medications:   •  acetaminophen (TYLENOL) 500 mg tablet, Take 500 mg by mouth 3 (three) times a day as needed, Disp: , Rfl:   •  ALPRAZolam (XANAX) 0 25 mg tablet, Take 1 tablet (0 25 mg total) by mouth 3 (three) times a day as needed for anxiety, Disp: 90 tablet, Rfl: 0  •  Calcium Citrate (CITRACAL PO), Take 1 tablet by mouth daily  , Disp: , Rfl:   •  Cholecalciferol (VITAMIN D PO), Take 1,000 Units by mouth 2 (two) times a day , Disp: , Rfl:   •  loratadine (CLARITIN) 10 mg tablet, Take 1 tablet (10 mg total) by mouth daily, Disp: 30 tablet, Rfl: 2  •  losartan (COZAAR) 50 mg tablet, Take 1 tablet (50 mg total) by mouth daily, Disp: 90 tablet, Rfl: 1  •  montelukast (SINGULAIR) 10 mg tablet, Take 1 tablet (10 mg total) by mouth daily, Disp: 30 tablet, Rfl: 2  •  Multiple Vitamin (MULTIVITAMIN) capsule, Take 1 capsule by mouth daily  , Disp: , Rfl:   •  pantoprazole (PROTONIX) 40 mg tablet, Take 1 tablet (40 mg total) by mouth daily, Disp: 90 tablet, Rfl: 1  •  sodium chloride (OCEAN) 0 65 % nasal spray, 1 spray into each nostril as needed for congestion, Disp: , Rfl:   •  vitamin E, tocopherol, 400 units capsule, Take 400 Units by mouth daily, Disp: , Rfl:   •  Pulmicort Flexhaler 90 MCG/ACT inhaler, , Disp: , Rfl:   Allergies   Allergen Reactions   • Escitalopram Hives   • Mepenzolate      Other reaction(s): Urinary Retention   • Maalox Anti-Gas [Simethicone] Hives   • Other      Seasonal    • Prednisone Other (See Comments)   • Reglan [Metoclopramide] Other (See Comments)     hyper       Vitals: Blood pressure 133/85, pulse 105, temperature 98 6 °F (37 °C), temperature source Tympanic, height 4' 10\" (1 473 m), weight 71 7 kg (158 lb), SpO2 97 %  , Body mass index is 33 02 kg/m²   Oxygen Therapy  SpO2: 97 %  Oxygen Therapy: None (Room air)    Physical Exam   Physical " Exam  Constitutional:       General: She is not in acute distress  HENT:      Head: Normocephalic  Eyes:      General: No scleral icterus  Neck:      Vascular: No JVD  Cardiovascular:      Rate and Rhythm: Normal rate and regular rhythm  Pulmonary:      Breath sounds: No wheezing, rhonchi or rales  Abdominal:      Palpations: Abdomen is soft  Tenderness: There is no abdominal tenderness  Musculoskeletal:      Cervical back: Neck supple  Lymphadenopathy:      Cervical: No cervical adenopathy  Skin:     General: Skin is warm and dry  Neurological:      Mental Status: She is alert and oriented to person, place, and time  Psychiatric:         Mood and Affect: Mood normal          Labs: I have personally reviewed pertinent lab results  Lab Results   Component Value Date    WBC 7 77 09/03/2020    HGB 13 4 09/03/2020    HCT 40 7 09/03/2020     (H) 09/03/2020     09/03/2020     Lab Results   Component Value Date    GLUCOSE 84 03/11/2015    CALCIUM 9 7 09/03/2020     03/11/2015    K 4 0 09/03/2020    CO2 24 09/03/2020     09/03/2020    BUN 14 11/15/2022    CREATININE 0 84 11/15/2022     Lab Results   Component Value Date    IGE 74 2 10/29/2022    IGE 74 2 10/29/2022    IGE 73 2 10/29/2022     Lab Results   Component Value Date    ALT 17 02/25/2020    AST 14 02/25/2020    ALKPHOS 169 (H) 02/25/2020    BILITOT 0 8 03/11/2015       Imaging and other studies: I have personally reviewed pertinent reports  and I have personally reviewed pertinent films in PACS  Chest CT from 2/21/2023 is reviewed and compared with prior from November 2022  The bilateral lower lobe infiltrates have resolved  There is scarring in the left lower lobe  No suspicious nodules

## 2023-05-01 DIAGNOSIS — F41.1 GENERALIZED ANXIETY DISORDER: ICD-10-CM

## 2023-05-01 RX ORDER — ALPRAZOLAM 0.25 MG/1
0.25 TABLET ORAL 3 TIMES DAILY PRN
Qty: 90 TABLET | Refills: 0 | Status: SHIPPED | OUTPATIENT
Start: 2023-05-01 | End: 2023-06-30

## 2023-06-05 DIAGNOSIS — F41.1 GENERALIZED ANXIETY DISORDER: ICD-10-CM

## 2023-06-05 RX ORDER — ALPRAZOLAM 0.25 MG/1
0.25 TABLET ORAL 3 TIMES DAILY PRN
Qty: 90 TABLET | Refills: 0 | Status: SHIPPED | OUTPATIENT
Start: 2023-06-05 | End: 2023-08-04

## 2023-07-05 DIAGNOSIS — F41.1 GENERALIZED ANXIETY DISORDER: ICD-10-CM

## 2023-07-05 RX ORDER — ALPRAZOLAM 0.25 MG/1
0.25 TABLET ORAL 3 TIMES DAILY PRN
Qty: 90 TABLET | Refills: 0 | Status: SHIPPED | OUTPATIENT
Start: 2023-07-05 | End: 2023-09-03

## 2023-08-03 ENCOUNTER — APPOINTMENT (OUTPATIENT)
Dept: RADIOLOGY | Facility: MEDICAL CENTER | Age: 88
End: 2023-08-03
Payer: COMMERCIAL

## 2023-08-03 ENCOUNTER — OFFICE VISIT (OUTPATIENT)
Dept: FAMILY MEDICINE CLINIC | Facility: CLINIC | Age: 88
End: 2023-08-03
Payer: COMMERCIAL

## 2023-08-03 VITALS
OXYGEN SATURATION: 94 % | SYSTOLIC BLOOD PRESSURE: 146 MMHG | WEIGHT: 159 LBS | HEIGHT: 58 IN | HEART RATE: 101 BPM | DIASTOLIC BLOOD PRESSURE: 84 MMHG | BODY MASS INDEX: 33.37 KG/M2 | TEMPERATURE: 96.8 F

## 2023-08-03 DIAGNOSIS — R05.3 CHRONIC COUGH: Primary | ICD-10-CM

## 2023-08-03 DIAGNOSIS — R05.3 CHRONIC COUGH: ICD-10-CM

## 2023-08-03 DIAGNOSIS — F41.1 GENERALIZED ANXIETY DISORDER: ICD-10-CM

## 2023-08-03 PROCEDURE — 71046 X-RAY EXAM CHEST 2 VIEWS: CPT

## 2023-08-03 PROCEDURE — 99214 OFFICE O/P EST MOD 30 MIN: CPT | Performed by: FAMILY MEDICINE

## 2023-08-03 RX ORDER — ALPRAZOLAM 0.25 MG/1
0.25 TABLET ORAL 3 TIMES DAILY PRN
Qty: 90 TABLET | Refills: 0 | Status: SHIPPED | OUTPATIENT
Start: 2023-08-03 | End: 2023-10-02

## 2023-08-03 NOTE — PROGRESS NOTES
Assessment/Plan:    Problem List Items Addressed This Visit    None  Visit Diagnoses     Chronic cough    -  Primary    Relevant Orders    XR chest pa & lateral    Generalized anxiety disorder        Relevant Medications    ALPRAZolam (XANAX) 0.25 mg tablet           Diagnoses and all orders for this visit:    Chronic cough  -     XR chest pa & lateral; Future    Generalized anxiety disorder  -     ALPRAZolam (XANAX) 0.25 mg tablet; Take 1 tablet (0.25 mg total) by mouth 3 (three) times a day as needed for anxiety        No problem-specific Assessment & Plan notes found for this encounter. Subjective:      Patient ID: Loni Otero is a 80 y.o. female. Patient here for follow-up visit does so she can sustain her a prescription for alprazolam also is having problems with postnasal drip and upper chest congestion she is very concerned about the possibility of a lung nodule I reviewed her CAT scans from November 2022 and January or February 2023 she originally had some nodularity and groundglass infiltrates in her first CAT scan these had cleared up completely by the second CAT scan I think a lot of her symptoms are combinations of seasonal allergic rhinitis with postnasal drip also gastroesophageal or laryngeal tracheal disease from reflux and then there is a component of anxiety also      The following portions of the patient's history were reviewed and updated as appropriate:   She has a past medical history of Diverticulosis, GERD (gastroesophageal reflux disease), Hiatal hernia, Irritable bowel disease, Lactose intolerance, and Osteoporosis. ,  does not have any pertinent problems on file. ,   has a past surgical history that includes Hysterectomy; Tonsillectomy; Cholecystectomy; Ear tube removal; Skin cancer excision; Hemorroidectomy; Vagina reconstruction surgery (09/13/2019); pr optx fem shft fx w/insj imed implt w/wo screw (Left, 2/1/2020); Cast application (Left, 6/2/0134);  Carpal tunnel release (Right); pr exc tumor soft tissue leg/ankle subfascial <5cm (Left, 9/8/2020); and Squamous cell carcinoma excision (Left, 09/08/2020). ,  family history includes Colon cancer in her mother; Stomach cancer in her father. ,   reports that she quit smoking about 34 years ago. Her smoking use included cigarettes. She has never used smokeless tobacco. She reports that she does not drink alcohol. No history on file for drug use.,  is allergic to escitalopram, mepenzolate, maalox anti-gas [simethicone], other, prednisone, and reglan [metoclopramide]. .  Current Outpatient Medications   Medication Sig Dispense Refill   • acetaminophen (TYLENOL) 500 mg tablet Take 500 mg by mouth 3 (three) times a day as needed     • ALPRAZolam (XANAX) 0.25 mg tablet Take 1 tablet (0.25 mg total) by mouth 3 (three) times a day as needed for anxiety 90 tablet 0   • Calcium Citrate (CITRACAL PO) Take 1 tablet by mouth daily. • Cholecalciferol (VITAMIN D PO) Take 1,000 Units by mouth 2 (two) times a day. • loratadine (CLARITIN) 10 mg tablet Take 1 tablet (10 mg total) by mouth daily 30 tablet 2   • losartan (COZAAR) 50 mg tablet Take 1 tablet (50 mg total) by mouth daily 90 tablet 1   • montelukast (SINGULAIR) 10 mg tablet Take 1 tablet (10 mg total) by mouth daily 30 tablet 2   • Multiple Vitamin (MULTIVITAMIN) capsule Take 1 capsule by mouth daily. • pantoprazole (PROTONIX) 40 mg tablet Take 1 tablet (40 mg total) by mouth daily 90 tablet 1   • sodium chloride (OCEAN) 0.65 % nasal spray 1 spray into each nostril as needed for congestion     • vitamin E, tocopherol, 400 units capsule Take 400 Units by mouth daily       No current facility-administered medications for this visit. Review of Systems   Constitutional: Negative for activity change, appetite change, diaphoresis, fatigue and fever. HENT: Negative. Negative for dental problem. Eyes: Negative. Respiratory: Positive for shortness of breath.  Negative for apnea, cough, chest tightness and wheezing. Cardiovascular: Negative for chest pain, palpitations and leg swelling. Gastrointestinal: Negative for abdominal distention, abdominal pain, anal bleeding, constipation, diarrhea, nausea and vomiting. Endocrine: Negative for cold intolerance, heat intolerance, polydipsia, polyphagia and polyuria. Genitourinary: Negative for difficulty urinating, dysuria, flank pain, hematuria and urgency. Musculoskeletal: Negative for arthralgias, back pain, gait problem, joint swelling and myalgias. Skin: Negative for color change, rash and wound. Allergic/Immunologic: Negative for environmental allergies, food allergies and immunocompromised state. Neurological: Negative for dizziness, seizures, syncope, speech difficulty, numbness and headaches. Hematological: Negative for adenopathy. Does not bruise/bleed easily. Psychiatric/Behavioral: Negative for agitation, behavioral problems, hallucinations, sleep disturbance and suicidal ideas. Objective:  Vitals:    08/03/23 1152   BP: 146/84   BP Location: Left arm   Patient Position: Sitting   Cuff Size: Large   Pulse: 101   Temp: (!) 96.8 °F (36 °C)   TempSrc: Temporal   SpO2: 94%   Weight: 72.1 kg (159 lb)   Height: 4' 10" (1.473 m)     Body mass index is 33.23 kg/m². Physical Exam  Constitutional:       General: She is not in acute distress. Appearance: She is well-developed. She is not diaphoretic. HENT:      Head: Normocephalic. Right Ear: External ear normal.      Left Ear: External ear normal.      Nose: Nose normal.   Eyes:      General: No scleral icterus. Right eye: No discharge. Left eye: No discharge. Conjunctiva/sclera: Conjunctivae normal.      Pupils: Pupils are equal, round, and reactive to light. Neck:      Thyroid: No thyromegaly. Trachea: No tracheal deviation. Cardiovascular:      Rate and Rhythm: Normal rate and regular rhythm.       Heart sounds: Normal heart sounds. No murmur heard. No friction rub. No gallop. Pulmonary:      Effort: Pulmonary effort is normal. No respiratory distress. Breath sounds: Normal breath sounds. No wheezing. Abdominal:      General: Bowel sounds are normal.      Palpations: Abdomen is soft. There is no mass. Tenderness: There is no abdominal tenderness. There is no guarding. Musculoskeletal:         General: No deformity. Cervical back: Normal range of motion. Lymphadenopathy:      Cervical: No cervical adenopathy. Skin:     General: Skin is warm and dry. Findings: No erythema or rash. Neurological:      Mental Status: She is alert and oriented to person, place, and time. Cranial Nerves: No cranial nerve deficit. Psychiatric:         Thought Content:  Thought content normal.

## 2023-08-25 ENCOUNTER — OFFICE VISIT (OUTPATIENT)
Dept: FAMILY MEDICINE CLINIC | Facility: CLINIC | Age: 88
End: 2023-08-25
Payer: COMMERCIAL

## 2023-08-25 ENCOUNTER — APPOINTMENT (OUTPATIENT)
Dept: RADIOLOGY | Facility: MEDICAL CENTER | Age: 88
End: 2023-08-25
Payer: COMMERCIAL

## 2023-08-25 VITALS
HEART RATE: 95 BPM | TEMPERATURE: 97.5 F | BODY MASS INDEX: 32.95 KG/M2 | WEIGHT: 157 LBS | OXYGEN SATURATION: 93 % | HEIGHT: 58 IN | DIASTOLIC BLOOD PRESSURE: 72 MMHG | SYSTOLIC BLOOD PRESSURE: 128 MMHG

## 2023-08-25 DIAGNOSIS — S80.12XA CONTUSION OF LEFT LOWER EXTREMITY, INITIAL ENCOUNTER: ICD-10-CM

## 2023-08-25 DIAGNOSIS — S80.12XA CONTUSION OF LEFT LOWER EXTREMITY, INITIAL ENCOUNTER: Primary | ICD-10-CM

## 2023-08-25 PROCEDURE — 73590 X-RAY EXAM OF LOWER LEG: CPT

## 2023-08-25 PROCEDURE — 99214 OFFICE O/P EST MOD 30 MIN: CPT | Performed by: FAMILY MEDICINE

## 2023-08-25 NOTE — PROGRESS NOTES
Assessment/Plan: Contusion medial distal left tibia  Problem List Items Addressed This Visit    None  Visit Diagnoses     Contusion of left lower extremity, initial encounter    -  Primary           Diagnoses and all orders for this visit:    Contusion of left lower extremity, initial encounter        No problem-specific Assessment & Plan notes found for this encounter. Subjective:      Patient ID: Leander Fonseca is a 80 y.o. female. Patient has a contusion to the medial distal tibia of the left leg it occurred when a large cell phone slipped and fell striking her lower left leg she has point tenderness at that site no other pain in the ankle or the foot      The following portions of the patient's history were reviewed and updated as appropriate:   She has a past medical history of Diverticulosis, GERD (gastroesophageal reflux disease), Hiatal hernia, Irritable bowel disease, Lactose intolerance, and Osteoporosis. ,  does not have any pertinent problems on file. ,   has a past surgical history that includes Hysterectomy; Tonsillectomy; Cholecystectomy; Ear tube removal; Skin cancer excision; Hemorroidectomy; Vagina reconstruction surgery (09/13/2019); pr optx fem shft fx w/insj imed implt w/wo screw (Left, 2/1/2020); Cast application (Left, 0/1/4023); Carpal tunnel release (Right); pr exc tumor soft tissue leg/ankle subfascial <5cm (Left, 9/8/2020); and Squamous cell carcinoma excision (Left, 09/08/2020). ,  family history includes Colon cancer in her mother; Stomach cancer in her father. ,   reports that she quit smoking about 34 years ago. Her smoking use included cigarettes. She has never used smokeless tobacco. She reports that she does not drink alcohol. No history on file for drug use.,  is allergic to escitalopram, mepenzolate, maalox anti-gas [simethicone], other, prednisone, and reglan [metoclopramide]. .  Current Outpatient Medications   Medication Sig Dispense Refill   • acetaminophen (TYLENOL) 500 mg tablet Take 500 mg by mouth 3 (three) times a day as needed     • ALPRAZolam (XANAX) 0.25 mg tablet Take 1 tablet (0.25 mg total) by mouth 3 (three) times a day as needed for anxiety 90 tablet 0   • Calcium Citrate (CITRACAL PO) Take 1 tablet by mouth daily. • Cholecalciferol (VITAMIN D PO) Take 1,000 Units by mouth 2 (two) times a day. • loratadine (CLARITIN) 10 mg tablet Take 1 tablet (10 mg total) by mouth daily 30 tablet 2   • losartan (COZAAR) 50 mg tablet Take 1 tablet (50 mg total) by mouth daily 90 tablet 1   • Multiple Vitamin (MULTIVITAMIN) capsule Take 1 capsule by mouth daily. • pantoprazole (PROTONIX) 40 mg tablet Take 1 tablet (40 mg total) by mouth daily 90 tablet 1   • sodium chloride (OCEAN) 0.65 % nasal spray 1 spray into each nostril as needed for congestion     • vitamin E, tocopherol, 400 units capsule Take 400 Units by mouth daily     • montelukast (SINGULAIR) 10 mg tablet Take 1 tablet (10 mg total) by mouth daily (Patient not taking: Reported on 8/25/2023) 30 tablet 2     No current facility-administered medications for this visit. Review of Systems   Constitutional: Negative for activity change, appetite change, diaphoresis, fatigue and fever. HENT: Negative. Eyes: Negative. Respiratory: Negative for apnea, cough, chest tightness, shortness of breath and wheezing. Cardiovascular: Negative for chest pain, palpitations and leg swelling. Gastrointestinal: Negative for abdominal distention, abdominal pain, anal bleeding, constipation, diarrhea, nausea and vomiting. Endocrine: Negative for cold intolerance, heat intolerance, polydipsia, polyphagia and polyuria. Genitourinary: Negative for difficulty urinating, dysuria, flank pain, hematuria and urgency. Musculoskeletal: Negative for arthralgias, back pain, gait problem, joint swelling and myalgias.         Pain left lower leg medial aspect distal tibia from a contusion   Skin: Negative for color change, rash and wound. Allergic/Immunologic: Negative for environmental allergies, food allergies and immunocompromised state. Neurological: Negative for dizziness, seizures, syncope, speech difficulty, numbness and headaches. Hematological: Negative for adenopathy. Does not bruise/bleed easily. Psychiatric/Behavioral: Negative for agitation, behavioral problems, hallucinations, sleep disturbance and suicidal ideas. Objective:  Vitals:    08/25/23 1028   BP: 128/72   BP Location: Left arm   Patient Position: Sitting   Cuff Size: Large   Pulse: 95   Temp: 97.5 °F (36.4 °C)   TempSrc: Temporal   SpO2: 93%   Weight: 71.2 kg (157 lb)   Height: 4' 10" (1.473 m)     Body mass index is 32.81 kg/m². Physical Exam  Constitutional:       General: She is not in acute distress. Appearance: She is well-developed. She is not diaphoretic. HENT:      Head: Normocephalic. Right Ear: External ear normal.      Left Ear: External ear normal.      Nose: Nose normal.   Eyes:      General: No scleral icterus. Right eye: No discharge. Left eye: No discharge. Conjunctiva/sclera: Conjunctivae normal.      Pupils: Pupils are equal, round, and reactive to light. Neck:      Thyroid: No thyromegaly. Trachea: No tracheal deviation. Cardiovascular:      Rate and Rhythm: Normal rate and regular rhythm. Heart sounds: Normal heart sounds. No murmur heard. No friction rub. No gallop. Pulmonary:      Effort: Pulmonary effort is normal. No respiratory distress. Breath sounds: Normal breath sounds. No wheezing. Abdominal:      General: Bowel sounds are normal.      Palpations: Abdomen is soft. There is no mass. Tenderness: There is no abdominal tenderness. There is no guarding. Musculoskeletal:         General: No deformity. Cervical back: Normal range of motion.       Comments: Patient has discomfort point tenderness about 6 cm above the medial malleolus at the distal tibia Lymphadenopathy:      Cervical: No cervical adenopathy. Skin:     General: Skin is warm and dry. Findings: No erythema or rash. Neurological:      Mental Status: She is alert and oriented to person, place, and time. Cranial Nerves: No cranial nerve deficit. Psychiatric:         Thought Content:  Thought content normal.

## 2023-08-29 ENCOUNTER — TELEPHONE (OUTPATIENT)
Dept: FAMILY MEDICINE CLINIC | Facility: CLINIC | Age: 88
End: 2023-08-29

## 2023-08-30 ENCOUNTER — TELEPHONE (OUTPATIENT)
Dept: FAMILY MEDICINE CLINIC | Facility: CLINIC | Age: 88
End: 2023-08-30

## 2023-09-05 DIAGNOSIS — F41.1 GENERALIZED ANXIETY DISORDER: ICD-10-CM

## 2023-09-05 RX ORDER — ALPRAZOLAM 0.25 MG/1
0.25 TABLET ORAL 3 TIMES DAILY PRN
Qty: 90 TABLET | Refills: 0 | Status: SHIPPED | OUTPATIENT
Start: 2023-09-05 | End: 2023-11-04

## 2023-09-18 DIAGNOSIS — I10 BENIGN ESSENTIAL HYPERTENSION: ICD-10-CM

## 2023-09-18 RX ORDER — LOSARTAN POTASSIUM 50 MG/1
50 TABLET ORAL DAILY
Qty: 90 TABLET | Refills: 1 | Status: SHIPPED | OUTPATIENT
Start: 2023-09-18

## 2023-10-06 DIAGNOSIS — F41.1 GENERALIZED ANXIETY DISORDER: ICD-10-CM

## 2023-10-06 DIAGNOSIS — K21.9 GASTROESOPHAGEAL REFLUX DISEASE WITHOUT ESOPHAGITIS: Chronic | ICD-10-CM

## 2023-10-06 RX ORDER — PANTOPRAZOLE SODIUM 40 MG/1
40 TABLET, DELAYED RELEASE ORAL DAILY
Qty: 90 TABLET | Refills: 1 | Status: SHIPPED | OUTPATIENT
Start: 2023-10-06

## 2023-10-06 RX ORDER — ALPRAZOLAM 0.25 MG/1
0.25 TABLET ORAL 3 TIMES DAILY PRN
Qty: 90 TABLET | Refills: 0 | Status: SHIPPED | OUTPATIENT
Start: 2023-10-06 | End: 2023-12-05

## 2023-10-07 DIAGNOSIS — K21.9 GASTROESOPHAGEAL REFLUX DISEASE WITHOUT ESOPHAGITIS: Chronic | ICD-10-CM

## 2023-10-09 RX ORDER — PANTOPRAZOLE SODIUM 40 MG/1
40 TABLET, DELAYED RELEASE ORAL DAILY
Qty: 90 TABLET | Refills: 1 | Status: SHIPPED | OUTPATIENT
Start: 2023-10-09

## 2023-10-13 ENCOUNTER — HOSPITAL ENCOUNTER (OUTPATIENT)
Dept: RADIOLOGY | Facility: HOSPITAL | Age: 88
Discharge: HOME | End: 2023-10-13
Payer: COMMERCIAL

## 2023-10-13 ENCOUNTER — OFFICE VISIT (OUTPATIENT)
Dept: FAMILY MEDICINE CLINIC | Facility: CLINIC | Age: 88
End: 2023-10-13
Payer: COMMERCIAL

## 2023-10-13 ENCOUNTER — LAB (OUTPATIENT)
Dept: LAB | Facility: HOSPITAL | Age: 88
End: 2023-10-13
Payer: COMMERCIAL

## 2023-10-13 ENCOUNTER — HOSPITAL ENCOUNTER (OUTPATIENT)
Dept: NON INVASIVE DIAGNOSTICS | Facility: HOSPITAL | Age: 88
Discharge: HOME | End: 2023-10-13
Attending: FAMILY MEDICINE
Payer: COMMERCIAL

## 2023-10-13 VITALS
SYSTOLIC BLOOD PRESSURE: 142 MMHG | BODY MASS INDEX: 33.8 KG/M2 | WEIGHT: 161 LBS | HEIGHT: 58 IN | HEART RATE: 89 BPM | TEMPERATURE: 97.5 F | DIASTOLIC BLOOD PRESSURE: 80 MMHG | OXYGEN SATURATION: 94 %

## 2023-10-13 DIAGNOSIS — M25.472 PAIN AND SWELLING OF LEFT ANKLE: Primary | ICD-10-CM

## 2023-10-13 DIAGNOSIS — M25.572 PAIN AND SWELLING OF LEFT ANKLE: ICD-10-CM

## 2023-10-13 DIAGNOSIS — M25.472 PAIN AND SWELLING OF LEFT ANKLE: ICD-10-CM

## 2023-10-13 DIAGNOSIS — M79.662 PAIN OF LEFT CALF: ICD-10-CM

## 2023-10-13 DIAGNOSIS — M79.662 PAIN OF LEFT CALF: Primary | ICD-10-CM

## 2023-10-13 DIAGNOSIS — M25.572 PAIN AND SWELLING OF LEFT ANKLE: Primary | ICD-10-CM

## 2023-10-13 LAB — D DIMER PPP FEU-MCNC: 0.66 UG/ML FEU

## 2023-10-13 PROCEDURE — 73630 X-RAY EXAM OF FOOT: CPT

## 2023-10-13 PROCEDURE — 85379 FIBRIN DEGRADATION QUANT: CPT

## 2023-10-13 PROCEDURE — 36415 COLL VENOUS BLD VENIPUNCTURE: CPT

## 2023-10-13 PROCEDURE — 99214 OFFICE O/P EST MOD 30 MIN: CPT | Performed by: FAMILY MEDICINE

## 2023-10-13 PROCEDURE — 73610 X-RAY EXAM OF ANKLE: CPT

## 2023-10-13 PROCEDURE — 93971 EXTREMITY STUDY: CPT

## 2023-10-13 NOTE — PROGRESS NOTES
Assessment/Plan: X-ray of the left ankle and foot will be taken and also stat D-dimer    Problem List Items Addressed This Visit    None  Visit Diagnoses       Pain and swelling of left ankle    -  Primary             Diagnoses and all orders for this visit:    Pain and swelling of left ankle        No problem-specific Assessment & Plan notes found for this encounter. Subjective:      Patient ID: Nette Whiting is a 80 y.o. female. Patient is experiencing pain in the left ankle she feels that the ankle is tight painful she has concerns about the possibility of blood clot she had an accident where something was dropped on her ankle back in August the beginning of August she had x-rays of the tibia and fibula which were negative at that time        The following portions of the patient's history were reviewed and updated as appropriate:   She has a past medical history of Diverticulosis, GERD (gastroesophageal reflux disease), Hiatal hernia, Irritable bowel disease, Lactose intolerance, and Osteoporosis. ,  does not have any pertinent problems on file. ,   has a past surgical history that includes Hysterectomy; Tonsillectomy; Cholecystectomy; Ear tube removal; Skin cancer excision; Hemorroidectomy; Vagina reconstruction surgery (09/13/2019); pr optx fem shft fx w/insj imed implt w/wo screw (Left, 2/1/2020); Cast application (Left, 0/6/7958); Carpal tunnel release (Right); pr exc tumor soft tissue leg/ankle subfascial <5cm (Left, 9/8/2020); and Squamous cell carcinoma excision (Left, 09/08/2020). ,  family history includes Colon cancer in her mother; Stomach cancer in her father. ,   reports that she quit smoking about 34 years ago. Her smoking use included cigarettes. She has never used smokeless tobacco. She reports that she does not drink alcohol. No history on file for drug use.,  is allergic to escitalopram, mepenzolate, maalox anti-gas [simethicone], other, prednisone, and reglan [metoclopramide]. .  Current Outpatient Medications   Medication Sig Dispense Refill    acetaminophen (TYLENOL) 500 mg tablet Take 500 mg by mouth 3 (three) times a day as needed      ALPRAZolam (XANAX) 0.25 mg tablet Take 1 tablet (0.25 mg total) by mouth 3 (three) times a day as needed for anxiety 90 tablet 0    Calcium Citrate (CITRACAL PO) Take 1 tablet by mouth daily. Cholecalciferol (VITAMIN D PO) Take 1,000 Units by mouth 2 (two) times a day. loratadine (CLARITIN) 10 mg tablet Take 1 tablet (10 mg total) by mouth daily 30 tablet 2    losartan (COZAAR) 50 mg tablet Take 1 tablet (50 mg total) by mouth daily 90 tablet 1    Multiple Vitamin (MULTIVITAMIN) capsule Take 1 capsule by mouth daily. pantoprazole (PROTONIX) 40 mg tablet take 1 tablet by mouth once daily 90 tablet 1    sodium chloride (OCEAN) 0.65 % nasal spray 1 spray into each nostril as needed for congestion      vitamin E, tocopherol, 400 units capsule Take 400 Units by mouth daily      montelukast (SINGULAIR) 10 mg tablet Take 1 tablet (10 mg total) by mouth daily (Patient not taking: Reported on 8/25/2023) 30 tablet 2     No current facility-administered medications for this visit. Review of Systems   Constitutional:  Negative for activity change, appetite change, diaphoresis, fatigue and fever. HENT: Negative. Eyes: Negative. Respiratory:  Negative for apnea, cough, chest tightness, shortness of breath and wheezing. Cardiovascular:  Negative for chest pain, palpitations and leg swelling. Gastrointestinal:  Negative for abdominal distention, abdominal pain, anal bleeding, constipation, diarrhea, nausea and vomiting. Endocrine: Negative for cold intolerance, heat intolerance, polydipsia, polyphagia and polyuria. Genitourinary:  Negative for difficulty urinating, dysuria, flank pain, hematuria and urgency. Musculoskeletal:  Positive for arthralgias. Negative for back pain, gait problem, joint swelling and myalgias.         Pain left ankle Skin:  Negative for color change, rash and wound. Allergic/Immunologic: Negative for environmental allergies, food allergies and immunocompromised state. Neurological:  Negative for dizziness, seizures, syncope, speech difficulty, numbness and headaches. Hematological:  Negative for adenopathy. Does not bruise/bleed easily. Psychiatric/Behavioral:  Negative for agitation, behavioral problems, hallucinations, sleep disturbance and suicidal ideas. Objective:  Vitals:    10/13/23 1058   BP: 142/80   BP Location: Left arm   Patient Position: Sitting   Cuff Size: Standard   Pulse: 89   Temp: 97.5 °F (36.4 °C)   TempSrc: Temporal   SpO2: 94%   Weight: 73 kg (161 lb)   Height: 4' 10" (1.473 m)     Body mass index is 33.65 kg/m². Physical Exam  Constitutional:       General: She is not in acute distress. Appearance: She is well-developed. She is not diaphoretic. HENT:      Head: Normocephalic. Right Ear: External ear normal.      Left Ear: External ear normal.      Nose: Nose normal.   Eyes:      General: No scleral icterus. Right eye: No discharge. Left eye: No discharge. Conjunctiva/sclera: Conjunctivae normal.      Pupils: Pupils are equal, round, and reactive to light. Neck:      Thyroid: No thyromegaly. Trachea: No tracheal deviation. Cardiovascular:      Rate and Rhythm: Normal rate and regular rhythm. Heart sounds: Normal heart sounds. No murmur heard. No friction rub. No gallop. Pulmonary:      Effort: Pulmonary effort is normal. No respiratory distress. Breath sounds: Normal breath sounds. No wheezing. Abdominal:      General: Bowel sounds are normal.      Palpations: Abdomen is soft. There is no mass. Tenderness: There is no abdominal tenderness. There is no guarding. Musculoskeletal:         General: Swelling present. No deformity. Cervical back: Normal range of motion.    Lymphadenopathy:      Cervical: No cervical adenopathy. Skin:     General: Skin is warm and dry. Findings: No erythema or rash. Neurological:      Mental Status: She is alert and oriented to person, place, and time. Cranial Nerves: No cranial nerve deficit. Psychiatric:         Thought Content:  Thought content normal.

## 2023-10-13 NOTE — RESULT ENCOUNTER NOTE
Please call the patient regarding her abnormal result.   Heel spurs and arthritis of the left foot and ankle

## 2023-10-13 NOTE — RESULT ENCOUNTER NOTE
Please call the patient regarding her abnormal result.   D-dimer slightly elevated given the fact that the patient has pain will order a venous Doppler of the left leg acutely as a stat test

## 2023-10-14 PROCEDURE — 93971 EXTREMITY STUDY: CPT | Performed by: SURGERY

## 2023-11-06 DIAGNOSIS — F41.1 GENERALIZED ANXIETY DISORDER: ICD-10-CM

## 2023-11-06 RX ORDER — ALPRAZOLAM 0.25 MG/1
0.25 TABLET ORAL 3 TIMES DAILY PRN
Qty: 90 TABLET | Refills: 0 | Status: SHIPPED | OUTPATIENT
Start: 2023-11-06 | End: 2024-01-05

## 2023-12-08 ENCOUNTER — OFFICE VISIT (OUTPATIENT)
Dept: FAMILY MEDICINE CLINIC | Facility: CLINIC | Age: 88
End: 2023-12-08
Payer: COMMERCIAL

## 2023-12-08 VITALS
TEMPERATURE: 97 F | OXYGEN SATURATION: 95 % | HEIGHT: 58 IN | BODY MASS INDEX: 33.58 KG/M2 | DIASTOLIC BLOOD PRESSURE: 88 MMHG | WEIGHT: 160 LBS | SYSTOLIC BLOOD PRESSURE: 142 MMHG | HEART RATE: 94 BPM

## 2023-12-08 DIAGNOSIS — Z00.00 MEDICARE ANNUAL WELLNESS VISIT, SUBSEQUENT: Primary | ICD-10-CM

## 2023-12-08 DIAGNOSIS — F41.1 GENERALIZED ANXIETY DISORDER: ICD-10-CM

## 2023-12-08 DIAGNOSIS — R53.82 CHRONIC FATIGUE: ICD-10-CM

## 2023-12-08 PROCEDURE — G0439 PPPS, SUBSEQ VISIT: HCPCS | Performed by: FAMILY MEDICINE

## 2023-12-08 RX ORDER — ALPRAZOLAM 0.25 MG/1
0.25 TABLET ORAL 3 TIMES DAILY PRN
Qty: 90 TABLET | Refills: 0 | Status: SHIPPED | OUTPATIENT
Start: 2023-12-08 | End: 2024-02-06

## 2023-12-08 NOTE — PATIENT INSTRUCTIONS
Medicare Preventive Visit Patient Instructions  Thank you for completing your Welcome to Medicare Visit or Medicare Annual Wellness Visit today. Your next wellness visit will be due in one year (12/8/2024). The screening/preventive services that you may require over the next 5-10 years are detailed below. Some tests may not apply to you based off risk factors and/or age. Screening tests ordered at today's visit but not completed yet may show as past due. Also, please note that scanned in results may not display below. Preventive Screenings:  Service Recommendations Previous Testing/Comments   Colorectal Cancer Screening  * Colonoscopy    * Fecal Occult Blood Test (FOBT)/Fecal Immunochemical Test (FIT)  * Fecal DNA/Cologuard Test  * Flexible Sigmoidoscopy Age: 43-73 years old   Colonoscopy: every 10 years (may be performed more frequently if at higher risk)  OR  FOBT/FIT: every 1 year  OR  Cologuard: every 3 years  OR  Sigmoidoscopy: every 5 years  Screening may be recommended earlier than age 39 if at higher risk for colorectal cancer. Also, an individualized decision between you and your healthcare provider will decide whether screening between the ages of 77-80 would be appropriate. Colonoscopy: 04/25/2013  FOBT/FIT: Not on file  Cologuard: Not on file  Sigmoidoscopy: Not on file    Screening Not Indicated     Breast Cancer Screening Age: 36 years old  Frequency: every 1-2 years  Not required if history of left and right mastectomy Mammogram: 12/29/2015        Cervical Cancer Screening Between the ages of 21-29, pap smear recommended once every 3 years. Between the ages of 32-69, can perform pap smear with HPV co-testing every 5 years.    Recommendations may differ for women with a history of total hysterectomy, cervical cancer, or abnormal pap smears in past. Pap Smear: Not on file    Screening Not Indicated   Hepatitis C Screening Once for adults born between 1945 and 1965  More frequently in patients at high risk for Hepatitis C Hep C Antibody: Not on file        Diabetes Screening 1-2 times per year if you're at risk for diabetes or have pre-diabetes Fasting glucose: 95 mg/dL (2/25/2020)  A1C: No results in last 5 years (No results in last 5 years)      Cholesterol Screening Once every 5 years if you don't have a lipid disorder. May order more often based on risk factors. Lipid panel: 08/20/2019    Screening Current     Other Preventive Screenings Covered by Medicare:  Abdominal Aortic Aneurysm (AAA) Screening: covered once if your at risk. You're considered to be at risk if you have a family history of AAA. Lung Cancer Screening: covers low dose CT scan once per year if you meet all of the following conditions: (1) Age 48-67; (2) No signs or symptoms of lung cancer; (3) Current smoker or have quit smoking within the last 15 years; (4) You have a tobacco smoking history of at least 20 pack years (packs per day multiplied by number of years you smoked); (5) You get a written order from a healthcare provider. Glaucoma Screening: covered annually if you're considered high risk: (1) You have diabetes OR (2) Family history of glaucoma OR (3)  aged 48 and older OR (3)  American aged 72 and older  Osteoporosis Screening: covered every 2 years if you meet one of the following conditions: (1) You're estrogen deficient and at risk for osteoporosis based off medical history and other findings; (2) Have a vertebral abnormality; (3) On glucocorticoid therapy for more than 3 months; (4) Have primary hyperparathyroidism; (5) On osteoporosis medications and need to assess response to drug therapy. Last bone density test (DXA Scan): 10/13/2021. HIV Screening: covered annually if you're between the age of 14-79. Also covered annually if you are younger than 13 and older than 72 with risk factors for HIV infection.  For pregnant patients, it is covered up to 3 times per pregnancy. Immunizations:  Immunization Recommendations   Influenza Vaccine Annual influenza vaccination during flu season is recommended for all persons aged >= 6 months who do not have contraindications   Pneumococcal Vaccine   * Pneumococcal conjugate vaccine = PCV13 (Prevnar 13), PCV15 (Vaxneuvance), PCV20 (Prevnar 20)  * Pneumococcal polysaccharide vaccine = PPSV23 (Pneumovax) Adults 13-45 yo with certain risk factors or if 69+ yo  If never received any pneumonia vaccine: recommend Prevnar 20 (PCV20)  Give PCV20 if previously received 1 dose of PCV13 or PPSV23   Hepatitis B Vaccine 3 dose series if at intermediate or high risk (ex: diabetes, end stage renal disease, liver disease)   Respiratory syncytial virus (RSV) Vaccine - COVERED BY MEDICARE PART D  * RSVPreF3 (Arexvy) CDC recommends that adults 61years of age and older may receive a single dose of RSV vaccine using shared clinical decision-making (SCDM)   Tetanus (Td) Vaccine - COST NOT COVERED BY MEDICARE PART B Following completion of primary series, a booster dose should be given every 10 years to maintain immunity against tetanus. Td may also be given as tetanus wound prophylaxis. Tdap Vaccine - COST NOT COVERED BY MEDICARE PART B Recommended at least once for all adults. For pregnant patients, recommended with each pregnancy. Shingles Vaccine (Shingrix) - COST NOT COVERED BY MEDICARE PART B  2 shot series recommended in those 19 years and older who have or will have weakened immune systems or those 50 years and older     Health Maintenance Due:  There are no preventive care reminders to display for this patient. Immunizations Due:      Topic Date Due   • COVID-19 Vaccine (1) Never done   • Pneumococcal Vaccine: 65+ Years (2 - PCV) 10/22/2008     Advance Directives   What are advance directives? Advance directives are legal documents that state your wishes and plans for medical care.  These plans are made ahead of time in case you lose your ability to make decisions for yourself. Advance directives can apply to any medical decision, such as the treatments you want, and if you want to donate organs. What are the types of advance directives? There are many types of advance directives, and each state has rules about how to use them. You may choose a combination of any of the following:  Living will: This is a written record of the treatment you want. You can also choose which treatments you do not want, which to limit, and which to stop at a certain time. This includes surgery, medicine, IV fluid, and tube feedings. Durable power of  for [facility]): This is a written record that states who you want to make healthcare choices for you when you are unable to make them for yourself. This person, called a proxy, is usually a family member or a friend. You may choose more than 1 proxy. Do not resuscitate (DNR) order:  A DNR order is used in case your heart stops beating or you stop breathing. It is a request not to have certain forms of treatment, such as CPR. A DNR order may be included in other types of advance directives. Medical directive: This covers the care that you want if you are in a coma, near death, or unable to make decisions for yourself. You can list the treatments you want for each condition. Treatment may include pain medicine, surgery, blood transfusions, dialysis, IV or tube feedings, and a ventilator (breathing machine). Values history: This document has questions about your views, beliefs, and how you feel and think about life. This information can help others choose the care that you would choose. Why are advance directives important? An advance directive helps you control your care. Although spoken wishes may be used, it is better to have your wishes written down. Spoken wishes can be misunderstood, or not followed. Treatments may be given even if you do not want them.  An advance directive may make it easier for your family to make difficult choices about your care. Urinary Incontinence   Urinary incontinence (UI)  is when you lose control of your bladder. UI develops because your bladder cannot store or empty urine properly. The 3 most common types of UI are stress incontinence, urge incontinence, or both. Medicines:   May be given to help strengthen your bladder control. Report any side effects of medication to your healthcare provider. Do pelvic muscle exercises often:  Your pelvic muscles help you stop urinating. Squeeze these muscles tight for 5 seconds, then relax for 5 seconds. Gradually work up to squeezing for 10 seconds. Do 3 sets of 15 repetitions a day, or as directed. This will help strengthen your pelvic muscles and improve bladder control. Train your bladder:  Go to the bathroom at set times, such as every 2 hours, even if you do not feel the urge to go. You can also try to hold your urine when you feel the urge to go. For example, hold your urine for 5 minutes when you feel the urge to go. As that becomes easier, hold your urine for 10 minutes. Self-care:   Keep a UI record. Write down how often you leak urine and how much you leak. Make a note of what you were doing when you leaked urine. Drink liquids as directed. You may need to limit the amount of liquid you drink to help control your urine leakage. Do not drink any liquid right before you go to bed. Limit or do not have drinks that contain caffeine or alcohol. Prevent constipation. Eat a variety of high-fiber foods. Good examples are high-fiber cereals, beans, vegetables, and whole-grain breads. Walking is the best way to trigger your intestines to have a bowel movement. Exercise regularly and maintain a healthy weight. Weight loss and exercise will decrease pressure on your bladder and help you control your leakage. Use a catheter as directed  to help empty your bladder.  A catheter is a tiny, plastic tube that is put into your bladder to drain your urine. Go to behavior therapy as directed. Behavior therapy may be used to help you learn to control your urge to urinate. Weight Management   Why it is important to manage your weight:  Being overweight increases your risk of health conditions such as heart disease, high blood pressure, type 2 diabetes, and certain types of cancer. It can also increase your risk for osteoarthritis, sleep apnea, and other respiratory problems. Aim for a slow, steady weight loss. Even a small amount of weight loss can lower your risk of health problems. How to lose weight safely:  A safe and healthy way to lose weight is to eat fewer calories and get regular exercise. You can lose up about 1 pound a week by decreasing the number of calories you eat by 500 calories each day. Healthy meal plan for weight management:  A healthy meal plan includes a variety of foods, contains fewer calories, and helps you stay healthy. A healthy meal plan includes the following:  Eat whole-grain foods more often. A healthy meal plan should contain fiber. Fiber is the part of grains, fruits, and vegetables that is not broken down by your body. Whole-grain foods are healthy and provide extra fiber in your diet. Some examples of whole-grain foods are whole-wheat breads and pastas, oatmeal, brown rice, and bulgur. Eat a variety of vegetables every day. Include dark, leafy greens such as spinach, kale, yoan greens, and mustard greens. Eat yellow and orange vegetables such as carrots, sweet potatoes, and winter squash. Eat a variety of fruits every day. Choose fresh or canned fruit (canned in its own juice or light syrup) instead of juice. Fruit juice has very little or no fiber. Eat low-fat dairy foods. Drink fat-free (skim) milk or 1% milk. Eat fat-free yogurt and low-fat cottage cheese. Try low-fat cheeses such as mozzarella and other reduced-fat cheeses. Choose meat and other protein foods that are low in fat. Choose beans or other legumes such as split peas or lentils. Choose fish, skinless poultry (chicken or turkey), or lean cuts of red meat (beef or pork). Before you cook meat or poultry, cut off any visible fat. Use less fat and oil. Try baking foods instead of frying them. Add less fat, such as margarine, sour cream, regular salad dressing and mayonnaise to foods. Eat fewer high-fat foods. Some examples of high-fat foods include french fries, doughnuts, ice cream, and cakes. Eat fewer sweets. Limit foods and drinks that are high in sugar. This includes candy, cookies, regular soda, and sweetened drinks. Exercise:  Exercise at least 30 minutes per day on most days of the week. Some examples of exercise include walking, biking, dancing, and swimming. You can also fit in more physical activity by taking the stairs instead of the elevator or parking farther away from stores. Ask your healthcare provider about the best exercise plan for you. © Copyright [address] 2018 Information is for End User's use only and may not be sold, redistributed or otherwise used for commercial purposes.  All illustrations and images included in CareNotes® are the copyrighted property of [employer]., Inc. or  [employer]

## 2023-12-08 NOTE — PROGRESS NOTES
Assessment and Plan:     Problem List Items Addressed This Visit    None  Visit Diagnoses       Generalized anxiety disorder              BMI Counseling: Body mass index is 33.44 kg/m². The BMI is above normal. Nutrition recommendations include decreasing portion sizes, encouraging healthy choices of fruits and vegetables, moderation in carbohydrate intake and increasing intake of lean protein. Rationale for BMI follow-up plan is due to patient being overweight or obese. Depression Screening and Follow-up Plan: Patient was screened for depression during today's encounter. They screened negative with a PHQ-2 score of 0. Preventive health issues were discussed with patient, and age appropriate screening tests were ordered as noted in patient's After Visit Summary. Personalized health advice and appropriate referrals for health education or preventive services given if needed, as noted in patient's After Visit Summary. History of Present Illness:     Patient presents for a Medicare Wellness Visit    Mrs. Arslan Smith is here today for her Medicare subsequent well visit any acute issues that are identified will be addressed       Patient Care Team:  Taras Jauregui DO as PCP - DO Poornima as PCP - [address] [place] [address] (E)  Modesto Morales MD  [address] [place] [address]/Hospice     Review of Systems:     Review of Systems   Constitutional:  Positive for activity change, appetite change and fatigue. Negative for diaphoresis and fever. HENT:  Positive for dental problem and hearing loss. Eyes:  Positive for visual disturbance. Wears glasses   Respiratory:  Negative for apnea, cough, chest tightness, shortness of breath and wheezing. Cardiovascular:  Negative for chest pain, palpitations and leg swelling. Gastrointestinal:  Negative for abdominal distention, abdominal pain, anal bleeding, constipation, diarrhea, nausea and vomiting.    Endocrine: Negative for cold intolerance, heat intolerance, polydipsia, polyphagia and polyuria. Genitourinary:  Negative for difficulty urinating, dysuria, flank pain, hematuria and urgency. Had pelvic sling inserted   Musculoskeletal:  Negative for arthralgias, back pain, gait problem, joint swelling and myalgias. Skin:  Negative for color change, rash and wound. Allergic/Immunologic: Negative for environmental allergies, food allergies and immunocompromised state. Neurological:  Negative for dizziness, seizures, syncope, speech difficulty, numbness and headaches. Hematological:  Negative for adenopathy. Does not bruise/bleed easily. Psychiatric/Behavioral:  Negative for agitation, behavioral problems, hallucinations, sleep disturbance and suicidal ideas. The patient is nervous/anxious.          Problem List:     Patient Active Problem List   Diagnosis    Atypical chest pain    GERD (gastroesophageal reflux disease)    Anxiety    Benign essential hypertension    Macular cyst, hole, or pseudohole, left eye    Fall    Stage 3a chronic kidney disease ([address])    Idiopathic peripheral neuropathy    Pulmonary nodule    Peripheral arterial disease (HCC)    Non-seasonal allergic rhinitis due to pollen      Past Medical and Surgical History:     Past Medical History:   Diagnosis Date    Diverticulosis     GERD (gastroesophageal reflux disease)     Hiatal hernia     Irritable bowel disease     Lactose intolerance     Osteoporosis      Past Surgical History:   Procedure Laterality Date    CARPAL TUNNEL RELEASE Right     CAST APPLICATION Left 6/3/1056    Procedure: APPLICATION CAST, LEFT WRIST;  Surgeon: Candace Lane MD;  Location: [facility];  Service: Orthopedics    CHOLECYSTECTOMY      EAR TUBE REMOVAL      HEMORROIDECTOMY      HYSTERECTOMY      PERINEAL  PELVIC RECONSTRUCTION SURGERY  09/13/2019    Dr Joan Peña - [facility]    FL EXC TUMOR SOFT TISSUE LEG/ANKLE SUBFASCIAL <5CM Left 9/8/2020    Procedure: EXCISION BIOPSY TISSUE LESION/MASS LOWER EXTREMITY; Surgeon: Carmen Hernandez DO;  Location: [facility];  Service: General    NJ OPTX FEM SHFT FX W/INSJ IMED IMPLT W/WO SCREW Left 2020    Procedure: INSERTION NAIL IM FEMUR ANTEGRADE (TROCHANTERIC); Surgeon: Angel Armas MD;  Location:  MAIN OR;  Service: Orthopedics    SKIN CANCER EXCISION      SQUAMOUS CELL CARCINOMA EXCISION Left 2020    lower part of leg    TONSILLECTOMY        Family History:     Family History   Problem Relation Age of Onset    Colon cancer Mother     Stomach cancer Father       Social History:     Social History     Socioeconomic History    Marital status:      Spouse name: None    Number of children: None    Years of education: None    Highest education level: None   Occupational History    None   Tobacco Use    Smoking status: Former     Years: 30.00     Types: Cigarettes     Quit date:      Years since quittin.9    Smokeless tobacco: Never   Vaping Use    Vaping Use: Never used   Substance and Sexual Activity    Alcohol use: Never    Drug use: None    Sexual activity: None   Other Topics Concern    None   Social History Narrative    No advanced directives      Social Determinants of Health     Financial Resource Strain: Low Risk  (2022)    Overall Financial Resource Strain (CARDIA)     Difficulty of Paying Living Expenses: Not hard at all   Food Insecurity: Not on file   Transportation Needs: No Transportation Needs (2022)    PRAPARE - Transportation     Lack of Transportation (Medical): No     Lack of Transportation (Non-Medical):  No   Physical Activity: Not on file   Stress: Not on file   Social Connections: Not on file   Intimate Partner Violence: Not on file   Housing Stability: Not on file      Medications and Allergies:     Current Outpatient Medications   Medication Sig Dispense Refill    acetaminophen (TYLENOL) 500 mg tablet Take 500 mg by mouth 3 (three) times a day as needed      ALPRAZolam (XANAX) 0.25 mg tablet Take 1 tablet (0.25 mg total) by mouth 3 (three) times a day as needed for anxiety 90 tablet 0    Calcium Citrate (CITRACAL PO) Take 1 tablet by mouth daily. Cholecalciferol (VITAMIN D PO) Take 1,000 Units by mouth 2 (two) times a day. loratadine (CLARITIN) 10 mg tablet Take 1 tablet (10 mg total) by mouth daily 30 tablet 2    losartan (COZAAR) 50 mg tablet Take 1 tablet (50 mg total) by mouth daily 90 tablet 1    Multiple Vitamin (MULTIVITAMIN) capsule Take 1 capsule by mouth daily. pantoprazole (PROTONIX) 40 mg tablet take 1 tablet by mouth once daily 90 tablet 1    sodium chloride (OCEAN) 0.65 % nasal spray 1 spray into each nostril as needed for congestion      vitamin E, tocopherol, 400 units capsule Take 400 Units by mouth daily      montelukast (SINGULAIR) 10 mg tablet Take 1 tablet (10 mg total) by mouth daily (Patient not taking: Reported on 8/25/2023) 30 tablet 2     No current facility-administered medications for this visit. Allergies   Allergen Reactions    Escitalopram Hives    Mepenzolate      Other reaction(s): Urinary Retention    Maalox Anti-Gas [Simethicone] Hives    Other      Seasonal     Prednisone Other (See Comments)    Reglan [Metoclopramide] Other (See Comments)     hyper      Immunizations:     Immunization History   Administered Date(s) Administered    Pneumococcal Polysaccharide PPV23 10/22/2007    Tuberculin Skin Test 02/05/2020, 02/12/2020      Health Maintenance: There are no preventive care reminders to display for this patient. Topic Date Due    COVID-19 Vaccine (1) Never done    Pneumococcal Vaccine: 65+ Years (2 - PCV) 10/22/2008      Medicare Screening Tests and Risk Assessments: Maria C Myrick is here for her Subsequent Wellness visit. Health Risk Assessment:   Patient rates overall health as good. Patient feels that their physical health rating is slightly worse. Patient is satisfied with their life. Eyesight was rated as slightly worse. Hearing was rated as same.  Patient feels that their emotional and mental health rating is same. Patients states they are sometimes angry. Patient states they are sometimes unusually tired/fatigued. Pain experienced in the last 7 days has been some. Patient's pain rating has been 6/10. Patient states that she has experienced no weight loss or gain in last 6 months. Depression Screening:   PHQ-2 Score: 0      Fall Risk Screening: In the past year, patient has experienced: no history of falling in past year      Urinary Incontinence Screening:   Patient has leaked urine accidently in the last six months. Home Safety:  Patient has trouble with stairs inside or outside of their home. Patient has working smoke alarms and has working carbon monoxide detector. Home safety hazards include: none. Pt does have a chair lift she uses in her home    Nutrition:   Current diet is Other (please comment), Regular and Limited junk food. Watches Lactose intake & limits seeds    Medications:   Patient is currently taking over-the-counter supplements. OTC medications include: see medication list. Patient is able to manage medications. Activities of Daily Living (ADLs)/Instrumental Activities of Daily Living (IADLs):   Walk and transfer into and out of bed and chair?: Yes  Dress and groom yourself?: Yes    Bathe or shower yourself?: Yes    Feed yourself? Yes  Do your laundry/housekeeping?: No  Manage your money, pay your bills and track your expenses?: Yes  Make your own meals?: Yes    Do your own shopping?: Yes    ADL comments: Pt has an aid to help with laundry & housekeeping, She also receives Meals on wheels, cooking only when necessary. Previous Hospitalizations:   Any hospitalizations or ED visits within the last 12 months?: No      Advance Care Planning:   Living will: No    Durable POA for healthcare:  Yes    Advanced directive: Yes    Advanced directive counseling given: No    Five wishes given: No    Patient declined ACP directive: No    End of Life Decisions reviewed with patient: Yes    Provider agrees with end of life decisions: Yes      Cognitive Screening:   Provider or family/friend/caregiver concerned regarding cognition?: No    PREVENTIVE SCREENINGS      Cardiovascular Screening:    General: Screening Current      Colorectal Cancer Screening:     General: Screening Not Indicated      Cervical Cancer Screening:    General: Screening Not Indicated      Lung Cancer Screening:     General: Screening Not Indicated    Screening, Brief Intervention, and Referral to Treatment (SBIRT)    Screening  Typical number of drinks in a day: 0  Typical number of drinks in a week: 0  Interpretation: Low risk drinking behavior. Single Item Drug Screening:  How often have you used an illegal drug (including marijuana) or a prescription medication for non-medical reasons in the past year? never    Single Item Drug Screen Score: 0  Interpretation: Negative screen for possible drug use disorder    No results found.      Physical Exam:     /88 (BP Location: Left arm, Patient Position: Sitting, Cuff Size: Standard)   Pulse 94   Temp (!) 97 °F (36.1 °C) (Temporal)   Ht 4' 10" (1.473 m)   Wt 72.6 kg (160 lb)   SpO2 95%   BMI 33.44 kg/m²     Physical Exam     Estela Jennings DO

## 2023-12-13 ENCOUNTER — LAB (OUTPATIENT)
Dept: LAB | Facility: MEDICAL CENTER | Age: 88
End: 2023-12-13
Payer: COMMERCIAL

## 2023-12-13 DIAGNOSIS — R53.82 CHRONIC FATIGUE: ICD-10-CM

## 2023-12-13 LAB
ALBUMIN SERPL BCP-MCNC: 4.3 G/DL (ref 3.5–5)
ALP SERPL-CCNC: 72 U/L (ref 34–104)
ALT SERPL W P-5'-P-CCNC: 26 U/L (ref 7–52)
ANION GAP SERPL CALCULATED.3IONS-SCNC: 8 MMOL/L
AST SERPL W P-5'-P-CCNC: 27 U/L (ref 13–39)
BASOPHILS # BLD AUTO: 0.11 THOUSANDS/ÂΜL (ref 0–0.1)
BASOPHILS NFR BLD AUTO: 2 % (ref 0–1)
BILIRUB SERPL-MCNC: 0.85 MG/DL (ref 0.2–1)
BUN SERPL-MCNC: 12 MG/DL (ref 5–25)
CALCIUM SERPL-MCNC: 10 MG/DL (ref 8.4–10.2)
CHLORIDE SERPL-SCNC: 103 MMOL/L (ref 96–108)
CO2 SERPL-SCNC: 28 MMOL/L (ref 21–32)
CREAT SERPL-MCNC: 0.87 MG/DL (ref 0.6–1.3)
EOSINOPHIL # BLD AUTO: 0.12 THOUSAND/ÂΜL (ref 0–0.61)
EOSINOPHIL NFR BLD AUTO: 3 % (ref 0–6)
ERYTHROCYTE [DISTWIDTH] IN BLOOD BY AUTOMATED COUNT: 13.4 % (ref 11.6–15.1)
GFR SERPL CREATININE-BSD FRML MDRD: 58 ML/MIN/1.73SQ M
GLUCOSE P FAST SERPL-MCNC: 109 MG/DL (ref 65–99)
HCT VFR BLD AUTO: 40.4 % (ref 34.8–46.1)
HGB BLD-MCNC: 13.7 G/DL (ref 11.5–15.4)
IMM GRANULOCYTES # BLD AUTO: 0.03 THOUSAND/UL (ref 0–0.2)
IMM GRANULOCYTES NFR BLD AUTO: 1 % (ref 0–2)
LYMPHOCYTES # BLD AUTO: 2.27 THOUSANDS/ÂΜL (ref 0.6–4.47)
LYMPHOCYTES NFR BLD AUTO: 46 % (ref 14–44)
MCH RBC QN AUTO: 34.9 PG (ref 26.8–34.3)
MCHC RBC AUTO-ENTMCNC: 33.9 G/DL (ref 31.4–37.4)
MCV RBC AUTO: 103 FL (ref 82–98)
MONOCYTES # BLD AUTO: 0.5 THOUSAND/ÂΜL (ref 0.17–1.22)
MONOCYTES NFR BLD AUTO: 10 % (ref 4–12)
NEUTROPHILS # BLD AUTO: 1.86 THOUSANDS/ÂΜL (ref 1.85–7.62)
NEUTS SEG NFR BLD AUTO: 38 % (ref 43–75)
NRBC BLD AUTO-RTO: 0 /100 WBCS
PLATELET # BLD AUTO: 288 THOUSANDS/UL (ref 149–390)
PMV BLD AUTO: 10.9 FL (ref 8.9–12.7)
POTASSIUM SERPL-SCNC: 4.1 MMOL/L (ref 3.5–5.3)
PROT SERPL-MCNC: 7.1 G/DL (ref 6.4–8.4)
RBC # BLD AUTO: 3.93 MILLION/UL (ref 3.81–5.12)
SODIUM SERPL-SCNC: 139 MMOL/L (ref 135–147)
TSH SERPL DL<=0.05 MIU/L-ACNC: 2.07 UIU/ML (ref 0.45–4.5)
WBC # BLD AUTO: 4.89 THOUSAND/UL (ref 4.31–10.16)

## 2023-12-13 PROCEDURE — 85025 COMPLETE CBC W/AUTO DIFF WBC: CPT

## 2023-12-13 PROCEDURE — 36415 COLL VENOUS BLD VENIPUNCTURE: CPT

## 2023-12-13 PROCEDURE — 80053 COMPREHEN METABOLIC PANEL: CPT

## 2023-12-13 PROCEDURE — 84443 ASSAY THYROID STIM HORMONE: CPT

## 2023-12-27 ENCOUNTER — HOSPITAL ENCOUNTER (EMERGENCY)
Facility: HOSPITAL | Age: 88
Discharge: HOME | End: 2023-12-28
Attending: EMERGENCY MEDICINE
Payer: COMMERCIAL

## 2023-12-27 ENCOUNTER — APPOINTMENT (EMERGENCY)
Dept: CT IMAGING | Facility: HOSPITAL | Age: 88
End: 2023-12-27
Payer: COMMERCIAL

## 2023-12-27 ENCOUNTER — APPOINTMENT (EMERGENCY)
Dept: RADIOLOGY | Facility: HOSPITAL | Age: 88
End: 2023-12-27
Payer: COMMERCIAL

## 2023-12-27 VITALS
SYSTOLIC BLOOD PRESSURE: 174 MMHG | OXYGEN SATURATION: 92 % | WEIGHT: 160 LBS | DIASTOLIC BLOOD PRESSURE: 82 MMHG | TEMPERATURE: 97.8 F | RESPIRATION RATE: 15 BRPM | HEIGHT: 58 IN | BODY MASS INDEX: 33.58 KG/M2 | HEART RATE: 76 BPM

## 2023-12-27 DIAGNOSIS — J10.1 INFLUENZA A: Primary | ICD-10-CM

## 2023-12-27 LAB
ALBUMIN SERPL BCP-MCNC: 4.1 G/DL (ref 3.5–5)
ALP SERPL-CCNC: 60 U/L (ref 34–104)
ALT SERPL W P-5'-P-CCNC: 30 U/L (ref 7–52)
ANION GAP SERPL CALCULATED.3IONS-SCNC: 10 MMOL/L
AST SERPL W P-5'-P-CCNC: 37 U/L (ref 13–39)
BASOPHILS # BLD AUTO: 0.02 THOUSANDS/ÂΜL (ref 0–0.1)
BASOPHILS NFR BLD AUTO: 1 % (ref 0–1)
BILIRUB SERPL-MCNC: 0.54 MG/DL (ref 0.2–1)
BILIRUB UR QL STRIP: NEGATIVE
BUN SERPL-MCNC: 11 MG/DL (ref 5–25)
CALCIUM SERPL-MCNC: 9.5 MG/DL (ref 8.4–10.2)
CARDIAC TROPONIN I PNL SERPL HS: 5 NG/L
CHLORIDE SERPL-SCNC: 102 MMOL/L (ref 96–108)
CLARITY UR: CLEAR
CO2 SERPL-SCNC: 26 MMOL/L (ref 21–32)
COLOR UR: YELLOW
CREAT SERPL-MCNC: 0.93 MG/DL (ref 0.6–1.3)
EOSINOPHIL # BLD AUTO: 0.05 THOUSAND/ÂΜL (ref 0–0.61)
EOSINOPHIL NFR BLD AUTO: 1 % (ref 0–6)
ERYTHROCYTE [DISTWIDTH] IN BLOOD BY AUTOMATED COUNT: 13.5 % (ref 11.6–15.1)
FLUAV RNA RESP QL NAA+PROBE: POSITIVE
FLUBV RNA RESP QL NAA+PROBE: NEGATIVE
GFR SERPL CREATININE-BSD FRML MDRD: 53 ML/MIN/1.73SQ M
GLUCOSE SERPL-MCNC: 105 MG/DL (ref 65–140)
GLUCOSE UR STRIP-MCNC: NEGATIVE MG/DL
HCT VFR BLD AUTO: 38.2 % (ref 34.8–46.1)
HGB BLD-MCNC: 12.4 G/DL (ref 11.5–15.4)
HGB UR QL STRIP.AUTO: NEGATIVE
IMM GRANULOCYTES # BLD AUTO: 0.04 THOUSAND/UL (ref 0–0.2)
IMM GRANULOCYTES NFR BLD AUTO: 1 % (ref 0–2)
KETONES UR STRIP-MCNC: NEGATIVE MG/DL
LACTATE SERPL-SCNC: 1 MMOL/L (ref 0.5–2)
LEUKOCYTE ESTERASE UR QL STRIP: NEGATIVE
LIPASE SERPL-CCNC: 32 U/L (ref 11–82)
LYMPHOCYTES # BLD AUTO: 2.21 THOUSANDS/ÂΜL (ref 0.6–4.47)
LYMPHOCYTES NFR BLD AUTO: 51 % (ref 14–44)
MCH RBC QN AUTO: 33.9 PG (ref 26.8–34.3)
MCHC RBC AUTO-ENTMCNC: 32.5 G/DL (ref 31.4–37.4)
MCV RBC AUTO: 104 FL (ref 82–98)
MONOCYTES # BLD AUTO: 0.62 THOUSAND/ÂΜL (ref 0.17–1.22)
MONOCYTES NFR BLD AUTO: 15 % (ref 4–12)
NEUTROPHILS # BLD AUTO: 1.35 THOUSANDS/ÂΜL (ref 1.85–7.62)
NEUTS SEG NFR BLD AUTO: 31 % (ref 43–75)
NITRITE UR QL STRIP: NEGATIVE
NRBC BLD AUTO-RTO: 0 /100 WBCS
PH UR STRIP.AUTO: 6.5 [PH]
PLATELET # BLD AUTO: 219 THOUSANDS/UL (ref 149–390)
PMV BLD AUTO: 10.6 FL (ref 8.9–12.7)
POTASSIUM SERPL-SCNC: 3.5 MMOL/L (ref 3.5–5.3)
PROT SERPL-MCNC: 7 G/DL (ref 6.4–8.4)
PROT UR STRIP-MCNC: NEGATIVE MG/DL
RBC # BLD AUTO: 3.66 MILLION/UL (ref 3.81–5.12)
RSV RNA RESP QL NAA+PROBE: NEGATIVE
SARS-COV-2 RNA RESP QL NAA+PROBE: NEGATIVE
SODIUM SERPL-SCNC: 138 MMOL/L (ref 135–147)
SP GR UR STRIP.AUTO: <=1.005 (ref 1–1.03)
TSH SERPL DL<=0.05 MIU/L-ACNC: 1.32 UIU/ML (ref 0.45–4.5)
UROBILINOGEN UR QL STRIP.AUTO: 0.2 E.U./DL
WBC # BLD AUTO: 4.29 THOUSAND/UL (ref 4.31–10.16)

## 2023-12-27 PROCEDURE — 99285 EMERGENCY DEPT VISIT HI MDM: CPT | Performed by: EMERGENCY MEDICINE

## 2023-12-27 PROCEDURE — 85025 COMPLETE CBC W/AUTO DIFF WBC: CPT | Performed by: EMERGENCY MEDICINE

## 2023-12-27 PROCEDURE — 93005 ELECTROCARDIOGRAM TRACING: CPT

## 2023-12-27 PROCEDURE — 96360 HYDRATION IV INFUSION INIT: CPT

## 2023-12-27 PROCEDURE — 0241U HB NFCT DS VIR RESP RNA 4 TRGT: CPT | Performed by: EMERGENCY MEDICINE

## 2023-12-27 PROCEDURE — 83605 ASSAY OF LACTIC ACID: CPT | Performed by: EMERGENCY MEDICINE

## 2023-12-27 PROCEDURE — 84443 ASSAY THYROID STIM HORMONE: CPT | Performed by: EMERGENCY MEDICINE

## 2023-12-27 PROCEDURE — 71045 X-RAY EXAM CHEST 1 VIEW: CPT

## 2023-12-27 PROCEDURE — 83690 ASSAY OF LIPASE: CPT | Performed by: EMERGENCY MEDICINE

## 2023-12-27 PROCEDURE — 80053 COMPREHEN METABOLIC PANEL: CPT | Performed by: EMERGENCY MEDICINE

## 2023-12-27 PROCEDURE — G1004 CDSM NDSC: HCPCS

## 2023-12-27 PROCEDURE — 36415 COLL VENOUS BLD VENIPUNCTURE: CPT | Performed by: EMERGENCY MEDICINE

## 2023-12-27 PROCEDURE — 84484 ASSAY OF TROPONIN QUANT: CPT | Performed by: EMERGENCY MEDICINE

## 2023-12-27 PROCEDURE — 81003 URINALYSIS AUTO W/O SCOPE: CPT | Performed by: EMERGENCY MEDICINE

## 2023-12-27 PROCEDURE — 99284 EMERGENCY DEPT VISIT MOD MDM: CPT

## 2023-12-27 PROCEDURE — 74177 CT ABD & PELVIS W/CONTRAST: CPT

## 2023-12-27 RX ADMIN — SODIUM CHLORIDE 1000 ML: 0.9 INJECTION, SOLUTION INTRAVENOUS at 21:47

## 2023-12-27 RX ADMIN — IOHEXOL 100 ML: 350 INJECTION, SOLUTION INTRAVENOUS at 22:52

## 2023-12-28 LAB
ATRIAL RATE: 81 BPM
P AXIS: 33 DEGREES
PR INTERVAL: 178 MS
QRS AXIS: -36 DEGREES
QRSD INTERVAL: 98 MS
QT INTERVAL: 382 MS
QTC INTERVAL: 443 MS
T WAVE AXIS: 45 DEGREES
VENTRICULAR RATE: 81 BPM

## 2023-12-28 NOTE — ED PROVIDER NOTES
History  Chief Complaint   Patient presents with    Diarrhea     Patient has been having diarrhea and abd cramping for the past week. Patient also has nausea, congestion, cough since last Thursday.      89-year-old female presents for evaluation of cold symptoms, GI upset.  Patient reports symptoms started about 5 days ago with abdominal cramping, diarrhea.  She does note over the last 1 to 2 days stools are starting to become formed again although does note a pressure sensation in her rectum.  Patient states nausea without vomiting, she is able to tolerate p.o. though has a reduced appetite.  She did have a fever a few days ago, none today.  Patient has since developed a cough, nasal congestion.  She denies chest pain or dyspnea.  Patient also reports episodes of indigestion over this timeframe.        Prior to Admission Medications   Prescriptions Last Dose Informant Patient Reported? Taking?   ALPRAZolam (XANAX) 0.25 mg tablet   No No   Sig: Take 1 tablet (0.25 mg total) by mouth 3 (three) times a day as needed for anxiety   Calcium Citrate (CITRACAL PO)   Yes No   Sig: Take 1 tablet by mouth daily.   Cholecalciferol (VITAMIN D PO)   Yes No   Sig: Take 1,000 Units by mouth 2 (two) times a day.   Multiple Vitamin (MULTIVITAMIN) capsule   Yes No   Sig: Take 1 capsule by mouth daily.   acetaminophen (TYLENOL) 500 mg tablet   Yes No   Sig: Take 500 mg by mouth 3 (three) times a day as needed   loratadine (CLARITIN) 10 mg tablet   No No   Sig: Take 1 tablet (10 mg total) by mouth daily   losartan (COZAAR) 50 mg tablet   No No   Sig: Take 1 tablet (50 mg total) by mouth daily   montelukast (SINGULAIR) 10 mg tablet   No No   Sig: Take 1 tablet (10 mg total) by mouth daily   Patient not taking: Reported on 2023   pantoprazole (PROTONIX) 40 mg tablet   No No   Sig: take 1 tablet by mouth once daily   sodium chloride (OCEAN) 0.65 % nasal spray   Yes No   Si spray into each nostril as needed for congestion    vitamin E, tocopherol, 400 units capsule   Yes No   Sig: Take 400 Units by mouth daily      Facility-Administered Medications: None       Past Medical History:   Diagnosis Date    Diverticulosis     GERD (gastroesophageal reflux disease)     Hiatal hernia     Irritable bowel disease     Lactose intolerance     Osteoporosis        Past Surgical History:   Procedure Laterality Date    CARPAL TUNNEL RELEASE Right     CAST APPLICATION Left 2020    Procedure: APPLICATION CAST, LEFT WRIST;  Surgeon: Julio Thomason MD;  Location: [facility];  Service: Orthopedics    CHOLECYSTECTOMY      EAR TUBE REMOVAL      HEMORROIDECTOMY      HYSTERECTOMY      PERINEAL  PELVIC RECONSTRUCTION SURGERY  2019    Dr Hightower - [place]    SD EXC TUMOR SOFT TISSUE LEG/ANKLE SUBFASCIAL <5CM Left 2020    Procedure: EXCISION BIOPSY TISSUE LESION/MASS LOWER EXTREMITY;  Surgeon: Al Roberson DO;  Location: [facility];  Service: General    SD OPTX FEM SHFT FX W/INSJ IMED IMPLT W/WO SCREW Left 2020    Procedure: INSERTION NAIL IM FEMUR ANTEGRADE (TROCHANTERIC);  Surgeon: Julio Thomason MD;  Location:  MAIN OR;  Service: Orthopedics    SKIN CANCER EXCISION      SQUAMOUS CELL CARCINOMA EXCISION Left 2020    lower part of leg    TONSILLECTOMY         Family History   Problem Relation Age of Onset    Colon cancer Mother     Stomach cancer Father      I have reviewed and agree with the history as documented.    E-Cigarette/Vaping    E-Cigarette Use Never User      E-Cigarette/Vaping Substances     Social History     Tobacco Use    Smoking status: Former     Current packs/day: 0.00     Types: Cigarettes     Start date:      Quit date:      Years since quittin.0    Smokeless tobacco: Never   Vaping Use    Vaping status: Never Used   Substance Use Topics    Alcohol use: Never    Drug use: Not Currently       Review of Systems   Constitutional:  Positive for appetite change and fever.   HENT:  Positive for congestion.     Respiratory:  Positive for cough. Negative for shortness of breath.    Cardiovascular:  Negative for chest pain.   Gastrointestinal:  Positive for abdominal pain, diarrhea and nausea. Negative for vomiting.   Genitourinary:  Negative for dysuria.   Musculoskeletal:  Negative for myalgias.   All other systems reviewed and are negative.      Physical Exam  Physical Exam  Vitals reviewed.   Constitutional:       General: She is not in acute distress.     Appearance: Normal appearance. She is not ill-appearing, toxic-appearing or diaphoretic.   HENT:      Head: Normocephalic and atraumatic.      Right Ear: External ear normal.      Left Ear: External ear normal.      Mouth/Throat:      Mouth: Mucous membranes are dry.   Eyes:      General:         Right eye: No discharge.         Left eye: No discharge.   Cardiovascular:      Rate and Rhythm: Normal rate and regular rhythm.   Pulmonary:      Effort: Pulmonary effort is normal. No respiratory distress.      Breath sounds: Normal breath sounds. No stridor. No wheezing, rhonchi or rales.   Abdominal:      General: There is no distension.      Palpations: Abdomen is soft.      Tenderness: There is abdominal tenderness (generalzied). There is no guarding or rebound.   Musculoskeletal:         General: No deformity or signs of injury.      Right lower leg: No edema.      Left lower leg: No edema.   Skin:     General: Skin is warm.      Coloration: Skin is not jaundiced or pale.   Neurological:      General: No focal deficit present.      Mental Status: She is alert.         Vital Signs  ED Triage Vitals [12/27/23 2110]   Temperature Pulse Respirations Blood Pressure SpO2   97.8 °F (36.6 °C) 86 20 (!) 186/83 94 %      Temp Source Heart Rate Source Patient Position - Orthostatic VS BP Location FiO2 (%)   Temporal Monitor Sitting Right arm --      Pain Score       --           Vitals:    12/27/23 2110 12/27/23 2200   BP: (!) 186/83 (!) 174/82   Pulse: 86 76   Patient Position  - Orthostatic VS: Sitting Sitting         Visual Acuity      ED Medications  Medications   sodium chloride 0.9 % bolus 1,000 mL (0 mL Intravenous Stopped 12/27/23 2247)   iohexol (OMNIPAQUE) 350 MG/ML injection (MULTI-DOSE) 100 mL (100 mL Intravenous Given 12/27/23 2252)       Diagnostic Studies  Results Reviewed       Procedure Component Value Units Date/Time    TSH, 3rd generation with Free T4 reflex [[id number]]  (Normal) Collected: 12/27/23 2237    Lab Status: Final result Specimen: Blood from Arm, Left Updated: 12/27/23 2316     TSH 3RD GENERATON 1.317 uIU/mL     HS Troponin 0hr (reflex protocol) [[id number]]  (Normal) Collected: 12/27/23 2237    Lab Status: Final result Specimen: Blood from Arm, Left Updated: 12/27/23 2307     hs TnI 0hr 5 ng/L     FLU/RSV/COVID - if FLU/RSV clinically relevant [[id number]]  (Abnormal) Collected: 12/27/23 2137    Lab Status: Final result Specimen: Nares from Nose Updated: 12/27/23 2245     SARS-CoV-2 Negative     INFLUENZA A PCR Positive     INFLUENZA B PCR Negative     RSV PCR Negative    Narrative:      FOR PEDIATRIC PATIENTS - copy/paste COVID Guidelines URL to browser: [email or web address]    SARS-CoV-2 assay is a Nucleic Acid Amplification assay intended for the  qualitative detection of nucleic acid from SARS-CoV-2 in nasopharyngeal  swabs. Results are for the presumptive identification of SARS-CoV-2 RNA.    Positive results are indicative of infection with SARS-CoV-2, the virus  causing COVID-19, but do not rule out bacterial infection or co-infection  with other viruses. Laboratories within the [place] and its  territories are required to report all positive results to the appropriate  public health authorities. Negative results do not preclude SARS-CoV-2  infection and should not be used as the sole basis for treatment or other  patient management decisions. Negative results must be combined with  clinical  observations, patient history, and epidemiological information.  This test has not been FDA cleared or approved.    This test has been authorized by FDA under an Emergency Use Authorization  (EUA). This test is only authorized for the duration of time the  declaration that circumstances exist justifying the authorization of the  emergency use of an in vitro diagnostic tests for detection of SARS-CoV-2  virus and/or diagnosis of COVID-19 infection under section 564(b)(1) of  the Act, 21 U.S.C. 360bbb-3(b)(1), unless the authorization is terminated  or revoked sooner. The test has been validated but independent review by FDA  and CLIA is pending.    Test performed using [employer] GeneXpert: This RT-PCR assay targets N2,  a region unique to SARS-CoV-2. A conserved region in the E-gene was chosen  for pan-Sarbecovirus detection which includes SARS-CoV-2.    According to CMS-2020-01-R, this platform meets the definition of high-throughput technology.    Comprehensive metabolic panel [[id number]] Collected: 12/27/23 2137    Lab Status: Final result Specimen: Blood from Arm, Left Updated: 12/27/23 2230     Sodium 138 mmol/L      Potassium 3.5 mmol/L      Chloride 102 mmol/L      CO2 26 mmol/L      ANION GAP 10 mmol/L      BUN 11 mg/dL      Creatinine 0.93 mg/dL      Glucose 105 mg/dL      Calcium 9.5 mg/dL      AST 37 U/L      ALT 30 U/L      Alkaline Phosphatase 60 U/L      Total Protein 7.0 g/dL      Albumin 4.1 g/dL      Total Bilirubin 0.54 mg/dL      eGFR 53 ml/min/1.73sq m     Narrative:      National Kidney Disease Foundation guidelines for Chronic Kidney Disease (CKD):     Stage 1 with normal or high GFR (GFR > 90 mL/min/1.73 square meters)    Stage 2 Mild CKD (GFR = 60-89 mL/min/1.73 square meters)    Stage 3A Moderate CKD (GFR = 45-59 mL/min/1.73 square meters)    Stage 3B Moderate CKD (GFR = 30-44 mL/min/1.73 square meters)    Stage 4 Severe CKD (GFR = 15-29 mL/min/1.73 square meters)    Stage 5 End Stage CKD (GFR  <15 mL/min/1.73 square meters)  Note: GFR calculation is accurate only with a steady state creatinine    Lipase [[id number]]  (Normal) Collected: 12/27/23 2137    Lab Status: Final result Specimen: Blood from Arm, Left Updated: 12/27/23 2230     Lipase 32 u/L     Lactic acid, plasma (w/reflex if result > 2.0) [[id number]]  (Normal) Collected: 12/27/23 2137    Lab Status: Final result Specimen: Blood from Arm, Left Updated: 12/27/23 2228     LACTIC ACID 1.0 mmol/L     Narrative:      Result may be elevated if tourniquet was used during collection.    UA w Reflex to Microscopic w Reflex to Culture [[id number]] Collected: 12/27/23 2147    Lab Status: Final result Specimen: Urine, Other Updated: 12/27/23 2208     Color, UA Yellow     Clarity, UA Clear     Specific Gravity, UA <=1.005     pH, UA 6.5     Leukocytes, UA Negative     Nitrite, UA Negative     Protein, UA Negative mg/dl      Glucose, UA Negative mg/dl      Ketones, UA Negative mg/dl      Urobilinogen, UA 0.2 E.U./dl      Bilirubin, UA Negative     Occult Blood, UA Negative    CBC and differential [[id number]]  (Abnormal) Collected: 12/27/23 2137    Lab Status: Final result Specimen: Blood from Arm, Left Updated: 12/27/23 2203     WBC 4.29 Thousand/uL      RBC 3.66 Million/uL      Hemoglobin 12.4 g/dL      Hematocrit 38.2 %       fL      MCH 33.9 pg      MCHC 32.5 g/dL      RDW 13.5 %      MPV 10.6 fL      Platelets 219 Thousands/uL      nRBC 0 /100 WBCs      Neutrophils Relative 31 %      Immat GRANS % 1 %      Lymphocytes Relative 51 %      Monocytes Relative 15 %      Eosinophils Relative 1 %      Basophils Relative 1 %      Neutrophils Absolute 1.35 Thousands/µL      Immature Grans Absolute 0.04 Thousand/uL      Lymphocytes Absolute 2.21 Thousands/µL      Monocytes Absolute 0.62 Thousand/µL      Eosinophils Absolute 0.05 Thousand/µL      Basophils Absolute 0.02 Thousands/µL                    CT abdomen pelvis with contrast   Final Result by Cabrera BECK  DO Cirilo (12/27 2354)      No acute abnormality or suspicious mass.            Workstation performed: [id number]         XR chest 1 view portable   Final Result by Ji Jackson MD (12/28 0830)      No acute cardiopulmonary disease.                  Workstation performed: [id number]                    Procedures  Procedures         ED Course  ED Course as of 12/29/23 0732   Wed Dec 27, 2023   2234 UA w Reflex to Microscopic w Reflex to Culture  Negative for UTI   2235 Lipase: 32  negative   2235 Comprehensive metabolic panel  Within normal for patient   2235 CBC and differential(!)  Grossly within normal for patient compared to previous CBC   2235 XR chest 1 view portable  On my interpretation, no acute cardiopulmonary disease   2247 INFLU A PCR(!): Positive   2309 hs TnI 0hr: 5  negative   2309 Procedure Note: EKG  Date/Time: 12/27/23 11:09 PM   Interpreted by: Maricruz Zarate  Indications / Diagnosis: epigastric pain  ECG reviewed by me, the ED Provider: yes   The EKG demonstrates:  Rhythm: normal sinus  Intervals: normal intervals  Axis: left axis  QRS/Blocks: non-specific QRS  ST Changes: No acute ST Changes, no STD/JOSE LUIS. No significant changes from previous EKG.       2318 TSH 3RD GENERATON: 1.317  normal   2356 CT abdomen pelvis with contrast  IMPRESSION:     No acute abnormality or suspicious mass.                                    SBIRT 20yo+      Flowsheet Row Most Recent Value   Initial Alcohol Screen: US AUDIT-C     1. How often do you have a drink containing alcohol? 0 Filed at: 12/27/2023 2126   2. How many drinks containing alcohol do you have on a typical day you are drinking?  0 Filed at: 12/27/2023 2126   3a. Male UNDER 65: How often do you have five or more drinks on one occasion? 0 Filed at: 12/27/2023 2126   3b. FEMALE Any Age, or MALE 65+: How often do you have 4 or more drinks on one occassion? 0 Filed at: 12/27/2023 2126   Audit-C Score 0 Filed at: 12/27/2023 2126   CHRISTINE: How many  times in the past year have you...    Used an illegal drug or used a prescription medication for non-medical reasons? Never Filed at: 12/27/2023 2126                      Medical Decision Making  89-year-old female presents for evaluation of GI symptoms, URI symptoms.  Patient is overall well-appearing, differential may include viral syndrome, pneumonia, gastritis, colitis, diverticulitis, UTI.  Will additionally assess for ACS with indigestion sensation.     Amount and/or Complexity of Data Reviewed  Labs: ordered. Decision-making details documented in ED Course.  Radiology: ordered. Decision-making details documented in ED Course.    Risk  Prescription drug management.             Disposition  Final diagnoses:   Influenza A     Time reflects when diagnosis was documented in both MDM as applicable and the Disposition within this note       Time User Action Codes Description Comment    12/27/2023 11:57 PM Maricruz Zarate Add [J10.1] Influenza A           ED Disposition       ED Disposition   Discharge    Condition   Stable    Date/Time   Wed Dec 27, 2023 7748    Comment   Ama Burnett discharge to home/self care.                   Follow-up Information       Follow up With Specialties Details Why Contact Info Additional Information    Rayray Liu, DO Family Medicine In 2 days  [address] [facility] [address]  [phone]       [facility] Emergency Department Emergency Medicine  If symptoms worsen [address] [facility] [address]  [phone] [facility] Emergency Department, [address], [place], [address]            Discharge Medication List as of 12/27/2023 11:57 PM        CONTINUE these medications which have NOT CHANGED    Details   acetaminophen (TYLENOL) 500 mg tablet Take 500 mg by mouth 3 (three) times a day as needed, Historical Med      ALPRAZolam (XANAX) 0.25 mg tablet Take 1 tablet (0.25 mg total) by mouth 3 (three) times a day as  needed for anxiety, Starting Fri 12/8/2023, Until Tue 2/6/2024 at 2359, Normal      Calcium Citrate (CITRACAL PO) Take 1 tablet by mouth daily., Until Discontinued, Historical Med      Cholecalciferol (VITAMIN D PO) Take 1,000 Units by mouth 2 (two) times a day., Until Discontinued, Historical Med      loratadine (CLARITIN) 10 mg tablet Take 1 tablet (10 mg total) by mouth daily, Starting Tue 9/27/2022, Normal      losartan (COZAAR) 50 mg tablet Take 1 tablet (50 mg total) by mouth daily, Starting Mon 9/18/2023, Normal      montelukast (SINGULAIR) 10 mg tablet Take 1 tablet (10 mg total) by mouth daily, Starting Thu 4/27/2023, Normal      Multiple Vitamin (MULTIVITAMIN) capsule Take 1 capsule by mouth daily., Until Discontinued, Historical Med      pantoprazole (PROTONIX) 40 mg tablet take 1 tablet by mouth once daily, Starting Mon 10/9/2023, Normal      sodium chloride (OCEAN) 0.65 % nasal spray 1 spray into each nostril as needed for congestion, Historical Med      vitamin E, tocopherol, 400 units capsule Take 400 Units by mouth daily, Historical Med             No discharge procedures on file.    PDMP Review         Value Time User    PDMP Reviewed  Yes 12/8/2023 12:03 PM Rayray Liu DO            ED Provider  Electronically Signed by             Maricruz Zarate DO  12/29/23 0781

## 2024-01-01 ENCOUNTER — APPOINTMENT (EMERGENCY)
Dept: RADIOLOGY | Facility: HOSPITAL | Age: 89
End: 2024-01-01
Payer: COMMERCIAL

## 2024-01-01 ENCOUNTER — HOSPITAL ENCOUNTER (EMERGENCY)
Facility: HOSPITAL | Age: 89
Discharge: HOME | End: 2024-01-01
Attending: EMERGENCY MEDICINE
Payer: COMMERCIAL

## 2024-01-01 VITALS
HEART RATE: 85 BPM | RESPIRATION RATE: 18 BRPM | OXYGEN SATURATION: 92 % | TEMPERATURE: 98.9 F | DIASTOLIC BLOOD PRESSURE: 88 MMHG | BODY MASS INDEX: 33.83 KG/M2 | HEIGHT: 58 IN | SYSTOLIC BLOOD PRESSURE: 185 MMHG | WEIGHT: 161.16 LBS

## 2024-01-01 DIAGNOSIS — K05.10 GINGIVOSTOMATITIS: Primary | ICD-10-CM

## 2024-01-01 DIAGNOSIS — J10.1 INFLUENZA A: ICD-10-CM

## 2024-01-01 LAB
ANION GAP SERPL CALCULATED.3IONS-SCNC: 10 MMOL/L
BASOPHILS # BLD AUTO: 0.03 THOUSANDS/ÂΜL (ref 0–0.1)
BASOPHILS NFR BLD AUTO: 1 % (ref 0–1)
BUN SERPL-MCNC: 7 MG/DL (ref 5–25)
CALCIUM SERPL-MCNC: 9.4 MG/DL (ref 8.4–10.2)
CHLORIDE SERPL-SCNC: 103 MMOL/L (ref 96–108)
CO2 SERPL-SCNC: 28 MMOL/L (ref 21–32)
CREAT SERPL-MCNC: 0.76 MG/DL (ref 0.6–1.3)
EOSINOPHIL # BLD AUTO: 0.07 THOUSAND/ÂΜL (ref 0–0.61)
EOSINOPHIL NFR BLD AUTO: 2 % (ref 0–6)
ERYTHROCYTE [DISTWIDTH] IN BLOOD BY AUTOMATED COUNT: 13.3 % (ref 11.6–15.1)
GFR SERPL CREATININE-BSD FRML MDRD: 68 ML/MIN/1.73SQ M
GLUCOSE SERPL-MCNC: 107 MG/DL (ref 65–140)
HCT VFR BLD AUTO: 37.7 % (ref 34.8–46.1)
HGB BLD-MCNC: 12.5 G/DL (ref 11.5–15.4)
IMM GRANULOCYTES # BLD AUTO: 0.04 THOUSAND/UL (ref 0–0.2)
IMM GRANULOCYTES NFR BLD AUTO: 1 % (ref 0–2)
LACTATE SERPL-SCNC: 1.2 MMOL/L (ref 0.5–2)
LYMPHOCYTES # BLD AUTO: 1.36 THOUSANDS/ÂΜL (ref 0.6–4.47)
LYMPHOCYTES NFR BLD AUTO: 34 % (ref 14–44)
MAGNESIUM SERPL-MCNC: 1.9 MG/DL (ref 1.9–2.7)
MCH RBC QN AUTO: 34 PG (ref 26.8–34.3)
MCHC RBC AUTO-ENTMCNC: 33.2 G/DL (ref 31.4–37.4)
MCV RBC AUTO: 102 FL (ref 82–98)
MONOCYTES # BLD AUTO: 0.44 THOUSAND/ÂΜL (ref 0.17–1.22)
MONOCYTES NFR BLD AUTO: 11 % (ref 4–12)
NEUTROPHILS # BLD AUTO: 2.06 THOUSANDS/ÂΜL (ref 1.85–7.62)
NEUTS SEG NFR BLD AUTO: 51 % (ref 43–75)
NRBC BLD AUTO-RTO: 0 /100 WBCS
PLATELET # BLD AUTO: 304 THOUSANDS/UL (ref 149–390)
PMV BLD AUTO: 9.8 FL (ref 8.9–12.7)
POTASSIUM SERPL-SCNC: 3.5 MMOL/L (ref 3.5–5.3)
RBC # BLD AUTO: 3.68 MILLION/UL (ref 3.81–5.12)
SODIUM SERPL-SCNC: 141 MMOL/L (ref 135–147)
WBC # BLD AUTO: 4 THOUSAND/UL (ref 4.31–10.16)

## 2024-01-01 PROCEDURE — 99283 EMERGENCY DEPT VISIT LOW MDM: CPT

## 2024-01-01 PROCEDURE — 36415 COLL VENOUS BLD VENIPUNCTURE: CPT | Performed by: EMERGENCY MEDICINE

## 2024-01-01 PROCEDURE — 83735 ASSAY OF MAGNESIUM: CPT | Performed by: EMERGENCY MEDICINE

## 2024-01-01 PROCEDURE — 85025 COMPLETE CBC W/AUTO DIFF WBC: CPT | Performed by: EMERGENCY MEDICINE

## 2024-01-01 PROCEDURE — 99285 EMERGENCY DEPT VISIT HI MDM: CPT | Performed by: EMERGENCY MEDICINE

## 2024-01-01 PROCEDURE — 71045 X-RAY EXAM CHEST 1 VIEW: CPT

## 2024-01-01 PROCEDURE — 80048 BASIC METABOLIC PNL TOTAL CA: CPT | Performed by: EMERGENCY MEDICINE

## 2024-01-01 PROCEDURE — 83605 ASSAY OF LACTIC ACID: CPT | Performed by: EMERGENCY MEDICINE

## 2024-01-01 RX ORDER — ONDANSETRON 4 MG/1
4 TABLET, ORALLY DISINTEGRATING ORAL EVERY 6 HOURS PRN
Qty: 20 TABLET | Refills: 0 | Status: SHIPPED | OUTPATIENT
Start: 2024-01-01

## 2024-01-01 RX ORDER — LIDOCAINE HYDROCHLORIDE 40 MG/ML
5 SOLUTION TOPICAL ONCE
Status: COMPLETED | OUTPATIENT
Start: 2024-01-01 | End: 2024-01-01

## 2024-01-01 RX ORDER — ACETAMINOPHEN 325 MG/1
975 TABLET ORAL ONCE
Status: COMPLETED | OUTPATIENT
Start: 2024-01-01 | End: 2024-01-01

## 2024-01-01 RX ORDER — LIDOCAINE HYDROCHLORIDE 20 MG/ML
15 SOLUTION OROPHARYNGEAL 4 TIMES DAILY PRN
Qty: 500 ML | Refills: 0 | Status: SHIPPED | OUTPATIENT
Start: 2024-01-01

## 2024-01-01 RX ADMIN — LIDOCAINE HYDROCHLORIDE 5 ML: 40 SOLUTION TOPICAL at 09:50

## 2024-01-01 RX ADMIN — ACETAMINOPHEN 975 MG: 325 TABLET, FILM COATED ORAL at 09:49

## 2024-01-01 NOTE — ED PROVIDER NOTES
History  Chief Complaint   Patient presents with    Mouth Lesions     Patient reports that she got new dentures 1.5 weeks ago and has had them adjusted multiple times at dentist. Pt reports mouth sores beginning couple days ago.      This is a 89 y/o woman with noted PMH who presents to the ED d/t development of several areas of painful mouth and lip ulcerations within the past week. Several areas on her lips and gingiva have superficial ulcerations that are painful to the touch. She is not sure if these are due to an ill-fitting lower denture plate, which she has been trying to have refitted since Thanksgiving 2023.  The upper plate fits well, and she is worn that for many years.  The lower plate after recent refurbishment was apparently irritating several areas of her gums due to a somewhat ill fit.  She has had several ulcerations of the lips for approximately the same length of time. She has not actually been wearing the lower plate consistently because of this ill fit for the past several weeks.  She also states that she has been eating a relatively more limited diet without the lower plate: This has primarily included soups, tea, and toast.  She was seen in this ED on 27 Dec 23 with about 5d of cough/congestion/n/abd cramping; was found to have influenza A. She reports some ongoing nausea without vomiting and persistent phlegmy cough. She continues to feel generally fatigued and unwell although is not having worsening dyspnea nor cough.    A/P: Recent influenza A diagnosis with typical respiratory symptoms now presenting with several areas of mucosal ulceration which are most likely a combination of viral stomatitis in conjunction with direct mechanical irritation from the denture plate.  Given the limited diet with concern for decreased solute intake, will check labs to assess for hyponatremia.  Will check chest x-ray due to wheezing present on examination.  Disposition pending.  Symptomatic treatment while  workup ongoing.      History provided by:  Patient, medical records and relative (Patient's granddaughter)  Mouth Lesions  Associated symptoms: no fever, no rash and no sore throat        Prior to Admission Medications   Prescriptions Last Dose Informant Patient Reported? Taking?   ALPRAZolam (XANAX) 0.25 mg tablet   No No   Sig: Take 1 tablet (0.25 mg total) by mouth 3 (three) times a day as needed for anxiety   Calcium Citrate (CITRACAL PO)   Yes No   Sig: Take 1 tablet by mouth daily.   Cholecalciferol (VITAMIN D PO)   Yes No   Sig: Take 1,000 Units by mouth 2 (two) times a day.   Multiple Vitamin (MULTIVITAMIN) capsule   Yes No   Sig: Take 1 capsule by mouth daily.   acetaminophen (TYLENOL) 500 mg tablet   Yes No   Sig: Take 500 mg by mouth 3 (three) times a day as needed   loratadine (CLARITIN) 10 mg tablet   No No   Sig: Take 1 tablet (10 mg total) by mouth daily   losartan (COZAAR) 50 mg tablet   No No   Sig: Take 1 tablet (50 mg total) by mouth daily   montelukast (SINGULAIR) 10 mg tablet   No No   Sig: Take 1 tablet (10 mg total) by mouth daily   Patient not taking: Reported on 2023   pantoprazole (PROTONIX) 40 mg tablet   No No   Sig: take 1 tablet by mouth once daily   sodium chloride (OCEAN) 0.65 % nasal spray   Yes No   Si spray into each nostril as needed for congestion   vitamin E, tocopherol, 400 units capsule   Yes No   Sig: Take 400 Units by mouth daily      Facility-Administered Medications: None       Past Medical History:   Diagnosis Date    Diverticulosis     GERD (gastroesophageal reflux disease)     Hiatal hernia     Irritable bowel disease     Lactose intolerance     Osteoporosis        Past Surgical History:   Procedure Laterality Date    CARPAL TUNNEL RELEASE Right     CAST APPLICATION Left 2020    Procedure: APPLICATION CAST, LEFT WRIST;  Surgeon: Julio Thomason MD;  Location: [facility];  Service: Orthopedics    CHOLECYSTECTOMY      EAR TUBE REMOVAL      HEMORROIDECTOMY       HYSTERECTOMY      PERINEAL  PELVIC RECONSTRUCTION SURGERY  2019    Dr Hightower - [facility]    NJ EXC TUMOR SOFT TISSUE LEG/ANKLE SUBFASCIAL <5CM Left 2020    Procedure: EXCISION BIOPSY TISSUE LESION/MASS LOWER EXTREMITY;  Surgeon: Al Roberson DO;  Location: [facility];  Service: General    NJ OPTX FEM SHFT FX W/INSJ IMED IMPLT W/WO SCREW Left 2020    Procedure: INSERTION NAIL IM FEMUR ANTEGRADE (TROCHANTERIC);  Surgeon: Julio Thomason MD;  Location:  MAIN OR;  Service: Orthopedics    SKIN CANCER EXCISION      SQUAMOUS CELL CARCINOMA EXCISION Left 2020    lower part of leg    TONSILLECTOMY         Family History   Problem Relation Age of Onset    Colon cancer Mother     Stomach cancer Father      I have reviewed and agree with the history as documented.    E-Cigarette/Vaping    E-Cigarette Use Never User      E-Cigarette/Vaping Substances     Social History     Tobacco Use    Smoking status: Former     Current packs/day: 0.00     Types: Cigarettes     Start date:      Quit date:      Years since quittin.0    Smokeless tobacco: Never   Vaping Use    Vaping status: Never Used   Substance Use Topics    Alcohol use: Never    Drug use: Not Currently       Review of Systems   Constitutional:  Positive for fatigue. Negative for chills, diaphoresis and fever.   HENT:  Positive for mouth sores. Negative for sore throat and trouble swallowing.    Respiratory:  Positive for cough and chest tightness. Negative for choking and shortness of breath.    Cardiovascular:  Negative for chest pain and leg swelling.   Gastrointestinal:  Positive for nausea. Negative for abdominal distention, abdominal pain and vomiting.   Skin:  Negative for color change and rash.   Neurological:  Negative for syncope and light-headedness.   Hematological:  Negative for adenopathy.       Physical Exam  Physical Exam  Vitals and nursing note reviewed.   Constitutional:       General: She is awake. She is not in acute  distress.     Appearance: Normal appearance. She is well-developed.   HENT:      Head: Normocephalic and atraumatic.      Right Ear: Hearing and external ear normal.      Left Ear: Hearing and external ear normal.      Mouth/Throat:      Lips: Pink.      Mouth: Mucous membranes are moist. Oral lesions present.      Dentition: Gum lesions present. No gingival swelling or dental abscesses.      Palate: No mass and lesions.      Pharynx: Oropharynx is clear. Uvula midline. No pharyngeal swelling, oropharyngeal exudate, posterior oropharyngeal erythema or uvula swelling.        Comments: Several healing superficial labial ulcers are present on the upper and lower lips  Edentulous  Several areas on the lower gingiva in the anterior midline show superficial ulcerations  Neck:      Trachea: Trachea and phonation normal.   Cardiovascular:      Rate and Rhythm: Normal rate and regular rhythm.      Pulses:           Radial pulses are 2+ on the right side and 2+ on the left side.        Dorsalis pedis pulses are 2+ on the right side and 2+ on the left side.        Posterior tibial pulses are 2+ on the right side and 2+ on the left side.      Heart sounds: Normal heart sounds, S1 normal and S2 normal. No murmur heard.     No friction rub. No gallop.   Pulmonary:      Effort: Pulmonary effort is normal. No respiratory distress.      Breath sounds: No stridor. Wheezing present. No decreased breath sounds, rhonchi or rales.      Comments: Scattered expiratory wheeze in multiple lung fields bilaterally  Abdominal:      General: There is no distension.      Palpations: There is no mass.      Tenderness: There is no abdominal tenderness. There is no guarding or rebound.   Skin:     General: Skin is warm and dry.   Neurological:      Mental Status: She is alert and oriented to person, place, and time.      GCS: GCS eye subscore is 4. GCS verbal subscore is 5. GCS motor subscore is 6.      Cranial Nerves: No cranial nerve deficit.       Sensory: No sensory deficit.      Motor: No abnormal muscle tone.      Comments: PERRLA; EOMI. Sensation intact to light touch over face in V1-V3 distribution bilaterally. Facial expressions symmetric. Tongue/uvula midline. Shoulder shrug equal bilaterally. Strength 5/5 in UE/LE bilaterally. Sensation intact to light touch in UE/LE bilaterally.         Vital Signs  ED Triage Vitals [01/01/24 0843]   Temperature Pulse Respirations Blood Pressure SpO2   98.9 °F (37.2 °C) 94 18 (!) 196/95 93 %      Temp Source Heart Rate Source Patient Position - Orthostatic VS BP Location FiO2 (%)   Temporal Monitor Sitting Left arm --      Pain Score       5           Vitals:    01/01/24 0843 01/01/24 0930   BP: (!) 196/95 (!) 185/88   Pulse: 94 85   Patient Position - Orthostatic VS: Sitting Sitting         Visual Acuity      ED Medications  Medications   acetaminophen (TYLENOL) tablet 975 mg (975 mg Oral Given 1/1/24 0949)   lidocaine (XYLOCAINE) 4 % topical solution 5 mL (5 mL Topical Given 1/1/24 0950)       Diagnostic Studies  Results Reviewed       Procedure Component Value Units Date/Time    Basic metabolic panel [[id number]] Collected: 01/01/24 0920    Lab Status: Final result Specimen: Blood from Arm, Right Updated: 01/01/24 0956     Sodium 141 mmol/L      Potassium 3.5 mmol/L      Chloride 103 mmol/L      CO2 28 mmol/L      ANION GAP 10 mmol/L      BUN 7 mg/dL      Creatinine 0.76 mg/dL      Glucose 107 mg/dL      Calcium 9.4 mg/dL      eGFR 68 ml/min/1.73sq m     Narrative:      National Kid[employer] Foundation guidelines for Chronic Kidney Disease (CKD):     Stage 1 with normal or high GFR (GFR > 90 mL/min/1.73 square meters)    Stage 2 Mild CKD (GFR = 60-89 mL/min/1.73 square meters)    Stage 3A Moderate CKD (GFR = 45-59 mL/min/1.73 square meters)    Stage 3B Moderate CKD (GFR = 30-44 mL/min/1.73 square meters)    Stage 4 Severe CKD (GFR = 15-29 mL/min/1.73 square meters)    Stage 5 End Stage CKD (GFR <15 mL/min/1.73  square meters)  Note: GFR calculation is accurate only with a steady state creatinine    Magnesium [[id number]]  (Normal) Collected: 01/01/24 0920    Lab Status: Final result Specimen: Blood from Arm, Right Updated: 01/01/24 0956     Magnesium 1.9 mg/dL     Lactic acid, plasma (w/reflex if result > 2.0) [[id number]]  (Normal) Collected: 01/01/24 0920    Lab Status: Final result Specimen: Blood from Arm, Right Updated: 01/01/24 0955     LACTIC ACID 1.2 mmol/L     Narrative:      Result may be elevated if tourniquet was used during collection.    CBC and differential [[id number]]  (Abnormal) Collected: 01/01/24 0920    Lab Status: Final result Specimen: Blood from Arm, Right Updated: 01/01/24 0937     WBC 4.00 Thousand/uL      RBC 3.68 Million/uL      Hemoglobin 12.5 g/dL      Hematocrit 37.7 %       fL      MCH 34.0 pg      MCHC 33.2 g/dL      RDW 13.3 %      MPV 9.8 fL      Platelets 304 Thousands/uL      nRBC 0 /100 WBCs      Neutrophils Relative 51 %      Immat GRANS % 1 %      Lymphocytes Relative 34 %      Monocytes Relative 11 %      Eosinophils Relative 2 %      Basophils Relative 1 %      Neutrophils Absolute 2.06 Thousands/µL      Immature Grans Absolute 0.04 Thousand/uL      Lymphocytes Absolute 1.36 Thousands/µL      Monocytes Absolute 0.44 Thousand/µL      Eosinophils Absolute 0.07 Thousand/µL      Basophils Absolute 0.03 Thousands/µL                    XR chest 1 view portable   ED Interpretation by Chapincito Delatorre DO (01/01 0954)   Airway is midline. Lungs are clear bilaterally with no evidence of pulmonary vascular congestion/focal infiltrate/pleural effusion/pneumothorax. Cardiac and mediastinal silhouettes are within normal limits. Osseous structures appear normal.                   Procedures  Procedures         ED Course  ED Course as of 01/01/24 1538   Mon Jan 01, 2024   0947 CBC and differential(!)  Mild leukopenia.  Hemoglobin/hematocrit normal.  Platelets normal.   0957 Magnesium  Normal    0957 Lactic acid, plasma (w/reflex if result > 2.0)  Normal   0957 Basic metabolic panel  Normal           SBIRT 20yo+      Flowsheet Row Most Recent Value   Initial Alcohol Screen: US AUDIT-C     1. How often do you have a drink containing alcohol? 0 Filed at: 01/01/2024 0845   2. How many drinks containing alcohol do you have on a typical day you are drinking?  0 Filed at: 01/01/2024 0845   3a. Male UNDER 65: How often do you have five or more drinks on one occasion? 0 Filed at: 01/01/2024 0845   3b. FEMALE Any Age, or MALE 65+: How often do you have 4 or more drinks on one occassion? 0 Filed at: 01/01/2024 0845   Audit-C Score 0 Filed at: 01/01/2024 0845   CHRISTINE: How many times in the past year have you...    Used an illegal drug or used a prescription medication for non-medical reasons? Never Filed at: 01/01/2024 0845                      Medical Decision Making  There is probably a combination of gingivostomatitis in the setting of influenza A combined with direct mechanical abrasions of the gingiva from the ill fitting denture plate.  No significant metabolic disturbances are present.  The chest x-ray is normal.  Patient's vital signs are normal. She is not significantly hypovolemic on exam.  Reviewed with patient and her granddaughter the results of the workup at length.  Advised use of analgesics such as acetaminophen for pain control with combination of topical lidocaine if need be for intraoral discomfort. She can supplement whenever she takes by mouth with Pedialyte if this is easier for her (I did review additionally that this is not a long-term strategy for hydration). Advised that she be rechecked by her primary physician within the coming week, and she should return to the emergency department at any point if her symptoms worsen particularly including her breathing.  All questions were answered to her satisfaction prior to discharge.  She expressed understanding and agreed to plan.    Amount and/or  Complexity of Data Reviewed  Labs: ordered. Decision-making details documented in ED Course.  Radiology: ordered and independent interpretation performed.    Risk  OTC drugs.  Prescription drug management.             Disposition  Final diagnoses:   Gingivostomatitis   Influenza A     Time reflects when diagnosis was documented in both MDM as applicable and the Disposition within this note       Time User Action Codes Description Comment    1/1/2024 10:37 AM RitChapincito parnell Ten Add [K05.10] Gingivostomatitis     1/1/2024 10:37 AM Chapincito Delatorre Ten Add [J10.1] Influenza A           ED Disposition       ED Disposition   Discharge    Condition   Stable    Date/Time   Mon Jan 1, 2024 1037    Comment   Ama Burnett discharge to home/self care.                   Follow-up Information       Follow up With Specialties Details Why Contact Vivek Liu DO Family Medicine Call in 1 day For an appointment in the next week [address] [facility] [address]  [phone]              Discharge Medication List as of 1/1/2024 10:40 AM        START taking these medications    Details   Lidocaine Viscous HCl (XYLOCAINE) 2 % mucosal solution Swish and spit 15 mL 4 (four) times a day as needed for mouth pain or discomfort, Starting Mon 1/1/2024, Normal      ondansetron (ZOFRAN-ODT) 4 mg disintegrating tablet Take 1 tablet (4 mg total) by mouth every 6 (six) hours as needed for nausea or vomiting, Starting Mon 1/1/2024, Normal           CONTINUE these medications which have NOT CHANGED    Details   acetaminophen (TYLENOL) 500 mg tablet Take 500 mg by mouth 3 (three) times a day as needed, Historical Med      ALPRAZolam (XANAX) 0.25 mg tablet Take 1 tablet (0.25 mg total) by mouth 3 (three) times a day as needed for anxiety, Starting Fri 12/8/2023, Until Tue 2/6/2024 at 2359, Normal      Calcium Citrate (CITRACAL PO) Take 1 tablet by mouth daily., Until Discontinued, Historical Med      Cholecalciferol (VITAMIN D PO)  Take 1,000 Units by mouth 2 (two) times a day., Until Discontinued, Historical Med      loratadine (CLARITIN) 10 mg tablet Take 1 tablet (10 mg total) by mouth daily, Starting Tue 9/27/2022, Normal      losartan (COZAAR) 50 mg tablet Take 1 tablet (50 mg total) by mouth daily, Starting Mon 9/18/2023, Normal      montelukast (SINGULAIR) 10 mg tablet Take 1 tablet (10 mg total) by mouth daily, Starting Thu 4/27/2023, Normal      Multiple Vitamin (MULTIVITAMIN) capsule Take 1 capsule by mouth daily., Until Discontinued, Historical Med      pantoprazole (PROTONIX) 40 mg tablet take 1 tablet by mouth once daily, Starting Mon 10/9/2023, Normal      sodium chloride (OCEAN) 0.65 % nasal spray 1 spray into each nostril as needed for congestion, Historical Med      vitamin E, tocopherol, 400 units capsule Take 400 Units by mouth daily, Historical Med             No discharge procedures on file.    PDMP Review         Value Time User    PDMP Reviewed  Yes 12/8/2023 12:03 PM Rayray Liu DO            ED Provider  Electronically Signed by             Chapincito Delatorre DO  01/01/24 7072

## 2024-01-01 NOTE — DISCHARGE INSTRUCTIONS
You can take Mucinex as needed to help resolve the cough.    You have been prescribed Zofran which can be used as needed for nausea/vomiting.  You have also prescribed lidocaine gel which you can use if you have continued pain in the mouth.    You should speak to your dentist regarding the fit of your dentures.    You can contact your regular doctor follow-up within the next week.  If you feel worse in any way, please feel free to return to the emergency department for further evaluation.    Continue all your other medications at their current dosages and frequencies.

## 2024-01-12 DIAGNOSIS — F41.1 GENERALIZED ANXIETY DISORDER: ICD-10-CM

## 2024-01-12 RX ORDER — ALPRAZOLAM 0.25 MG/1
0.25 TABLET ORAL 3 TIMES DAILY PRN
Qty: 90 TABLET | Refills: 0 | Status: SHIPPED | OUTPATIENT
Start: 2024-01-12 | End: 2024-03-12

## 2024-02-12 DIAGNOSIS — F41.1 GENERALIZED ANXIETY DISORDER: ICD-10-CM

## 2024-02-12 RX ORDER — ALPRAZOLAM 0.25 MG/1
0.25 TABLET ORAL 3 TIMES DAILY PRN
Qty: 90 TABLET | Refills: 0 | Status: SHIPPED | OUTPATIENT
Start: 2024-02-12 | End: 2024-04-12

## 2024-03-12 ENCOUNTER — OFFICE VISIT (OUTPATIENT)
Dept: FAMILY MEDICINE CLINIC | Facility: CLINIC | Age: 89
End: 2024-03-12
Payer: COMMERCIAL

## 2024-03-12 VITALS
TEMPERATURE: 98.1 F | HEIGHT: 58 IN | SYSTOLIC BLOOD PRESSURE: 152 MMHG | HEART RATE: 102 BPM | WEIGHT: 156 LBS | OXYGEN SATURATION: 96 % | BODY MASS INDEX: 32.75 KG/M2 | DIASTOLIC BLOOD PRESSURE: 78 MMHG

## 2024-03-12 DIAGNOSIS — I73.9 PERIPHERAL ARTERIAL DISEASE (HCC): ICD-10-CM

## 2024-03-12 DIAGNOSIS — I10 BENIGN ESSENTIAL HYPERTENSION: ICD-10-CM

## 2024-03-12 DIAGNOSIS — N18.31 STAGE 3A CHRONIC KIDNEY DISEASE (HCC): ICD-10-CM

## 2024-03-12 DIAGNOSIS — F41.1 GENERALIZED ANXIETY DISORDER: ICD-10-CM

## 2024-03-12 DIAGNOSIS — K21.9 GASTROESOPHAGEAL REFLUX DISEASE WITHOUT ESOPHAGITIS: Chronic | ICD-10-CM

## 2024-03-12 PROCEDURE — G2211 COMPLEX E/M VISIT ADD ON: HCPCS | Performed by: FAMILY MEDICINE

## 2024-03-12 PROCEDURE — 99214 OFFICE O/P EST MOD 30 MIN: CPT | Performed by: FAMILY MEDICINE

## 2024-03-12 RX ORDER — PANTOPRAZOLE SODIUM 40 MG/1
40 TABLET, DELAYED RELEASE ORAL DAILY
Qty: 90 TABLET | Refills: 1 | Status: SHIPPED | OUTPATIENT
Start: 2024-03-12

## 2024-03-12 RX ORDER — LOSARTAN POTASSIUM 50 MG/1
50 TABLET ORAL DAILY
Qty: 90 TABLET | Refills: 1 | Status: SHIPPED | OUTPATIENT
Start: 2024-03-12

## 2024-03-12 RX ORDER — ALPRAZOLAM 0.25 MG/1
0.25 TABLET ORAL 3 TIMES DAILY PRN
Qty: 90 TABLET | Refills: 0 | Status: SHIPPED | OUTPATIENT
Start: 2024-03-12 | End: 2024-05-11

## 2024-03-12 NOTE — PROGRESS NOTES
Assessment/Plan:    Problem List Items Addressed This Visit    None       There are no diagnoses linked to this encounter.    No problem-specific Assessment & Plan notes found for this encounter.        Subjective:      Patient ID: Ama Burnett is a 89 y.o. female.    Mrs. Burnett is here for a follow-up visit as required by the [facility] for her controlled substances but she also has an issue she had to have new dentures made she had some type of a reaction to the denture either the denture itself or the liners she developed soreness of the mouth tongue and even some small ulcers she has not had the teeth in her mouth since then and her dentist is making her a new set of teeth with a different compound        The following portions of the patient's history were reviewed and updated as appropriate:   She has a past medical history of Diverticulosis, GERD (gastroesophageal reflux disease), Hiatal hernia, Irritable bowel disease, Lactose intolerance, and Osteoporosis.,  does not have any pertinent problems on file.,   has a past surgical history that includes Hysterectomy; Tonsillectomy; Cholecystectomy; Ear tube removal; Skin cancer excision; Hemorroidectomy; Vagina reconstruction surgery (09/13/2019); pr optx fem shft fx w/insj imed implt w/wo screw (Left, 2/1/2020); Cast application (Left, 2/1/2020); Carpal tunnel release (Right); pr exc tumor soft tissue leg/ankle subfascial <5cm (Left, 9/8/2020); and Squamous cell carcinoma excision (Left, 09/08/2020).,  family history includes Colon cancer in her mother; Stomach cancer in her father.,   reports that she quit smoking about 35 years ago. Her smoking use included cigarettes. She started smoking about 65 years ago. She has never used smokeless tobacco. She reports that she does not currently use drugs. She reports that she does not drink alcohol.,  is allergic to escitalopram, mepenzolate, maalox anti-gas [simethicone], other, prednisone, and reglan  [metoclopramide]..  Current Outpatient Medications   Medication Sig Dispense Refill    acetaminophen (TYLENOL) 500 mg tablet Take 500 mg by mouth 3 (three) times a day as needed      ALPRAZolam (XANAX) 0.25 mg tablet Take 1 tablet (0.25 mg total) by mouth 3 (three) times a day as needed for anxiety 90 tablet 0    Calcium Citrate (CITRACAL PO) Take 1 tablet by mouth daily.      Cholecalciferol (VITAMIN D PO) Take 1,000 Units by mouth 2 (two) times a day.      Lidocaine Viscous HCl (XYLOCAINE) 2 % mucosal solution Swish and spit 15 mL 4 (four) times a day as needed for mouth pain or discomfort 500 mL 0    loratadine (CLARITIN) 10 mg tablet Take 1 tablet (10 mg total) by mouth daily 30 tablet 2    losartan (COZAAR) 50 mg tablet Take 1 tablet (50 mg total) by mouth daily 90 tablet 1    Multiple Vitamin (MULTIVITAMIN) capsule Take 1 capsule by mouth daily.      ondansetron (ZOFRAN-ODT) 4 mg disintegrating tablet Take 1 tablet (4 mg total) by mouth every 6 (six) hours as needed for nausea or vomiting 20 tablet 0    pantoprazole (PROTONIX) 40 mg tablet take 1 tablet by mouth once daily 90 tablet 1    sodium chloride (OCEAN) 0.65 % nasal spray 1 spray into each nostril as needed for congestion      vitamin E, tocopherol, 400 units capsule Take 400 Units by mouth daily       No current facility-administered medications for this visit.       Review of Systems   Constitutional:  Negative for activity change, appetite change, diaphoresis, fatigue and fever.   HENT:  Positive for mouth sores.    Eyes: Negative.    Respiratory:  Negative for apnea, cough, chest tightness, shortness of breath and wheezing.    Cardiovascular:  Negative for chest pain, palpitations and leg swelling.   Gastrointestinal:  Negative for abdominal distention, abdominal pain, anal bleeding, constipation, diarrhea, nausea and vomiting.   Endocrine: Negative for cold intolerance, heat intolerance, polydipsia, polyphagia and polyuria.   Genitourinary:   "Negative for difficulty urinating, dysuria, flank pain, hematuria and urgency.   Musculoskeletal:  Negative for arthralgias, back pain, gait problem, joint swelling and myalgias.   Skin:  Negative for color change, rash and wound.   Allergic/Immunologic: Negative for environmental allergies, food allergies and immunocompromised state.   Neurological:  Negative for dizziness, seizures, syncope, speech difficulty, numbness and headaches.   Hematological:  Negative for adenopathy. Does not bruise/bleed easily.   Psychiatric/Behavioral:  Negative for agitation, behavioral problems, hallucinations, sleep disturbance and suicidal ideas.          Objective:  Vitals:    03/12/24 1318   BP: 152/78   BP Location: Left arm   Patient Position: Sitting   Cuff Size: Standard   Pulse: 102   Temp: 98.1 °F (36.7 °C)   TempSrc: Temporal   SpO2: 96%   Weight: 70.8 kg (156 lb)   Height: 4' 10\" (1.473 m)     Body mass index is 32.6 kg/m².     Physical Exam  Constitutional:       General: She is not in acute distress.     Appearance: She is well-developed. She is not diaphoretic.   HENT:      Head: Normocephalic.      Right Ear: External ear normal.      Left Ear: External ear normal.      Nose: Nose normal.   Eyes:      General: No scleral icterus.        Right eye: No discharge.         Left eye: No discharge.      Conjunctiva/sclera: Conjunctivae normal.      Pupils: Pupils are equal, round, and reactive to light.   Neck:      Thyroid: No thyromegaly.      Trachea: No tracheal deviation.   Cardiovascular:      Rate and Rhythm: Normal rate and regular rhythm.      Heart sounds: Normal heart sounds. No murmur heard.     No friction rub. No gallop.   Pulmonary:      Effort: Pulmonary effort is normal. No respiratory distress.      Breath sounds: Normal breath sounds. No wheezing.   Abdominal:      General: Bowel sounds are normal.      Palpations: Abdomen is soft. There is no mass.      Tenderness: There is no abdominal tenderness. There " is no guarding.   Musculoskeletal:         General: No deformity.      Cervical back: Normal range of motion.   Lymphadenopathy:      Cervical: No cervical adenopathy.   Skin:     General: Skin is warm and dry.      Findings: No erythema or rash.   Neurological:      Mental Status: She is alert and oriented to person, place, and time.      Cranial Nerves: No cranial nerve deficit.   Psychiatric:         Thought Content: Thought content normal.         Depression Screening and Follow-up Plan: Patient was screened for depression during today's encounter. They screened negative with a PHQ-2 score of 0.

## 2024-04-15 DIAGNOSIS — F41.1 GENERALIZED ANXIETY DISORDER: ICD-10-CM

## 2024-04-15 RX ORDER — ALPRAZOLAM 0.25 MG/1
0.25 TABLET ORAL 3 TIMES DAILY PRN
Qty: 90 TABLET | Refills: 0 | Status: SHIPPED | OUTPATIENT
Start: 2024-04-15 | End: 2024-06-14

## 2024-04-17 ENCOUNTER — OFFICE VISIT (OUTPATIENT)
Dept: FAMILY MEDICINE CLINIC | Facility: CLINIC | Age: 89
End: 2024-04-17
Payer: COMMERCIAL

## 2024-04-17 VITALS
DIASTOLIC BLOOD PRESSURE: 84 MMHG | TEMPERATURE: 96.8 F | HEIGHT: 58 IN | HEART RATE: 95 BPM | OXYGEN SATURATION: 93 % | SYSTOLIC BLOOD PRESSURE: 158 MMHG | WEIGHT: 158 LBS | BODY MASS INDEX: 33.17 KG/M2

## 2024-04-17 DIAGNOSIS — K21.9 GASTROESOPHAGEAL REFLUX DISEASE WITHOUT ESOPHAGITIS: Primary | Chronic | ICD-10-CM

## 2024-04-17 DIAGNOSIS — K58.9 IRRITABLE BOWEL SYNDROME WITHOUT DIARRHEA: ICD-10-CM

## 2024-04-17 PROCEDURE — 99214 OFFICE O/P EST MOD 30 MIN: CPT | Performed by: FAMILY MEDICINE

## 2024-04-17 PROCEDURE — G2211 COMPLEX E/M VISIT ADD ON: HCPCS | Performed by: FAMILY MEDICINE

## 2024-04-17 RX ORDER — DICYCLOMINE HYDROCHLORIDE 10 MG/1
10 CAPSULE ORAL
Qty: 50 CAPSULE | Refills: 0 | Status: SHIPPED | OUTPATIENT
Start: 2024-04-17 | End: 2024-04-24 | Stop reason: SDUPTHER

## 2024-04-17 NOTE — PROGRESS NOTES
Assessment/Plan:    Problem List Items Addressed This Visit          Digestive    GERD (gastroesophageal reflux disease) - Primary (Chronic)     Other Visit Diagnoses       Irritable bowel syndrome without diarrhea                 Diagnoses and all orders for this visit:    Gastroesophageal reflux disease without esophagitis    Irritable bowel syndrome without diarrhea        No problem-specific Assessment & Plan notes found for this encounter.        Subjective:      Patient ID: Ama Burnett is a 89 y.o. female.    Sinusitis  Pertinent negatives include no coughing, diaphoresis, headaches or shortness of breath.       The following portions of the patient's history were reviewed and updated as appropriate:   She has a past medical history of Diverticulosis, GERD (gastroesophageal reflux disease), Hiatal hernia, Irritable bowel disease, Lactose intolerance, and Osteoporosis.,  does not have any pertinent problems on file.,   has a past surgical history that includes Hysterectomy; Tonsillectomy; Cholecystectomy; Ear tube removal; Skin cancer excision; Hemorroidectomy; Vagina reconstruction surgery (09/13/2019); pr optx fem shft fx w/insj imed implt w/wo screw (Left, 2/1/2020); Cast application (Left, 2/1/2020); Carpal tunnel release (Right); pr exc tumor soft tissue leg/ankle subfascial <5cm (Left, 9/8/2020); and Squamous cell carcinoma excision (Left, 09/08/2020).,  family history includes Colon cancer in her mother; Stomach cancer in her father.,   reports that she quit smoking about 35 years ago. Her smoking use included cigarettes. She started smoking about 65 years ago. She has never used smokeless tobacco. She reports that she does not currently use drugs. She reports that she does not drink alcohol.,  is allergic to escitalopram, mepenzolate, other, prednisone, and reglan [metoclopramide]..  Current Outpatient Medications   Medication Sig Dispense Refill    acetaminophen (TYLENOL) 500 mg tablet Take 500 mg by  mouth 3 (three) times a day as needed      ALPRAZolam (XANAX) 0.25 mg tablet Take 1 tablet (0.25 mg total) by mouth 3 (three) times a day as needed for anxiety 90 tablet 0    Calcium Citrate (CITRACAL PO) Take 1 tablet by mouth daily.      Cholecalciferol (VITAMIN D PO) Take 1,000 Units by mouth 2 (two) times a day.      loratadine (CLARITIN) 10 mg tablet Take 1 tablet (10 mg total) by mouth daily 30 tablet 2    losartan (COZAAR) 50 mg tablet Take 1 tablet (50 mg total) by mouth daily 90 tablet 1    Multiple Vitamin (MULTIVITAMIN) capsule Take 1 capsule by mouth daily.      pantoprazole (PROTONIX) 40 mg tablet Take 1 tablet (40 mg total) by mouth daily 90 tablet 1    sodium chloride (OCEAN) 0.65 % nasal spray 1 spray into each nostril as needed for congestion       No current facility-administered medications for this visit.       Review of Systems   Constitutional:  Negative for activity change, appetite change, diaphoresis, fatigue and fever.   HENT:  Positive for dental problem.    Eyes: Negative.    Respiratory:  Negative for apnea, cough, chest tightness, shortness of breath and wheezing.    Cardiovascular:  Negative for chest pain, palpitations and leg swelling.   Gastrointestinal:  Negative for abdominal distention, abdominal pain, anal bleeding, constipation, diarrhea, nausea and vomiting.   Endocrine: Negative for cold intolerance, heat intolerance, polydipsia, polyphagia and polyuria.   Genitourinary:  Negative for difficulty urinating, dysuria, flank pain, hematuria and urgency.   Musculoskeletal:  Negative for arthralgias, back pain, gait problem, joint swelling and myalgias.   Skin:  Negative for color change, rash and wound.   Allergic/Immunologic: Negative for environmental allergies, food allergies and immunocompromised state.   Neurological:  Negative for dizziness, seizures, syncope, speech difficulty, numbness and headaches.   Hematological:  Negative for adenopathy. Does not bruise/bleed easily.  "  Psychiatric/Behavioral:  Negative for agitation, behavioral problems, hallucinations, sleep disturbance and suicidal ideas.          Objective:  Vitals:    04/17/24 1439   BP: 158/84   BP Location: Left arm   Patient Position: Sitting   Cuff Size: Large   Pulse: 95   Temp: (!) 96.8 °F (36 °C)   TempSrc: Temporal   SpO2: 93%   Weight: 71.7 kg (158 lb)   Height: 4' 10\" (1.473 m)     Body mass index is 33.02 kg/m².     Physical Exam  Constitutional:       General: She is not in acute distress.     Appearance: She is well-developed. She is not diaphoretic.   HENT:      Head: Normocephalic.      Right Ear: External ear normal.      Left Ear: External ear normal.      Nose: Nose normal.   Eyes:      General: No scleral icterus.        Right eye: No discharge.         Left eye: No discharge.      Conjunctiva/sclera: Conjunctivae normal.      Pupils: Pupils are equal, round, and reactive to light.   Neck:      Thyroid: No thyromegaly.      Trachea: No tracheal deviation.   Cardiovascular:      Rate and Rhythm: Normal rate and regular rhythm.      Heart sounds: Normal heart sounds. No murmur heard.     No friction rub. No gallop.   Pulmonary:      Effort: Pulmonary effort is normal. No respiratory distress.      Breath sounds: Normal breath sounds. No wheezing.   Abdominal:      General: Bowel sounds are normal.      Palpations: Abdomen is soft. There is no mass.      Tenderness: There is no abdominal tenderness. There is no guarding.   Musculoskeletal:         General: No deformity.      Cervical back: Normal range of motion.   Lymphadenopathy:      Cervical: No cervical adenopathy.   Skin:     General: Skin is warm and dry.      Findings: No erythema or rash.   Neurological:      Mental Status: She is alert and oriented to person, place, and time.      Cranial Nerves: No cranial nerve deficit.   Psychiatric:         Thought Content: Thought content normal.         "

## 2024-04-24 DIAGNOSIS — K58.9 IRRITABLE BOWEL SYNDROME WITHOUT DIARRHEA: ICD-10-CM

## 2024-04-25 RX ORDER — DICYCLOMINE HYDROCHLORIDE 10 MG/1
10 CAPSULE ORAL
Qty: 90 CAPSULE | Refills: 5 | Status: SHIPPED | OUTPATIENT
Start: 2024-04-25

## 2024-05-02 ENCOUNTER — TELEPHONE (OUTPATIENT)
Dept: FAMILY MEDICINE CLINIC | Facility: CLINIC | Age: 89
End: 2024-05-02

## 2024-05-03 ENCOUNTER — TELEPHONE (OUTPATIENT)
Age: 89
End: 2024-05-03

## 2024-05-03 NOTE — TELEPHONE ENCOUNTER
Patient is saying that she is supposed to be admitted to [facility] in [place] on Monday. They are waiting for paperwork to be sent to them or she cannot be admitted.  She mentioned that [facility] know all about this and would be able to help.  Please let the patient know when this has been taken care of.

## 2024-05-03 NOTE — TELEPHONE ENCOUNTER
Message is for Mell you spoke with Bernie from [facility] she is asking did the office receive a fax with forms for medical evaluation and medication can you call her back at [phone].

## 2024-06-12 ENCOUNTER — OFFICE VISIT (OUTPATIENT)
Dept: FAMILY MEDICINE CLINIC | Facility: CLINIC | Age: 89
End: 2024-06-12
Payer: COMMERCIAL

## 2024-06-12 VITALS
HEART RATE: 96 BPM | DIASTOLIC BLOOD PRESSURE: 76 MMHG | TEMPERATURE: 98.1 F | OXYGEN SATURATION: 94 % | BODY MASS INDEX: 33.37 KG/M2 | WEIGHT: 159 LBS | HEIGHT: 58 IN | SYSTOLIC BLOOD PRESSURE: 138 MMHG

## 2024-06-12 DIAGNOSIS — K21.9 GASTROESOPHAGEAL REFLUX DISEASE WITHOUT ESOPHAGITIS: Chronic | ICD-10-CM

## 2024-06-12 DIAGNOSIS — Z79.899 BENZODIAZEPINE CONTRACT EXISTS: Primary | ICD-10-CM

## 2024-06-12 DIAGNOSIS — I10 BENIGN ESSENTIAL HYPERTENSION: ICD-10-CM

## 2024-06-12 PROCEDURE — G2211 COMPLEX E/M VISIT ADD ON: HCPCS | Performed by: FAMILY MEDICINE

## 2024-06-12 PROCEDURE — 99213 OFFICE O/P EST LOW 20 MIN: CPT | Performed by: FAMILY MEDICINE

## 2024-06-12 RX ORDER — VITAMIN E 268 MG
400 CAPSULE ORAL DAILY
COMMUNITY

## 2024-06-12 RX ORDER — MAGNESIUM HYDROXIDE/ALUMINUM HYDROXICE/SIMETHICONE 120; 1200; 1200 MG/30ML; MG/30ML; MG/30ML
30 SUSPENSION ORAL EVERY 4 HOURS PRN
COMMUNITY

## 2024-06-12 NOTE — PROGRESS NOTES
Ambulatory Visit  Name: Ama Burnett      : 1931      MRN: [id number]  Encounter Provider: Rayray Liu DO  Encounter Date: 2024   Encounter department: [facility]    Assessment & Plan   1. Benzodiazepine contract exists  -     [facility] All Prescribed Meds and Special Instructions  -     Amphetamines, Methamphetamines  -     Butalbital  -     Phenobarbital  -     Secobarbital  -     Alprazolam  -     Clonazepam  -     Diazepam  -     Lorazepam  -     Gabapentin  -     Pregabalin  -     Cocaine  -     Heroin  -     Buprenorphine  -     Levorphanol  -     Meperidine  -     Naltrexone  -     Fentanyl  -     Methadone  -     Oxycodone  -     Tapentadol  -     THC  -     Tramadol  -     Codeine, Hydrocodone, Hydropmorphone, Morphine  -     Bath Salts  -     Ethyl Glucuronide/Ethyl Sulfate  -     Kratom  -     Spice  -     Methylphenidate  -     Phentermine  -     Validity Oxidant  -     Validity Creatinine  -     Validity pH  -     Validity Specific  -     Xylazine Definitive Test  2. Benign essential hypertension  3. Gastroesophageal reflux disease without esophagitis       History of Present Illness     Mrs. Burnett is here today for a follow-up visit she has recently been at [facility] she would like to return to her home for a number of reasons and what asked my opinion regarding her being at home.        Review of Systems   Constitutional:  Positive for activity change. Negative for appetite change, diaphoresis, fatigue and fever.   HENT: Negative.  Negative for congestion and hearing loss.    Eyes: Negative.  Negative for visual disturbance.   Respiratory:  Negative for apnea, cough, chest tightness, shortness of breath and wheezing.    Cardiovascular:  Negative for chest pain, palpitations and leg swelling.   Gastrointestinal:  Negative for abdominal distention, abdominal pain, anal bleeding, constipation, diarrhea, nausea and vomiting.   Endocrine: Negative for cold intolerance, heat  intolerance, polydipsia, polyphagia and polyuria.   Genitourinary:  Negative for difficulty urinating, dysuria, flank pain, hematuria and urgency.   Musculoskeletal:  Positive for arthralgias. Negative for back pain, gait problem, joint swelling and myalgias.   Skin:  Negative for color change, rash and wound.   Allergic/Immunologic: Negative for environmental allergies, food allergies and immunocompromised state.   Neurological:  Negative for dizziness, seizures, syncope, speech difficulty, numbness and headaches.   Hematological:  Negative for adenopathy. Does not bruise/bleed easily.   Psychiatric/Behavioral:  Negative for agitation, behavioral problems, hallucinations, sleep disturbance and suicidal ideas.      Medical History Reviewed by provider this encounter:  Tobacco  Allergies  Meds  Problems  Med Hx  Surg Hx  Fam Hx       Current Outpatient Medications on File Prior to Visit   Medication Sig Dispense Refill    acetaminophen (TYLENOL) 500 mg tablet Take 500 mg by mouth 3 (three) times a day as needed      ALPRAZolam (XANAX) 0.25 mg tablet Take 1 tablet (0.25 mg total) by mouth 3 (three) times a day as needed for anxiety 90 tablet 0    aluminum-magnesium hydroxide-simethicone (Mirta-Lanta) 5746-8135-184 mg/30 mL suspension Take 30 mL by mouth every 4 (four) hours as needed for indigestion or heartburn      Calcium Citrate (CITRACAL PO) Take 1 tablet by mouth daily.      Cholecalciferol (VITAMIN D PO) Take 1,000 Units by mouth 2 (two) times a day.      loratadine (CLARITIN) 10 mg tablet Take 1 tablet (10 mg total) by mouth daily 30 tablet 2    losartan (COZAAR) 50 mg tablet Take 1 tablet (50 mg total) by mouth daily 90 tablet 1    Multiple Vitamin (MULTIVITAMIN) capsule Take 1 capsule by mouth daily.      pantoprazole (PROTONIX) 40 mg tablet Take 1 tablet (40 mg total) by mouth daily 90 tablet 1    vitamin E, tocopherol, 400 units capsule Take 400 Units by mouth daily      dicyclomine (BENTYL) 10 mg  "capsule Take 1 capsule (10 mg total) by mouth 3 (three) times a day before meals (Patient not taking: Reported on 2024) 90 capsule 5    sodium chloride (OCEAN) 0.65 % nasal spray 1 spray into each nostril as needed for congestion (Patient not taking: Reported on 2024)       No current facility-administered medications on file prior to visit.      Social History     Tobacco Use    Smoking status: Former     Current packs/day: 0.00     Types: Cigarettes     Start date:      Quit date:      Years since quittin.4    Smokeless tobacco: Never   Vaping Use    Vaping status: Never Used   Substance and Sexual Activity    Alcohol use: Never    Drug use: Not Currently    Sexual activity: Not on file     Objective     /76 (BP Location: Left arm, Patient Position: Sitting, Cuff Size: Standard)   Pulse 96   Temp 98.1 °F (36.7 °C) (Temporal)   Ht 4' 10\" (1.473 m)   Wt 72.1 kg (159 lb)   SpO2 94%   BMI 33.23 kg/m²     Physical Exam  Constitutional:       Appearance: She is well-developed.   HENT:      Head: Normocephalic and atraumatic.      Right Ear: External ear normal.      Left Ear: External ear normal.      Nose: Nose normal.   Eyes:      Conjunctiva/sclera: Conjunctivae normal.      Pupils: Pupils are equal, round, and reactive to light.   Cardiovascular:      Rate and Rhythm: Normal rate and regular rhythm.      Heart sounds: Normal heart sounds. No murmur heard.     No friction rub.   Pulmonary:      Effort: Pulmonary effort is normal. No respiratory distress.      Breath sounds: Normal breath sounds. No wheezing or rales.   Chest:      Chest wall: No tenderness.   Abdominal:      General: Bowel sounds are normal.      Palpations: Abdomen is soft.   Musculoskeletal:         General: Normal range of motion.      Cervical back: Normal range of motion and neck supple.   Skin:     General: Skin is warm and dry.      Capillary Refill: Capillary refill takes 2 to 3 seconds.   Neurological:      " Mental Status: She is alert and oriented to person, place, and time.   Psychiatric:         Behavior: Behavior normal.         Thought Content: Thought content normal.         Judgment: Judgment normal.       Administrative Statements   I have spent a total time of 20 minutes on 06/12/24 In caring for this patient including Impressions and Documenting in the medical record.

## 2024-06-16 LAB
4OH-XYLAZINE UR QL CFM: NEGATIVE NG/ML
6MAM UR QL CFM: NEGATIVE NG/ML
7AMINOCLONAZEPAM UR QL CFM: NEGATIVE NG/ML
A-OH ALPRAZ UR QL CFM: NORMAL NG/ML
ACCEPTABLE CREAT UR QL: NORMAL MG/DL
ACCEPTIBLE SP GR UR QL: NORMAL
AMPHET UR QL CFM: NEGATIVE NG/ML
BUPRENORPHINE UR QL CFM: NEGATIVE NG/ML
BUTALBITAL UR QL CFM: NEGATIVE NG/ML
BZE UR QL CFM: NEGATIVE NG/ML
CODEINE UR QL CFM: NEGATIVE NG/ML
EDDP UR QL CFM: NEGATIVE NG/ML
ETHYL GLUCURONIDE UR QL CFM: NEGATIVE NG/ML
ETHYL SULFATE UR QL SCN: NEGATIVE NG/ML
EUTYLONE UR QL: NEGATIVE NG/ML
FENTANYL UR QL CFM: NEGATIVE NG/ML
GLIADIN IGG SER IA-ACNC: NEGATIVE NG/ML
HYDROCODONE UR QL CFM: NEGATIVE NG/ML
HYDROMORPHONE UR QL CFM: NEGATIVE NG/ML
LORAZEPAM UR QL CFM: NEGATIVE NG/ML
ME-PHENIDATE UR QL CFM: NEGATIVE NG/ML
MEPERIDINE UR QL CFM: NEGATIVE NG/ML
METHADONE UR QL CFM: NEGATIVE NG/ML
METHAMPHET UR QL CFM: NEGATIVE NG/ML
MORPHINE UR QL CFM: NEGATIVE NG/ML
NALTREXONE UR QL CFM: NEGATIVE NG/ML
NITRITE UR QL: NORMAL UG/ML
NORBUPRENORPHINE UR QL CFM: NEGATIVE NG/ML
NORDIAZEPAM UR QL CFM: NEGATIVE NG/ML
NORFENTANYL UR QL CFM: NEGATIVE NG/ML
NORHYDROCODONE UR QL CFM: NEGATIVE NG/ML
NORMEPERIDINE UR QL CFM: NEGATIVE NG/ML
NOROXYCODONE UR QL CFM: NEGATIVE NG/ML
OXAZEPAM UR QL CFM: NEGATIVE NG/ML
OXYCODONE UR QL CFM: NEGATIVE NG/ML
OXYMORPHONE UR QL CFM: NEGATIVE NG/ML
PARA-FLUOROFENTANYL QUANTIFICATION: NORMAL NG/ML
PHENOBARB UR QL CFM: NEGATIVE NG/ML
RESULT ALL_PRESCRIBED MEDS AND SPECIAL INSTRUCTIONS: NORMAL
SECOBARBITAL UR QL CFM: NEGATIVE NG/ML
SL AMB 4-ANPP QUANTIFICATION: NORMAL NG/ML
SL AMB 5F-ADB-M7 METABOLITE QUANTIFICATION: NEGATIVE NG/ML
SL AMB 7-OH-MITRAGYNINE (KRATOM ALKALOID) QUANTIFICATION: NEGATIVE NG/ML
SL AMB AB-FUBINACA-M3 METABOLITE QUANTIFICATION: NEGATIVE NG/ML
SL AMB ACETYL FENTANYL QUANTIFICATION: NORMAL NG/ML
SL AMB ACETYL NORFENTANYL QUANTIFICATION: NORMAL NG/ML
SL AMB ACRYL FENTANYL QUANTIFICATION: NORMAL NG/ML
SL AMB CARFENTANIL QUANTIFICATION: NORMAL NG/ML
SL AMB CTHC (MARIJUANA METABOLITE) QUANTIFICATION: NEGATIVE NG/ML
SL AMB DEXTRORPHAN (DEXTROMETHORPHAN METABOLITE) QUANT: NEGATIVE NG/ML
SL AMB GABAPENTIN QUANTIFICATION: NEGATIVE NG/ML
SL AMB JWH018 METABOLITE QUANTIFICATION: NEGATIVE NG/ML
SL AMB JWH073 METABOLITE QUANTIFICATION: NEGATIVE NG/ML
SL AMB MDMB-FUBINACA-M1 METABOLITE QUANTIFICATION: NEGATIVE NG/ML
SL AMB METHYLONE QUANTIFICATION: NEGATIVE NG/ML
SL AMB N-DESMETHYL-TRAMADOL QUANTIFICATION: NEGATIVE NG/ML
SL AMB PHENTERMINE QUANTIFICATION: NEGATIVE NG/ML
SL AMB PREGABALIN QUANTIFICATION: NEGATIVE NG/ML
SL AMB RCS4 METABOLITE QUANTIFICATION: NEGATIVE NG/ML
SL AMB RITALINIC ACID QUANTIFICATION: NEGATIVE NG/ML
SMOOTH MUSCLE AB TITR SER IF: NEGATIVE NG/ML
SPECIMEN DRAWN SERPL: NEGATIVE NG/ML
SPECIMEN PH ACCEPTABLE UR: NORMAL
TAPENTADOL UR QL CFM: NEGATIVE NG/ML
TEMAZEPAM UR QL CFM: NEGATIVE NG/ML
TRAMADOL UR QL CFM: NEGATIVE NG/ML
URATE/CREAT 24H UR: NEGATIVE NG/ML
XYLAZINE UR QL CFM: NEGATIVE NG/ML

## 2024-06-18 DIAGNOSIS — J30.9 ALLERGIC RHINITIS, UNSPECIFIED SEASONALITY, UNSPECIFIED TRIGGER: ICD-10-CM

## 2024-06-18 RX ORDER — LORATADINE 10 MG/1
10 TABLET ORAL DAILY
Qty: 30 TABLET | Refills: 11 | Status: SHIPPED | OUTPATIENT
Start: 2024-06-18

## 2024-08-09 ENCOUNTER — TELEPHONE (OUTPATIENT)
Age: 89
End: 2024-08-09

## 2024-08-09 NOTE — TELEPHONE ENCOUNTER
Patient is requesting an RX for Flonase or Sinex. Her sinuses are bothering her. Please advise.      Any questions:  [facility]:  [phone]    Pharmacy:    [facility] - [place] - [place], [place] - [address] [phone]

## 2024-08-13 DIAGNOSIS — J30.1 SEASONAL ALLERGIC RHINITIS DUE TO POLLEN: Primary | ICD-10-CM

## 2024-08-13 RX ORDER — FLUTICASONE PROPIONATE 50 MCG
1 SPRAY, SUSPENSION (ML) NASAL DAILY
Qty: 15.8 G | Refills: 1 | Status: SHIPPED | OUTPATIENT
Start: 2024-08-13

## 2024-08-14 DIAGNOSIS — F41.1 GENERALIZED ANXIETY DISORDER: ICD-10-CM

## 2024-08-14 NOTE — TELEPHONE ENCOUNTER
Reason for call:   [x] Refill   [] Prior Auth  [] Other:     Office:   [x] PCP/Provider - Rayray Liu DO  [] Specialty/Provider -     Medication: ALPRAZolam (XANAX) 0.25 mg tablet     Dose/Frequency: 0.25 mg, Oral, 3 times daily PRN     Quantity: 90    [place] [facility] [place] [address]     Does the patient have enough for 3 days?   [] Yes   [x] No - Send as HP to POD

## 2024-08-15 RX ORDER — ALPRAZOLAM 0.25 MG
0.25 TABLET ORAL 2 TIMES DAILY PRN
Qty: 90 TABLET | Refills: 0 | Status: SHIPPED | OUTPATIENT
Start: 2024-08-15 | End: 2024-10-14

## 2024-09-12 NOTE — TELEPHONE ENCOUNTER
Phone# [phone]/ PT: Carolina Burnett/ : 1931/ PT'S daughter Tony Sender requesting a phone call from the on call provider to notify that PT has a lot of pain on her surgery area and is red   Thank You
yes

## 2024-09-25 DIAGNOSIS — F41.1 GENERALIZED ANXIETY DISORDER: ICD-10-CM

## 2024-09-25 RX ORDER — ALPRAZOLAM 0.25 MG
0.25 TABLET ORAL 2 TIMES DAILY PRN
Qty: 60 TABLET | Refills: 0 | Status: SHIPPED | OUTPATIENT
Start: 2024-09-25 | End: 2024-10-25

## 2024-09-25 NOTE — TELEPHONE ENCOUNTER
Karissa from [facility] called to request a refill on patients Xanax .25.       Please send new script order to   [facility] - [place] - [place], [place] - [address] [phone]

## 2024-10-03 ENCOUNTER — OFFICE VISIT (OUTPATIENT)
Dept: FAMILY MEDICINE CLINIC | Facility: CLINIC | Age: 89
End: 2024-10-03
Payer: COMMERCIAL

## 2024-10-03 VITALS
OXYGEN SATURATION: 94 % | BODY MASS INDEX: 34 KG/M2 | WEIGHT: 162 LBS | TEMPERATURE: 98.3 F | DIASTOLIC BLOOD PRESSURE: 74 MMHG | SYSTOLIC BLOOD PRESSURE: 128 MMHG | HEIGHT: 58 IN | HEART RATE: 83 BPM

## 2024-10-03 DIAGNOSIS — Z23 NEED FOR IMMUNIZATION AGAINST INFLUENZA: Primary | ICD-10-CM

## 2024-10-03 DIAGNOSIS — K21.9 GASTROESOPHAGEAL REFLUX DISEASE WITHOUT ESOPHAGITIS: Chronic | ICD-10-CM

## 2024-10-03 DIAGNOSIS — I10 BENIGN ESSENTIAL HYPERTENSION: ICD-10-CM

## 2024-10-03 DIAGNOSIS — J30.9 ALLERGIC RHINITIS, UNSPECIFIED SEASONALITY, UNSPECIFIED TRIGGER: ICD-10-CM

## 2024-10-03 PROCEDURE — G2211 COMPLEX E/M VISIT ADD ON: HCPCS | Performed by: FAMILY MEDICINE

## 2024-10-03 PROCEDURE — 99214 OFFICE O/P EST MOD 30 MIN: CPT | Performed by: FAMILY MEDICINE

## 2024-10-03 RX ORDER — PANTOPRAZOLE SODIUM 40 MG/1
40 TABLET, DELAYED RELEASE ORAL DAILY
Qty: 90 TABLET | Refills: 1 | Status: SHIPPED | OUTPATIENT
Start: 2024-10-03

## 2024-10-03 RX ORDER — LORATADINE 10 MG/1
10 TABLET ORAL DAILY
Qty: 30 TABLET | Refills: 1 | Status: CANCELLED | OUTPATIENT
Start: 2024-10-03

## 2024-10-03 RX ORDER — CETIRIZINE HYDROCHLORIDE 5 MG/1
5 TABLET ORAL DAILY
Qty: 30 TABLET | Refills: 1 | Status: SHIPPED | OUTPATIENT
Start: 2024-10-03

## 2024-10-03 RX ORDER — LOSARTAN POTASSIUM 50 MG/1
50 TABLET ORAL DAILY
Qty: 90 TABLET | Refills: 1 | Status: SHIPPED | OUTPATIENT
Start: 2024-10-03

## 2024-10-03 NOTE — PROGRESS NOTES
Ambulatory Visit  Name: Ama Burnett      : 1931      MRN: [id number]  Encounter Provider: Rayray Liu DO  Encounter Date: 10/3/2024   Encounter department: [facility]    Assessment & Plan  Need for immunization against influenza         Benign essential hypertension         Gastroesophageal reflux disease without esophagitis         Allergic rhinitis, unspecified seasonality, unspecified trigger            History of Present Illness     Mrs. Burnett is here she is returned home and is doing well does need a refill on her allergy medications her Protonix and her losartan these were completed she is not due yet for her alprazolam and I did explain to her that were cutting her down to 2 doses a day because of the possibility of causing confusion          Review of Systems   Constitutional:  Negative for activity change, appetite change, diaphoresis, fatigue and fever.   HENT:  Positive for sneezing.    Eyes: Negative.    Respiratory:  Negative for apnea, cough, chest tightness, shortness of breath and wheezing.    Cardiovascular:  Negative for chest pain, palpitations and leg swelling.   Gastrointestinal:  Negative for abdominal distention, abdominal pain, anal bleeding, constipation, diarrhea, nausea and vomiting.   Endocrine: Negative for cold intolerance, heat intolerance, polydipsia, polyphagia and polyuria.   Genitourinary:  Negative for difficulty urinating, dysuria, flank pain, hematuria and urgency.   Musculoskeletal:  Negative for arthralgias, back pain, gait problem, joint swelling and myalgias.   Skin:  Negative for color change, rash and wound.   Allergic/Immunologic: Negative for environmental allergies, food allergies and immunocompromised state.   Neurological:  Negative for dizziness, seizures, syncope, speech difficulty, numbness and headaches.   Hematological:  Negative for adenopathy. Does not bruise/bleed easily.   Psychiatric/Behavioral:  Negative for agitation, behavioral  "problems, hallucinations, sleep disturbance and suicidal ideas.            Objective     /74 (BP Location: Left arm, Patient Position: Sitting, Cuff Size: Large)   Pulse 83   Temp 98.3 °F (36.8 °C) (Temporal)   Ht 4' 10\" (1.473 m)   Wt 73.5 kg (162 lb)   SpO2 94%   BMI 33.86 kg/m²     Physical Exam  Constitutional:       Appearance: She is well-developed.   HENT:      Head: Normocephalic and atraumatic.      Right Ear: External ear normal.      Left Ear: External ear normal.      Nose: Nose normal.   Eyes:      Conjunctiva/sclera: Conjunctivae normal.      Pupils: Pupils are equal, round, and reactive to light.   Cardiovascular:      Rate and Rhythm: Normal rate and regular rhythm.      Heart sounds: Normal heart sounds. No murmur heard.     No friction rub.   Pulmonary:      Effort: Pulmonary effort is normal. No respiratory distress.      Breath sounds: Normal breath sounds. No wheezing or rales.   Chest:      Chest wall: No tenderness.   Abdominal:      General: Bowel sounds are normal.      Palpations: Abdomen is soft.   Musculoskeletal:         General: Normal range of motion.      Cervical back: Normal range of motion and neck supple.   Skin:     General: Skin is warm and dry.      Capillary Refill: Capillary refill takes 2 to 3 seconds.   Neurological:      Mental Status: She is alert and oriented to person, place, and time.   Psychiatric:         Behavior: Behavior normal.         Thought Content: Thought content normal.         Judgment: Judgment normal.         "

## 2024-10-25 DIAGNOSIS — F41.1 GENERALIZED ANXIETY DISORDER: ICD-10-CM

## 2024-10-25 RX ORDER — ALPRAZOLAM 0.25 MG/1
0.25 TABLET ORAL 2 TIMES DAILY PRN
Qty: 60 TABLET | Refills: 0 | Status: SHIPPED | OUTPATIENT
Start: 2024-10-25 | End: 2024-11-24

## 2024-10-25 NOTE — TELEPHONE ENCOUNTER
Reason for call:   [x] Refill   [] Prior Auth  [] Other:     Office:   [x] PCP/Provider - Rayray Liu DO   [] Specialty/Provider -     Medication: ALPRAZolam (XANAX) 0.25 mg tablet     Dose/Frequency: Take 1 tablet (0.25 mg total) by mouth 2 (two) times a day as needed for anxiety,     Quantity: 60    Pharmacy: [id number] - [place] [facility] [address] [phone]    Does the patient have enough for 3 days?   [] Yes   [x] No - Send as HP to POD

## 2024-11-07 ENCOUNTER — OFFICE VISIT (OUTPATIENT)
Dept: URGENT CARE | Facility: MEDICAL CENTER | Age: 89
End: 2024-11-07
Payer: COMMERCIAL

## 2024-11-07 VITALS
WEIGHT: 159 LBS | TEMPERATURE: 97.2 F | SYSTOLIC BLOOD PRESSURE: 130 MMHG | RESPIRATION RATE: 20 BRPM | DIASTOLIC BLOOD PRESSURE: 62 MMHG | OXYGEN SATURATION: 97 % | HEIGHT: 58 IN | HEART RATE: 92 BPM | BODY MASS INDEX: 33.37 KG/M2

## 2024-11-07 DIAGNOSIS — S61.412A SKIN TEAR OF LEFT HAND WITHOUT COMPLICATION, INITIAL ENCOUNTER: Primary | ICD-10-CM

## 2024-11-07 DIAGNOSIS — Z23 ENCOUNTER FOR IMMUNIZATION: ICD-10-CM

## 2024-11-07 PROCEDURE — 90471 IMMUNIZATION ADMIN: CPT

## 2024-11-07 PROCEDURE — 90715 TDAP VACCINE 7 YRS/> IM: CPT

## 2024-11-07 PROCEDURE — 99213 OFFICE O/P EST LOW 20 MIN: CPT

## 2024-11-07 NOTE — PATIENT INSTRUCTIONS
Updated tetanus vaccination administered today. Apply bacitracin ointment over area of skin tear up to 3 times daily. May take tylenol for pain management. May apply ice to area for comfort.     Follow up with PCP in 3-5 days.  Proceed to  ER if symptoms worsen.    If tests have been performed at [facility], our office will contact you with results if changes need to be made to the care plan discussed with you at the visit.  You can review your full results on [facility] MyChart.

## 2024-11-07 NOTE — PROGRESS NOTES
[facility]        NAME: Ama Burnett is a 89 y.o. female  : 1931    MRN: [id number]  DATE: 2024  TIME: 4:43 PM    Assessment and Plan   Skin tear of left hand without complication, initial encounter [S61.412A]  1. Skin tear of left hand without complication, initial encounter  Tdap Vaccine greater than or equal to 6yo      2. Encounter for immunization          Updated tetanus vaccination administered today by clinic RN. Patient instructed to keep wound clean, dry, and apply bacitracin to the area. Signs of infection discussed with both patient and family who verbalized understanding. ED precautions discussed.     Patient Instructions     Updated tetanus vaccination administered today. Apply bacitracin ointment over area of skin tear up to 3 times daily. May take tylenol for pain management. May apply ice to area for comfort.     Follow up with PCP in 3-5 days.  Proceed to  ER if symptoms worsen.    If tests have been performed at [facility], our office will contact you with results if changes need to be made to the care plan discussed with you at the visit.  [facility]    Chief Complaint     Chief Complaint   Patient presents with    skin tear     5 days: pt cut hand on a nail, L hand skin tear, cleaned with antiseptic and antibiotic, hand is discolored/bruised, cut is crosses some veins, 3 first fingers are numb          History of Present Illness       Patient is a 89 year old female with PMH including GERD, IBD, and osteoporosis. PSH includes carpal tunnel release, cholecystectomy, hemorrhoidectomy, hysterectomy, pelvic reconstructive surgery, and multiple skin surgeries. She is accompanied today by her granddaughter for evaluation of a wound. She states that 5 days ago when attempting to the turn her clocks back she caught her LEFT hand on the head of the nail that was sticking out of the wall. She states that at the time of injury she  "washed the wound with an \"antiseptic cloth\" and applied antibiotic cream. She states that she has been taking tylenol and using ice for pain control. She reports that her pain is increased only with palpation and movement. Denies any fever. States that the 1st three fingers on her LEFT hand feel numb, however she states that this is her baseline.           Wound Check  She was originally treated 3 to 5 days ago. Previous treatment included wound cleansing or irrigation. Her temperature was unmeasured prior to arrival. There has been no drainage from the wound. There is no redness present. There is no swelling present. The pain has not changed. She has no difficulty moving the affected extremity or digit.       Review of Systems   Review of Systems   Constitutional:  Negative for activity change, appetite change, chills, diaphoresis, fatigue and fever.   HENT:  Negative for congestion, ear pain, rhinorrhea and sore throat.    Eyes:  Negative for pain and redness.   Respiratory:  Negative for cough, chest tightness and shortness of breath.    Cardiovascular:  Negative for chest pain and palpitations.   Gastrointestinal:  Negative for abdominal pain, constipation, diarrhea, nausea and vomiting.   Musculoskeletal:  Positive for arthralgias. Negative for gait problem, joint swelling and myalgias.   Skin:  Positive for wound. Negative for color change, pallor and rash.   Neurological:  Negative for dizziness, weakness, light-headedness, numbness and headaches.   All other systems reviewed and are negative.        Current Medications       Current Outpatient Medications:     acetaminophen (TYLENOL) 500 mg tablet, Take 500 mg by mouth 3 (three) times a day as needed, Disp: , Rfl:     ALPRAZolam (XANAX) 0.25 mg tablet, Take 1 tablet (0.25 mg total) by mouth 2 (two) times a day as needed for anxiety, Disp: 60 tablet, Rfl: 0    aluminum-magnesium hydroxide-simethicone (Mirta-Lanta) [id number] mg/30 mL suspension, Take 30 mL " by mouth every 4 (four) hours as needed for indigestion or heartburn, Disp: , Rfl:     Calcium Citrate (CITRACAL PO), Take 1 tablet by mouth daily., Disp: , Rfl:     cetirizine (ZyrTEC) 5 MG tablet, Take 1 tablet (5 mg total) by mouth daily, Disp: 30 tablet, Rfl: 1    Cholecalciferol (VITAMIN D PO), Take 1,000 Units by mouth 2 (two) times a day., Disp: , Rfl:     losartan (COZAAR) 50 mg tablet, Take 1 tablet (50 mg total) by mouth daily, Disp: 90 tablet, Rfl: 1    Multiple Vitamin (MULTIVITAMIN) capsule, Take 1 capsule by mouth daily., Disp: , Rfl:     pantoprazole (PROTONIX) 40 mg tablet, Take 1 tablet (40 mg total) by mouth daily, Disp: 90 tablet, Rfl: 1    sodium chloride (OCEAN) 0.65 % nasal spray, 1 spray into each nostril as needed for congestion, Disp: , Rfl:     vitamin E, tocopherol, 400 units capsule, Take 400 Units by mouth daily, Disp: , Rfl:     fluticasone (FLONASE) 50 mcg/act nasal spray, 1 spray into each nostril daily (Patient not taking: Reported on 10/3/2024), Disp: 15.8 g, Rfl: 1    Current Allergies     Allergies as of 11/07/2024 - Reviewed 11/07/2024   Allergen Reaction Noted    Escitalopram Hives     Mepenzolate  03/07/2012    Other  03/11/2016    Prednisone Other (See Comments) 07/08/2020    Reglan [metoclopramide] Other (See Comments) 03/11/2016            The following portions of the patient's history were reviewed and updated as appropriate: allergies, current medications, past family history, past medical history, past social history, past surgical history and problem list.     Past Medical History:   Diagnosis Date    Diverticulosis     GERD (gastroesophageal reflux disease)     Hiatal hernia     Irritable bowel disease     Lactose intolerance     Osteoporosis        Past Surgical History:   Procedure Laterality Date    CARPAL TUNNEL RELEASE Right     CAST APPLICATION Left 2/1/2020    Procedure: APPLICATION CAST, LEFT WRIST;  Surgeon: Julio Thomason MD;  Location: [facility];   "Service: Orthopedics    CHOLECYSTECTOMY      EAR TUBE REMOVAL      HEMORROIDECTOMY      HYSTERECTOMY      PERINEAL  PELVIC RECONSTRUCTION SURGERY  09/13/2019    Dr Hightower - [facility]    WY EXC TUMOR SOFT TISSUE LEG/ANKLE SUBFASCIAL <5CM Left 9/8/2020    Procedure: EXCISION BIOPSY TISSUE LESION/MASS LOWER EXTREMITY;  Surgeon: Al Roberson DO;  Location: [facility];  Service: General    WY OPTX FEM SHFT FX W/INSJ IMED IMPLT W/WO SCREW Left 2/1/2020    Procedure: INSERTION NAIL IM FEMUR ANTEGRADE (TROCHANTERIC);  Surgeon: Julio Thomason MD;  Location:  MAIN OR;  Service: Orthopedics    SKIN CANCER EXCISION      SQUAMOUS CELL CARCINOMA EXCISION Left 09/08/2020    lower part of leg    TONSILLECTOMY         Family History   Problem Relation Age of Onset    Colon cancer Mother     Stomach cancer Father          Medications have been verified.        Objective   /62   Pulse 92   Temp (!) 97.2 °F (36.2 °C)   Resp 20   Ht 4' 10\" (1.473 m)   Wt 72.1 kg (159 lb)   SpO2 97%   BMI 33.23 kg/m²   No LMP recorded. Patient has had a hysterectomy.       Physical Exam     Physical Exam  Vitals and nursing note reviewed.   Constitutional:       Appearance: Normal appearance. She is not ill-appearing.   HENT:      Head: Normocephalic and atraumatic.      Right Ear: External ear normal.      Left Ear: External ear normal.      Nose: Nose normal.      Mouth/Throat:      Mouth: Mucous membranes are moist.      Pharynx: Oropharynx is clear.   Eyes:      Extraocular Movements: Extraocular movements intact.      Conjunctiva/sclera: Conjunctivae normal.      Pupils: Pupils are equal, round, and reactive to light.   Cardiovascular:      Rate and Rhythm: Normal rate.      Pulses: Normal pulses.   Pulmonary:      Effort: Pulmonary effort is normal. No respiratory distress.   Abdominal:      General: Abdomen is flat.      Palpations: Abdomen is soft.   Musculoskeletal:         General: Tenderness and signs of injury present. No " swelling or deformity. Normal range of motion.      Right wrist: Normal.      Left wrist: Normal.      Right hand: Normal.      Left hand: Laceration and tenderness present. No swelling, deformity or bony tenderness. Normal range of motion. Normal strength. Normal sensation. Normal capillary refill. Normal pulse.        Arms:       Cervical back: Normal range of motion and neck supple.      Comments: [place] shaped skin tear present to dorsum of LEFT hand. No active bleeding. Mild erythema surrounding wound, consistent with routine healing. No drainage or signs of infection. Tenderness surrounding wound. Full ROM of wrist and fingers.   Skin:     General: Skin is warm and dry.      Capillary Refill: Capillary refill takes less than 2 seconds.      Coloration: Skin is not pale.      Findings: No erythema or rash.   Neurological:      General: No focal deficit present.      Mental Status: She is alert. Mental status is at baseline.      Sensory: No sensory deficit.      Motor: No weakness.   Psychiatric:         Mood and Affect: Mood normal.         Behavior: Behavior normal.         Thought Content: Thought content normal.         Judgment: Judgment normal.

## 2024-11-21 ENCOUNTER — TELEPHONE (OUTPATIENT)
Dept: FAMILY MEDICINE CLINIC | Facility: CLINIC | Age: 89
End: 2024-11-21

## 2024-11-21 DIAGNOSIS — F41.1 GENERALIZED ANXIETY DISORDER: ICD-10-CM

## 2024-11-21 RX ORDER — ALPRAZOLAM 0.25 MG/1
0.25 TABLET ORAL 2 TIMES DAILY PRN
Qty: 60 TABLET | Refills: 0 | Status: CANCELLED | OUTPATIENT
Start: 2024-11-21 | End: 2024-12-21

## 2024-11-21 NOTE — TELEPHONE ENCOUNTER
Patient called for a refill of her Alprazolam she would like the amount increased to 3 a day due to the increased anxiety she is experiencing.    She is aware of an upcoming appt 12/9 if increase needs to be reviewed at appt.    Please review and send [facility] to [facility]

## 2024-11-25 NOTE — TELEPHONE ENCOUNTER
Pt called just checking on status of this. She said if pcp does not want to change to 3 times a day she is fine with that and can discuss at her upcoming appt. Please advise.

## 2024-11-25 NOTE — TELEPHONE ENCOUNTER
Patient called to follow up on her script refill, she stated he has been out of her medication.     Patient would like a call back, please advise.

## 2024-11-25 NOTE — TELEPHONE ENCOUNTER
Patient called in to check on her medication increase request. Patient says she is now down to her last pill so would like a call today with an update. Please advise.

## 2024-11-26 DIAGNOSIS — F41.1 GENERALIZED ANXIETY DISORDER: ICD-10-CM

## 2024-11-26 RX ORDER — ALPRAZOLAM 0.25 MG/1
0.25 TABLET ORAL 2 TIMES DAILY PRN
Qty: 60 TABLET | Refills: 0 | Status: SHIPPED | OUTPATIENT
Start: 2024-11-26 | End: 2024-12-26

## 2024-12-02 DIAGNOSIS — J30.9 ALLERGIC RHINITIS, UNSPECIFIED SEASONALITY, UNSPECIFIED TRIGGER: ICD-10-CM

## 2024-12-02 NOTE — TELEPHONE ENCOUNTER
Reason for call:   [x] Refill   [] Prior Auth  [] Other:     Office:   [] PCP/Provider -   [] Specialty/Provider -     Medication: cetirizine (ZyrTEC) 5 MG tablet Take 1 tablet (5 mg total) by mouth daily       Pharmacy: [id number] - [address] [place] [address]      Does the patient have enough for 3 days?   [x] Yes   [] No - Send as HP to POD

## 2024-12-05 RX ORDER — CETIRIZINE HYDROCHLORIDE 5 MG/1
5 TABLET ORAL DAILY
Qty: 30 TABLET | Refills: 0 | Status: SHIPPED | OUTPATIENT
Start: 2024-12-05

## 2024-12-23 ENCOUNTER — OFFICE VISIT (OUTPATIENT)
Dept: FAMILY MEDICINE CLINIC | Facility: CLINIC | Age: 89
End: 2024-12-23
Payer: COMMERCIAL

## 2024-12-23 VITALS
HEART RATE: 91 BPM | DIASTOLIC BLOOD PRESSURE: 68 MMHG | TEMPERATURE: 97.5 F | OXYGEN SATURATION: 94 % | WEIGHT: 159 LBS | BODY MASS INDEX: 33.37 KG/M2 | SYSTOLIC BLOOD PRESSURE: 128 MMHG | HEIGHT: 58 IN

## 2024-12-23 DIAGNOSIS — N18.31 STAGE 3A CHRONIC KIDNEY DISEASE (HCC): ICD-10-CM

## 2024-12-23 DIAGNOSIS — K21.9 GASTROESOPHAGEAL REFLUX DISEASE WITHOUT ESOPHAGITIS: Primary | Chronic | ICD-10-CM

## 2024-12-23 DIAGNOSIS — F41.9 ANXIETY: Chronic | ICD-10-CM

## 2024-12-23 DIAGNOSIS — F41.1 GENERALIZED ANXIETY DISORDER: ICD-10-CM

## 2024-12-23 PROCEDURE — G0439 PPPS, SUBSEQ VISIT: HCPCS | Performed by: FAMILY MEDICINE

## 2024-12-23 RX ORDER — ALPRAZOLAM 0.25 MG/1
0.25 TABLET ORAL 3 TIMES DAILY
Qty: 90 TABLET | Refills: 0 | Status: SHIPPED | OUTPATIENT
Start: 2024-12-23 | End: 2025-01-22

## 2024-12-23 NOTE — PATIENT INSTRUCTIONS
Medicare Preventive Visit Patient Instructions  Thank you for completing your Welcome to Medicare Visit or Medicare Annual Wellness Visit today. Your next wellness visit will be due in one year (12/24/2025).  The screening/preventive services that you may require over the next 5-10 years are detailed below. Some tests may not apply to you based off risk factors and/or age. Screening tests ordered at today's visit but not completed yet may show as past due. Also, please note that scanned in results may not display below.  Preventive Screenings:  Service Recommendations Previous Testing/Comments   Colorectal Cancer Screening  * Colonoscopy    * Fecal Occult Blood Test (FOBT)/Fecal Immunochemical Test (FIT)  * Fecal DNA/Cologuard Test  * Flexible Sigmoidoscopy Age: 45-75 years old   Colonoscopy: every 10 years (may be performed more frequently if at higher risk)  OR  FOBT/FIT: every 1 year  OR  Cologuard: every 3 years  OR  Sigmoidoscopy: every 5 years  Screening may be recommended earlier than age 45 if at higher risk for colorectal cancer. Also, an individualized decision between you and your healthcare provider will decide whether screening between the ages of 76-85 would be appropriate. Colonoscopy: 04/25/2013  FOBT/FIT: Not on file  Cologuard: Not on file  Sigmoidoscopy: Not on file    Screening Not Indicated     Breast Cancer Screening Age: 40+ years old  Frequency: every 1-2 years  Not required if history of left and right mastectomy Mammogram: 12/29/2015        Cervical Cancer Screening Between the ages of 21-29, pap smear recommended once every 3 years.   Between the ages of 30-65, can perform pap smear with HPV co-testing every 5 years.   Recommendations may differ for women with a history of total hysterectomy, cervical cancer, or abnormal pap smears in past. Pap Smear: Not on file    Screening Not Indicated   Hepatitis C Screening Once for adults born between 1945 and 1965  More frequently in patients at  high risk for Hepatitis C Hep C Antibody: Not on file        Diabetes Screening 1-2 times per year if you're at risk for diabetes or have pre-diabetes Fasting glucose: 109 mg/dL (12/13/2023)  A1C: No results in last 5 years (No results in last 5 years)  Screening Current   Cholesterol Screening Once every 5 years if you don't have a lipid disorder. May order more often based on risk factors. Lipid panel: Not on file          Other Preventive Screenings Covered by Medicare:  Abdominal Aortic Aneurysm (AAA) Screening: covered once if your at risk. You're considered to be at risk if you have a family history of AAA.  Lung Cancer Screening: covers low dose CT scan once per year if you meet all of the following conditions: (1) Age 55-77; (2) No signs or symptoms of lung cancer; (3) Current smoker or have quit smoking within the last 15 years; (4) You have a tobacco smoking history of at least 20 pack years (packs per day multiplied by number of years you smoked); (5) You get a written order from a healthcare provider.  Glaucoma Screening: covered annually if you're considered high risk: (1) You have diabetes OR (2) Family history of glaucoma OR (3)  aged 50 and older OR (4)  American aged 65 and older  Osteoporosis Screening: covered every 2 years if you meet one of the following conditions: (1) You're estrogen deficient and at risk for osteoporosis based off medical history and other findings; (2) Have a vertebral abnormality; (3) On glucocorticoid therapy for more than 3 months; (4) Have primary hyperparathyroidism; (5) On osteoporosis medications and need to assess response to drug therapy.   Last bone density test (DXA Scan): 10/13/2021.  HIV Screening: covered annually if you're between the age of 15-65. Also covered annually if you are younger than 15 and older than 65 with risk factors for HIV infection. For pregnant patients, it is covered up to 3 times per  pregnancy.    Immunizations:  Immunization Recommendations   Influenza Vaccine Annual influenza vaccination during flu season is recommended for all persons aged >= 6 months who do not have contraindications   Pneumococcal Vaccine   * Pneumococcal conjugate vaccine = PCV13 (Prevnar 13), PCV15 (Vaxneuvance), PCV20 (Prevnar 20)  * Pneumococcal polysaccharide vaccine = PPSV23 (Pneumovax) Adults 19-63 yo with certain risk factors or if 65+ yo  If never received any pneumonia vaccine: recommend Prevnar 20 (PCV20)  Give PCV20 if previously received 1 dose of PCV13 or PPSV23   Hepatitis B Vaccine 3 dose series if at intermediate or high risk (ex: diabetes, end stage renal disease, liver disease)   Respiratory syncytial virus (RSV) Vaccine - COVERED BY MEDICARE PART D  * RSVPreF3 (Arexvy) CDC recommends that adults 60 years of age and older may receive a single dose of RSV vaccine using shared clinical decision-making (SCDM)   Tetanus (Td) Vaccine - COST NOT COVERED BY MEDICARE PART B Following completion of primary series, a booster dose should be given every 10 years to maintain immunity against tetanus. Td may also be given as tetanus wound prophylaxis.   Tdap Vaccine - COST NOT COVERED BY MEDICARE PART B Recommended at least once for all adults. For pregnant patients, recommended with each pregnancy.   Shingles Vaccine (Shingrix) - COST NOT COVERED BY MEDICARE PART B  2 shot series recommended in those 19 years and older who have or will have weakened immune systems or those 50 years and older     Health Maintenance Due:  There are no preventive care reminders to display for this patient.  Immunizations Due:      Topic Date Due   • Pneumococcal Vaccine: 65+ Years (2 of 2 - PCV) 10/22/2008   • Influenza Vaccine (1) Never done   • COVID-19 Vaccine (1 - 2024-25 season) Never done     Advance Directives   What are advance directives?  Advance directives are legal documents that state your wishes and plans for medical care.  These plans are made ahead of time in case you lose your ability to make decisions for yourself. Advance directives can apply to any medical decision, such as the treatments you want, and if you want to donate organs.   What are the types of advance directives?  There are many types of advance directives, and each state has rules about how to use them. You may choose a combination of any of the following:  Living will:  This is a written record of the treatment you want. You can also choose which treatments you do not want, which to limit, and which to stop at a certain time. This includes surgery, medicine, IV fluid, and tube feedings.   Durable power of  for healthcare (DPAHC):  This is a written record that states who you want to make healthcare choices for you when you are unable to make them for yourself. This person, called a proxy, is usually a family member or a friend. You may choose more than 1 proxy.  Do not resuscitate (DNR) order:  A DNR order is used in case your heart stops beating or you stop breathing. It is a request not to have certain forms of treatment, such as CPR. A DNR order may be included in other types of advance directives.  Medical directive:  This covers the care that you want if you are in a coma, near death, or unable to make decisions for yourself. You can list the treatments you want for each condition. Treatment may include pain medicine, surgery, blood transfusions, dialysis, IV or tube feedings, and a ventilator (breathing machine).  Values history:  This document has questions about your views, beliefs, and how you feel and think about life. This information can help others choose the care that you would choose.  Why are advance directives important?  An advance directive helps you control your care. Although spoken wishes may be used, it is better to have your wishes written down. Spoken wishes can be misunderstood, or not followed. Treatments may be given even if you  do not want them. An advance directive may make it easier for your family to make difficult choices about your care.   Weight Management   Why it is important to manage your weight:  Being overweight increases your risk of health conditions such as heart disease, high blood pressure, type 2 diabetes, and certain types of cancer. It can also increase your risk for osteoarthritis, sleep apnea, and other respiratory problems. Aim for a slow, steady weight loss. Even a small amount of weight loss can lower your risk of health problems.  How to lose weight safely:  A safe and healthy way to lose weight is to eat fewer calories and get regular exercise. You can lose up about 1 pound a week by decreasing the number of calories you eat by 500 calories each day.   Healthy meal plan for weight management:  A healthy meal plan includes a variety of foods, contains fewer calories, and helps you stay healthy. A healthy meal plan includes the following:  Eat whole-grain foods more often.  A healthy meal plan should contain fiber. Fiber is the part of grains, fruits, and vegetables that is not broken down by your body. Whole-grain foods are healthy and provide extra fiber in your diet. Some examples of whole-grain foods are whole-wheat breads and pastas, oatmeal, brown rice, and bulgur.  Eat a variety of vegetables every day.  Include dark, leafy greens such as spinach, kale, yoan greens, and mustard greens. Eat yellow and orange vegetables such as carrots, sweet potatoes, and winter squash.   Eat a variety of fruits every day.  Choose fresh or canned fruit (canned in its own juice or light syrup) instead of juice. Fruit juice has very little or no fiber.  Eat low-fat dairy foods.  Drink fat-free (skim) milk or 1% milk. Eat fat-free yogurt and low-fat cottage cheese. Try low-fat cheeses such as mozzarella and other reduced-fat cheeses.  Choose meat and other protein foods that are low in fat.  Choose beans or other legumes such  as split peas or lentils. Choose fish, skinless poultry (chicken or turkey), or lean cuts of red meat (beef or pork). Before you cook meat or poultry, cut off any visible fat.   Use less fat and oil.  Try baking foods instead of frying them. Add less fat, such as margarine, sour cream, regular salad dressing and mayonnaise to foods. Eat fewer high-fat foods. Some examples of high-fat foods include french fries, doughnuts, ice cream, and cakes.  Eat fewer sweets.  Limit foods and drinks that are high in sugar. This includes candy, cookies, regular soda, and sweetened drinks.  Exercise:  Exercise at least 30 minutes per day on most days of the week. Some examples of exercise include walking, biking, dancing, and swimming. You can also fit in more physical activity by taking the stairs instead of the elevator or parking farther away from stores. Ask your healthcare provider about the best exercise plan for you.      © Copyright [employer] 2018 Information is for End User's use only and may not be sold, redistributed or otherwise used for commercial purposes. All illustrations and images included in CareNotes® are the copyrighted property of [employer] or [email or web address]

## 2024-12-23 NOTE — PROGRESS NOTES
Name: Ama Burnett      : 1931      MRN: [id number]  Encounter Provider: Rayray Liu DO  Encounter Date: 2024   Encounter department: [facility]    Assessment & Plan       Preventive health issues were discussed with patient, and age appropriate screening tests were ordered as noted in patient's After Visit Summary. Personalized health advice and appropriate referrals for health education or preventive services given if needed, as noted in patient's After Visit Summary.    History of Present Illness     Medicare subsequent well visit       Patient Care Team:  Rayray Liu DO as PCP - General (Family Medicine)  Rayray Liu DO as PCP - PCP-[facility] ([facility])  Orlando Hudson MD  [facility]    Review of Systems   Constitutional:  Positive for activity change.   HENT:  Positive for dental problem.    Eyes:  Positive for visual disturbance.   Cardiovascular:  Negative for chest pain and palpitations.   Gastrointestinal:  Negative for constipation and diarrhea.   Genitourinary:  Negative for frequency.   Musculoskeletal:  Positive for arthralgias.     Medical History Reviewed by provider this encounter:       Annual Wellness Visit Questionnaire   Ama is here for her Subsequent Wellness visit.     Health Risk Assessment:   Patient rates overall health as fair. Patient feels that their physical health rating is slightly worse. Patient is dissatisfied with their life. Eyesight was rated as same. Hearing was rated as slightly worse. Patient feels that their emotional and mental health rating is same. Patients states they are often angry. Patient states they are sometimes unusually tired/fatigued. Pain experienced in the last 7 days has been some. Patient's pain rating has been 5/10. Patient states that she has experienced no weight loss or gain in last 6 months.     Depression Screening:   PHQ-2 Score: 0      Fall Risk Screening:   In the past year, patient has  experienced: no history of falling in past year      Urinary Incontinence Screening:   Patient has not leaked urine accidently in the last six months.     Home Safety:  Patient has trouble with stairs inside or outside of their home. Patient has working smoke alarms and has working carbon monoxide detector. Home safety hazards include: none.     Nutrition:   Current diet is Regular.     Medications:   Patient is currently taking over-the-counter supplements. OTC medications include: see medication list. Patient is able to manage medications.     Activities of Daily Living (ADLs)/Instrumental Activities of Daily Living (IADLs):   Walk and transfer into and out of bed and chair?: Yes  Dress and groom yourself?: Yes    Bathe or shower yourself?: Yes    Feed yourself? Yes  Do your laundry/housekeeping?: No  Manage your money, pay your bills and track your expenses?: Yes  Do your own shopping?: Yes    Previous Hospitalizations:   Any hospitalizations or ED visits within the last 12 months?: No      Advance Care Planning:   Living will: No    Durable POA for healthcare: Yes    Advanced directive: No    Advanced directive counseling given: No    Five wishes given: No    Patient declined ACP directive: No    End of Life Decisions reviewed with patient: No    Provider agrees with end of life decisions: No      Cognitive Screening:   Provider or family/friend/caregiver concerned regarding cognition?: No    PREVENTIVE SCREENINGS        Diabetes Screening:     General: Screening Current      Colorectal Cancer Screening:     General: Screening Not Indicated      Cervical Cancer Screening:    General: Screening Not Indicated      Lung Cancer Screening:     General: Screening Not Indicated    Screening, Brief Intervention, and Referral to Treatment (SBIRT)    Screening  Typical number of drinks in a day: 0  Typical number of drinks in a week: 0  Interpretation: Low risk drinking behavior.    Single Item Drug Screening:  How often  "have you used an illegal drug (including marijuana) or a prescription medication for non-medical reasons in the past year? never    Single Item Drug Screen Score: 0  Interpretation: Negative screen for possible drug use disorder    SDOH Risk Assessment  Social determinants of health (SDOH) risk assesment tool was completed. The tool at a minimum covered housing stability, food insecurity, transportation needs, and utility difficulty. Patient had at risk responses for the following SDOH domains: transportation needs.     Social Drivers of Health     Financial Resource Strain: Low Risk  (12/8/2023)    Overall Financial Resource Strain (CARDIA)     Difficulty of Paying Living Expenses: Not very hard   Transportation Needs: Unmet Transportation Needs (12/8/2023)    PRAPARE - Transportation     Lack of Transportation (Medical): Yes     Lack of Transportation (Non-Medical): Yes     No results found.    Objective   /68 (BP Location: Left arm, Patient Position: Sitting, Cuff Size: Large)   Pulse 91   Temp 97.5 °F (36.4 °C) (Temporal)   Ht 4' 10\" (1.473 m)   Wt 72.1 kg (159 lb)   SpO2 94%   BMI 33.23 kg/m²     Physical Exam  Constitutional:       Appearance: She is well-developed.   HENT:      Head: Normocephalic and atraumatic.      Right Ear: External ear normal.      Left Ear: External ear normal.      Nose: Nose normal.   Eyes:      Conjunctiva/sclera: Conjunctivae normal.      Pupils: Pupils are equal, round, and reactive to light.   Cardiovascular:      Rate and Rhythm: Normal rate and regular rhythm.      Heart sounds: Normal heart sounds. No murmur heard.     No friction rub.   Pulmonary:      Effort: Pulmonary effort is normal. No respiratory distress.      Breath sounds: Normal breath sounds. No wheezing or rales.   Chest:      Chest wall: No tenderness.   Abdominal:      General: Bowel sounds are normal.      Palpations: Abdomen is soft.   Musculoskeletal:         General: Normal range of motion.      " Cervical back: Normal range of motion and neck supple.   Skin:     General: Skin is warm and dry.      Capillary Refill: Capillary refill takes 2 to 3 seconds.   Neurological:      Mental Status: She is alert and oriented to person, place, and time.   Psychiatric:         Behavior: Behavior normal.         Thought Content: Thought content normal.         Judgment: Judgment normal.

## 2025-01-02 DIAGNOSIS — J30.9 ALLERGIC RHINITIS, UNSPECIFIED SEASONALITY, UNSPECIFIED TRIGGER: ICD-10-CM

## 2025-01-02 RX ORDER — CETIRIZINE HYDROCHLORIDE 5 MG/1
5 TABLET ORAL DAILY
Qty: 30 TABLET | Refills: 2 | Status: SHIPPED | OUTPATIENT
Start: 2025-01-02

## 2025-01-02 NOTE — TELEPHONE ENCOUNTER
Reason for call:   [x] Refill   [] Prior Auth  [] Other:     Office:   [x] PCP/Provider -   [] Specialty/Provider -     cetirizine (ZyrTEC) 5 MG tablet 5 mg, Oral, Daily       Quantity: 90    [facility]    Does the patient have enough for 3 days?   [] Yes   [x] No - Send as HP to POD

## 2025-01-22 DIAGNOSIS — K21.9 GASTROESOPHAGEAL REFLUX DISEASE WITHOUT ESOPHAGITIS: Chronic | ICD-10-CM

## 2025-01-22 DIAGNOSIS — I10 BENIGN ESSENTIAL HYPERTENSION: ICD-10-CM

## 2025-01-22 DIAGNOSIS — F41.1 GENERALIZED ANXIETY DISORDER: ICD-10-CM

## 2025-01-22 RX ORDER — LOSARTAN POTASSIUM 50 MG/1
50 TABLET ORAL DAILY
Qty: 30 TABLET | Refills: 0 | Status: SHIPPED | OUTPATIENT
Start: 2025-01-22 | End: 2025-01-23 | Stop reason: SDUPTHER

## 2025-01-22 RX ORDER — ALPRAZOLAM 0.25 MG/1
0.25 TABLET ORAL 3 TIMES DAILY
Qty: 90 TABLET | Refills: 0 | Status: SHIPPED | OUTPATIENT
Start: 2025-01-22 | End: 2025-02-21

## 2025-01-22 RX ORDER — PANTOPRAZOLE SODIUM 40 MG/1
40 TABLET, DELAYED RELEASE ORAL DAILY
Qty: 90 TABLET | Refills: 1 | Status: SHIPPED | OUTPATIENT
Start: 2025-01-22

## 2025-01-22 NOTE — TELEPHONE ENCOUNTER
Reason for call:   [x] Refill   [] Prior Auth  [] Other:     Office:   [x] PCP/Provider - Rayray Liu   [] Specialty/Provider -     Medication: ALPRAZolam (XANAX) 0.25 mg tablet     Dose/Frequency: Take 1 tablet (0.25 mg total) by mouth 3 (three) times a day     Quantity: 90    Medication: losartan (COZAAR) 50 mg tablet     Dose/Frequency: Take 1 tablet (50 mg total) by mouth daily     Quantity: 90    Medication: pantoprazole (PROTONIX) 40 mg tablet     Dose/Frequency: Take 1 tablet (40 mg total) by mouth daily     Quantity: 90    Pharmacy: [facility] - [place], [place] [address]    Does the patient have enough for 3 days?   [] Yes   [x] No - Send as HP to POD

## 2025-01-23 DIAGNOSIS — I10 BENIGN ESSENTIAL HYPERTENSION: ICD-10-CM

## 2025-01-23 RX ORDER — LOSARTAN POTASSIUM 50 MG/1
50 TABLET ORAL DAILY
Qty: 90 TABLET | Refills: 1 | Status: SHIPPED | OUTPATIENT
Start: 2025-01-23

## 2025-02-07 NOTE — TELEPHONE ENCOUNTER
Staff with [facility] transferred from pod, they will be faxing over paperwork for pt to move in on Monday, 5/6. However, staff cannot fax until tomorrow so we will not get paperwork until then. Asking if you could fill this out tomorrow so they can proceed with the move in date on Monday?   done

## 2025-02-20 DIAGNOSIS — F41.1 GENERALIZED ANXIETY DISORDER: ICD-10-CM

## 2025-02-20 DIAGNOSIS — I10 BENIGN ESSENTIAL HYPERTENSION: ICD-10-CM

## 2025-02-20 RX ORDER — LOSARTAN POTASSIUM 50 MG/1
50 TABLET ORAL DAILY
Qty: 30 TABLET | Refills: 0 | Status: SHIPPED | OUTPATIENT
Start: 2025-02-20 | End: 2025-02-21 | Stop reason: SDUPTHER

## 2025-02-20 RX ORDER — ALPRAZOLAM 0.25 MG/1
0.25 TABLET ORAL 3 TIMES DAILY
Qty: 90 TABLET | Refills: 0 | Status: SHIPPED | OUTPATIENT
Start: 2025-02-20 | End: 2025-03-22

## 2025-02-20 NOTE — TELEPHONE ENCOUNTER
Reason for call:   [x] Refill   [] Prior Auth  [] Other:     Office:   [x] PCP/Provider -  Rayray Liu,    [] Specialty/Provider -     Medication:  ALPRAZolam (XANAX) 0.25 mg tablet    Dose/Frequency: Take 1 tablet (0.25 mg total) by mouth 3 (three) times a day,     Quantity: 90    Pharmacy: [facility] [address]      Does the patient have enough for 3 days?   [] Yes   [x] No - Send as HP to POD    Reason for call:   [x] Refill   [] Prior Auth  [] Other:     Office:   [x] PCP/Provider -  Rayray Liu,    [] Specialty/Provider -     Medication:  losartan (COZAAR) 50 mg tablet    Dose/Frequency: Take 1 tablet (50 mg total) by mouth daily,     Quantity: 90    Pharmacy: [id number] [address]      Does the patient have enough for 3 days?   [] Yes   [] No - Send as HP to POD

## 2025-02-21 DIAGNOSIS — I10 BENIGN ESSENTIAL HYPERTENSION: ICD-10-CM

## 2025-02-21 RX ORDER — LOSARTAN POTASSIUM 50 MG/1
50 TABLET ORAL DAILY
Qty: 90 TABLET | Refills: 1 | Status: SHIPPED | OUTPATIENT
Start: 2025-02-21

## 2025-03-08 ENCOUNTER — APPOINTMENT (EMERGENCY)
Dept: RADIOLOGY | Facility: HOSPITAL | Age: OVER 89
End: 2025-03-08
Payer: COMMERCIAL

## 2025-03-08 ENCOUNTER — APPOINTMENT (EMERGENCY)
Dept: CT IMAGING | Facility: HOSPITAL | Age: OVER 89
End: 2025-03-08
Payer: COMMERCIAL

## 2025-03-08 ENCOUNTER — HOSPITAL ENCOUNTER (INPATIENT)
Facility: HOSPITAL | Age: OVER 89
LOS: 5 days | Discharge: HOME HEALTH | End: 2025-03-14
Attending: EMERGENCY MEDICINE | Admitting: FAMILY MEDICINE
Payer: COMMERCIAL

## 2025-03-08 DIAGNOSIS — K59.09 INTERMITTENT CONSTIPATION: ICD-10-CM

## 2025-03-08 DIAGNOSIS — F41.9 ANXIETY: Chronic | ICD-10-CM

## 2025-03-08 DIAGNOSIS — R11.2 NAUSEA AND VOMITING, UNSPECIFIED VOMITING TYPE: ICD-10-CM

## 2025-03-08 DIAGNOSIS — I73.9 PERIPHERAL ARTERIAL DISEASE (HCC): ICD-10-CM

## 2025-03-08 DIAGNOSIS — R10.9 ABDOMINAL PAIN: ICD-10-CM

## 2025-03-08 DIAGNOSIS — R11.2 NAUSEA AND VOMITING: Primary | ICD-10-CM

## 2025-03-08 DIAGNOSIS — G60.9 IDIOPATHIC PERIPHERAL NEUROPATHY: ICD-10-CM

## 2025-03-08 DIAGNOSIS — R65.10 SIRS (SYSTEMIC INFLAMMATORY RESPONSE SYNDROME) (HCC): ICD-10-CM

## 2025-03-08 DIAGNOSIS — I10 UNCONTROLLED HYPERTENSION: ICD-10-CM

## 2025-03-08 DIAGNOSIS — K21.9 GASTROESOPHAGEAL REFLUX DISEASE WITHOUT ESOPHAGITIS: Chronic | ICD-10-CM

## 2025-03-08 DIAGNOSIS — N18.31 STAGE 3A CHRONIC KIDNEY DISEASE (HCC): ICD-10-CM

## 2025-03-08 DIAGNOSIS — I10 BENIGN ESSENTIAL HYPERTENSION: ICD-10-CM

## 2025-03-08 DIAGNOSIS — E83.42 HYPOMAGNESEMIA: ICD-10-CM

## 2025-03-08 PROBLEM — R07.89 ATYPICAL CHEST PAIN: Status: RESOLVED | Noted: 2017-02-17 | Resolved: 2025-03-08

## 2025-03-08 PROBLEM — H35.342 MACULAR CYST, HOLE, OR PSEUDOHOLE, LEFT EYE: Status: RESOLVED | Noted: 2017-10-27 | Resolved: 2025-03-08

## 2025-03-08 PROBLEM — A41.9 SEVERE SEPSIS (HCC): Status: ACTIVE | Noted: 2025-03-08

## 2025-03-08 PROBLEM — R65.20 SEVERE SEPSIS (HCC): Status: ACTIVE | Noted: 2025-03-08
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
## 2025-03-08 LAB
ALBUMIN SERPL BCG-MCNC: 4.2 G/DL (ref 3.5–5)
ALP SERPL-CCNC: 81 U/L (ref 34–104)
ALT SERPL W P-5'-P-CCNC: 15 U/L (ref 7–52)
ANION GAP SERPL CALCULATED.3IONS-SCNC: 12 MMOL/L (ref 4–13)
ANISOCYTOSIS BLD QL SMEAR: PRESENT
APTT PPP: 30 SECONDS (ref 23–34)
AST SERPL W P-5'-P-CCNC: 16 U/L (ref 13–39)
ATRIAL RATE: 133 BPM
BACTERIA UR QL AUTO: NORMAL /HPF
BASO STIPL BLD QL SMEAR: PRESENT
BASOPHILS # BLD MANUAL: 0 THOUSAND/UL (ref 0–0.1)
BASOPHILS NFR MAR MANUAL: 0 % (ref 0–1)
BILIRUB SERPL-MCNC: 1.2 MG/DL (ref 0.2–1)
BILIRUB UR QL STRIP: NEGATIVE
BUN SERPL-MCNC: 28 MG/DL (ref 5–25)
CALCIUM SERPL-MCNC: 9.5 MG/DL (ref 8.4–10.2)
CARDIAC TROPONIN I PNL SERPL HS: 5 NG/L (ref ?–50)
CHLORIDE SERPL-SCNC: 102 MMOL/L (ref 96–108)
CLARITY UR: ABNORMAL
CO2 SERPL-SCNC: 25 MMOL/L (ref 21–32)
COLOR UR: YELLOW
CREAT SERPL-MCNC: 0.92 MG/DL (ref 0.6–1.3)
EOSINOPHIL # BLD MANUAL: 0 THOUSAND/UL (ref 0–0.4)
EOSINOPHIL NFR BLD MANUAL: 0 % (ref 0–6)
ERYTHROCYTE [DISTWIDTH] IN BLOOD BY AUTOMATED COUNT: 13.7 % (ref 11.6–15.1)
FLUAV RNA RESP QL NAA+PROBE: NEGATIVE
FLUBV RNA RESP QL NAA+PROBE: NEGATIVE
GFR SERPL CREATININE-BSD FRML MDRD: 53 ML/MIN/1.73SQ M
GLUCOSE SERPL-MCNC: 157 MG/DL (ref 65–140)
GLUCOSE UR STRIP-MCNC: NEGATIVE MG/DL
HCT VFR BLD AUTO: 39.1 % (ref 34.8–46.1)
HGB BLD-MCNC: 13.2 G/DL (ref 11.5–15.4)
HGB UR QL STRIP.AUTO: ABNORMAL
INR PPP: 1.05 (ref 0.85–1.19)
KETONES UR STRIP-MCNC: NEGATIVE MG/DL
LACTATE SERPL-SCNC: 1.6 MMOL/L (ref 0.5–2)
LACTATE SERPL-SCNC: 2.5 MMOL/L (ref 0.5–2)
LEUKOCYTE ESTERASE UR QL STRIP: NEGATIVE
LIPASE SERPL-CCNC: 15 U/L (ref 11–82)
LYMPHOCYTES # BLD AUTO: 0.27 THOUSAND/UL (ref 0.6–4.47)
LYMPHOCYTES # BLD AUTO: 2 % (ref 14–44)
MAGNESIUM SERPL-MCNC: 1.6 MG/DL (ref 1.9–2.7)
MCH RBC QN AUTO: 35.1 PG (ref 26.8–34.3)
MCHC RBC AUTO-ENTMCNC: 33.8 G/DL (ref 31.4–37.4)
MCV RBC AUTO: 104 FL (ref 82–98)
MONOCYTES # BLD AUTO: 1.08 THOUSAND/UL (ref 0–1.22)
MONOCYTES NFR BLD: 8 % (ref 4–12)
NEUTROPHILS # BLD MANUAL: 12.2 THOUSAND/UL (ref 1.85–7.62)
NEUTS BAND NFR BLD MANUAL: 6 % (ref 0–8)
NEUTS SEG NFR BLD AUTO: 84 % (ref 43–75)
NITRITE UR QL STRIP: POSITIVE
NON-SQ EPI CELLS URNS QL MICRO: NORMAL /HPF
P AXIS: 43 DEGREES
PH UR STRIP.AUTO: 6.5 [PH]
PLATELET # BLD AUTO: 305 THOUSANDS/UL (ref 149–390)
PLATELET # BLD AUTO: 348 THOUSANDS/UL (ref 149–390)
PLATELET BLD QL SMEAR: ABNORMAL
PMV BLD AUTO: 10 FL (ref 8.9–12.7)
PMV BLD AUTO: 10.2 FL (ref 8.9–12.7)
POTASSIUM SERPL-SCNC: 4.1 MMOL/L (ref 3.5–5.3)
PR INTERVAL: 190 MS
PROCALCITONIN SERPL-MCNC: 0.34 NG/ML
PROT SERPL-MCNC: 7.3 G/DL (ref 6.4–8.4)
PROT UR STRIP-MCNC: ABNORMAL MG/DL
PROTHROMBIN TIME: 14.2 SECONDS (ref 12.3–15)
QRS AXIS: 258 DEGREES
QRSD INTERVAL: 78 MS
QT INTERVAL: 306 MS
QTC INTERVAL: 455 MS
RBC # BLD AUTO: 3.76 MILLION/UL (ref 3.81–5.12)
RBC #/AREA URNS AUTO: NORMAL /HPF
RBC MORPH BLD: PRESENT
RSV RNA RESP QL NAA+PROBE: NEGATIVE
SARS-COV-2 RNA RESP QL NAA+PROBE: NEGATIVE
SODIUM SERPL-SCNC: 139 MMOL/L (ref 135–147)
SP GR UR STRIP.AUTO: <1.005 (ref 1–1.03)
T WAVE AXIS: 23 DEGREES
UROBILINOGEN UR STRIP-ACNC: <2 MG/DL
VENTRICULAR RATE: 133 BPM
WBC # BLD AUTO: 13.56 THOUSAND/UL (ref 4.31–10.16)
WBC #/AREA URNS AUTO: NORMAL /HPF

## 2025-03-08 PROCEDURE — 71275 CT ANGIOGRAPHY CHEST: CPT

## 2025-03-08 PROCEDURE — 94760 N-INVAS EAR/PLS OXIMETRY 1: CPT

## 2025-03-08 PROCEDURE — 99285 EMERGENCY DEPT VISIT HI MDM: CPT

## 2025-03-08 PROCEDURE — 83605 ASSAY OF LACTIC ACID: CPT | Performed by: PHYSICIAN ASSISTANT

## 2025-03-08 PROCEDURE — 85610 PROTHROMBIN TIME: CPT | Performed by: PHYSICIAN ASSISTANT

## 2025-03-08 PROCEDURE — 84145 PROCALCITONIN (PCT): CPT | Performed by: PHYSICIAN ASSISTANT

## 2025-03-08 PROCEDURE — 0241U HB NFCT DS VIR RESP RNA 4 TRGT: CPT | Performed by: PHYSICIAN ASSISTANT

## 2025-03-08 PROCEDURE — 96366 THER/PROPH/DIAG IV INF ADDON: CPT

## 2025-03-08 PROCEDURE — 99223 1ST HOSP IP/OBS HIGH 75: CPT | Performed by: FAMILY MEDICINE

## 2025-03-08 PROCEDURE — 84484 ASSAY OF TROPONIN QUANT: CPT | Performed by: PHYSICIAN ASSISTANT

## 2025-03-08 PROCEDURE — 85007 BL SMEAR W/DIFF WBC COUNT: CPT | Performed by: PHYSICIAN ASSISTANT

## 2025-03-08 PROCEDURE — 87449 NOS EACH ORGANISM AG IA: CPT | Performed by: FAMILY MEDICINE

## 2025-03-08 PROCEDURE — 96375 TX/PRO/DX INJ NEW DRUG ADDON: CPT

## 2025-03-08 PROCEDURE — 85730 THROMBOPLASTIN TIME PARTIAL: CPT | Performed by: PHYSICIAN ASSISTANT

## 2025-03-08 PROCEDURE — 87040 BLOOD CULTURE FOR BACTERIA: CPT | Performed by: PHYSICIAN ASSISTANT

## 2025-03-08 PROCEDURE — 80053 COMPREHEN METABOLIC PANEL: CPT | Performed by: PHYSICIAN ASSISTANT

## 2025-03-08 PROCEDURE — 81001 URINALYSIS AUTO W/SCOPE: CPT

## 2025-03-08 PROCEDURE — 99285 EMERGENCY DEPT VISIT HI MDM: CPT | Performed by: PHYSICIAN ASSISTANT

## 2025-03-08 PROCEDURE — 93005 ELECTROCARDIOGRAM TRACING: CPT

## 2025-03-08 PROCEDURE — 83605 ASSAY OF LACTIC ACID: CPT

## 2025-03-08 PROCEDURE — 85027 COMPLETE CBC AUTOMATED: CPT | Performed by: PHYSICIAN ASSISTANT

## 2025-03-08 PROCEDURE — 83690 ASSAY OF LIPASE: CPT | Performed by: PHYSICIAN ASSISTANT

## 2025-03-08 PROCEDURE — 85049 AUTOMATED PLATELET COUNT: CPT

## 2025-03-08 PROCEDURE — 71045 X-RAY EXAM CHEST 1 VIEW: CPT

## 2025-03-08 PROCEDURE — 83735 ASSAY OF MAGNESIUM: CPT | Performed by: PHYSICIAN ASSISTANT

## 2025-03-08 PROCEDURE — 74177 CT ABD & PELVIS W/CONTRAST: CPT

## 2025-03-08 PROCEDURE — 36415 COLL VENOUS BLD VENIPUNCTURE: CPT | Performed by: PHYSICIAN ASSISTANT

## 2025-03-08 PROCEDURE — 96365 THER/PROPH/DIAG IV INF INIT: CPT

## 2025-03-08 RX ORDER — ENOXAPARIN SODIUM 100 MG/ML
40 INJECTION SUBCUTANEOUS DAILY
Status: DISCONTINUED | OUTPATIENT
Start: 2025-03-08 | End: 2025-03-14 | Stop reason: HOSPADM

## 2025-03-08 RX ORDER — LOSARTAN POTASSIUM 50 MG/1
50 TABLET ORAL DAILY
Status: DISCONTINUED | OUTPATIENT
Start: 2025-03-08 | End: 2025-03-13

## 2025-03-08 RX ORDER — ACETAMINOPHEN 325 MG/1
650 TABLET ORAL 3 TIMES DAILY PRN
Status: DISCONTINUED | OUTPATIENT
Start: 2025-03-08 | End: 2025-03-14 | Stop reason: HOSPADM

## 2025-03-08 RX ORDER — MAGNESIUM SULFATE HEPTAHYDRATE 40 MG/ML
2 INJECTION, SOLUTION INTRAVENOUS ONCE
Status: COMPLETED | OUTPATIENT
Start: 2025-03-08 | End: 2025-03-09

## 2025-03-08 RX ORDER — ONDANSETRON 2 MG/ML
4 INJECTION INTRAMUSCULAR; INTRAVENOUS ONCE
Status: COMPLETED | OUTPATIENT
Start: 2025-03-08 | End: 2025-03-08

## 2025-03-08 RX ORDER — AZITHROMYCIN 250 MG/1
500 TABLET, FILM COATED ORAL EVERY 24 HOURS
Status: DISCONTINUED | OUTPATIENT
Start: 2025-03-08 | End: 2025-03-10

## 2025-03-08 RX ORDER — CEFTRIAXONE 1 G/50ML
1000 INJECTION, SOLUTION INTRAVENOUS EVERY 24 HOURS
Status: DISCONTINUED | OUTPATIENT
Start: 2025-03-09 | End: 2025-03-12

## 2025-03-08 RX ORDER — SODIUM CHLORIDE, SODIUM GLUCONATE, SODIUM ACETATE, POTASSIUM CHLORIDE, MAGNESIUM CHLORIDE, SODIUM PHOSPHATE, DIBASIC, AND POTASSIUM PHOSPHATE .53; .5; .37; .037; .03; .012; .00082 G/100ML; G/100ML; G/100ML; G/100ML; G/100ML; G/100ML; G/100ML
1000 INJECTION, SOLUTION INTRAVENOUS ONCE
Status: COMPLETED | OUTPATIENT
Start: 2025-03-08 | End: 2025-03-08

## 2025-03-08 RX ORDER — PANTOPRAZOLE SODIUM 40 MG/10ML
40 INJECTION, POWDER, LYOPHILIZED, FOR SOLUTION INTRAVENOUS ONCE
Status: COMPLETED | OUTPATIENT
Start: 2025-03-08 | End: 2025-03-08

## 2025-03-08 RX ORDER — SODIUM CHLORIDE, SODIUM GLUCONATE, SODIUM ACETATE, POTASSIUM CHLORIDE, MAGNESIUM CHLORIDE, SODIUM PHOSPHATE, DIBASIC, AND POTASSIUM PHOSPHATE .53; .5; .37; .037; .03; .012; .00082 G/100ML; G/100ML; G/100ML; G/100ML; G/100ML; G/100ML; G/100ML
1000 INJECTION, SOLUTION INTRAVENOUS ONCE
Status: DISCONTINUED | OUTPATIENT
Start: 2025-03-08 | End: 2025-03-08 | Stop reason: CLARIF

## 2025-03-08 RX ORDER — ALPRAZOLAM 0.25 MG
0.25 TABLET ORAL 3 TIMES DAILY PRN
Status: DISCONTINUED | OUTPATIENT
Start: 2025-03-08 | End: 2025-03-14 | Stop reason: HOSPADM

## 2025-03-08 RX ORDER — ALPRAZOLAM 0.25 MG
0.25 TABLET ORAL 3 TIMES DAILY
Status: DISCONTINUED | OUTPATIENT
Start: 2025-03-08 | End: 2025-03-08

## 2025-03-08 RX ORDER — PANTOPRAZOLE SODIUM 40 MG/1
40 TABLET, DELAYED RELEASE ORAL
Status: DISCONTINUED | OUTPATIENT
Start: 2025-03-09 | End: 2025-03-14 | Stop reason: HOSPADM

## 2025-03-08 RX ORDER — LORATADINE 10 MG/1
5 TABLET ORAL DAILY
Status: DISCONTINUED | OUTPATIENT
Start: 2025-03-08 | End: 2025-03-14 | Stop reason: HOSPADM

## 2025-03-08 RX ORDER — SODIUM CHLORIDE 9 MG/ML
3 INJECTION INTRAVENOUS ONCE AS NEEDED
Status: DISCONTINUED | OUTPATIENT
Start: 2025-03-08 | End: 2025-03-14 | Stop reason: HOSPADM

## 2025-03-08 RX ORDER — ENOXAPARIN SODIUM 100 MG/ML
40 INJECTION SUBCUTANEOUS DAILY
Status: DISCONTINUED | OUTPATIENT
Start: 2025-03-08 | End: 2025-03-08

## 2025-03-08 RX ORDER — MAGNESIUM HYDROXIDE/ALUMINUM HYDROXICE/SIMETHICONE 120; 1200; 1200 MG/30ML; MG/30ML; MG/30ML
30 SUSPENSION ORAL EVERY 4 HOURS PRN
Status: DISCONTINUED | OUTPATIENT
Start: 2025-03-08 | End: 2025-03-14 | Stop reason: HOSPADM

## 2025-03-08 RX ORDER — CEFTRIAXONE 2 G/50ML
2000 INJECTION, SOLUTION INTRAVENOUS ONCE
Status: COMPLETED | OUTPATIENT
Start: 2025-03-08 | End: 2025-03-08

## 2025-03-08 RX ADMIN — ONDANSETRON 4 MG: 2 INJECTION INTRAMUSCULAR; INTRAVENOUS at 12:18

## 2025-03-08 RX ADMIN — AZITHROMYCIN DIHYDRATE 500 MG: 250 TABLET ORAL at 15:30

## 2025-03-08 RX ADMIN — LOSARTAN POTASSIUM 50 MG: 50 TABLET, FILM COATED ORAL at 15:30

## 2025-03-08 RX ADMIN — MAGNESIUM SULFATE HEPTAHYDRATE 2 G: 40 INJECTION, SOLUTION INTRAVENOUS at 14:20

## 2025-03-08 RX ADMIN — IOHEXOL 100 ML: 350 INJECTION, SOLUTION INTRAVENOUS at 13:42

## 2025-03-08 RX ADMIN — PANTOPRAZOLE SODIUM 40 MG: 40 INJECTION, POWDER, FOR SOLUTION INTRAVENOUS at 12:18

## 2025-03-08 RX ADMIN — LORATADINE 5 MG: 10 TABLET ORAL at 15:30

## 2025-03-08 RX ADMIN — ALPRAZOLAM 0.25 MG: 0.25 TABLET ORAL at 21:14

## 2025-03-08 RX ADMIN — ALUMINUM HYDROXIDE, MAGNESIUM HYDROXIDE, AND DIMETHICONE 30 ML: 200; 20; 200 SUSPENSION ORAL at 21:16

## 2025-03-08 RX ADMIN — ENOXAPARIN SODIUM 40 MG: 40 INJECTION SUBCUTANEOUS at 15:30

## 2025-03-08 RX ADMIN — CEFTRIAXONE 2000 MG: 2 INJECTION, SOLUTION INTRAVENOUS at 13:55

## 2025-03-08 RX ADMIN — SODIUM CHLORIDE, SODIUM GLUCONATE, SODIUM ACETATE, POTASSIUM CHLORIDE, MAGNESIUM CHLORIDE, SODIUM PHOSPHATE, DIBASIC, AND POTASSIUM PHOSPHATE 1000 ML: .53; .5; .37; .037; .03; .012; .00082 INJECTION, SOLUTION INTRAVENOUS at 12:18

## 2025-03-08 NOTE — RESPIRATORY THERAPY NOTE
RT Protocol Note  Ama Burnett 89 y.o. female MRN: [id number]  Unit/Bed#: 423-01 Encounter: [id number]    Assessment    Principal Problem:    Severe sepsis (HCC)  Active Problems:    GERD (gastroesophageal reflux disease)    Anxiety    Benign essential hypertension    Hypomagnesemia      Home Pulmonary Medications:  Patient denies any home respiratory medications, denies oxygen or pap therapy       Past Medical History:   Diagnosis Date    Diverticulosis     Fall 2020    GERD (gastroesophageal reflux disease)     Hiatal hernia     Irritable bowel disease     Lactose intolerance     Macular cyst, hole, or pseudohole, left eye 10/27/2017    Osteoporosis      Social History     Socioeconomic History    Marital status:      Spouse name: None    Number of children: None    Years of education: None    Highest education level: None   Occupational History    None   Tobacco Use    Smoking status: Former     Current packs/day: 0.00     Types: Cigarettes     Start date:      Quit date:      Years since quittin.2    Smokeless tobacco: Never   Vaping Use    Vaping status: Never Used   Substance and Sexual Activity    Alcohol use: Never    Drug use: Not Currently    Sexual activity: None   Other Topics Concern    None   Social History Narrative    No advanced directives      Social Drivers of Health     Financial Resource Strain: Low Risk  (2023)    Overall Financial Resource Strain (CARDIA)     Difficulty of Paying Living Expenses: Not very hard   Food Insecurity: No Food Insecurity (3/8/2025)    Nursing - Inadequate Food Risk Classification     Worried About Running Out of Food in the Last Year: Never true     Ran Out of Food in the Last Year: Never true     Ran Out of Food in the Last Year: Never true   Transportation Needs: No Transportation Needs (3/8/2025)    Nursing - Transportation Risk Classification     Lack of Transportation: Not on file     Lack of Transportation: No   Recent Concern:  "Transportation Needs - Unmet Transportation Needs (2024)    PRAPARE - Transportation     Lack of Transportation (Medical): Yes     Lack of Transportation (Non-Medical): No   Physical Activity: Not on file   Stress: Not on file   Social Connections: Not on file   Intimate Partner Violence: Unknown (3/8/2025)    Nursing IPS     Feels Physically and Emotionally Safe: Not on file     Physically Hurt by Someone: Not on file     Humiliated or Emotionally Abused by Someone: Not on file     Physically Hurt by Someone: No     Hurt or Threatened by Someone: No   Housing Stability: Unknown (3/8/2025)    Nursing: Inadequate Housing Risk Classification     Has Housing: Not on file     Worried About Losing Housing: Not on file     Unable to Get Utilities: Not on file     Unable to Pay for Housing in the Last Year: No     Has Housin       Subjective    Subjective Data: denies sob    Objective    Physical Exam:   Assessment Type: During-treatment  General Appearance: Drowsy, Awake  Respiratory Pattern: Normal  Chest Assessment: Chest expansion symmetrical, Trachea midline  Bilateral Breath Sounds: Diminished, Clear  Cough: Non-productive, Dry  O2 Device: trying on room air    Vitals:  Blood pressure 135/71, pulse 102, temperature 99 °F (37.2 °C), temperature source Oral, resp. rate 20, height 4' 11\" (1.499 m), weight 71 kg (156 lb 8.4 oz), SpO2 94%.          Imaging and other studies:     O2 Device: trying on room air     Plan    Respiratory Plan: No distress/Pulmonary history  Airway Clearance Plan: Incentive Spirometer     Resp Comments: Patient received in her room, she was sleeping, she had the nasal cannula in her nose, but the oxygen was not connected tightly, at this time I am trying her on room air, she does not use any home oxygen, no pap therapy, patient takes no respiratory medications. I have instructed patient on the use of the incentive spirometer to be used Q1H to help promote better bronchial hygiene, " patient did well, cough on command is dry and npc

## 2025-03-08 NOTE — H&P
"H&P - Hospitalist   Name: Ama Burnett 89 y.o. female I MRN: [id number]  Unit/Bed#: 423-01 I Date of Admission: 3/8/2025   Date of Service: 3/8/2025 I Hospital Day: 0     Assessment & Plan  Severe sepsis (HCC)  Tachycardia, leukocytosis, and elevated lactic acid, with elevated procalcitonin.  Source: suspected GI  CT c/a/p: no PE, diverticulitis, or acute intra-abdominal inflammatory process, but LLL airspace opacity that my represent pneumonia vs atelectasis.  Empiric Rocephin given in the ED.  Continue empiric antibiotics, narrow the regimen pending imaging results.  IV fluid bolus given in the ED  Trend procalcitonin, CBC, vitals, lactic acid  GERD (gastroesophageal reflux disease)  Continue home PPI  Anxiety  Continue home Xanax PRN  Benign essential hypertension  Well-controlled  Continue home Losartan  Hypomagnesemia  1.6 in the ED  Repleted intravenously  Recheck in the AM      VTE Pharmacologic Prophylaxis: VTE Score: 6 High Risk (Score >/= 5) - Pharmacological DVT Prophylaxis Ordered: enoxaparin (Lovenox). Sequential Compression Devices Ordered.  Code Status: DNR/DNI  Discussion with family:  to call patient's daughter.     Anticipated Length of Stay: Patient will be admitted on an observation basis with an anticipated length of stay of less than 2 midnights secondary to severe sepsis.    History of Present Illness   Chief Complaint: nausea, vomiting, and abdominal pain    Ama Burnett is a 89 y.o. female with a PMH of GERD, hiatal hernia, diverticulitis, and cholecystectomy who presented to the ED for acute vomiting and abdominal pain since 2 AM. She hasn't eaten since then. She had some fish and tomato sauce the night before and felt fine when she went to bed last night. She describes her emesis as \"all bile.\" She admits to intermittent epigastric pain/nausea, but feels fine now.   Review of Systems   Constitutional:  Negative for chills and fever.   Respiratory:  Negative for shortness of breath.  "   Gastrointestinal:  Positive for abdominal pain. Negative for diarrhea and nausea.       Historical Information   Past Medical History:   Diagnosis Date    Diverticulosis     Fall 2020    GERD (gastroesophageal reflux disease)     Hiatal hernia     Irritable bowel disease     Lactose intolerance     Macular cyst, hole, or pseudohole, left eye 10/27/2017    Osteoporosis      Past Surgical History:   Procedure Laterality Date    CARPAL TUNNEL RELEASE Right     CAST APPLICATION Left 2020    Procedure: APPLICATION CAST, LEFT WRIST;  Surgeon: Julio Thomason MD;  Location: [facility];  Service: Orthopedics    CHOLECYSTECTOMY      EAR TUBE REMOVAL      HEMORROIDECTOMY      HYSTERECTOMY      PERINEAL  PELVIC RECONSTRUCTION SURGERY  2019    Dr Hightower - [facility]    [place] EXC TUMOR SOFT TISSUE LEG/ANKLE SUBFASCIAL <5CM Left 2020    Procedure: EXCISION BIOPSY TISSUE LESION/MASS LOWER EXTREMITY;  Surgeon: Al Roberson DO;  Location: [facility];  Service: General    [place] OPTX FEM SHFT FX W/INSJ IMED IMPLT W/WO SCREW Left 2020    Procedure: INSERTION NAIL IM FEMUR ANTEGRADE (TROCHANTERIC);  Surgeon: Julio Thomason MD;  Location:  MAIN OR;  Service: Orthopedics    SKIN CANCER EXCISION      SQUAMOUS CELL CARCINOMA EXCISION Left 2020    lower part of leg    TONSILLECTOMY       Social History     Tobacco Use    Smoking status: Former     Current packs/day: 0.00     Types: Cigarettes     Start date:      Quit date:      Years since quittin.2    Smokeless tobacco: Never   Vaping Use    Vaping status: Never Used   Substance and Sexual Activity    Alcohol use: Never    Drug use: Not Currently    Sexual activity: Not on file     E-Cigarette/Vaping    E-Cigarette Use Never User      E-Cigarette/Vaping Substances    Nicotine No     THC No     CBD No     Flavoring No     Other No     Unknown No      Family history non-contributory  Social History:  Marital Status:    Occupation:  retired  Patient Pre-hospital Living Situation: Home, alone  Patient Pre-hospital Level of Mobility: walks with cane  Patient Pre-hospital Diet Restrictions: lactose    Meds/Allergies   I have reviewed home medications with patient personally.  Prior to Admission medications    Medication Sig Start Date End Date Taking? Authorizing Provider   acetaminophen (TYLENOL) 500 mg tablet Take 500 mg by mouth 3 (three) times a day as needed    Historical Provider, MD   ALPRAZolam (XANAX) 0.25 mg tablet Take 1 tablet (0.25 mg total) by mouth 3 (three) times a day 2/20/25 3/22/25  Rayray Liu DO   aluminum-magnesium hydroxide-simethicone (Mirta-Lanta) 0361-3003-341 mg/30 mL suspension Take 30 mL by mouth every 4 (four) hours as needed for indigestion or heartburn    Historical Provider, MD   Calcium Citrate (CITRACAL PO) Take 1 tablet by mouth daily.    Historical Provider, MD   cetirizine (ZyrTEC) 5 MG tablet Take 1 tablet (5 mg total) by mouth daily 1/2/25   Rayray Liu DO   Cholecalciferol (VITAMIN D PO) Take 1,000 Units by mouth 2 (two) times a day.    Historical Provider, MD   fluticasone (FLONASE) 50 mcg/act nasal spray 1 spray into each nostril daily  Patient not taking: Reported on 12/23/2024 8/13/24   Rayray Liu DO   losartan (COZAAR) 50 mg tablet Take 1 tablet (50 mg total) by mouth daily 2/21/25   Rayray Liu DO   Multiple Vitamin (MULTIVITAMIN) capsule Take 1 capsule by mouth daily.    Historical Provider, MD   pantoprazole (PROTONIX) 40 mg tablet Take 1 tablet (40 mg total) by mouth daily 1/22/25   Rayray Liu DO   sodium chloride (OCEAN) 0.65 % nasal spray 1 spray into each nostril as needed for congestion    Historical Provider, MD   vitamin E, tocopherol, 400 units capsule Take 400 Units by mouth daily    Historical Provider, MD     Allergies   Allergen Reactions    Escitalopram Hives    Mepenzolate      Other reaction(s): Urinary Retention    Other      Seasonal     Prednisone Other (See Comments)     "Reglan [Metoclopramide] Other (See Comments)     hyper       Objective :  Temp:  [99.2 °F (37.3 °C)-99.6 °F (37.6 °C)] 99.3 °F (37.4 °C)  HR:  [] 98  BP: (120-143)/(60-64) 125/60  Resp:  [18-22] 22  SpO2:  [92 %-94 %] 94 %  O2 Device: None (Room air)    Physical Exam  Vitals reviewed.   Constitutional:       General: She is not in acute distress.     Appearance: Normal appearance. She is obese. She is not ill-appearing, toxic-appearing or diaphoretic.   Cardiovascular:      Rate and Rhythm: Regular rhythm. Tachycardia present.      Heart sounds: Normal heart sounds. No murmur heard.  Pulmonary:      Effort: Pulmonary effort is normal. No respiratory distress.      Breath sounds: Normal breath sounds. No stridor. No wheezing, rhonchi or rales.   Abdominal:      General: Bowel sounds are decreased. There is no distension.      Palpations: Abdomen is soft.      Tenderness: There is no abdominal tenderness. There is no guarding.   Musculoskeletal:      Right lower leg: No edema.      Left lower leg: No edema.   Skin:     General: Skin is warm and dry.   Neurological:      Mental Status: She is alert.                Lab Results: I have reviewed the following results:  Results from last 7 days   Lab Units 03/08/25  1224   WBC Thousand/uL 13.56*   HEMOGLOBIN g/dL 13.2   HEMATOCRIT % 39.1   PLATELETS Thousands/uL 348   BANDS PCT % 6   LYMPHO PCT % 2*   MONO PCT % 8   EOS PCT % 0     Results from last 7 days   Lab Units 03/08/25  1224   SODIUM mmol/L 139   POTASSIUM mmol/L 4.1   CHLORIDE mmol/L 102   CO2 mmol/L 25   BUN mg/dL 28*   CREATININE mg/dL 0.92   ANION GAP mmol/L 12   CALCIUM mg/dL 9.5   ALBUMIN g/dL 4.2   TOTAL BILIRUBIN mg/dL 1.20*   ALK PHOS U/L 81   ALT U/L 15   AST U/L 16   GLUCOSE RANDOM mg/dL 157*     Results from last 7 days   Lab Units 03/08/25  1224   INR  1.05         No results found for: \"HGBA1C\"  Results from last 7 days   Lab Units 03/08/25  1224   LACTIC ACID mmol/L 2.5*   PROCALCITONIN ng/ml " 0.34*

## 2025-03-08 NOTE — ASSESSMENT & PLAN NOTE
Tachycardia, leukocytosis, and elevated lactic acid, with elevated procalcitonin.  Possible pneumonia as source of infection  CT c/a/p: no PE, diverticulitis, or acute intra-abdominal inflammatory process, but LLL airspace opacity that my represent pneumonia vs atelectasis.    Today is day 5 of 5 of ceftriaxone

## 2025-03-08 NOTE — INCIDENTAL FINDINGS
The following findings require follow up:  Radiographic finding   Finding: Left lower lobe airspace opacity    Follow up required: CT Chest   Follow up should be done within 3 month(s)    Please notify the following clinician to assist with the follow up:   Dr. Liu    Incidental finding results were discussed with the Patient by Joe Padgett MD on 03/08/25.   They expressed understanding and all questions answered.

## 2025-03-08 NOTE — ASSESSMENT & PLAN NOTE
Tachycardia, leukocytosis, and elevated lactic acid, with elevated procalcitonin.  Source: suspected GI  CT c/a/p: no PE, diverticulitis, or acute intra-abdominal inflammatory process, but LLL airspace opacity that my represent pneumonia vs atelectasis.  Empiric Rocephin given in the ED.  Continue empiric antibiotics, narrow the regimen pending imaging results.  IV fluid bolus given in the ED  Trend procalcitonin, CBC, vitals, lactic acid

## 2025-03-08 NOTE — ED PROVIDER NOTES
Time reflects when diagnosis was documented in both MDM as applicable and the Disposition within this note       Time User Action Codes Description Comment    3/8/2025  2:10 PM OlNanci knightmarlen Morocho [R11.2] Nausea and vomiting     3/8/2025  2:10 PM Oleugene Nerissa Add [R10.9] Abdominal pain     3/8/2025  2:10 PM Oleugene Nerissa Add [E83.42] Hypomagnesemia     3/8/2025  2:10 PM OlNanci knightberly Add [R65.10] SIRS (systemic inflammatory response syndrome) (HCC)           ED Disposition       ED Disposition   Admit    Condition   Stable    Date/Time   Sat Mar 8, 2025  2:55 PM    Comment   Case was discussed with Dr. Padgett and the patient's admission status was agreed to be Admission Status: inpatient status to the service of Dr. Padgett.               Assessment & Plan       Medical Decision Making  89 yo female presenting for evaluation of vomiting, abdominal pain.  Will obtain EKG, CXR, labs, urine, viral swab and pursue CT imaging.  She is tachycardic, otherwise afebrile non toxic appearing.  Anticipate need for admission.    Work up obtained as noted above.  EKG and troponin not c/w ACS.  CXR does not reveal pneumonia, pneumothorax, vascular congestion or pleural effusion. There is a noted leukocytosis, no anemia.  Lactic acid and procalcitonin are also elevated.  This raises suspicion for possible infectious etiology. No hypo or hyperglycemia.  Renal function within normal limits.  Electrolytes within normal limits except for hypomagnesemia which was repleted via IV.  UA not collected at time of admission.  Pt was given fluids with improvement of HR.  Suspect some dehydration from repeated vomiting.  No further vomiting in the emergency room.  CT imaging was pursued.  [facility] was also consulted for admission.  Pt was covered with empiric antibiotic therapy pending further testing in light of laboratory findings and SIRS criteria.  CT showed no evidence of PE.  There was noted left lower lobe opacity, possible  pneumonia.  No noted acute findings in the abdomen/pelvic.  Diverticulosis without diverticulitis.  No noted bowel obstruction.  Pt was seen and evaluated by [facility] and was admitted prior to CT results.  She was comfortable with admission/plan of care.    Please refer to above ER course for further details/discussion.    Problems Addressed:  Abdominal pain: acute illness or injury  Hypomagnesemia: acute illness or injury  Nausea and vomiting: acute illness or injury  SIRS (systemic inflammatory response syndrome) (HCC): acute illness or injury    Amount and/or Complexity of Data Reviewed  External Data Reviewed: labs, radiology and notes.  Labs: ordered. Decision-making details documented in ED Course.  Radiology: ordered and independent interpretation performed. Decision-making details documented in ED Course.  ECG/medicine tests: ordered and independent interpretation performed. Decision-making details documented in ED Course.  Discussion of management or test interpretation with external provider(s): [facility]    Risk  OTC drugs.  Prescription drug management.  Decision regarding hospitalization.        ED Course as of 03/08/25 1546   Sat Mar 08, 2025   1208 Current HR around 110.   1208 XR chest 1 view portable  Independently viewed and interpreted by me - no acute cardiopulmonary process, cardiomegaly, appears stable from prior; pending official read.   1244 WBC(!): 13.56  elevated   1244 Hemoglobin: 13.2  stable   1244 Platelet Count: 348   1253 POCT INR: 1.05   1253 PROTIME: 14.2   1253 PTT: 30   1306 LACTIC ACID(!): 2.5   1309 GLUCOSE(!): 157   1309 Creatinine: 0.92  Appears stable from prior   1309 BUN(!): 28  elevated   1309 Sodium: 139   1309 Potassium: 4.1   1309 Chloride: 102   1309 Carbon Dioxide: 25   1309 ANION GAP: 12   1309 Calcium: 9.5   1309 AST: 16   1309 ALT: 15   1309 ALK PHOS: 81   1309 Total Protein: 7.3   1309 Albumin: 4.2   1309 Total Bilirubin(!): 1.20  Increased from prior   1309 GFR,  Calculated: 53   1310 LIPASE: 15   1310 MAGNESIUM(!): 1.6  Decreased, will replete   1311 Pt reassessed.  No noted distress.  Did have O2 desaturation down in the high 80s and placed on 2L supplemental O2 with improvement.  No noted respiratory distress.  Her HR has been down trending with fluids.  She is agreeable with plan for CT imaging.  She still reports having dry mouth.  She's had no further vomiting.   1320 SARS-COV-2: Negative   1320 INFLU A PCR: Negative   1320 INFLU B PCR: Negative   1320 RSV PCR: Negative   1320 hs TnI 0hr: 5   1320 Procalcitonin(!): 0.34  With elevation along with elevated WBC, elevated lactic acid and presenting SIRS criteria, will initiate antibiotic therapy.   1343 Discussed with Dr. Padgett of [facility]; they will go evaluate; asks that we get back to him with CT results.   1401 CT performed and pending interpretation - no noted bowel obstruction or free air.  Diverticulosis without noted diverticulitis.  Pt is in agreeance with admission/plan of care.  Respiratory status stable.  HR improved.  She notes her nausea and pain are feeling better.   1455 CT pe study w abdomen pelvis w contrast  IMPRESSION:     1.  No pulmonary embolus.  2.  Left lower lobe airspace opacity may represent pneumonia versus focal atelectasis. 3-month follow-up chest CT is recommended to ensure resolution.  3.  No acute intra-abdominal inflammatory process.  4.  Colonic diverticulosis without evidence of acute diverticulitis   1455 SLIM updated with CT results.  UA was not obtained at time of admission.       Medications   sodium chloride (PF) 0.9 % injection 3 mL (has no administration in time range)   magnesium sulfate 2 g/50 mL IVPB (premix) 2 g (has no administration in time range)   multi-electrolyte (Plasmalyte-A/Isolyte-S PH 7.4/Normosol-R) IV bolus 1,000 mL (0 mL Intravenous Stopped 3/8/25 1407)   ondansetron (ZOFRAN) injection 4 mg (4 mg Intravenous Given 3/8/25 1218)   pantoprazole (PROTONIX) injection 40  mg (40 mg Intravenous Given 3/8/25 1218)   cefTRIAXone (ROCEPHIN) IVPB (premix in dextrose) 2,000 mg 50 mL (2,000 mg Intravenous New Bag 3/8/25 1355)   iohexol (OMNIPAQUE) 350 MG/ML injection (MULTI-DOSE) 100 mL (100 mL Intravenous Given 3/8/25 1342)       ED Risk Strat Scores   HEART Risk Score      Flowsheet Row Most Recent Value   Heart Score Risk Calculator    History 0 Filed at: 03/08/2025 1200   ECG 1 Filed at: 03/08/2025 1200   Age 2 Filed at: 03/08/2025 1200   Risk Factors 1 Filed at: 03/08/2025 1200   Troponin 0 Filed at: 03/08/2025 1200   HEART Score 4 Filed at: 03/08/2025 1200          HEART Risk Score      Flowsheet Row Most Recent Value   Heart Score Risk Calculator    History 0 Filed at: 03/08/2025 1200   ECG 1 Filed at: 03/08/2025 1200   Age 2 Filed at: 03/08/2025 1200   Risk Factors 1 Filed at: 03/08/2025 1200   Troponin 0 Filed at: 03/08/2025 1200   HEART Score 4 Filed at: 03/08/2025 1200                              SBIRT 22yo+      Flowsheet Row Most Recent Value   Initial Alcohol Screen: US AUDIT-C     1. How often do you have a drink containing alcohol? 0 Filed at: 03/08/2025 1140   2. How many drinks containing alcohol do you have on a typical day you are drinking?  0 Filed at: 03/08/2025 1140   3a. Male UNDER 65: How often do you have five or more drinks on one occasion? 0 Filed at: 03/08/2025 1140   3b. FEMALE Any Age, or MALE 65+: How often do you have 4 or more drinks on one occassion? 0 Filed at: 03/08/2025 1140   Audit-C Score 0 Filed at: 03/08/2025 1140   CHRISTINE: How many times in the past year have you...    Used an illegal drug or used a prescription medication for non-medical reasons? Never Filed at: 03/08/2025 1140            Wells' Criteria for PE      Flowsheet Row Most Recent Value   Wells' Criteria for PE    Clinical signs and symptoms of DVT 0 Filed at: 03/08/2025 1322   PE is primary diagnosis or equally likely 0 Filed at: 03/08/2025 1322   HR >100 1.5 Filed at: 03/08/2025 1322    Immobilization at least 3 days or Surgery in the previous 4 weeks 0 Filed at: 03/08/2025 1322   Previous, objectively diagnosed PE or DVT 0 Filed at: 03/08/2025 1322   Hemoptysis 0 Filed at: 03/08/2025 1322   Malignancy with treatment within 6 months or palliative 0 Filed at: 03/08/2025 1322   Wells' Criteria Total 1.5 Filed at: 03/08/2025 1322                        History of Present Illness       Chief Complaint   Patient presents with    Abdominal Pain     Vomiting since yesterday bloating and right sided abdominal pain        Past Medical History:   Diagnosis Date    Diverticulosis     Fall 02/01/2020    GERD (gastroesophageal reflux disease)     Hiatal hernia     Irritable bowel disease     Lactose intolerance     Macular cyst, hole, or pseudohole, left eye 10/27/2017    Osteoporosis       Past Surgical History:   Procedure Laterality Date    CARPAL TUNNEL RELEASE Right     CAST APPLICATION Left 2/1/2020    Procedure: APPLICATION CAST, LEFT WRIST;  Surgeon: Julio Thomason MD;  Location: [facility];  Service: Orthopedics    CHOLECYSTECTOMY      EAR TUBE REMOVAL      HEMORROIDECTOMY      HYSTERECTOMY      PERINEAL  PELVIC RECONSTRUCTION SURGERY  09/13/2019    Dr Hightower - [facility]    TX EXC TUMOR SOFT TISSUE LEG/ANKLE SUBFASCIAL <5CM Left 9/8/2020    Procedure: EXCISION BIOPSY TISSUE LESION/MASS LOWER EXTREMITY;  Surgeon: Al Roberson DO;  Location: [facility];  Service: General    TX OPTX FEM SHFT FX W/INSJ IMED IMPLT W/WO SCREW Left 2/1/2020    Procedure: INSERTION NAIL IM FEMUR ANTEGRADE (TROCHANTERIC);  Surgeon: Julio Thomason MD;  Location: [facility];  Service: Orthopedics    SKIN CANCER EXCISION      SQUAMOUS CELL CARCINOMA EXCISION Left 09/08/2020    lower part of leg    TONSILLECTOMY        Family History   Problem Relation Age of Onset    Colon cancer Mother     Stomach cancer Father       Social History     Tobacco Use    Smoking status: Former     Current packs/day: 0.00     Types: Cigarettes      Start date:      Quit date:      Years since quittin.2    Smokeless tobacco: Never   Vaping Use    Vaping status: Never Used   Substance Use Topics    Alcohol use: Never    Drug use: Not Currently      E-Cigarette/Vaping    E-Cigarette Use Never User       E-Cigarette/Vaping Substances    Nicotine No     THC No     CBD No     Flavoring No     Other No     Unknown No       I have reviewed and agree with the history as documented.     89 year old female with PMH GERD, IBS, lactose intolerance, hiatal hernia, diverticulosis, HTN, CKD, neuropathy, macular degeneration presenting for evaluation of abdominal pain and vomiting.  Pt reports she felt fine yesterday.  Went to bed around midnight.  She reports she woke up around 2 am with vomiting.  She reports she's had several episodes of vomiting since onset.  She reports associated pain in her stomach and points to area around LUQ but states overall having generalized pain.  She reports she did have a bowel movement around 8 am.  Denies constipation or diarrhea.  Denies black or bloody stools.  Denies chest pain, SOB.  Denies dizziness, lightheadedness.  No syncope.  No recent falls.  Denies cough, congestion or recent illness.  Denies any urinary complaints.  She notes generalized arthralgias and neuropathy which she notes has been chronic.  She notes she ate some fish and spaghetti yesterday from her meals on wheels and thought maybe her vomiting initially was from her GERD and hiatal hernia.  She reports chills, no reported fevers.  No known sick contacts.  No recent antibiotic therapy.  No specific aggravating or alleviating factors.  No recent changes in her health or medications.  She did not have any of her usual meds this morning due to vomiting.  She reports not having any to eat or drink.  Prior abdominal surgeries include cholecystectomy, hysterectomy, pelvic reconstruction surgery.      History provided by:  Patient and medical records  Language   used: No    Abdominal Pain  Associated symptoms: chills, fatigue, nausea and vomiting    Associated symptoms: no chest pain, no constipation, no cough, no diarrhea, no dysuria, no fever, no hematuria, no shortness of breath and no sore throat        Review of Systems   Constitutional:  Positive for chills and fatigue. Negative for fever.   HENT: Negative.  Negative for congestion, rhinorrhea and sore throat.    Eyes: Negative.  Negative for visual disturbance.   Respiratory: Negative.  Negative for cough, shortness of breath and wheezing.    Cardiovascular: Negative.  Negative for chest pain, palpitations and leg swelling.   Gastrointestinal:  Positive for abdominal pain, nausea and vomiting. Negative for blood in stool, constipation and diarrhea.   Genitourinary: Negative.  Negative for dysuria, flank pain, frequency and hematuria.   Musculoskeletal:  Positive for arthralgias. Negative for back pain.   Skin: Negative.  Negative for rash.   Neurological: Negative.  Negative for dizziness, syncope, light-headedness and headaches.   Psychiatric/Behavioral: Negative.     All other systems reviewed and are negative.          Objective       ED Triage Vitals   Temperature Pulse Blood Pressure Respirations SpO2 Patient Position - Orthostatic VS   03/08/25 1138 03/08/25 1140 03/08/25 1140 03/08/25 1140 03/08/25 1140 03/08/25 1140   99.6 °F (37.6 °C) (!) 143 143/64 18 92 % Sitting      Temp Source Heart Rate Source BP Location FiO2 (%) Pain Score    03/08/25 1138 03/08/25 1230 03/08/25 1140 -- 03/08/25 1140    Temporal Monitor Left arm  5      Vitals      Date and Time Temp Pulse SpO2 Resp BP Pain Score FACES Pain Rating User   03/08/25 1546 -- -- -- -- -- No Pain -- KF   03/08/25 1439 99 °F (37.2 °C) 102 95 % -- 135/71 -- -- DII   03/08/25 1300 99.3 °F (37.4 °C) 98 94 % 22 125/60 5 -- BW   03/08/25 1253 -- 100 94 % -- -- -- -- KO   03/08/25 1230 99.2 °F (37.3 °C) 106 94 % 20 120/61 5 -- BW   03/08/25 1140 99.6  °F (37.6 °C) 143 92 % 18 143/64 5 -- BW   03/08/25 1138 99.6 °F (37.6 °C) -- -- -- -- -- -- BW            Physical Exam  Vitals and nursing note reviewed.   Constitutional:       General: She is awake. She is not in acute distress.     Appearance: She is well-developed. She is not toxic-appearing or diaphoretic.   HENT:      Head: Normocephalic and atraumatic.      Right Ear: Hearing and external ear normal.      Left Ear: Hearing and external ear normal.      Nose: Nose normal.      Mouth/Throat:      Mouth: Mucous membranes are dry.      Pharynx: Oropharynx is clear. Uvula midline.      Comments: +edentulous  Eyes:      General: Lids are normal. No scleral icterus.     Conjunctiva/sclera: Conjunctivae normal.      Pupils: Pupils are equal, round, and reactive to light.      Comments: +glasses   Neck:      Trachea: Trachea and phonation normal. No tracheal deviation.   Cardiovascular:      Rate and Rhythm: Regular rhythm. Tachycardia present.      Pulses: Normal pulses.           Radial pulses are 2+ on the right side and 2+ on the left side.        Dorsalis pedis pulses are 2+ on the right side and 2+ on the left side.        Posterior tibial pulses are 2+ on the right side and 2+ on the left side.      Heart sounds: Normal heart sounds, S1 normal and S2 normal.   Pulmonary:      Effort: Tachypnea present. No accessory muscle usage or respiratory distress.      Breath sounds: Normal breath sounds. No stridor. No wheezing, rhonchi or rales.   Abdominal:      General: Bowel sounds are normal. There is no distension.      Palpations: Abdomen is soft.      Tenderness: There is generalized abdominal tenderness. There is no right CVA tenderness, left CVA tenderness, guarding or rebound.   Musculoskeletal:         General: No tenderness. Normal range of motion.      Cervical back: Normal range of motion and neck supple.      Right lower leg: No edema.      Left lower leg: No edema.   Skin:     General: Skin is warm and  dry.      Capillary Refill: Capillary refill takes less than 2 seconds.      Findings: No rash.   Neurological:      General: No focal deficit present.      Mental Status: She is alert and oriented to person, place, and time.      GCS: GCS eye subscore is 4. GCS verbal subscore is 5. GCS motor subscore is 6.      Cranial Nerves: No cranial nerve deficit.   Psychiatric:         Mood and Affect: Mood is anxious.         Speech: Speech normal.         Behavior: Behavior normal. Behavior is cooperative.         Results Reviewed       Procedure Component Value Units Date/Time    Lactic acid 2 Hours [[id number]] Collected: 03/08/25 1530    Lab Status: In process Specimen: Blood from Arm, Left Updated: 03/08/25 1535    UA w Reflex to Microscopic w Reflex to Culture [[id number]]     Lab Status: No result Specimen: Urine, Straight Cath     RBC Morphology Reflex Test [[id number]] Collected: 03/08/25 1224    Lab Status: Final result Specimen: Blood from Arm, Right Updated: 03/08/25 1401    CBC and differential [[id number]]  (Abnormal) Collected: 03/08/25 1224    Lab Status: Final result Specimen: Blood from Arm, Right Updated: 03/08/25 1330     WBC 13.56 Thousand/uL      RBC 3.76 Million/uL      Hemoglobin 13.2 g/dL      Hematocrit 39.1 %       fL      MCH 35.1 pg      MCHC 33.8 g/dL      RDW 13.7 %      MPV 10.2 fL      Platelets 348 Thousands/uL     Narrative:      This is an appended report.  These results have been appended to a previously verified report.    Manual Differential(PHLEBS Do Not Order) [[id number]]  (Abnormal) Collected: 03/08/25 1224    Lab Status: Final result Specimen: Blood from Arm, Right Updated: 03/08/25 1330     Segmented % 84 %      Bands % 6 %      Lymphocytes % 2 %      Monocytes % 8 %      Eosinophils % 0 %      Basophils % 0 %      Absolute Neutrophils 12.20 Thousand/uL      Absolute Lymphocytes 0.27 Thousand/uL      Absolute Monocytes 1.08 Thousand/uL      Absolute Eosinophils 0.00 Thousand/uL       Absolute Basophils 0.00 Thousand/uL      Total Counted --     RBC Morphology Present     Platelet Estimate Increased     Anisocytosis Present     Basophilic Stippling Present    FLU/RSV/COVID - if FLU/RSV clinically relevant [[id number]]  (Normal) Collected: 03/08/25 1224    Lab Status: Final result Specimen: Nares from Nasopharyngeal Swab Updated: 03/08/25 1317     SARS-CoV-2 Negative     INFLUENZA A PCR Negative     INFLUENZA B PCR Negative     RSV PCR Negative    Narrative:      This test has been performed using the CoV-2/Flu/RSV plus assay on the [employer] platform. This test has been validated by the  and verified by the performing laboratory.     This test is designed to amplify and detect the following: nucleocapsid (N), envelope (E), and RNA-dependent RNA polymerase (RdRP) genes of the SARS-CoV-2 genome; matrix (M), basic polymerase (PB2), and acidic protein (PA) segments of the influenza A genome; matrix (M) and non-structural protein (NS) segments of the influenza B genome, and the nucleocapsid genes of RSV A and RSV B.     Positive results are indicative of the presence of Flu A, Flu B, RSV, and/or SARS-CoV-2 RNA. Positive results for SARS-CoV-2 or suspected novel influenza should be reported to state, local, or federal health departments according to local reporting requirements.      All results should be assessed in conjunction with clinical presentation and other laboratory markers for clinical management.     FOR PEDIATRIC PATIENTS - copy/paste COVID Guidelines URL to browser: [email or web address]       Procalcitonin [[id number]]  (Abnormal) Collected: 03/08/25 1224    Lab Status: Final result Specimen: Blood from Arm, Right Updated: 03/08/25 1316     Procalcitonin 0.34 ng/ml     HS Troponin 0hr (reflex protocol) [[id number]]  (Normal) Collected: 03/08/25 1224    Lab Status: Final result Specimen: Blood from Arm, Right  Updated: 03/08/25 1315     hs TnI 0hr 5 ng/L     Comprehensive metabolic panel [[id number]]  (Abnormal) Collected: 03/08/25 1224    Lab Status: Final result Specimen: Blood from Arm, Right Updated: 03/08/25 1309     Sodium 139 mmol/L      Potassium 4.1 mmol/L      Chloride 102 mmol/L      CO2 25 mmol/L      ANION GAP 12 mmol/L      BUN 28 mg/dL      Creatinine 0.92 mg/dL      Glucose 157 mg/dL      Calcium 9.5 mg/dL      AST 16 U/L      ALT 15 U/L      Alkaline Phosphatase 81 U/L      Total Protein 7.3 g/dL      Albumin 4.2 g/dL      Total Bilirubin 1.20 mg/dL      eGFR 53 ml/min/1.73sq m     Narrative:      National Kidney Disease Foundation guidelines for Chronic Kidney Disease (CKD):     Stage 1 with normal or high GFR (GFR > 90 mL/min/1.73 square meters)    Stage 2 Mild CKD (GFR = 60-89 mL/min/1.73 square meters)    Stage 3A Moderate CKD (GFR = 45-59 mL/min/1.73 square meters)    Stage 3B Moderate CKD (GFR = 30-44 mL/min/1.73 square meters)    Stage 4 Severe CKD (GFR = 15-29 mL/min/1.73 square meters)    Stage 5 End Stage CKD (GFR <15 mL/min/1.73 square meters)  Note: GFR calculation is accurate only with a steady state creatinine    Magnesium [[id number]]  (Abnormal) Collected: 03/08/25 1224    Lab Status: Final result Specimen: Blood from Arm, Right Updated: 03/08/25 1309     Magnesium 1.6 mg/dL     Lipase [[id number]]  (Normal) Collected: 03/08/25 1224    Lab Status: Final result Specimen: Blood from Arm, Right Updated: 03/08/25 1309     Lipase 15 u/L     Lactic acid [[id number]]  (Abnormal) Collected: 03/08/25 1224    Lab Status: Final result Specimen: Blood from Arm, Right Updated: 03/08/25 1306     LACTIC ACID 2.5 mmol/L     Narrative:      Result may be elevated if tourniquet was used during collection.    Blood culture #2 [[id number]] Collected: 03/08/25 1246    Lab Status: In process Specimen: Blood from Arm, Left Updated: 03/08/25 1250    Protime-INR [[id number]]  (Normal) Collected: 03/08/25 1224    Lab  Status: Final result Specimen: Blood from Arm, Right Updated: 03/08/25 1247     Protime 14.2 seconds      INR 1.05    Narrative:      INR Therapeutic Range    Indication                                             INR Range      Atrial Fibrillation                                               2.0-3.0  Hypercoagulable State                                    2.0.2.3  Left Ventricular Asist Device                            2.0-3.0  Mechanical Heart Valve                                  -    Aortic(with afib, MI, embolism, HF, LA enlargement,    and/or coagulopathy)                                     2.0-3.0 (2.5-3.5)     Mitral                                                             2.5-3.5  Prosthetic/Bioprosthetic Heart Valve               2.0-3.0  Venous thromboembolism (VTE: VT, PE        2.0-3.0    APTT [[id number]]  (Normal) Collected: 03/08/25 1224    Lab Status: Final result Specimen: Blood from Arm, Right Updated: 03/08/25 1247     PTT 30 seconds     Blood culture #1 [[id number]] Collected: 03/08/25 1224    Lab Status: In process Specimen: Blood from Arm, Right Updated: 03/08/25 1234            CT pe study w abdomen pelvis w contrast   Final Interpretation by Bradley Landon Kocher, MD (03/08 1449)      1.  No pulmonary embolus.   2.  Left lower lobe airspace opacity may represent pneumonia versus focal atelectasis. 3-month follow-up chest CT is recommended to ensure resolution.   3.  No acute intra-abdominal inflammatory process.   4.  Colonic diverticulosis without evidence of acute diverticulitis               The study was marked in EPIC for significant notification.      Workstation performed: [id number]         XR chest 1 view portable   Final Interpretation by Bernie Harvey MD (03/08 1446)      No acute cardiopulmonary disease.            Workstation performed: [id number]             ECG 12 Lead Documentation Only    Date/Time: 3/8/2025 11:45 AM    Performed by: Nerissa Coker,  CHEKO  Authorized by: Nerissa Coker PA-C    Indications / Diagnosis:  Abd pain, vomiting  ECG reviewed by me, the ED Provider: yes    Patient location:  ED  Previous ECG:     Previous ECG:  Compared to current    Similarity:  Changes noted    Comparison to cardiac monitor: Yes    Interpretation:     Interpretation: abnormal    Rate:     ECG rate:  133    ECG rate assessment: tachycardic    Rhythm:     Rhythm: sinus tachycardia    QRS:     QRS axis:  Normal    QRS intervals:  Normal  Conduction:     Conduction: normal    ST segments:     ST segments:  Non-specific  T waves:     T waves: non-specific    Comments:      , QRS 78, QT//455      ED Medication and Procedure Management   Prior to Admission Medications   Prescriptions Last Dose Informant Patient Reported? Taking?   ALPRAZolam (XANAX) 0.25 mg tablet 3/7/2025  No Yes   Sig: Take 1 tablet (0.25 mg total) by mouth 3 (three) times a day   Calcium Citrate (CITRACAL PO) 3/7/2025  Yes Yes   Sig: Take 1 tablet by mouth daily.   Cholecalciferol (VITAMIN D PO) 3/7/2025  Yes Yes   Sig: Take 1,000 Units by mouth 2 (two) times a day.   Multiple Vitamin (MULTIVITAMIN) capsule 3/7/2025  Yes Yes   Sig: Take 1 capsule by mouth daily.   acetaminophen (TYLENOL) 500 mg tablet 3/7/2025  Yes Yes   Sig: Take 500 mg by mouth 3 (three) times a day as needed   aluminum-magnesium hydroxide-simethicone (Mirta-Lanta) [id number] mg/30 mL suspension 3/7/2025  Yes Yes   Sig: Take 30 mL by mouth every 4 (four) hours as needed for indigestion or heartburn   cetirizine (ZyrTEC) 5 MG tablet 3/7/2025  No Yes   Sig: Take 1 tablet (5 mg total) by mouth daily   fluticasone (FLONASE) 50 mcg/act nasal spray Unknown  No No   Si spray into each nostril daily   Patient not taking: Reported on 10/3/2024   losartan (COZAAR) 50 mg tablet 3/7/2025  No Yes   Sig: Take 1 tablet (50 mg total) by mouth daily   pantoprazole (PROTONIX) 40 mg tablet 3/7/2025  No Yes   Sig: Take 1 tablet (40  mg total) by mouth daily   sodium chloride (OCEAN) 0.65 % nasal spray Unknown  Yes No   Si spray into each nostril as needed for congestion   vitamin E, tocopherol, 400 units capsule 3/7/2025  Yes Yes   Sig: Take 400 Units by mouth daily      Facility-Administered Medications: None     Current Discharge Medication List        CONTINUE these medications which have NOT CHANGED    Details   acetaminophen (TYLENOL) 500 mg tablet Take 500 mg by mouth 3 (three) times a day as needed      ALPRAZolam (XANAX) 0.25 mg tablet Take 1 tablet (0.25 mg total) by mouth 3 (three) times a day  Qty: 90 tablet, Refills: 0    Associated Diagnoses: Generalized anxiety disorder      aluminum-magnesium hydroxide-simethicone (Mirta-Lanta) [id number] mg/30 mL suspension Take 30 mL by mouth every 4 (four) hours as needed for indigestion or heartburn      Calcium Citrate (CITRACAL PO) Take 1 tablet by mouth daily.      cetirizine (ZyrTEC) 5 MG tablet Take 1 tablet (5 mg total) by mouth daily  Qty: 30 tablet, Refills: 2    Associated Diagnoses: Allergic rhinitis, unspecified seasonality, unspecified trigger      Cholecalciferol (VITAMIN D PO) Take 1,000 Units by mouth 2 (two) times a day.      losartan (COZAAR) 50 mg tablet Take 1 tablet (50 mg total) by mouth daily  Qty: 90 tablet, Refills: 1    Associated Diagnoses: Benign essential hypertension      Multiple Vitamin (MULTIVITAMIN) capsule Take 1 capsule by mouth daily.      pantoprazole (PROTONIX) 40 mg tablet Take 1 tablet (40 mg total) by mouth daily  Qty: 90 tablet, Refills: 1    Associated Diagnoses: Gastroesophageal reflux disease without esophagitis      vitamin E, tocopherol, 400 units capsule Take 400 Units by mouth daily      fluticasone (FLONASE) 50 mcg/act nasal spray 1 spray into each nostril daily  Qty: 15.8 g, Refills: 1    Associated Diagnoses: Seasonal allergic rhinitis due to pollen      sodium chloride (OCEAN) 0.65 % nasal spray 1 spray into each nostril as needed for  "congestion           No discharge procedures on file.  ED SEPSIS DOCUMENTATION   Time reflects when diagnosis was documented in both MDM as applicable and the Disposition within this note       Time User Action Codes Description Comment    3/8/2025  2:10 PM Nerissa Coker [R11.2] Nausea and vomiting     3/8/2025  2:10 PM Nerissa Coker [R10.9] Abdominal pain     3/8/2025  2:10 PM Nerissa Coker [E83.42] Hypomagnesemia     3/8/2025  2:10 PM Nerissa Coker [R65.10] SIRS (systemic inflammatory response syndrome) (HCC)            Initial Sepsis Screening       Row Name 03/08/25 1320                Is the patient's history suggestive of a new or worsening infection? Yes (Proceed)  -KO        Suspected source of infection suspect infection, source unknown  -KO        Indicate SIRS criteria Tachycardia > 90 bpm;Leukocytosis (WBC > 24756 IJL) OR Leukopenia (WBC <4000 IJL) OR Bandemia (WBC >10% bands)  -KO        Are two or more of the above signs & symptoms of infection both present and new to the patient? Yes (Proceed)  -KO        Assess for evidence of organ dysfunction: Are any of the below criteria present within 6 hours of suspected infection and SIRS criteria that are NOT considered to be chronic conditions? Lactate > 2.0  -KO        Date of presentation of severe sepsis --        Time of presentation of severe sepsis --        Sepsis Note: Click \"NEXT\" below (NOT \"close\") to generate sepsis note based on above information. --                  User Key  (r) = Recorded By, (t) = Taken By, (c) = Cosigned By      Initials Name Provider Type    ZAKI Coker PA-C Physician Assistant                       Nerissa Coker PA-C  03/08/25 6036    "

## 2025-03-09 LAB
ALBUMIN SERPL BCG-MCNC: 3.4 G/DL (ref 3.5–5)
ALP SERPL-CCNC: 60 U/L (ref 34–104)
ALT SERPL W P-5'-P-CCNC: 11 U/L (ref 7–52)
ANION GAP SERPL CALCULATED.3IONS-SCNC: 5 MMOL/L (ref 4–13)
AST SERPL W P-5'-P-CCNC: 16 U/L (ref 13–39)
BASOPHILS # BLD AUTO: 0.04 THOUSANDS/ÂΜL (ref 0–0.1)
BASOPHILS NFR BLD AUTO: 1 % (ref 0–1)
BILIRUB SERPL-MCNC: 0.64 MG/DL (ref 0.2–1)
BUN SERPL-MCNC: 22 MG/DL (ref 5–25)
CALCIUM ALBUM COR SERPL-MCNC: 9 MG/DL (ref 8.3–10.1)
CALCIUM SERPL-MCNC: 8.5 MG/DL (ref 8.4–10.2)
CHLORIDE SERPL-SCNC: 100 MMOL/L (ref 96–108)
CO2 SERPL-SCNC: 31 MMOL/L (ref 21–32)
CREAT SERPL-MCNC: 0.93 MG/DL (ref 0.6–1.3)
EOSINOPHIL # BLD AUTO: 0.06 THOUSAND/ÂΜL (ref 0–0.61)
EOSINOPHIL NFR BLD AUTO: 1 % (ref 0–6)
ERYTHROCYTE [DISTWIDTH] IN BLOOD BY AUTOMATED COUNT: 14.3 % (ref 11.6–15.1)
GFR SERPL CREATININE-BSD FRML MDRD: 53 ML/MIN/1.73SQ M
GLUCOSE P FAST SERPL-MCNC: 95 MG/DL (ref 65–99)
GLUCOSE SERPL-MCNC: 95 MG/DL (ref 65–140)
HCT VFR BLD AUTO: 33.3 % (ref 34.8–46.1)
HGB BLD-MCNC: 10.8 G/DL (ref 11.5–15.4)
IMM GRANULOCYTES # BLD AUTO: 0.05 THOUSAND/UL (ref 0–0.2)
IMM GRANULOCYTES NFR BLD AUTO: 1 % (ref 0–2)
L PNEUMO1 AG UR QL IA.RAPID: NEGATIVE
LYMPHOCYTES # BLD AUTO: 1.24 THOUSANDS/ÂΜL (ref 0.6–4.47)
LYMPHOCYTES NFR BLD AUTO: 22 % (ref 14–44)
MAGNESIUM SERPL-MCNC: 2.4 MG/DL (ref 1.9–2.7)
MCH RBC QN AUTO: 35.2 PG (ref 26.8–34.3)
MCHC RBC AUTO-ENTMCNC: 32.4 G/DL (ref 31.4–37.4)
MCV RBC AUTO: 109 FL (ref 82–98)
MONOCYTES # BLD AUTO: 0.75 THOUSAND/ÂΜL (ref 0.17–1.22)
MONOCYTES NFR BLD AUTO: 13 % (ref 4–12)
NEUTROPHILS # BLD AUTO: 3.63 THOUSANDS/ÂΜL (ref 1.85–7.62)
NEUTS SEG NFR BLD AUTO: 62 % (ref 43–75)
NRBC BLD AUTO-RTO: 0 /100 WBCS
PLATELET # BLD AUTO: 249 THOUSANDS/UL (ref 149–390)
PMV BLD AUTO: 10.1 FL (ref 8.9–12.7)
POTASSIUM SERPL-SCNC: 3.7 MMOL/L (ref 3.5–5.3)
PROCALCITONIN SERPL-MCNC: 0.31 NG/ML
PROT SERPL-MCNC: 5.8 G/DL (ref 6.4–8.4)
RBC # BLD AUTO: 3.07 MILLION/UL (ref 3.81–5.12)
S PNEUM AG UR QL: NEGATIVE
SODIUM SERPL-SCNC: 136 MMOL/L (ref 135–147)
WBC # BLD AUTO: 5.77 THOUSAND/UL (ref 4.31–10.16)

## 2025-03-09 PROCEDURE — 85025 COMPLETE CBC W/AUTO DIFF WBC: CPT

## 2025-03-09 PROCEDURE — 84145 PROCALCITONIN (PCT): CPT

## 2025-03-09 PROCEDURE — 80053 COMPREHEN METABOLIC PANEL: CPT

## 2025-03-09 PROCEDURE — 94760 N-INVAS EAR/PLS OXIMETRY 1: CPT

## 2025-03-09 PROCEDURE — 99232 SBSQ HOSP IP/OBS MODERATE 35: CPT | Performed by: FAMILY MEDICINE

## 2025-03-09 PROCEDURE — 83735 ASSAY OF MAGNESIUM: CPT

## 2025-03-09 RX ORDER — ONDANSETRON 2 MG/ML
4 INJECTION INTRAMUSCULAR; INTRAVENOUS EVERY 4 HOURS PRN
Status: DISCONTINUED | OUTPATIENT
Start: 2025-03-09 | End: 2025-03-14 | Stop reason: HOSPADM

## 2025-03-09 RX ORDER — FAMOTIDINE 10 MG/ML
10 INJECTION, SOLUTION INTRAVENOUS
Status: DISCONTINUED | OUTPATIENT
Start: 2025-03-09 | End: 2025-03-11

## 2025-03-09 RX ADMIN — ALUMINUM HYDROXIDE, MAGNESIUM HYDROXIDE, AND DIMETHICONE 30 ML: 200; 20; 200 SUSPENSION ORAL at 14:07

## 2025-03-09 RX ADMIN — ALPRAZOLAM 0.25 MG: 0.25 TABLET ORAL at 17:48

## 2025-03-09 RX ADMIN — ENOXAPARIN SODIUM 40 MG: 40 INJECTION SUBCUTANEOUS at 08:32

## 2025-03-09 RX ADMIN — CEFTRIAXONE 1000 MG: 1 INJECTION, SOLUTION INTRAVENOUS at 14:04

## 2025-03-09 RX ADMIN — AZITHROMYCIN DIHYDRATE 500 MG: 250 TABLET ORAL at 14:04

## 2025-03-09 RX ADMIN — LOSARTAN POTASSIUM 50 MG: 50 TABLET, FILM COATED ORAL at 08:31

## 2025-03-09 RX ADMIN — LORATADINE 5 MG: 10 TABLET ORAL at 08:31

## 2025-03-09 RX ADMIN — FAMOTIDINE 10 MG: 10 INJECTION, SOLUTION INTRAVENOUS at 17:45

## 2025-03-09 RX ADMIN — PANTOPRAZOLE SODIUM 40 MG: 40 TABLET, DELAYED RELEASE ORAL at 07:17

## 2025-03-09 RX ADMIN — ONDANSETRON 4 MG: 2 INJECTION INTRAMUSCULAR; INTRAVENOUS at 17:45

## 2025-03-09 NOTE — UTILIZATION REVIEW
"Initial Clinical Review    Admission: Date/Time/Statement: 3/8/25 AT 1409 OBSERVATION - CONVERTED TO INPATIENT 3/9/25 AT 1143 2ND SEVERE SEPSIS PROBABLE GI SOURCE, ALSO LLL PNEUMONIA + CYSTITIS, HYPOMAGNESIA -  REQUIRES CONTINUED INPATIENT MANAGEMENT AFTER OBSERVATION + > 2 MIDNITES - IV ABX, ELECTROLYTE REPLACEMENT    03/09/25 1143  INPATIENT ADMISSION  Once        Transfer Service: Hospitalist   Question Answer Comment   Level of Care Med Surg    Estimated length of stay More than 2 Midnights    Certification I certify that inpatient services are medically necessary for this patient for a duration of greater than two midnights. See H&P and MD Progress Notes for additional information about the patient's course of treatment.        03/09/25 1143   03/08/25 1406  Place in Observation  (Place in Observation)  Once        Transfer Service: Hospitalist   Question: Level of Care Answer: Med Surg    03/08/25 1409     ED Arrival Information       Expected   -    Arrival   3/8/2025 11:29    Acuity   Urgent              Means of arrival   Wheelchair    Escorted by   Family Member    Service   Hospitalist    Admission type   Emergency              Arrival complaint   vomiting        Chief Complaint   Patient presents with    Abdominal Pain     Vomiting since yesterday bloating and right sided abdominal pain      Initial Presentation:    89 y/o female with PMHx Diverticulitis, GERD, hiatal hernia, Cholecystectomy -  presented to [facility] ED on 3/8/25 2nd acute vomiting and abdominal pain since 2 AM. She hasn't eaten since then and describes emesis as \"all bile\"  In ED -  Temp 99.6     Rm Air SpO2 92 %  Exam:  febrile,  tachycardia.  Decreased bowel sounds  CT c/a/p: no PE, diverticulitis, or acute intra-abd Mg+ 1.6  T-Bili 1.2  Lac acid 2.5  Procalcitonin 0.34  ED Tx: IVF x 1 L, IV Mag So4, IV Rocephin, IV Protonix, IV Zofran  Placed in Observation 3/8/24 at 1409 -    Severe sepsis present on admission  Evidenced by " tachycardia, leukocytosis with concomitant lactic acidosis  Source: suspected GI  CT c/a/p: no PE, diverticulitis, or acute intra-abdominal inflammatory process, but LLL airspace opacity that my represent pneumonia vs atelectasis.  Empiric Rocephin given in the ED - Ceftriaxone/azithromycin  Continue empiric antibiotics, narrow the regimen pending imaging results.  IV fluid bolus given in the ED  Trend procalcitonin, CBC, vitals, lactic acid   Will volume expand and trend lactic acidosis to resolution  Send urine for culture  Anticipated Length of Stay: Patient will be admitted secondary to severe sepsis.     3/9/25 DAY 2:   T max 99.6  SpO2 down to 87 % - maintained on 1 L NC O2  MD Certification Statement: The patient will continue to require additional inpatient hospital stay due to sepsis     Labs 3/9/25: WBC: 5.77, Hb 10.8, Pro Brannon: 0.31, CMP : normal  Urine analysis: wbc:1-2, positive nitrites, small occult blood      Severe sepsis  Possible left lower lobe pneumonia  Acute cystitis with microscopic hematuria    Continue ceftriaxone and azithromycin  Follow-up urine Legionella and pneumococcal antigen  Follow-up blood cultures obtained x 2  Procalcitonin improving and leukocytosis resolved  Patient appears clinically improved    CONVERTED TO INPATIENT 3/9/25 AT 1143 2ND SEVERE SEPSIS PROBABLE GI SOURCE, ALSO LLL PNEUMONIA + CYSTITIS, HYPOMAGNESIA -  REQUIRES CONTINUED INPATIENT MANAGEMENT AFTER OBSERVATION + > 2 MIDNITES - IV ABX, ELECTROLYTE REPLACEMENT     3/10/25 DAY 3:  Has surpassed a 2nd midnight with active treatments and services.  Continue Ceftriaxone plus azithromycin     -abdominal pain and distention with nausea  T max 99    Severe sepsis (HCC)  Severe sepsis on admission. Possible left lower lobe pneumonia vs acute cystitis with microscopic hematuria vs diverticulitis  Labs 3/10/25: WBC: 3.95, Hb 10.2, Pro Brannon: 0.09, CMP : normal  Urine analysis: wbc:1-2, positive nitrites, small occult blood    Urine legionella and Streptococcal pneumo negative  Blood Cultures : no growth      CT CAP: IMPRESSION:  Left lower lobe airspace opacity may represent pneumonia versus focal atelectasis.  No acute intra-abdominal inflammatory process.  Colonic diverticulosis without evidence of acute diverticulitis     Plan:   Continue Ceftriaxone plus azithromycin   Follow sputum cultures  Consult GI  Monitor vitals     ED Treatment-Medication Administration from 03/08/2025 1129 to 03/08/2025 1426         Date/Time Order Dose Route Action     03/08/2025 1218 multi-electrolyte (Plasmalyte-A/Isolyte-S PH 7.4/Normosol-R) IV bolus 1,000 mL 1,000 mL Intravenous New Bag     03/08/2025 1218 ondansetron (ZOFRAN) injection 4 mg 4 mg Intravenous Given     03/08/2025 1218 pantoprazole (PROTONIX) injection 40 mg 40 mg Intravenous Given     03/08/2025 1420 magnesium sulfate 2 g/50 mL IVPB (premix) 2 g 2 g Intravenous New Bag     03/08/2025 1355 cefTRIAXone (ROCEPHIN) IVPB (premix in dextrose) 2,000 mg 50 mL 2,000 mg Intravenous New Bag     03/08/2025 1342 iohexol (OMNIPAQUE) 350 MG/ML injection (MULTI-DOSE) 100 mL 100 mL Intravenous Given     Scheduled Medications:  azithromycin, 500 mg, Oral, Q24H  cefTRIAXone, 1,000 mg, Intravenous, Q24H  enoxaparin, 40 mg, Subcutaneous, Daily  loratadine, 5 mg, Oral, Daily  losartan, 50 mg, Oral, Daily  pantoprazole, 40 mg, Oral, Early Morning    Continuous IV Infusions: NONE    PRN Meds:  acetaminophen, 650 mg, Oral, TID PRN  ALPRAZolam, 0.25 mg, Oral, TID PRN - 3/8 X 1, 3/9 X 1, 3/10 X 2  aluminum-magnesium hydroxide-simethicone, 30 mL, Oral, Q4H PRN - 3/8 X 1, 3/9 X 1  sodium chloride (PF), 3 mL, Intravenous, Once PRN  Ondansetron (ZOFRAN) injection 4 mg, Intravenous, Q4H PRN - 3/9 X 1    ED Triage Vitals   Temperature Pulse Respirations Blood Pressure SpO2 Pain Score   03/08/25 1138 03/08/25 1140 03/08/25 1140 03/08/25 1140 03/08/25 1140 03/08/25 1140   99.6 °F (37.6 °C) (!) 143 18 143/64 92 % 5      Weight (last 2 days)       Date/Time Weight    03/08/25 14:39:40 71 (156.53)    03/08/25 1433 71 (156.53)    03/08/25 1140 71.7 (158)     Vital Signs (last 3 days)      ate/Time Temp Pulse Resp BP MAP (mmHg) SpO2 Calculated FIO2 (%) - Nasal Cannula Nasal Cannula O2 Flow Rate (L/min) O2 Device Patient Position - Orthostatic VS   03/10/25 10:33:02 -- 71 -- 142/67 92 93 % -- -- -- --   03/10/25 1030 -- -- -- -- -- 95 % -- -- None (Room air) --   03/10/25 07:22:49 97.8 °F (36.6 °C) 77 18 119/57 78 95 % 24 1 L/min Nasal cannula Lying   03/09/25 2200 -- -- -- -- -- -- 24 1 L/min Nasal cannula --   03/09/25 21:37:36 97.8 °F (36.6 °C) 85 20 130/72 91 92 % -- -- -- --   03/09/25 2040 -- -- -- -- -- 93 % -- -- None (Room air) --   03/09/25 14:44:35 99 °F (37.2 °C) 76 -- 122/60 81 92 % -- -- -- --   03/09/25 1444 -- -- 18 -- -- -- -- -- None (Room air) Lying   03/09/25 0830 -- 81 -- -- -- 92 % -- -- None (Room air) --   03/09/25 07:13:54 98.6 °F (37 °C) 80 18 117/58 78 99 % 24 1 L/min Nasal cannula Lying   03/08/25 21:36:27 -- 87 -- 120/57 78 93 % -- -- -- --   03/08/25 21:30:19 99.2 °F (37.3 °C) 88 -- 123/55 78 95 % -- -- -- --   03/08/25 1930 -- -- -- -- -- -- 24 1 L/min Nasal cannula --   03/08/25 1629 -- -- -- -- -- 87 % Abnormal  24 1 L/min Nasal cannula --   03/08/25 1623 -- 102 20 -- -- 94 % -- -- None (Room air) --   03/08/25 14:39:40 99 °F (37.2 °C) 102 18 135/71 92 95 % -- -- Nasal cannula Lying   03/08/25 1300 99.3 °F (37.4 °C) 98 22 125/60 86 94 % -- -- None (Room air) Sitting   03/08/25 1253 -- 100 -- -- -- 94 % -- -- -- --   03/08/25 1230 99.2 °F (37.3 °C) 106 Abnormal  20 120/61 82 94 % -- -- None (Room air) Sitting   03/08/25 1147 -- -- -- -- -- -- -- -- None (Room air) --   03/08/25 1140 99.6 °F (37.6 °C) 143 Abnormal  18 143/64 -- 92 % -- -- None (Room air) Sitting   03/08/25 1138 99.6 °F (37.6 °C) -- -- -- -- -- -- -- -- --     I/O 03/09  0701 03/10  0701    Intake   (mL/kg) 385   (5.4) 120   (1.7)     P.O. 385 120    Output 200 --    Urine   (mL/kg/hr) 200   (0.1) --    Net +185 +120      Pertinent Labs/Diagnostic Test Results:   CT pe study w abdomen pelvis w contrast   Final Interpretation  (03/08 1449)      1.  No pulmonary embolus.   2.  Left lower lobe airspace opacity may represent pneumonia versus focal atelectasis. 3-month follow-up chest CT is recommended to ensure resolution.   3.  No acute intra-abdominal inflammatory process.   4.  Colonic diverticulosis without evidence of acute diverticulitis      XR chest 1 view portable   Final Interpretation  (03/08 1443)      No acute cardiopulmonary disease.     Results from last 7 days   Lab Units 03/08/25  1224   SARS-COV-2  Negative     Results from last 7 days   Lab Units 03/10/25  0600 03/09/25  0521 03/08/25  1530 03/08/25  1224   WBC Thousand/uL 3.95* 5.77  --  13.56*   HEMOGLOBIN g/dL 10.2* 10.8*  --  13.2   HEMATOCRIT % 32.2* 33.3*  --  39.1   PLATELETS Thousands/uL 229 249 305 348   TOTAL NEUT ABS Thousands/µL 1.52* 3.63  --   --    BANDS PCT %  --   --   --  6       Results from last 7 days   Lab Units 03/10/25  0600 03/09/25  0521 03/08/25  1224   SODIUM mmol/L 138 136 139   POTASSIUM mmol/L 3.6 3.7 4.1   CHLORIDE mmol/L 105 100 102   CO2 mmol/L 28 31 25   ANION GAP mmol/L 5 5 12   BUN mg/dL 13 22 28*   CREATININE mg/dL 0.77 0.93 0.92   EGFR ml/min/1.73sq m 66 53 53   CALCIUM mg/dL 8.4 8.5 9.5   MAGNESIUM mg/dL 2.2 2.4 1.6*   PHOSPHORUS mg/dL 2.7  --   --      Results from last 7 days   Lab Units 03/10/25  0600 03/09/25  0521 03/08/25  1224   AST U/L 18 16 16   ALT U/L 12 11 15   ALK PHOS U/L 55 60 81   TOTAL PROTEIN g/dL 5.7* 5.8* 7.3   ALBUMIN g/dL 3.3* 3.4* 4.2   TOTAL BILIRUBIN mg/dL 0.37 0.64 1.20*     Results from last 7 days   Lab Units 03/10/25  0600 03/09/25  0521 03/08/25  1224   GLUCOSE RANDOM mg/dL 86 95 157*       Results from last 7 days   Lab Units 03/08/25  1224   HS TNI 0HR ng/L 5     Results from last 7 days   Lab Units  03/08/25  1224   PROTIME seconds 14.2   INR  1.05   PTT seconds 30     Results from last 7 days   Lab Units 03/10/25  0600 03/09/25  0521 03/08/25  1224   PROCALCITONIN ng/ml 0.09 0.31* 0.34*     Results from last 7 days   Lab Units 03/08/25  1530 03/08/25  1224   LACTIC ACID mmol/L 1.6 2.5*       Results from last 7 days   Lab Units 03/08/25  1224   LIPASE u/L 15     Results from last 7 days   Lab Units 03/08/25  1712   CLARITY UA  Slightly Cloudy   COLOR UA  Yellow   SPEC GRAV UA  <1.005*   PH UA  6.5   GLUCOSE UA mg/dl Negative   KETONES UA mg/dl Negative   BLOOD UA  Small*   PROTEIN UA mg/dl Trace*   NITRITE UA  Positive*   BILIRUBIN UA  Negative   UROBILINOGEN UA (BE) mg/dl <2.0   LEUKOCYTES UA  Negative   WBC UA /hpf 1-2   RBC UA /hpf 1-2   BACTERIA UA /hpf Occasional   EPITHELIAL CELLS WET PREP /hpf Occasional     Results from last 7 days   Lab Units 03/08/25  1711 03/08/25  1224   STREP PNEUMONIAE ANTIGEN, URINE  Negative  --    LEGIONELLA URINARY ANTIGEN  Negative  --    INFLUENZA A PCR   --  Negative   INFLUENZA B PCR   --  Negative   RSV PCR   --  Negative       Results from last 7 days   Lab Units 03/08/25  1246 03/08/25  1224   BLOOD CULTURE  No Growth at 24 hrs. No Growth at 24 hrs.         Past Medical History:   Diagnosis Date    Diverticulosis     Fall 02/01/2020    GERD (gastroesophageal reflux disease)     Hiatal hernia     Irritable bowel disease     Lactose intolerance     Macular cyst, hole, or pseudohole, left eye 10/27/2017    Osteoporosis      Present on Admission:   Severe sepsis (HCC)   GERD (gastroesophageal reflux disease)   Anxiety   Benign essential hypertension   Hypomagnesemia    Admitting Diagnosis: Hypomagnesemia [E83.42]  Vomiting [R11.10]  Abdominal pain [R10.9]  Nausea and vomiting [R11.2]  SIRS (systemic inflammatory response syndrome) (HCC) [R65.10]    Age/Sex: 89 y.o. female    Network Utilization Review Department  ATTENTION: Please call with any questions or concerns to  [phone] and carefully listen to the prompts so that you are directed to the right person. All voicemails are confidential.   For Discharge needs, contact Care Management DC Support Team at [phone] opt. 2  Send all requests for admission clinical reviews, approved or denied determinations and any other requests to dedicated fax number below belonging to the campus where the patient is receiving treatment. List of dedicated fax numbers for the Facilities:  FACILITY NAME UR FAX NUMBER   ADMISSION DENIALS (Administrative/Medical Necessity) [phone]   DISCHARGE SUPPORT TEAM (NETWORK) [phone]   PARENT CHILD HEALTH (Maternity/NICU/Pediatrics) [phone]   [facility] [phone]   [facility] [phone]   [facility] [phone]   [facility] [phone]   [facility] [phone]   [facility] [phone]   [facility] [phone]   [facility] [phone]   [facility] [phone]   [facility] [phone]   [facility] [phone]   [facility] [phone]

## 2025-03-09 NOTE — ASSESSMENT & PLAN NOTE
Severe sepsis on admission. Tachycardia, leukocytosis and elevated Lactic acid and pro calcitonin.   Labs 3/9/25: WBC: 5.77, Hb 10.8, Pro Brannon: 0.31, CMP : normal  Urine analysis: wbc:1-2, positive nitrites, small occult blood     CT TAP: IMPRESSION:  Left lower lobe airspace opacity may represent pneumonia versus focal atelectasis.  No acute intra-abdominal inflammatory process.  Colonic diverticulosis without evidence of acute diverticulitis    Plan:   Continue Ceftriaxone plus azithromycin   Follow blood cultures and sputum cultures  Follow urine Legionella and pneumococcal antigen  Monitor vitals

## 2025-03-09 NOTE — PLAN OF CARE
Problem: PAIN - ADULT  Goal: Verbalizes/displays adequate comfort level or baseline comfort level  Description: Interventions:  - Encourage patient to monitor pain and request assistance  - Assess pain using appropriate pain scale  - Administer analgesics based on type and severity of pain and evaluate response  - Implement non-pharmacological measures as appropriate and evaluate response  - Consider cultural and social influences on pain and pain management  - Notify physician/advanced practitioner if interventions unsuccessful or patient reports new pain  Outcome: Progressing     Problem: INFECTION - ADULT  Goal: Absence or prevention of progression during hospitalization  Description: INTERVENTIONS:  - Assess and monitor for signs and symptoms of infection  - Monitor lab/diagnostic results  - Monitor all insertion sites, i.e. indwelling lines, tubes, and drains  - Monitor endotracheal if appropriate and nasal secretions for changes in amount and color  - Prentice appropriate cooling/warming therapies per order  - Administer medications as ordered  - Instruct and encourage patient and family to use good hand hygiene technique  - Identify and instruct in appropriate isolation precautions for identified infection/condition  Outcome: Progressing  Goal: Absence of fever/infection during neutropenic period  Description: INTERVENTIONS:  - Monitor WBC    Outcome: Progressing     Problem: SAFETY ADULT  Goal: Patient will remain free of falls  Description: INTERVENTIONS:  - Educate patient/family on patient safety including physical limitations  - Instruct patient to call for assistance with activity   - Consult OT/PT to assist with strengthening/mobility   - Keep Call bell within reach  - Keep bed low and locked with side rails adjusted as appropriate  - Keep care items and personal belongings within reach  - Initiate and maintain comfort rounds  - Make Fall Risk Sign visible to staff  - Offer Toileting every 2 Hours,  in advance of need  - Initiate/Maintain bed/chair alarm  - Obtain necessary fall risk management equipment: non skid socks  - Apply yellow socks and bracelet for high fall risk patients  - Consider moving patient to room near nurses station  Outcome: Progressing  Goal: Maintain or return to baseline ADL function  Description: INTERVENTIONS:  -  Assess patient's ability to carry out ADLs; assess patient's baseline for ADL function and identify physical deficits which impact ability to perform ADLs (bathing, care of mouth/teeth, toileting, grooming, dressing, etc.)  - Assess/evaluate cause of self-care deficits   - Assess range of motion  - Assess patient's mobility; develop plan if impaired  - Assess patient's need for assistive devices and provide as appropriate  - Encourage maximum independence but intervene and supervise when necessary  - Involve family in performance of ADLs  - Assess for home care needs following discharge   - Consider OT consult to assist with ADL evaluation and planning for discharge  - Provide patient education as appropriate  Outcome: Progressing  Goal: Maintains/Returns to pre admission functional level  Description: INTERVENTIONS:  - Perform AM-PAC 6 Click Basic Mobility/ Daily Activity assessment daily.  - Set and communicate daily mobility goal to care team and patient/family/caregiver.   - Collaborate with rehabilitation services on mobility goals if consulted  - Perform Range of Motion 3 times a day.  - Reposition patient every 2 hours.  - Dangle patient 3 times a day  - Stand patient 3 times a day  - Ambulate patient 3 times a day  - Out of bed to chair 3 times a day   - Out of bed for meals 3 times a day  - Out of bed for toileting  - Record patient progress and toleration of activity level   Outcome: Progressing     Problem: DISCHARGE PLANNING  Goal: Discharge to home or other facility with appropriate resources  Description: INTERVENTIONS:  - Identify barriers to discharge  w/patient and caregiver  - Arrange for needed discharge resources and transportation as appropriate  - Identify discharge learning needs (meds, wound care, etc.)  - Arrange for interpretive services to assist at discharge as needed  - Refer to Case Management Department for coordinating discharge planning if the patient needs post-hospital services based on physician/advanced practitioner order or complex needs related to functional status, cognitive ability, or social support system  Outcome: Progressing     Problem: Knowledge Deficit  Goal: Patient/family/caregiver demonstrates understanding of disease process, treatment plan, medications, and discharge instructions  Description: Complete learning assessment and assess knowledge base.  Interventions:  - Provide teaching at level of understanding  - Provide teaching via preferred learning methods  Outcome: Progressing     Problem: NEUROSENSORY - ADULT  Goal: Achieves stable or improved neurological status  Description: INTERVENTIONS  - Monitor and report changes in neurological status  - Monitor vital signs such as temperature, blood pressure, glucose, and any other labs ordered   - Initiate measures to prevent increased intracranial pressure  - Monitor for seizure activity and implement precautions if appropriate      Outcome: Progressing  Goal: Remains free of injury related to seizures activity  Description: INTERVENTIONS  - Maintain airway, patient safety  and administer oxygen as ordered  - Monitor patient for seizure activity, document and report duration and description of seizure to physician/advanced practitioner  - If seizure occurs,  ensure patient safety during seizure  - Reorient patient post seizure  - Seizure pads on all 4 side rails  - Instruct patient/family to notify RN of any seizure activity including if an aura is experienced  - Instruct patient/family to call for assistance with activity based on nursing assessment  - Administer anti-seizure  medications if ordered    Outcome: Progressing  Goal: Achieves maximal functionality and self care  Description: INTERVENTIONS  - Monitor swallowing and airway patency with patient fatigue and changes in neurological status  - Encourage and assist patient to increase activity and self care.   - Encourage visually impaired, hearing impaired and aphasic patients to use assistive/communication devices  Outcome: Progressing     Problem: CARDIOVASCULAR - ADULT  Goal: Maintains optimal cardiac output and hemodynamic stability  Description: INTERVENTIONS:  - Monitor I/O, vital signs and rhythm  - Monitor for S/S and trends of decreased cardiac output  - Administer and titrate ordered vasoactive medications to optimize hemodynamic stability  - Assess quality of pulses, skin color and temperature  - Assess for signs of decreased coronary artery perfusion  - Instruct patient to report change in severity of symptoms  Outcome: Progressing  Goal: Absence of cardiac dysrhythmias or at baseline rhythm  Description: INTERVENTIONS:  - Continuous cardiac monitoring, vital signs, obtain 12 lead EKG if ordered  - Administer antiarrhythmic and heart rate control medications as ordered  - Monitor electrolytes and administer replacement therapy as ordered  Outcome: Progressing     Problem: RESPIRATORY - ADULT  Goal: Achieves optimal ventilation and oxygenation  Description: INTERVENTIONS:  - Assess for changes in respiratory status  - Assess for changes in mentation and behavior  - Position to facilitate oxygenation and minimize respiratory effort  - Oxygen administered by appropriate delivery if ordered  - Initiate smoking cessation education as indicated  - Encourage broncho-pulmonary hygiene including cough, deep breathe, Incentive Spirometry  - Assess the need for suctioning and aspirate as needed  - Assess and instruct to report SOB or any respiratory difficulty  - Respiratory Therapy support as indicated  Outcome: Progressing      Problem: GASTROINTESTINAL - ADULT  Goal: Minimal or absence of nausea and/or vomiting  Description: INTERVENTIONS:  - Administer IV fluids if ordered to ensure adequate hydration  - Maintain NPO status until nausea and vomiting are resolved  - Nasogastric tube if ordered  - Administer ordered antiemetic medications as needed  - Provide nonpharmacologic comfort measures as appropriate  - Advance diet as tolerated, if ordered  - Consider nutrition services referral to assist patient with adequate nutrition and appropriate food choices  Outcome: Progressing  Goal: Maintains or returns to baseline bowel function  Description: INTERVENTIONS:  - Assess bowel function  - Encourage oral fluids to ensure adequate hydration  - Administer IV fluids if ordered to ensure adequate hydration  - Administer ordered medications as needed  - Encourage mobilization and activity  - Consider nutritional services referral to assist patient with adequate nutrition and appropriate food choices  Outcome: Progressing  Goal: Maintains adequate nutritional intake  Description: INTERVENTIONS:  - Monitor percentage of each meal consumed  - Identify factors contributing to decreased intake, treat as appropriate  - Assist with meals as needed  - Monitor I&O, weight, and lab values if indicated  - Obtain nutrition services referral as needed  Outcome: Progressing  Goal: Establish and maintain optimal ostomy function  Description: INTERVENTIONS:  - Assess bowel function  - Encourage oral fluids to ensure adequate hydration  - Administer IV fluids if ordered to ensure adequate hydration   - Administer ordered medications as needed  - Encourage mobilization and activity  - Nutrition services referral to assist patient with appropriate food choices  - Assess stoma site  - Consider wound care consult   Outcome: Progressing  Goal: Oral mucous membranes remain intact  Description: INTERVENTIONS  - Assess oral mucosa and hygiene practices  - Implement  preventative oral hygiene regimen  - Implement oral medicated treatments as ordered  - Initiate Nutrition services referral as needed  Outcome: Progressing     Problem: GENITOURINARY - ADULT  Goal: Maintains or returns to baseline urinary function  Description: INTERVENTIONS:  - Assess urinary function  - Encourage oral fluids to ensure adequate hydration if ordered  - Administer IV fluids as ordered to ensure adequate hydration  - Administer ordered medications as needed  - Offer frequent toileting  - Follow urinary retention protocol if ordered  Outcome: Progressing  Goal: Absence of urinary retention  Description: INTERVENTIONS:  - Assess patient’s ability to void and empty bladder  - Monitor I/O  - Bladder scan as needed  - Discuss with physician/AP medications to alleviate retention as needed  - Discuss catheterization for long term situations as appropriate  Outcome: Progressing  Goal: Urinary catheter remains patent  Description: INTERVENTIONS:  - Assess patency of urinary catheter  - If patient has a chronic nguyen, consider changing catheter if non-functioning  - Follow guidelines for intermittent irrigation of non-functioning urinary catheter  Outcome: Progressing     Problem: METABOLIC, FLUID AND ELECTROLYTES - ADULT  Goal: Electrolytes maintained within normal limits  Description: INTERVENTIONS:  - Monitor labs and assess patient for signs and symptoms of electrolyte imbalances  - Administer electrolyte replacement as ordered  - Monitor response to electrolyte replacements, including repeat lab results as appropriate  - Instruct patient on fluid and nutrition as appropriate  Outcome: Progressing  Goal: Fluid balance maintained  Description: INTERVENTIONS:  - Monitor labs   - Monitor I/O and WT  - Instruct patient on fluid and nutrition as appropriate  - Assess for signs & symptoms of volume excess or deficit  Outcome: Progressing  Goal: Glucose maintained within target range  Description:  INTERVENTIONS:  - Monitor Blood Glucose as ordered  - Assess for signs and symptoms of hyperglycemia and hypoglycemia  - Administer ordered medications to maintain glucose within target range  - Assess nutritional intake and initiate nutrition service referral as needed  Outcome: Progressing     Problem: SKIN/TISSUE INTEGRITY - ADULT  Goal: Skin Integrity remains intact(Skin Breakdown Prevention)  Description: Assess:  -Perform Mando assessment every shift  -Clean and moisturize skin every shift  -Inspect skin when repositioning, toileting, and assisting with ADLS  -Assess extremities for adequate circulation and sensation     Bed Management:  -Have minimal linens on bed & keep smooth, unwrinkled  -Change linens as needed when moist or perspiring  -Avoid sitting or lying in one position for more than 2 hours while in bed  -Keep HOB at 30degrees     Toileting:  -Offer bedside commode  -Assess for incontinence every shift  -Use incontinent care products after each incontinent episode such as jim wipes    Activity:  -Mobilize patient 3 times a day  -Encourage activity and walks on unit  -Encourage or provide ROM exercises   -Turn and reposition patient every 2 Hours  -Use appropriate equipment to lift or move patient in bed  -Instruct/ Assist with weight shifting every 60 mins when out of bed in chair  -Consider limitation of chair time 6 hour intervals    Skin Care:  -Avoid use of baby powder, tape, friction and shearing, hot water or constrictive clothing  -Relieve pressure over bony prominences using foam wedges/pillows  -Do not massage red bony areas    Outcome: Progressing  Goal: Pressure injury heals and does not worsen  Description: Interventions:  - Implement low air loss mattress or specialty surface (Criteria met)  - Apply silicone foam dressing  - Instruct/assist with weight shifting every 60 minutes when in chair   - Limit chair time to 6 hour intervals  - Use special pressure reducing interventions such  as waffle cushion when in chair   - Apply fecal or urinary incontinence containment device   - Perform passive or active ROM every 8 hours  - Turn and reposition patient & offload bony prominences every 2 hours   - Utilize friction reducing device or surface for transfers   - Use incontinent care products after each incontinent episode such as jim wipes  - Consider nutrition services referral as needed  Outcome: Progressing     Problem: HEMATOLOGIC - ADULT  Goal: Maintains hematologic stability  Description: INTERVENTIONS  - Assess for signs and symptoms of bleeding or hemorrhage  - Monitor labs  - Administer supportive blood products/factors as ordered and appropriate  Outcome: Progressing     Problem: MUSCULOSKELETAL - ADULT  Goal: Maintain or return mobility to safest level of function  Description: INTERVENTIONS:  - Assess patient's ability to carry out ADLs; assess patient's baseline for ADL function and identify physical deficits which impact ability to perform ADLs (bathing, care of mouth/teeth, toileting, grooming, dressing, etc.)  - Assess/evaluate cause of self-care deficits   - Assess range of motion  - Assess patient's mobility  - Assess patient's need for assistive devices and provide as appropriate  - Encourage maximum independence but intervene and supervise when necessary  - Involve family in performance of ADLs  - Assess for home care needs following discharge   - Consider OT consult to assist with ADL evaluation and planning for discharge  - Provide patient education as appropriate  Outcome: Progressing  Goal: Maintain proper alignment of affected body part  Description: INTERVENTIONS:  - Support, maintain and protect limb and body alignment  - Provide patient/ family with appropriate education  Outcome: Progressing

## 2025-03-09 NOTE — PLAN OF CARE
Problem: PAIN - ADULT  Goal: Verbalizes/displays adequate comfort level or baseline comfort level  Description: Interventions:  - Encourage patient to monitor pain and request assistance  - Assess pain using appropriate pain scale  - Administer analgesics based on type and severity of pain and evaluate response  - Implement non-pharmacological measures as appropriate and evaluate response  - Consider cultural and social influences on pain and pain management  - Notify physician/advanced practitioner if interventions unsuccessful or patient reports new pain  3/8/2025 2102 by Christophe Patel RN  Outcome: Progressing  3/8/2025 2100 by Christophe Patel RN  Outcome: Progressing     Problem: INFECTION - ADULT  Goal: Absence or prevention of progression during hospitalization  Description: INTERVENTIONS:  - Assess and monitor for signs and symptoms of infection  - Monitor lab/diagnostic results  - Monitor all insertion sites, i.e. indwelling lines, tubes, and drains  - Monitor endotracheal if appropriate and nasal secretions for changes in amount and color  - [place] appropriate cooling/warming therapies per order  - Administer medications as ordered  - Instruct and encourage patient and family to use good hand hygiene technique  - Identify and instruct in appropriate isolation precautions for identified infection/condition  3/8/2025 2102 by Christophe Patel RN  Outcome: Progressing  3/8/2025 2100 by Christophe Patel RN  Outcome: Progressing  Goal: Absence of fever/infection during neutropenic period  Description: INTERVENTIONS:  - Monitor WBC    3/8/2025 2102 by Christophe Patel RN  Outcome: Progressing  3/8/2025 2100 by Christophe Patel RN  Outcome: Progressing     Problem: SAFETY ADULT  Goal: Patient will remain free of falls  Description: INTERVENTIONS:  - Educate patient/family on patient safety including physical limitations  - Instruct patient to call for assistance with activity   - Consult OT/PT to assist with  strengthening/mobility   - Keep Call bell within reach  - Keep bed low and locked with side rails adjusted as appropriate  - Keep care items and personal belongings within reach  - Initiate and maintain comfort rounds  - Make Fall Risk Sign visible to staff  - Offer Toileting every 2 Hours, in advance of need  - Initiate/Maintain bed/chair alarm  - Obtain necessary fall risk management equipment: non skid socks  - Apply yellow socks and bracelet for high fall risk patients  - Consider moving patient to room near nurses station  3/8/2025 2102 by Christophe Patel RN  Outcome: Progressing  3/8/2025 2100 by Christophe Patel RN  Outcome: Progressing  Goal: Maintain or return to baseline ADL function  Description: INTERVENTIONS:  -  Assess patient's ability to carry out ADLs; assess patient's baseline for ADL function and identify physical deficits which impact ability to perform ADLs (bathing, care of mouth/teeth, toileting, grooming, dressing, etc.)  - Assess/evaluate cause of self-care deficits   - Assess range of motion  - Assess patient's mobility; develop plan if impaired  - Assess patient's need for assistive devices and provide as appropriate  - Encourage maximum independence but intervene and supervise when necessary  - Involve family in performance of ADLs  - Assess for home care needs following discharge   - Consider OT consult to assist with ADL evaluation and planning for discharge  - Provide patient education as appropriate  3/8/2025 2102 by Christophe Patel RN  Outcome: Progressing  3/8/2025 2100 by Christophe Patel RN  Outcome: Progressing  Goal: Maintains/Returns to pre admission functional level  Description: INTERVENTIONS:  - Perform AM-PAC 6 Click Basic Mobility/ Daily Activity assessment daily.  - Set and communicate daily mobility goal to care team and patient/family/caregiver.   - Collaborate with rehabilitation services on mobility goals if consulted  - Perform Range of Motion 3 times a day.  -  Reposition patient every 2 hours.  - Dangle patient 3 times a day  - Stand patient 3 times a day  - Ambulate patient 3 times a day  - Out of bed to chair 3 times a day   - Out of bed for meals 3 times a day  - Out of bed for toileting  - Record patient progress and toleration of activity level   3/8/2025 2102 by Christophe Patel RN  Outcome: Progressing  3/8/2025 2100 by Christophe Patel RN  Outcome: Progressing     Problem: DISCHARGE PLANNING  Goal: Discharge to home or other facility with appropriate resources  Description: INTERVENTIONS:  - Identify barriers to discharge w/patient and caregiver  - Arrange for needed discharge resources and transportation as appropriate  - Identify discharge learning needs (meds, wound care, etc.)  - Arrange for interpretive services to assist at discharge as needed  - Refer to Case Management Department for coordinating discharge planning if the patient needs post-hospital services based on physician/advanced practitioner order or complex needs related to functional status, cognitive ability, or social support system  3/8/2025 2102 by Christophe Patel RN  Outcome: Progressing  3/8/2025 2100 by Christophe Patel RN  Outcome: Progressing     Problem: Knowledge Deficit  Goal: Patient/family/caregiver demonstrates understanding of disease process, treatment plan, medications, and discharge instructions  Description: Complete learning assessment and assess knowledge base.  Interventions:  - Provide teaching at level of understanding  - Provide teaching via preferred learning methods  3/8/2025 2102 by Christophe Patel RN  Outcome: Progressing  3/8/2025 2100 by Christophe Patel RN  Outcome: Progressing     Problem: NEUROSENSORY - ADULT  Goal: Achieves stable or improved neurological status  Description: INTERVENTIONS  - Monitor and report changes in neurological status  - Monitor vital signs such as temperature, blood pressure, glucose, and any other labs ordered   - Initiate measures to  prevent increased intracranial pressure  - Monitor for seizure activity and implement precautions if appropriate      3/8/2025 2102 by Christophe Patel RN  Outcome: Progressing  3/8/2025 2100 by Christophe Patel RN  Outcome: Progressing  Goal: Remains free of injury related to seizures activity  Description: INTERVENTIONS  - Maintain airway, patient safety  and administer oxygen as ordered  - Monitor patient for seizure activity, document and report duration and description of seizure to physician/advanced practitioner  - If seizure occurs,  ensure patient safety during seizure  - Reorient patient post seizure  - Seizure pads on all 4 side rails  - Instruct patient/family to notify RN of any seizure activity including if an aura is experienced  - Instruct patient/family to call for assistance with activity based on nursing assessment  - Administer anti-seizure medications if ordered    3/8/2025 2102 by Christophe Patel RN  Outcome: Progressing  3/8/2025 2100 by Christophe Patel RN  Outcome: Progressing  Goal: Achieves maximal functionality and self care  Description: INTERVENTIONS  - Monitor swallowing and airway patency with patient fatigue and changes in neurological status  - Encourage and assist patient to increase activity and self care.   - Encourage visually impaired, hearing impaired and aphasic patients to use assistive/communication devices  3/8/2025 2102 by Christophe Patel RN  Outcome: Progressing  3/8/2025 2100 by Christophe Patel RN  Outcome: Progressing     Problem: CARDIOVASCULAR - ADULT  Goal: Maintains optimal cardiac output and hemodynamic stability  Description: INTERVENTIONS:  - Monitor I/O, vital signs and rhythm  - Monitor for S/S and trends of decreased cardiac output  - Administer and titrate ordered vasoactive medications to optimize hemodynamic stability  - Assess quality of pulses, skin color and temperature  - Assess for signs of decreased coronary artery perfusion  - Instruct patient to  report change in severity of symptoms  3/8/2025 2102 by Christophe Patel RN  Outcome: Progressing  3/8/2025 2100 by Christophe Patel RN  Outcome: Progressing  Goal: Absence of cardiac dysrhythmias or at baseline rhythm  Description: INTERVENTIONS:  - Continuous cardiac monitoring, vital signs, obtain 12 lead EKG if ordered  - Administer antiarrhythmic and heart rate control medications as ordered  - Monitor electrolytes and administer replacement therapy as ordered  3/8/2025 2102 by Christophe Patel RN  Outcome: Progressing  3/8/2025 2100 by Christophe Patel RN  Outcome: Progressing     Problem: RESPIRATORY - ADULT  Goal: Achieves optimal ventilation and oxygenation  Description: INTERVENTIONS:  - Assess for changes in respiratory status  - Assess for changes in mentation and behavior  - Position to facilitate oxygenation and minimize respiratory effort  - Oxygen administered by appropriate delivery if ordered  - Initiate smoking cessation education as indicated  - Encourage broncho-pulmonary hygiene including cough, deep breathe, Incentive Spirometry  - Assess the need for suctioning and aspirate as needed  - Assess and instruct to report SOB or any respiratory difficulty  - Respiratory Therapy support as indicated  3/8/2025 2102 by Chritsophe Patel RN  Outcome: Progressing  3/8/2025 2100 by Christophe Patel RN  Outcome: Progressing     Problem: GASTROINTESTINAL - ADULT  Goal: Minimal or absence of nausea and/or vomiting  Description: INTERVENTIONS:  - Administer IV fluids if ordered to ensure adequate hydration  - Maintain NPO status until nausea and vomiting are resolved  - Nasogastric tube if ordered  - Administer ordered antiemetic medications as needed  - Provide nonpharmacologic comfort measures as appropriate  - Advance diet as tolerated, if ordered  - Consider nutrition services referral to assist patient with adequate nutrition and appropriate food choices  3/8/2025 2102 by Christophe Patel RN  Outcome:  Progressing  3/8/2025 2100 by Christophe Patel RN  Outcome: Progressing  Goal: Maintains or returns to baseline bowel function  Description: INTERVENTIONS:  - Assess bowel function  - Encourage oral fluids to ensure adequate hydration  - Administer IV fluids if ordered to ensure adequate hydration  - Administer ordered medications as needed  - Encourage mobilization and activity  - Consider nutritional services referral to assist patient with adequate nutrition and appropriate food choices  3/8/2025 2102 by Christophe Patel RN  Outcome: Progressing  3/8/2025 2100 by Christophe Patel RN  Outcome: Progressing  Goal: Maintains adequate nutritional intake  Description: INTERVENTIONS:  - Monitor percentage of each meal consumed  - Identify factors contributing to decreased intake, treat as appropriate  - Assist with meals as needed  - Monitor I&O, weight, and lab values if indicated  - Obtain nutrition services referral as needed  3/8/2025 2102 by Christophe Patel RN  Outcome: Progressing  3/8/2025 2100 by Christophe Patel RN  Outcome: Progressing  Goal: Establish and maintain optimal ostomy function  Description: INTERVENTIONS:  - Assess bowel function  - Encourage oral fluids to ensure adequate hydration  - Administer IV fluids if ordered to ensure adequate hydration   - Administer ordered medications as needed  - Encourage mobilization and activity  - Nutrition services referral to assist patient with appropriate food choices  - Assess stoma site  - Consider wound care consult   3/8/2025 2102 by Christophe Patel RN  Outcome: Progressing  3/8/2025 2100 by Christophe Patel RN  Outcome: Progressing  Goal: Oral mucous membranes remain intact  Description: INTERVENTIONS  - Assess oral mucosa and hygiene practices  - Implement preventative oral hygiene regimen  - Implement oral medicated treatments as ordered  - Initiate Nutrition services referral as needed  3/8/2025 2102 by Christophe Patel RN  Outcome: Progressing  3/8/2025 2100  by Christophe Stubaus, RN  Outcome: Progressing     Problem: GENITOURINARY - ADULT  Goal: Maintains or returns to baseline urinary function  Description: INTERVENTIONS:  - Assess urinary function  - Encourage oral fluids to ensure adequate hydration if ordered  - Administer IV fluids as ordered to ensure adequate hydration  - Administer ordered medications as needed  - Offer frequent toileting  - Follow urinary retention protocol if ordered  3/8/2025 2102 by Christophe Patel RN  Outcome: Progressing  3/8/2025 2100 by Christophe Patel RN  Outcome: Progressing  Goal: Absence of urinary retention  Description: INTERVENTIONS:  - Assess patient’s ability to void and empty bladder  - Monitor I/O  - Bladder scan as needed  - Discuss with physician/AP medications to alleviate retention as needed  - Discuss catheterization for long term situations as appropriate  3/8/2025 2102 by Christophe Patel RN  Outcome: Progressing  3/8/2025 2100 by Christophe Patel RN  Outcome: Progressing  Goal: Urinary catheter remains patent  Description: INTERVENTIONS:  - Assess patency of urinary catheter  - If patient has a chronic nguyen, consider changing catheter if non-functioning  - Follow guidelines for intermittent irrigation of non-functioning urinary catheter  3/8/2025 2102 by Christophe Patel RN  Outcome: Progressing  3/8/2025 2100 by Christophe Patel RN  Outcome: Progressing     Problem: METABOLIC, FLUID AND ELECTROLYTES - ADULT  Goal: Electrolytes maintained within normal limits  Description: INTERVENTIONS:  - Monitor labs and assess patient for signs and symptoms of electrolyte imbalances  - Administer electrolyte replacement as ordered  - Monitor response to electrolyte replacements, including repeat lab results as appropriate  - Instruct patient on fluid and nutrition as appropriate  3/8/2025 2102 by Christophe Patel RN  Outcome: Progressing  3/8/2025 2100 by Christophe Patel RN  Outcome: Progressing  Goal: Fluid balance  maintained  Description: INTERVENTIONS:  - Monitor labs   - Monitor I/O and WT  - Instruct patient on fluid and nutrition as appropriate  - Assess for signs & symptoms of volume excess or deficit  3/8/2025 2102 by Christophe Patel RN  Outcome: Progressing  3/8/2025 2100 by Christophe Patel RN  Outcome: Progressing  Goal: Glucose maintained within target range  Description: INTERVENTIONS:  - Monitor Blood Glucose as ordered  - Assess for signs and symptoms of hyperglycemia and hypoglycemia  - Administer ordered medications to maintain glucose within target range  - Assess nutritional intake and initiate nutrition service referral as needed  3/8/2025 2102 by Christophe Patel RN  Outcome: Progressing  3/8/2025 2100 by Christophe Patel RN  Outcome: Progressing     Problem: SKIN/TISSUE INTEGRITY - ADULT  Goal: Skin Integrity remains intact(Skin Breakdown Prevention)  Description: Assess:  -Perform Mando assessment every shift  -Clean and moisturize skin every shift  -Inspect skin when repositioning, toileting, and assisting with ADLS  -Assess extremities for adequate circulation and sensation     Bed Management:  -Have minimal linens on bed & keep smooth, unwrinkled  -Change linens as needed when moist or perspiring  -Avoid sitting or lying in one position for more than 2 hours while in bed  -Keep HOB at 30degrees     Toileting:  -Offer bedside commode  -Assess for incontinence every shift  -Use incontinent care products after each incontinent episode such as jim wipes    Activity:  -Mobilize patient 3 times a day  -Encourage activity and walks on unit  -Encourage or provide ROM exercises   -Turn and reposition patient every 2 Hours  -Use appropriate equipment to lift or move patient in bed  -Instruct/ Assist with weight shifting every 60 mins when out of bed in chair  -Consider limitation of chair time 6 hour intervals    Skin Care:  -Avoid use of baby powder, tape, friction and shearing, hot water or constrictive  clothing  -Relieve pressure over bony prominences using foam wedges/pillows  -Do not massage red bony areas    3/8/2025 2102 by Christophe Patel RN  Outcome: Progressing  3/8/2025 2100 by Christophe Patel RN  Outcome: Progressing  Goal: Pressure injury heals and does not worsen  Description: Interventions:  - Implement low air loss mattress or specialty surface (Criteria met)  - Apply silicone foam dressing  - Instruct/assist with weight shifting every 60 minutes when in chair   - Limit chair time to 6 hour intervals  - Use special pressure reducing interventions such as waffle cushion when in chair   - Apply fecal or urinary incontinence containment device   - Perform passive or active ROM every 8 hours  - Turn and reposition patient & offload bony prominences every 2 hours   - Utilize friction reducing device or surface for transfers   - Use incontinent care products after each incontinent episode such as jim wipes  - Consider nutrition services referral as needed  3/8/2025 2102 by Christophe Patel RN  Outcome: Progressing  3/8/2025 2100 by Christophe Patel RN  Outcome: Progressing     Problem: HEMATOLOGIC - ADULT  Goal: Maintains hematologic stability  Description: INTERVENTIONS  - Assess for signs and symptoms of bleeding or hemorrhage  - Monitor labs  - Administer supportive blood products/factors as ordered and appropriate  3/8/2025 2102 by Christophe Patel RN  Outcome: Progressing  3/8/2025 2100 by Christophe Patel RN  Outcome: Progressing     Problem: MUSCULOSKELETAL - ADULT  Goal: Maintain or return mobility to safest level of function  Description: INTERVENTIONS:  - Assess patient's ability to carry out ADLs; assess patient's baseline for ADL function and identify physical deficits which impact ability to perform ADLs (bathing, care of mouth/teeth, toileting, grooming, dressing, etc.)  - Assess/evaluate cause of self-care deficits   - Assess range of motion  - Assess patient's mobility  - Assess patient's need  for assistive devices and provide as appropriate  - Encourage maximum independence but intervene and supervise when necessary  - Involve family in performance of ADLs  - Assess for home care needs following discharge   - Consider OT consult to assist with ADL evaluation and planning for discharge  - Provide patient education as appropriate  3/8/2025 2102 by Christophe Patel RN  Outcome: Progressing  3/8/2025 2100 by Christophe Patel RN  Outcome: Progressing  Goal: Maintain proper alignment of affected body part  Description: INTERVENTIONS:  - Support, maintain and protect limb and body alignment  - Provide patient/ family with appropriate education  3/8/2025 2102 by Christophe Patel RN  Outcome: Progressing  3/8/2025 2100 by Christophe Patel RN  Outcome: Progressing

## 2025-03-09 NOTE — PROGRESS NOTES
Progress Note - Family Medicine   Name: Ama Burnett 89 y.o. female I MRN: [id number]  Unit/Bed#: 423-01 I Date of Admission: 3/8/2025   Date of Service: 3/9/2025 I Hospital Day: 0   Assessment & Plan  Severe sepsis (HCC)  Severe sepsis on admission. Tachycardia, leukocytosis and elevated Lactic acid and pro calcitonin.   Labs 3/9/25: WBC: 5.77, Hb 10.8, Pro Brannon: 0.31, CMP : normal  Urine analysis: wbc:1-2, positive nitrites, small occult blood     CT TAP: IMPRESSION:  Left lower lobe airspace opacity may represent pneumonia versus focal atelectasis.  No acute intra-abdominal inflammatory process.  Colonic diverticulosis without evidence of acute diverticulitis    Plan:   Continue Ceftriaxone plus azithromycin   Follow blood cultures and sputum cultures  Follow urine Legionella and pneumococcal antigen  Monitor vitals   GERD (gastroesophageal reflux disease)  PPI daily  Continue to monitor  Anxiety  Continue xanax as needed   Continue to monitor  Benign essential hypertension  Continue losartan 50 mg daily  Continue to monitor  Hypomagnesemia  Resolved, S/p IV magnesium replacement  Mg today: 2.4  Continue to monitor    VTE Pharmacologic Prophylaxis: VTE Score: 6 High Risk (Score >/= 5) - Pharmacological DVT Prophylaxis Ordered: enoxaparin (Lovenox). Sequential Compression Devices Ordered.    Mobility:   Basic Mobility Inpatient Raw Score: 18  JH-HLM Goal: 6: Walk 10 steps or more  JH-HLM Achieved: 6: Walk 10 steps or more  JH-HLM Goal achieved. Continue to encourage appropriate mobility.    Patient Centered Rounds: I performed bedside rounds with nursing staff today.   Discussions with Specialists or Other Care Team Provider: yes attending    Education and Discussions with Family / Patient: Updated  (daughter) via phone.    Current Length of Stay: 0 day(s)  Current Patient Status: Observation   Certification Statement: The patient will continue to require additional inpatient hospital stay due to  sepsis  Discharge Plan: Anticipate discharge in 24-48 hrs to home.    Code Status: Level 3 - DNAR and DNI    Subjective   No acute overnight events. Patient reports mild pain in right hypochondrium, denies vomiting, shortness of breath, diarrhea or constipation.   Patient remained vitally stable overnight    Objective   Temp:  [98.6 °F (37 °C)-99.6 °F (37.6 °C)] 98.6 °F (37 °C)  HR:  [] 80  BP: (117-143)/(55-71) 117/58  Resp:  [18-22] 18  SpO2:  [87 %-99 %] 99 %  O2 Device: Nasal cannula  Nasal Cannula O2 Flow Rate (L/min):  [1 L/min] 1 L/min    Body mass index is 31.61 kg/m².     Input and Output Summary (last 24 hours):     Intake/Output Summary (Last 24 hours) at 3/9/2025 0844  Last data filed at 3/9/2025 0832  Gross per 24 hour   Intake 1120 ml   Output 250 ml   Net 870 ml       Physical Exam  Constitutional:       Appearance: She is obese. She is not toxic-appearing.   HENT:      Head: Normocephalic and atraumatic.      Nose: Nose normal.      Mouth/Throat:      Mouth: Mucous membranes are moist.      Pharynx: Oropharynx is clear.   Eyes:      Conjunctiva/sclera: Conjunctivae normal.   Cardiovascular:      Rate and Rhythm: Normal rate and regular rhythm.      Pulses: Normal pulses.      Heart sounds: Normal heart sounds.   Pulmonary:      Effort: Pulmonary effort is normal.      Breath sounds: Normal breath sounds.   Abdominal:      General: Abdomen is flat. Bowel sounds are normal.      Palpations: Abdomen is soft.      Tenderness: There is abdominal tenderness. There is no right CVA tenderness, left CVA tenderness, guarding or rebound.   Musculoskeletal:         General: Normal range of motion.      Cervical back: Normal range of motion and neck supple.   Skin:     General: Skin is warm and dry.      Capillary Refill: Capillary refill takes less than 2 seconds.   Neurological:      General: No focal deficit present.      Mental Status: She is alert and oriented to person, place, and time.   Psychiatric:          Mood and Affect: Mood normal.         Behavior: Behavior normal.       Lab Results: I have reviewed the following results:   Results from last 7 days   Lab Units 03/09/25  0521 03/08/25  1530 03/08/25  1224   WBC Thousand/uL 5.77  --  13.56*   HEMOGLOBIN g/dL 10.8*  --  13.2   HEMATOCRIT % 33.3*  --  39.1   PLATELETS Thousands/uL 249   < > 348   BANDS PCT %  --   --  6   SEGS PCT % 62  --   --    LYMPHO PCT % 22  --  2*   MONO PCT % 13*  --  8   EOS PCT % 1  --  0    < > = values in this interval not displayed.     Results from last 7 days   Lab Units 03/09/25  0521   SODIUM mmol/L 136   POTASSIUM mmol/L 3.7   CHLORIDE mmol/L 100   CO2 mmol/L 31   BUN mg/dL 22   CREATININE mg/dL 0.93   ANION GAP mmol/L 5   CALCIUM mg/dL 8.5   ALBUMIN g/dL 3.4*   TOTAL BILIRUBIN mg/dL 0.64   ALK PHOS U/L 60   ALT U/L 11   AST U/L 16   GLUCOSE RANDOM mg/dL 95     Results from last 7 days   Lab Units 03/08/25  1224   INR  1.05             Results from last 7 days   Lab Units 03/09/25  0521 03/08/25  1530 03/08/25  1224   LACTIC ACID mmol/L  --  1.6 2.5*   PROCALCITONIN ng/ml 0.31*  --  0.34*       Recent Cultures (last 7 days):   Results from last 7 days   Lab Units 03/08/25  1246 03/08/25  1224   BLOOD CULTURE  Received in Microbiology Lab. Culture in Progress. Received in Microbiology Lab. Culture in Progress.       Imaging Results Review: I reviewed radiology reports from this admission including: CT abdomen/pelvis.  Other Study Results Review: EKG was reviewed.     Last 24 Hours Medication List:     Current Facility-Administered Medications:     acetaminophen (TYLENOL) tablet 650 mg, TID PRN    ALPRAZolam (XANAX) tablet 0.25 mg, TID PRN    aluminum-magnesium hydroxide-simethicone (MAALOX) oral suspension 30 mL, Q4H PRN    azithromycin (ZITHROMAX) tablet 500 mg, Q24H    cefTRIAXone (ROCEPHIN) IVPB (premix in dextrose) 1,000 mg 50 mL, Q24H    enoxaparin (LOVENOX) subcutaneous injection 40 mg, Daily    loratadine (CLARITIN)  tablet 5 mg, Daily    losartan (COZAAR) tablet 50 mg, Daily    pantoprazole (PROTONIX) EC tablet 40 mg, Early Morning    Insert peripheral IV, Once **AND** sodium chloride (PF) 0.9 % injection 3 mL, Once PRN    Administrative Statements   Today, Patient Was Seen By: Radu Ramirez MD      **Please Note: This note may have been constructed using a voice recognition system.**

## 2025-03-09 NOTE — QUICK NOTE
Updated patients family ( grand daughter) over the phone about current management and anticipated discharge tomorrow. All questions answered.

## 2025-03-10 PROBLEM — N30.01 ACUTE CYSTITIS WITH HEMATURIA: Status: ACTIVE | Noted: 2025-03-10

## 2025-03-10 LAB
ALBUMIN SERPL BCG-MCNC: 3.3 G/DL (ref 3.5–5)
ALP SERPL-CCNC: 55 U/L (ref 34–104)
ALT SERPL W P-5'-P-CCNC: 12 U/L (ref 7–52)
ANION GAP SERPL CALCULATED.3IONS-SCNC: 5 MMOL/L (ref 4–13)
AST SERPL W P-5'-P-CCNC: 18 U/L (ref 13–39)
ATRIAL RATE: 133 BPM
BASOPHILS # BLD AUTO: 0.03 THOUSANDS/ÂΜL (ref 0–0.1)
BASOPHILS NFR BLD AUTO: 1 % (ref 0–1)
BILIRUB SERPL-MCNC: 0.37 MG/DL (ref 0.2–1)
BUN SERPL-MCNC: 13 MG/DL (ref 5–25)
CALCIUM ALBUM COR SERPL-MCNC: 9 MG/DL (ref 8.3–10.1)
CALCIUM SERPL-MCNC: 8.4 MG/DL (ref 8.4–10.2)
CHLORIDE SERPL-SCNC: 105 MMOL/L (ref 96–108)
CO2 SERPL-SCNC: 28 MMOL/L (ref 21–32)
CREAT SERPL-MCNC: 0.77 MG/DL (ref 0.6–1.3)
EOSINOPHIL # BLD AUTO: 0.12 THOUSAND/ÂΜL (ref 0–0.61)
EOSINOPHIL NFR BLD AUTO: 3 % (ref 0–6)
ERYTHROCYTE [DISTWIDTH] IN BLOOD BY AUTOMATED COUNT: 13.7 % (ref 11.6–15.1)
GFR SERPL CREATININE-BSD FRML MDRD: 66 ML/MIN/1.73SQ M
GLUCOSE SERPL-MCNC: 86 MG/DL (ref 65–140)
HCT VFR BLD AUTO: 32.2 % (ref 34.8–46.1)
HGB BLD-MCNC: 10.2 G/DL (ref 11.5–15.4)
IMM GRANULOCYTES # BLD AUTO: 0.03 THOUSAND/UL (ref 0–0.2)
IMM GRANULOCYTES NFR BLD AUTO: 1 % (ref 0–2)
LYMPHOCYTES # BLD AUTO: 1.56 THOUSANDS/ÂΜL (ref 0.6–4.47)
LYMPHOCYTES NFR BLD AUTO: 39 % (ref 14–44)
MAGNESIUM SERPL-MCNC: 2.2 MG/DL (ref 1.9–2.7)
MCH RBC QN AUTO: 34.3 PG (ref 26.8–34.3)
MCHC RBC AUTO-ENTMCNC: 31.7 G/DL (ref 31.4–37.4)
MCV RBC AUTO: 108 FL (ref 82–98)
MONOCYTES # BLD AUTO: 0.69 THOUSAND/ÂΜL (ref 0.17–1.22)
MONOCYTES NFR BLD AUTO: 18 % (ref 4–12)
NEUTROPHILS # BLD AUTO: 1.52 THOUSANDS/ÂΜL (ref 1.85–7.62)
NEUTS SEG NFR BLD AUTO: 38 % (ref 43–75)
NRBC BLD AUTO-RTO: 0 /100 WBCS
P AXIS: 43 DEGREES
PHOSPHATE SERPL-MCNC: 2.7 MG/DL (ref 2.3–4.1)
PLATELET # BLD AUTO: 229 THOUSANDS/UL (ref 149–390)
PMV BLD AUTO: 10 FL (ref 8.9–12.7)
POTASSIUM SERPL-SCNC: 3.6 MMOL/L (ref 3.5–5.3)
PR INTERVAL: 190 MS
PROCALCITONIN SERPL-MCNC: 0.09 NG/ML
PROT SERPL-MCNC: 5.7 G/DL (ref 6.4–8.4)
QRS AXIS: 258 DEGREES
QRSD INTERVAL: 78 MS
QT INTERVAL: 306 MS
QTC INTERVAL: 455 MS
RBC # BLD AUTO: 2.97 MILLION/UL (ref 3.81–5.12)
SODIUM SERPL-SCNC: 138 MMOL/L (ref 135–147)
T WAVE AXIS: 23 DEGREES
VENTRICULAR RATE: 133 BPM
WBC # BLD AUTO: 3.95 THOUSAND/UL (ref 4.31–10.16)

## 2025-03-10 PROCEDURE — 97166 OT EVAL MOD COMPLEX 45 MIN: CPT

## 2025-03-10 PROCEDURE — 83735 ASSAY OF MAGNESIUM: CPT | Performed by: FAMILY MEDICINE

## 2025-03-10 PROCEDURE — 84145 PROCALCITONIN (PCT): CPT | Performed by: FAMILY MEDICINE

## 2025-03-10 PROCEDURE — 85025 COMPLETE CBC W/AUTO DIFF WBC: CPT | Performed by: FAMILY MEDICINE

## 2025-03-10 PROCEDURE — 80053 COMPREHEN METABOLIC PANEL: CPT | Performed by: INTERNAL MEDICINE

## 2025-03-10 PROCEDURE — 93010 ELECTROCARDIOGRAM REPORT: CPT | Performed by: INTERNAL MEDICINE

## 2025-03-10 PROCEDURE — 84100 ASSAY OF PHOSPHORUS: CPT | Performed by: INTERNAL MEDICINE

## 2025-03-10 PROCEDURE — 97162 PT EVAL MOD COMPLEX 30 MIN: CPT

## 2025-03-10 PROCEDURE — 99232 SBSQ HOSP IP/OBS MODERATE 35: CPT | Performed by: INTERNAL MEDICINE

## 2025-03-10 RX ORDER — DICYCLOMINE HYDROCHLORIDE 10 MG/1
10 CAPSULE ORAL
Status: DISCONTINUED | OUTPATIENT
Start: 2025-03-10 | End: 2025-03-12

## 2025-03-10 RX ADMIN — ALUMINUM HYDROXIDE, MAGNESIUM HYDROXIDE, AND DIMETHICONE 30 ML: 200; 20; 200 SUSPENSION ORAL at 22:57

## 2025-03-10 RX ADMIN — DICYCLOMINE HYDROCHLORIDE 10 MG: 10 CAPSULE ORAL at 21:52

## 2025-03-10 RX ADMIN — LOSARTAN POTASSIUM 50 MG: 50 TABLET, FILM COATED ORAL at 10:34

## 2025-03-10 RX ADMIN — ENOXAPARIN SODIUM 40 MG: 40 INJECTION SUBCUTANEOUS at 10:29

## 2025-03-10 RX ADMIN — FAMOTIDINE 10 MG: 10 INJECTION, SOLUTION INTRAVENOUS at 10:30

## 2025-03-10 RX ADMIN — LORATADINE 5 MG: 10 TABLET ORAL at 10:32

## 2025-03-10 RX ADMIN — PANTOPRAZOLE SODIUM 40 MG: 40 TABLET, DELAYED RELEASE ORAL at 06:45

## 2025-03-10 RX ADMIN — ALPRAZOLAM 0.25 MG: 0.25 TABLET ORAL at 00:59

## 2025-03-10 RX ADMIN — ALPRAZOLAM 0.25 MG: 0.25 TABLET ORAL at 21:52

## 2025-03-10 RX ADMIN — CEFTRIAXONE 1000 MG: 1 INJECTION, SOLUTION INTRAVENOUS at 13:32

## 2025-03-10 RX ADMIN — ALPRAZOLAM 0.25 MG: 0.25 TABLET ORAL at 13:32

## 2025-03-10 NOTE — CASE MANAGEMENT
Case Management Assessment & Discharge Planning Note    Patient name Ama Burnett  Location /423-01 MRN [id number]  : 1931 Date 3/10/2025       Current Admission Date: 3/8/2025  Current Admission Diagnosis:Severe sepsis (HCC)   Patient Active Problem List    Diagnosis Date Noted Date Diagnosed    Acute cystitis with hematuria 03/10/2025     Severe sepsis (HCC) 2025     Hypomagnesemia 2025     Non-seasonal allergic rhinitis due to pollen 2023     Pulmonary nodule 02/10/2023     Peripheral arterial disease (HCC) 02/10/2023     Idiopathic peripheral neuropathy 2022     Stage 3a chronic kidney disease (HCC) 11/15/2021     Benign essential hypertension 10/27/2017     GERD (gastroesophageal reflux disease) 2017     Anxiety 2017       LOS (days): 1  Geometric Mean LOS (GMLOS) (days): 4.9  Days to GMLOS:3.7     OBJECTIVE:    Risk of Unplanned Readmission Score: 10.73         Current admission status: Inpatient  Referral Reason:  (discharge planning)    Preferred Pharmacy:   [id number] - [place] - [address]  [address] [facility] [address]  Phone: [phone] Fax: [phone]    [facility] [address]  [address]  [address]  [facility] [address]  Phone: [phone] Fax: [phone]    Primary Care Provider: Rayray Liu DO    Primary Insurance: [facility]  Secondary Insurance:     ASSESSMENT:    CM met with patient at the bedside,baseline information  was obtained. CM discussed the role of CM in helping the patient develop a discharge plan and assist the patient in carry out their plan.    Patient lives alone 2 story home. Bath and bed on second floor. Patient has stair glide to second floor.    Baseline using walker inside  and cane out of home.    Patient stated it is getting harder to be at home as her right hip and knee arthritis is limiting her . Hard time getting up the 3 steps to  the stair glide in the home.      Active Health Care Proxies    There are no active Health Care Proxies on file.       Advance Directives  Does patient have a Health Care POA?: Yes  Does patient have Advance Directives?: No  Was patient offered paperwork?: Yes (declined)  Primary Contact: Granddadaniella Burnett         Readmission Root Cause  30 Day Readmission: No    Patient Information  Admitted from:: Home  Mental Status: Alert  During Assessment patient was accompanied by: Not accompanied during assessment  Assessment information provided by:: Patient  Primary Caregiver: Family  Caregiver's Relationship to Patient:: Family Member  County of Residence: [facility]  What city do you live in?: [facility]  Home entry access options. Select all that apply.: Stairs  Number of steps to enter home.: 3  Do the steps have railings?: Yes  Type of Current Residence: 2 story home  Upon entering residence, is there a bedroom on the main floor (no further steps)?: No (patient has been staying on first floor with [facility])  A bedroom is located on the following floor levels of residence (select all that apply):: 2nd Floor  Upon entering residence, is there a bathroom on the main floor (no further steps)?: No  Indicate which floors of current residence have a bathroom (select all the apply):: 2nd Floor  Number of steps to 2nd floor from main floor: One Flight  Living Arrangements: Lives Alone  Is patient a ?: No    Activities of Daily Living Prior to Admission  Functional Status: Independent  Completes ADLs independently?: Yes  Ambulates independently?: Yes  Does patient use assisted devices?: Yes  Assisted Devices (DME) used: Walker, Shower Chair, Bedside Commode, Stair Chair/[place]  Does patient currently own DME?: Yes  What DME does the patient currently own?: Bedside Commode, Shower Chair, Stair Chair/Glide, Walker  Does patient have a history of Outpatient Therapy (PT/OT)?: No  Does the patient have a history of  Short-Term Rehab?: Yes ([facility])  Does patient have a history of HHC?: Yes  Does patient currently have HHC?: No         Patient Information Continued  Income Source: Pension/senior living  Does patient have prescription coverage?: Yes  Does patient receive dialysis treatments?: No  Does patient have a history of substance abuse?: No  Does patient have a history of Mental Health Diagnosis?: No         Means of Transportation  Means of Transport to Appts:: Family transport          DISCHARGE DETAILS:    Discharge planning discussed with:: patient  Freedom of Choice: Yes  Comments - Freedom of Choice: CM discussed  FOC for  Level 2 recommendations , patient in agreement with STR as she stated she is having problems getting up her 3 steps to stair gilde to second floor bed and bath. patient has been staying on first floor with [facility]  CM contacted family/caregiver?: No- see comments (no at this time)  Were Treatment Team discharge recommendations reviewed with patient/caregiver?: Yes  Did patient/caregiver verbalize understanding of patient care needs?: Yes  Were patient/caregiver advised of the risks associated with not following Treatment Team discharge recommendations?: Yes    Treatment Team Recommendation: SNF  Discharge Destination Plan:: SNF            CM discussed  FOC with patient  at bedside. Patient  agreeable with blanket referrals in [facility] for local facilities  to determine bed availability according to your medical needs and insurance coverage. Patient  preference [facility].    Referrals placed in  aidin for STR.    Patient stated she has aides 2 x week for cleaning and laundry.

## 2025-03-10 NOTE — ASSESSMENT & PLAN NOTE
Severe sepsis on admission. Tachycardia, leukocytosis and elevated Lactic acid and pro calcitonin.   Possible Possible left lower lobe pneumonia vs acute cystitis with microscopic hematuria vs diverticulitis  Labs 3/10/25: WBC: 3.95, Hb 10.2, Pro Brannon: 0.09, CMP : normal  Urine analysis: wbc:1-2, positive nitrites, small occult blood   Urine legionella and Streptococcal pneumo negative  Blood Cultures : no growth      CT TAP: IMPRESSION:  Left lower lobe airspace opacity may represent pneumonia versus focal atelectasis.  No acute intra-abdominal inflammatory process.  Colonic diverticulosis without evidence of acute diverticulitis    Plan:   Continue Ceftriaxone plus azithromycin   Follow sputum cultures  Consult GI  Monitor vitals

## 2025-03-10 NOTE — PROGRESS NOTES
Progress Note - Family Medicine   Name: Ama Burnett 89 y.o. female I MRN: [id number]  Unit/Bed#: 423-01 I Date of Admission: 3/8/2025   Date of Service: 3/10/2025 I Hospital Day: 1     Assessment & Plan  Severe sepsis (HCC)  Severe sepsis on admission. Tachycardia, leukocytosis and elevated Lactic acid and pro calcitonin.   Possible Possible left lower lobe pneumonia vs acute cystitis with microscopic hematuria vs diverticulitis  Labs 3/10/25: WBC: 3.95, Hb 10.2, Pro Brannon: 0.09, CMP : normal  Urine analysis: wbc:1-2, positive nitrites, small occult blood   Urine legionella and Streptococcal pneumo negative  Blood Cultures : no growth      CT TAP: IMPRESSION:  Left lower lobe airspace opacity may represent pneumonia versus focal atelectasis.  No acute intra-abdominal inflammatory process.  Colonic diverticulosis without evidence of acute diverticulitis    Plan:   Continue Ceftriaxone plus azithromycin   Follow sputum cultures  Consult GI  Monitor vitals   Acute cystitis with hematuria  See sepsis above  GERD (gastroesophageal reflux disease)  PPI daily  Continue to monitor  Anxiety  Continue xanax as needed   Continue to monitor  Benign essential hypertension  Continue losartan 50 mg daily  Continue to monitor  Hypomagnesemia  Resolved, S/p IV magnesium replacement  Continue to monitor    24 Hour Events : No acute overnight events. Patient remains vitally stable  Subjective : Patient reports abdominal pain with distention and nausea.     Objective :  Temp:  [97.8 °F (36.6 °C)-99 °F (37.2 °C)] 97.8 °F (36.6 °C)  HR:  [76-85] 77  BP: (119-130)/(57-72) 119/57  Resp:  [18-20] 18  SpO2:  [92 %-95 %] 95 %  O2 Device: Nasal cannula  Nasal Cannula O2 Flow Rate (L/min):  [1 L/min] 1 L/min    Physical Exam  Constitutional:       Appearance: She is obese.   HENT:      Head: Normocephalic and atraumatic.      Nose: Nose normal.      Mouth/Throat:      Mouth: Mucous membranes are dry.   Eyes:      Conjunctiva/sclera: Conjunctivae  normal.   Cardiovascular:      Rate and Rhythm: Normal rate and regular rhythm.      Pulses: Normal pulses.      Heart sounds: Normal heart sounds.   Pulmonary:      Effort: Pulmonary effort is normal.      Breath sounds: Normal breath sounds.   Abdominal:      General: There is distension.      Tenderness: There is abdominal tenderness. There is no right CVA tenderness, left CVA tenderness, guarding or rebound.   Musculoskeletal:         General: Normal range of motion.      Cervical back: Normal range of motion and neck supple.   Skin:     General: Skin is warm and dry.      Capillary Refill: Capillary refill takes less than 2 seconds.   Neurological:      General: No focal deficit present.      Mental Status: She is alert and oriented to person, place, and time.   Psychiatric:         Mood and Affect: Mood normal.         Behavior: Behavior normal.         Lab Results: I have reviewed the following results:CBC/BMP:   .     03/10/25  0600   WBC 3.95*   HGB 10.2*   HCT 32.2*      SODIUM 138   K 3.6      CO2 28   BUN 13   CREATININE 0.77   GLUC 86   MG 2.2   PHOS 2.7        Imaging Results Review: No pertinent imaging studies reviewed.  Other Study Results Review: No additional pertinent studies reviewed.    VTE Pharmacologic Prophylaxis: VTE covered by:  enoxaparin, Subcutaneous, 40 mg at 03/09/25 0832     VTE Mechanical Prophylaxis: sequential compression device

## 2025-03-10 NOTE — PLAN OF CARE
Problem: OCCUPATIONAL THERAPY ADULT  Goal: Performs self-care activities at highest level of function for planned discharge setting.  See evaluation for individualized goals.  Description: Treatment Interventions: ADL retraining, UE strengthening/ROM, Functional transfer training, Endurance training, Patient/family training, Equipment evaluation/education, Activityengagement, Energy conservation          See flowsheet documentation for full assessment, interventions and recommendations.   Note: Limitation: Decreased ADL status, Decreased UE strength, Decreased Safe judgement during ADL, Decreased endurance, Decreased high-level ADLs, Decreased self-care trans     Assessment: Pt is a 89 y.o. female seen for OT evaluation s/p admit to [facility] on 3/8/2025 w/ Severe sepsis (HCC).  Comorbidities affecting pt's functional performance at time of assessment include:  GERD, anxiety, HTN, hypomagnesemia, acute cystitis w/ hematuria . Personal factors affecting pt at time of IE include:steps to enter environment, limited home support, difficulty performing ADLS, difficulty performing IADLS , decreased initiation and engagement , and health management . Prior to admission, pt was (I) with ADLs with AD. Upon evaluation: Pt requires (S)-mod (A) with RW 2* the following deficits impacting occupational performance: weakness, decreased strength, decreased balance, decreased tolerance, impaired initiation, impulsivity, and decreased safety awareness. Pt to benefit from continued skilled OT tx while in the hospital to address deficits as defined above and maximize level of functional independence w ADL's and functional mobility. Occupational Performance areas to address include: grooming, bathing/shower, toilet hygiene, dressing, health maintenance, functional mobility, and community mobility. The patient's raw score on the AM-PAC Daily Activity Inpatient Short Form is 19. A raw score of greater than or equal to 19 suggests the patient  may benefit from discharge to home. Discharge recommendation at this time is level II moderate resource intensity.     Rehab Resource Intensity Level, OT: II (Moderate Resource Intensity)

## 2025-03-10 NOTE — PLAN OF CARE
Problem: PAIN - ADULT  Goal: Verbalizes/displays adequate comfort level or baseline comfort level  Description: Interventions:  - Encourage patient to monitor pain and request assistance  - Assess pain using appropriate pain scale  - Administer analgesics based on type and severity of pain and evaluate response  - Implement non-pharmacological measures as appropriate and evaluate response  - Consider cultural and social influences on pain and pain management  - Notify physician/advanced practitioner if interventions unsuccessful or patient reports new pain  Outcome: Progressing     Problem: INFECTION - ADULT  Goal: Absence or prevention of progression during hospitalization  Description: INTERVENTIONS:  - Assess and monitor for signs and symptoms of infection  - Monitor lab/diagnostic results  - Monitor all insertion sites, i.e. indwelling lines, tubes, and drains  - Monitor endotracheal if appropriate and nasal secretions for changes in amount and color  - [place] appropriate cooling/warming therapies per order  - Administer medications as ordered  - Instruct and encourage patient and family to use good hand hygiene technique  - Identify and instruct in appropriate isolation precautions for identified infection/condition  Outcome: Progressing  Goal: Absence of fever/infection during neutropenic period  Description: INTERVENTIONS:  - Monitor WBC    Outcome: Progressing     Problem: SAFETY ADULT  Goal: Patient will remain free of falls  Description: INTERVENTIONS:  - Educate patient/family on patient safety including physical limitations  - Instruct patient to call for assistance with activity   - Consult OT/PT to assist with strengthening/mobility   - Keep Call bell within reach  - Keep bed low and locked with side rails adjusted as appropriate  - Keep care items and personal belongings within reach  - Initiate and maintain comfort rounds  - Make Fall Risk Sign visible to staff  - Offer Toileting every 2 Hours,  in advance of need  - Initiate/Maintain bed/chair alarm  - Obtain necessary fall risk management equipment: non skid socks  - Apply yellow socks and bracelet for high fall risk patients  - Consider moving patient to room near nurses station  Outcome: Progressing  Goal: Maintain or return to baseline ADL function  Description: INTERVENTIONS:  -  Assess patient's ability to carry out ADLs; assess patient's baseline for ADL function and identify physical deficits which impact ability to perform ADLs (bathing, care of mouth/teeth, toileting, grooming, dressing, etc.)  - Assess/evaluate cause of self-care deficits   - Assess range of motion  - Assess patient's mobility; develop plan if impaired  - Assess patient's need for assistive devices and provide as appropriate  - Encourage maximum independence but intervene and supervise when necessary  - Involve family in performance of ADLs  - Assess for home care needs following discharge   - Consider OT consult to assist with ADL evaluation and planning for discharge  - Provide patient education as appropriate  Outcome: Progressing  Goal: Maintains/Returns to pre admission functional level  Description: INTERVENTIONS:  - Perform AM-PAC 6 Click Basic Mobility/ Daily Activity assessment daily.  - Set and communicate daily mobility goal to care team and patient/family/caregiver.   - Collaborate with rehabilitation services on mobility goals if consulted  - Perform Range of Motion 3 times a day.  - Reposition patient every 2 hours.  - Dangle patient 3 times a day  - Stand patient 3 times a day  - Ambulate patient 3 times a day  - Out of bed to chair 3 times a day   - Out of bed for meals 3 times a day  - Out of bed for toileting  - Record patient progress and toleration of activity level   Outcome: Progressing     Problem: DISCHARGE PLANNING  Goal: Discharge to home or other facility with appropriate resources  Description: INTERVENTIONS:  - Identify barriers to discharge  w/patient and caregiver  - Arrange for needed discharge resources and transportation as appropriate  - Identify discharge learning needs (meds, wound care, etc.)  - Arrange for interpretive services to assist at discharge as needed  - Refer to Case Management Department for coordinating discharge planning if the patient needs post-hospital services based on physician/advanced practitioner order or complex needs related to functional status, cognitive ability, or social support system  Outcome: Progressing     Problem: Knowledge Deficit  Goal: Patient/family/caregiver demonstrates understanding of disease process, treatment plan, medications, and discharge instructions  Description: Complete learning assessment and assess knowledge base.  Interventions:  - Provide teaching at level of understanding  - Provide teaching via preferred learning methods  Outcome: Progressing     Problem: NEUROSENSORY - ADULT  Goal: Achieves stable or improved neurological status  Description: INTERVENTIONS  - Monitor and report changes in neurological status  - Monitor vital signs such as temperature, blood pressure, glucose, and any other labs ordered   - Initiate measures to prevent increased intracranial pressure  - Monitor for seizure activity and implement precautions if appropriate      Outcome: Progressing  Goal: Remains free of injury related to seizures activity  Description: INTERVENTIONS  - Maintain airway, patient safety  and administer oxygen as ordered  - Monitor patient for seizure activity, document and report duration and description of seizure to physician/advanced practitioner  - If seizure occurs,  ensure patient safety during seizure  - Reorient patient post seizure  - Seizure pads on all 4 side rails  - Instruct patient/family to notify RN of any seizure activity including if an aura is experienced  - Instruct patient/family to call for assistance with activity based on nursing assessment  - Administer anti-seizure  medications if ordered    Outcome: Progressing  Goal: Achieves maximal functionality and self care  Description: INTERVENTIONS  - Monitor swallowing and airway patency with patient fatigue and changes in neurological status  - Encourage and assist patient to increase activity and self care.   - Encourage visually impaired, hearing impaired and aphasic patients to use assistive/communication devices  Outcome: Progressing     Problem: CARDIOVASCULAR - ADULT  Goal: Maintains optimal cardiac output and hemodynamic stability  Description: INTERVENTIONS:  - Monitor I/O, vital signs and rhythm  - Monitor for S/S and trends of decreased cardiac output  - Administer and titrate ordered vasoactive medications to optimize hemodynamic stability  - Assess quality of pulses, skin color and temperature  - Assess for signs of decreased coronary artery perfusion  - Instruct patient to report change in severity of symptoms  Outcome: Progressing  Goal: Absence of cardiac dysrhythmias or at baseline rhythm  Description: INTERVENTIONS:  - Continuous cardiac monitoring, vital signs, obtain 12 lead EKG if ordered  - Administer antiarrhythmic and heart rate control medications as ordered  - Monitor electrolytes and administer replacement therapy as ordered  Outcome: Progressing     Problem: RESPIRATORY - ADULT  Goal: Achieves optimal ventilation and oxygenation  Description: INTERVENTIONS:  - Assess for changes in respiratory status  - Assess for changes in mentation and behavior  - Position to facilitate oxygenation and minimize respiratory effort  - Oxygen administered by appropriate delivery if ordered  - Initiate smoking cessation education as indicated  - Encourage broncho-pulmonary hygiene including cough, deep breathe, Incentive Spirometry  - Assess the need for suctioning and aspirate as needed  - Assess and instruct to report SOB or any respiratory difficulty  - Respiratory Therapy support as indicated  Outcome: Progressing      Problem: GASTROINTESTINAL - ADULT  Goal: Minimal or absence of nausea and/or vomiting  Description: INTERVENTIONS:  - Administer IV fluids if ordered to ensure adequate hydration  - Maintain NPO status until nausea and vomiting are resolved  - Nasogastric tube if ordered  - Administer ordered antiemetic medications as needed  - Provide nonpharmacologic comfort measures as appropriate  - Advance diet as tolerated, if ordered  - Consider nutrition services referral to assist patient with adequate nutrition and appropriate food choices  Outcome: Progressing  Goal: Maintains or returns to baseline bowel function  Description: INTERVENTIONS:  - Assess bowel function  - Encourage oral fluids to ensure adequate hydration  - Administer IV fluids if ordered to ensure adequate hydration  - Administer ordered medications as needed  - Encourage mobilization and activity  - Consider nutritional services referral to assist patient with adequate nutrition and appropriate food choices  Outcome: Progressing  Goal: Maintains adequate nutritional intake  Description: INTERVENTIONS:  - Monitor percentage of each meal consumed  - Identify factors contributing to decreased intake, treat as appropriate  - Assist with meals as needed  - Monitor I&O, weight, and lab values if indicated  - Obtain nutrition services referral as needed  Outcome: Progressing  Goal: Establish and maintain optimal ostomy function  Description: INTERVENTIONS:  - Assess bowel function  - Encourage oral fluids to ensure adequate hydration  - Administer IV fluids if ordered to ensure adequate hydration   - Administer ordered medications as needed  - Encourage mobilization and activity  - Nutrition services referral to assist patient with appropriate food choices  - Assess stoma site  - Consider wound care consult   Outcome: Progressing  Goal: Oral mucous membranes remain intact  Description: INTERVENTIONS  - Assess oral mucosa and hygiene practices  - Implement  preventative oral hygiene regimen  - Implement oral medicated treatments as ordered  - Initiate Nutrition services referral as needed  Outcome: Progressing     Problem: GENITOURINARY - ADULT  Goal: Maintains or returns to baseline urinary function  Description: INTERVENTIONS:  - Assess urinary function  - Encourage oral fluids to ensure adequate hydration if ordered  - Administer IV fluids as ordered to ensure adequate hydration  - Administer ordered medications as needed  - Offer frequent toileting  - Follow urinary retention protocol if ordered  Outcome: Progressing  Goal: Absence of urinary retention  Description: INTERVENTIONS:  - Assess patient’s ability to void and empty bladder  - Monitor I/O  - Bladder scan as needed  - Discuss with physician/AP medications to alleviate retention as needed  - Discuss catheterization for long term situations as appropriate  Outcome: Progressing  Goal: Urinary catheter remains patent  Description: INTERVENTIONS:  - Assess patency of urinary catheter  - If patient has a chronic nguyen, consider changing catheter if non-functioning  - Follow guidelines for intermittent irrigation of non-functioning urinary catheter  Outcome: Progressing     Problem: METABOLIC, FLUID AND ELECTROLYTES - ADULT  Goal: Electrolytes maintained within normal limits  Description: INTERVENTIONS:  - Monitor labs and assess patient for signs and symptoms of electrolyte imbalances  - Administer electrolyte replacement as ordered  - Monitor response to electrolyte replacements, including repeat lab results as appropriate  - Instruct patient on fluid and nutrition as appropriate  Outcome: Progressing  Goal: Fluid balance maintained  Description: INTERVENTIONS:  - Monitor labs   - Monitor I/O and WT  - Instruct patient on fluid and nutrition as appropriate  - Assess for signs & symptoms of volume excess or deficit  Outcome: Progressing  Goal: Glucose maintained within target range  Description:  INTERVENTIONS:  - Monitor Blood Glucose as ordered  - Assess for signs and symptoms of hyperglycemia and hypoglycemia  - Administer ordered medications to maintain glucose within target range  - Assess nutritional intake and initiate nutrition service referral as needed  Outcome: Progressing     Problem: SKIN/TISSUE INTEGRITY - ADULT  Goal: Skin Integrity remains intact(Skin Breakdown Prevention)  Description: Assess:  -Perform M[place] assessment every shift  -Clean and moisturize skin every shift  -Inspect skin when repositioning, toileting, and assisting with ADLS  -Assess extremities for adequate circulation and sensation     Bed Management:  -Have minimal linens on bed & keep smooth, unwrinkled  -Change linens as needed when moist or perspiring  -Avoid sitting or lying in one position for more than 2 hours while in bed  -Keep HOB at 30degrees     Toileting:  -Offer bedside commode  -Assess for incontinence every shift  -Use incontinent care products after each incontinent episode such as jim wipes    Activity:  -Mobilize patient 3 times a day  -Encourage activity and walks on unit  -Encourage or provide ROM exercises   -Turn and reposition patient every 2 Hours  -Use appropriate equipment to lift or move patient in bed  -Instruct/ Assist with weight shifting every 60 mins when out of bed in chair  -Consider limitation of chair time 6 hour intervals    Skin Care:  -Avoid use of baby powder, tape, friction and shearing, hot water or constrictive clothing  -Relieve pressure over bony prominences using foam wedges/pillows  -Do not massage red bony areas    Outcome: Progressing  Goal: Pressure injury heals and does not worsen  Description: Interventions:  - Implement low air loss mattress or specialty surface (Criteria met)  - Apply silicone foam dressing  - Instruct/assist with weight shifting every 60 minutes when in chair   - Limit chair time to 6 hour intervals  - Use special pressure reducing interventions such  as waffle cushion when in chair   - Apply fecal or urinary incontinence containment device   - Perform passive or active ROM every 8 hours  - Turn and reposition patient & offload bony prominences every 2 hours   - Utilize friction reducing device or surface for transfers   - Use incontinent care products after each incontinent episode such as jim wipes  - Consider nutrition services referral as needed  Outcome: Progressing     Problem: HEMATOLOGIC - ADULT  Goal: Maintains hematologic stability  Description: INTERVENTIONS  - Assess for signs and symptoms of bleeding or hemorrhage  - Monitor labs  - Administer supportive blood products/factors as ordered and appropriate  Outcome: Progressing     Problem: MUSCULOSKELETAL - ADULT  Goal: Maintain or return mobility to safest level of function  Description: INTERVENTIONS:  - Assess patient's ability to carry out ADLs; assess patient's baseline for ADL function and identify physical deficits which impact ability to perform ADLs (bathing, care of mouth/teeth, toileting, grooming, dressing, etc.)  - Assess/evaluate cause of self-care deficits   - Assess range of motion  - Assess patient's mobility  - Assess patient's need for assistive devices and provide as appropriate  - Encourage maximum independence but intervene and supervise when necessary  - Involve family in performance of ADLs  - Assess for home care needs following discharge   - Consider OT consult to assist with ADL evaluation and planning for discharge  - Provide patient education as appropriate  Outcome: Progressing  Goal: Maintain proper alignment of affected body part  Description: INTERVENTIONS:  - Support, maintain and protect limb and body alignment  - Provide patient/ family with appropriate education  Outcome: Progressing

## 2025-03-10 NOTE — OCCUPATIONAL THERAPY NOTE
Occupational Therapy Evaluation     Patient Name: Ama Burnett  Today's Date: 3/10/2025  Problem List  Principal Problem:    Severe sepsis (HCC)  Active Problems:    GERD (gastroesophageal reflux disease)    Anxiety    Benign essential hypertension    Hypomagnesemia    Acute cystitis with hematuria    Past Medical History  Past Medical History:   Diagnosis Date    Diverticulosis     Fall 02/01/2020    GERD (gastroesophageal reflux disease)     Hiatal hernia     Irritable bowel disease     Lactose intolerance     Macular cyst, hole, or pseudohole, left eye 10/27/2017    Osteoporosis      Past Surgical History  Past Surgical History:   Procedure Laterality Date    CARPAL TUNNEL RELEASE Right     CAST APPLICATION Left 2/1/2020    Procedure: APPLICATION CAST, LEFT WRIST;  Surgeon: Julio Thomason MD;  Location: [facility];  Service: Orthopedics    CHOLECYSTECTOMY      EAR TUBE REMOVAL      HEMORROIDECTOMY      HYSTERECTOMY      PERINEAL  PELVIC RECONSTRUCTION SURGERY  09/13/2019    Dr Hightower - [facility]    MN EXC TUMOR SOFT TISSUE LEG/ANKLE SUBFASCIAL <5CM Left 9/8/2020    Procedure: EXCISION BIOPSY TISSUE LESION/MASS LOWER EXTREMITY;  Surgeon: Al Roberson DO;  Location: [facility];  Service: General    MN OPTX FEM SHFT FX W/INSJ IMED IMPLT W/WO SCREW Left 2/1/2020    Procedure: INSERTION NAIL IM FEMUR ANTEGRADE (TROCHANTERIC);  Surgeon: Julio Thomason MD;  Location: [facility];  Service: Orthopedics    SKIN CANCER EXCISION      SQUAMOUS CELL CARCINOMA EXCISION Left 09/08/2020    lower part of leg    TONSILLECTOMY             03/10/25 0918   OT Last Visit   OT Visit Date 03/10/25   Note Type   Note type Evaluation   Pain Assessment   Pain Assessment Tool 0-10   Pain Score No Pain   Restrictions/Precautions   Weight Bearing Precautions Per Order No   Other Precautions Impulsive;Chair Alarm;Bed Alarm;Fall Risk;Hard of hearing;Visual impairment   Home Living   Type of Home House   Home Layout Two level;Bed/bath  "upstairs;Able to live on main level with bedroom/bathroom;Stairs to enter with rails  (3 JOSE LUIS c HR; stairglide to second floor; pt reporting to be staying on first floor at this time)   Bathroom Shower/Tub Tub/shower unit   Bathroom Toilet Standard   Bathroom Equipment Grab bars in shower;Shower chair   Bathroom Accessibility Accessible   Home Equipment Walker;Cane  (4WW baseline, no O2 baseline)   Prior Function   Level of [place] Independent with functional mobility;Needs assistance with IADLS;Independent with ADLs   Lives With Alone   Receives Help From Home health;Family   IADLs Family/Friend/Other provides meals;Family/Friend/Other provides medication management;Family/Friend/Other provides transportation   Falls in the last 6 months 0   Vocational Retired   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   Subjective   Subjective \"I'm just not sure if I can live alone\"   ADL   Where Assessed Other (Comment)  (bathroom)   Grooming Assistance 5  Supervision/Setup   Grooming Deficit Wash/dry face;Supervision/safety   UB Bathing Assistance 4  Minimal Assistance   UB Bathing Deficit Increased time to complete  (back)   LB Bathing Assistance 3  Moderate Assistance   LB Bathing Deficit Left lower leg including foot;Right lower leg including foot;Increased time to complete;Supervision/safety   UB Dressing Assistance 5  Supervision/Setup   LB Dressing Assistance 3  Moderate Assistance   LB Dressing Deficit Thread LLE into underwear;Thread RLE into underwear;Don/doff L sock;Don/doff R sock;Supervision/safety   Toileting Assistance  4  Minimal Assistance   Toileting Deficit Supervison/safety;Clothing management up;Grab bar use  (vc for safety with toilet transfer; posterior lean upon standing requiring (A) to maintain position)   Bed Mobility   Rolling R 5  Supervision   Additional items Increased time required;Verbal cues   Supine to Sit 5  Supervision   Additional items Verbal cues "   Additional Comments pt supine in bed upon arrival, seated in chair at end of session on 1LO2; SPO2>90%, no reports of SOB   Transfers   Sit to Stand 5  Supervision   Additional items Increased time required  (RW)   Stand to Sit 5  Supervision   Additional items Increased time required  (RW)   Stand pivot 5  Supervision   Additional items Increased time required;Verbal cues  (RW)   Additional Comments functional transfers with RW and (S) with mild instability   Functional Mobility   Functional Mobility 5  Supervision   Additional Comments ~40ft functional mobility in room with RW and (S) with mild instability, no significant LOB   Additional items Rolling walker   Balance   Static Sitting Good   Dynamic Sitting Good   Static Standing Fair +   Dynamic Standing Fair +   Ambulatory Fair   Activity Tolerance   Activity Tolerance Patient limited by fatigue   RUE Assessment   RUE Assessment WFL   LUE Assessment   LUE Assessment WFL   Hand Function   Gross Motor Coordination Functional   Fine Motor Coordination Functional   Sensation   Light Touch No apparent deficits   Sharp/Dull No apparent deficits   Vision-Basic Assessment   Current Vision Wears glasses all the time   Psychosocial   Psychosocial (WDL) WDL   Cognition   Overall Cognitive Status WFL   Arousal/Participation Alert;Cooperative   Attention Within functional limits   Orientation Level Oriented X4   Memory Within functional limits   Following Commands Follows one step commands without difficulty   Assessment   Limitation Decreased ADL status;Decreased UE strength;Decreased Safe judgement during ADL;Decreased endurance;Decreased high-level ADLs;Decreased self-care trans   Assessment Pt is a 89 y.o. female seen for OT evaluation s/p admit to [facility] on 3/8/2025 w/ Severe sepsis (HCC).  Comorbidities affecting pt's functional performance at time of assessment include:  GERD, anxiety, HTN, hypomagnesemia, acute cystitis w/ hematuria . Personal factors affecting pt at  time of IE include:steps to enter environment, limited home support, difficulty performing ADLS, difficulty performing IADLS , decreased initiation and engagement , and health management . Prior to admission, pt was (I) with ADLs with AD. Upon evaluation: Pt requires (S)-mod (A) with RW 2* the following deficits impacting occupational performance: weakness, decreased strength, decreased balance, decreased tolerance, impaired initiation, impulsivity, and decreased safety awareness. Pt to benefit from continued skilled OT tx while in the hospital to address deficits as defined above and maximize level of functional independence w ADL's and functional mobility. Occupational Performance areas to address include: grooming, bathing/shower, toilet hygiene, dressing, health maintenance, functional mobility, and community mobility. The patient's raw score on the AM-PAC Daily Activity Inpatient Short Form is 19. A raw score of greater than or equal to 19 suggests the patient may benefit from discharge to home. Discharge recommendation at this time is level II moderate resource intensity.   Goals   Patient Goals to feel better   Short Term Goal  pt will complete UE strengthening exercises   LTG Time Frame 10-14   Long Term Goal #1 pt will complete toilet transfers and hygiene (I)   Long Term Goal #2 pt will complete functional mobility with RW at mod (I)   Long Term Goal pt will complete LB dressing/bathing (I)   Plan   Treatment Interventions ADL retraining;UE strengthening/ROM;Functional transfer training;Endurance training;Patient/family training;Equipment evaluation/education;Activityengagement;Energy conservation   Goal Expiration Date 03/24/25   OT Frequency 3-5x/wk   Discharge Recommendation   Rehab Resource Intensity Level, OT II (Moderate Resource Intensity)   AM-PAC Daily Activity Inpatient   Lower Body Dressing 2   Bathing 2   Toileting 3   Upper Body Dressing 4   Grooming 4   Eating 4   Daily Activity Raw Score 19    Daily Activity Standardized Score (Calc for Raw Score >=11) 40.22   AM-PAC Applied Cognition Inpatient   Following a Speech/Presentation 4   Understanding Ordinary Conversation 4   Taking Medications 4   Remembering Where Things Are Placed or Put Away 4   Remembering List of 4-5 Errands 3   Taking Care of Complicated Tasks 3   Applied Cognition Raw Score 22   Applied Cognition Standardized Score 47.83

## 2025-03-10 NOTE — PHYSICAL THERAPY NOTE
PHYSICAL THERAPY EVALUATION  NAME:  Ama Burnett  DATE: 03/10/25    AGE:   89 y.o.  Mrn:   [id number]  ADMIT DX:  Hypomagnesemia [E83.42]  Vomiting [R11.10]  Abdominal pain [R10.9]  Nausea and vomiting [R11.2]  SIRS (systemic inflammatory response syndrome) (HCC) [R65.10]    Past Medical History:   Diagnosis Date    Diverticulosis     Fall 02/01/2020    GERD (gastroesophageal reflux disease)     Hiatal hernia     Irritable bowel disease     Lactose intolerance     Macular cyst, hole, or pseudohole, left eye 10/27/2017    Osteoporosis      Length Of Stay: 1  Performed at least 2 patient identifiers during session: Name and ID bracelet  PHYSICAL THERAPY EVALUATION :    03/10/25 1102   PT Last Visit   PT Visit Date 03/10/25   Note Type   Note type Evaluation   Pain Assessment   Pain Assessment Tool 0-10   Pain Score No Pain   Restrictions/Precautions   Weight Bearing Precautions Per Order No   Other Precautions Pain;Fall Risk   Home Living   Type of Home House   Home Layout Two level;Bed/bath upstairs;Stairs to enter with rails  (stairglide c 2 stairs to access; 3 JOSE LUIS c HR)   Bathroom Shower/Tub Tub/shower unit   Bathroom Toilet Standard   Bathroom Equipment Commode   Bathroom Accessibility Accessible   Home Equipment Walker;Cane   Additional Comments pt uses cane at baseline   Prior Function   Level of [place] Independent with ADLs;Independent with functional mobility;Needs assistance with IADLS   Lives With Alone   Receives Help From Home health  (2x/week for 2 hours at a time)   IADLs Family/Friend/Other provides transportation;Independent with medication management;Independent with meal prep  (preps simple meals and also receives MOW)   Falls in the last 6 months 0   Vocational Retired   General   Family/Caregiver Present No   Cognition   Overall Cognitive Status WFL   Arousal/Participation Alert   Orientation Level Oriented X4   Following Commands Follows all commands and directions without difficulty  "  Subjective   Subjective \"I don't know if I can go home by myself\"   RLE Assessment   RLE Assessment WFL   LLE Assessment   LLE Assessment WFL   Vision-Basic Assessment   Current Vision Wears glasses all the time   Bed Mobility   Additional Comments pt OOB at start/end of session   Transfers   Sit to Stand 5  Supervision   Additional items Armrests;Increased time required   Stand to Sit 5  Supervision   Additional items Armrests;Increased time required   Toilet transfer 5  Supervision   Additional items Increased time required;Standard toilet   Additional Comments RW used   Ambulation/Elevation   Gait pattern Short stride;Narrow NIALL;Decreased R stance   Gait Assistance 5  Supervision   Additional items Verbal cues   Assistive Device Rolling walker   Distance 100'   Balance   Static Sitting Good   Dynamic Sitting Good   Static Standing Fair +   Dynamic Standing Fair +   Ambulatory Fair  (with RW)   Endurance Deficit   Endurance Deficit Yes   Endurance Deficit Description pt endorses SOB with increased ambulation   Activity Tolerance   Activity Tolerance Patient limited by fatigue   Assessment   Prognosis Good   Problem List Decreased endurance;Impaired balance;Decreased mobility   Goals   Patient Goals to go home   LTG Expiration Date 03/24/25   Plan   Treatment/Interventions Functional transfer training;LE strengthening/ROM;Elevations;Therapeutic exercise;Endurance training;Bed mobility;Gait training   PT Frequency 3-5x/wk   Discharge Recommendation   Rehab Resource Intensity Level, PT III (Minimum Resource Intensity)   AM-PAC Basic Mobility Inpatient   Turning in Flat Bed Without Bedrails 3   Lying on Back to Sitting on Edge of Flat Bed Without Bedrails 3   Moving Bed to Chair 3   Standing Up From Chair Using Arms 3   Walk in Room 3   Climb 3-5 Stairs With Railing 3   Basic Mobility Inpatient Raw Score 18   Basic Mobility Standardized Score 41.05   [facility] Highest Level Of Mobility   [facility] Goal 6: Walk 10 " steps or more   JH-HLM Achieved 7: Walk 25 feet or more   End of Consult   Patient Position at End of Consult Bedside chair;Bed/Chair alarm activated;All needs within reach       (Please find full objective findings from PT assessment regarding body systems outlined above).     Assessment: Pt is a 89 y.o. female seen for PT evaluation s/p admission to [facility] on 3/8/2025 with Severe sepsis (HCC).  Order placed for PT services.  Upon evaluation: Pt is presenting with impaired functional mobility due to decreased endurance, impaired balance, gait deviations, impaired hearing, and fall risk requiring  SUP assistance for transfers and ambulation with RW . Pt's clinical presentation is currently evolving given the functional mobility deficits above, especially decreased endurance, impaired balance, gait deviations, and decreased activity tolerance, and combined with medical complications of new onset O2 use and need for input for mobility technique/safety.  Pt's PMHx and comorbidities that may affect physical performance and progress include: anxiety, HTN, PAD, and limited hearing. Personal factors affecting pt at time of IE include: step(s) to enter environment, multi-level environment, inability to perform IADLs, and inability to navigate community distances. Pt will benefit from continued skilled PT services to address deficits as defined above and to maximize level of functional mobility to facilitate return toward PLOF and improved QOL. From PT/mobility standpoint, recommendation at time of d/c would be Level III (Minimum Resource Intensity) in order to reduce fall risk and maximize pt's functional independence and consistency with mobility.      The patient's AM-PAC Basic Mobility Inpatient Short Form Raw Score is 18. A Raw score of greater than 16 suggests the patient may benefit from discharge to home. Please also refer to the recommendation of the Physical Therapist for safe discharge  planning.       Goals: Pt will participate in B LE strengthening exercises to facilitate improved functional activity tolerance. Pt will perform all functional transfers and bed mobility mod(I) with good safety awareness. Pt will ambulate 250' mod(I) with LRAD while maintaining good functional dynamic balance. Pt will ascend/descend a FFOS with HR and SUP to reflect the ability to safely navigate the home.     Maura Key, PT,DPT

## 2025-03-10 NOTE — PLAN OF CARE
Problem: PHYSICAL THERAPY ADULT  Goal: Performs mobility at highest level of function for planned discharge setting.  See evaluation for individualized goals.  Description: Treatment/Interventions: Functional transfer training, LE strengthening/ROM, Elevations, Therapeutic exercise, Endurance training, Bed mobility, Gait training          See flowsheet documentation for full assessment, interventions and recommendations.  Note: Prognosis: Good  Problem List: Decreased endurance, Impaired balance, Decreased mobility      Assessment: Pt is a 89 y.o. female seen for PT evaluation s/p admission to [facility] on 3/8/2025 with Severe sepsis (HCC).  Order placed for PT services.  Upon evaluation: Pt is presenting with impaired functional mobility due to decreased endurance, impaired balance, gait deviations, impaired hearing, and fall risk requiring  SUP assistance for transfers and ambulation with RW . Pt's clinical presentation is currently evolving given the functional mobility deficits above, especially decreased endurance, impaired balance, gait deviations, and decreased activity tolerance, and combined with medical complications of new onset O2 use and need for input for mobility technique/safety.  Pt's PMHx and comorbidities that may affect physical performance and progress include: anxiety, HTN, PAD, and limited hearing. Personal factors affecting pt at time of IE include: step(s) to enter environment, multi-level environment, inability to perform IADLs, and inability to navigate community distances. Pt will benefit from continued skilled PT services to address deficits as defined above and to maximize level of functional mobility to facilitate return toward PLOF and improved QOL. From PT/mobility standpoint, recommendation at time of d/c would be Level III (Minimum Resource Intensity) in order to reduce fall risk and maximize pt's functional independence and consistency with mobility.      Rehab  Resource Intensity Level, PT: III (Minimum Resource Intensity)    See flowsheet documentation for full assessment.

## 2025-03-11 PROBLEM — K59.09 INTERMITTENT CONSTIPATION: Status: ACTIVE | Noted: 2025-03-11

## 2025-03-11 LAB
ALBUMIN SERPL BCG-MCNC: 3.4 G/DL (ref 3.5–5)
ALP SERPL-CCNC: 55 U/L (ref 34–104)
ALT SERPL W P-5'-P-CCNC: 14 U/L (ref 7–52)
ANION GAP SERPL CALCULATED.3IONS-SCNC: 4 MMOL/L (ref 4–13)
AST SERPL W P-5'-P-CCNC: 17 U/L (ref 13–39)
BILIRUB SERPL-MCNC: 0.42 MG/DL (ref 0.2–1)
BUN SERPL-MCNC: 12 MG/DL (ref 5–25)
CALCIUM ALBUM COR SERPL-MCNC: 9.1 MG/DL (ref 8.3–10.1)
CALCIUM SERPL-MCNC: 8.6 MG/DL (ref 8.4–10.2)
CHLORIDE SERPL-SCNC: 106 MMOL/L (ref 96–108)
CO2 SERPL-SCNC: 31 MMOL/L (ref 21–32)
CREAT SERPL-MCNC: 0.79 MG/DL (ref 0.6–1.3)
ERYTHROCYTE [DISTWIDTH] IN BLOOD BY AUTOMATED COUNT: 13.7 % (ref 11.6–15.1)
GFR SERPL CREATININE-BSD FRML MDRD: 64 ML/MIN/1.73SQ M
GLUCOSE SERPL-MCNC: 84 MG/DL (ref 65–140)
HCT VFR BLD AUTO: 32.1 % (ref 34.8–46.1)
HGB BLD-MCNC: 10.2 G/DL (ref 11.5–15.4)
MAGNESIUM SERPL-MCNC: 2.2 MG/DL (ref 1.9–2.7)
MCH RBC QN AUTO: 34.6 PG (ref 26.8–34.3)
MCHC RBC AUTO-ENTMCNC: 31.8 G/DL (ref 31.4–37.4)
MCV RBC AUTO: 109 FL (ref 82–98)
PLATELET # BLD AUTO: 245 THOUSANDS/UL (ref 149–390)
PMV BLD AUTO: 10.3 FL (ref 8.9–12.7)
POTASSIUM SERPL-SCNC: 3.6 MMOL/L (ref 3.5–5.3)
PROCALCITONIN SERPL-MCNC: 0.07 NG/ML
PROT SERPL-MCNC: 5.8 G/DL (ref 6.4–8.4)
RBC # BLD AUTO: 2.95 MILLION/UL (ref 3.81–5.12)
SODIUM SERPL-SCNC: 141 MMOL/L (ref 135–147)
WBC # BLD AUTO: 3.93 THOUSAND/UL (ref 4.31–10.16)

## 2025-03-11 PROCEDURE — 80053 COMPREHEN METABOLIC PANEL: CPT

## 2025-03-11 PROCEDURE — 99222 1ST HOSP IP/OBS MODERATE 55: CPT | Performed by: INTERNAL MEDICINE

## 2025-03-11 PROCEDURE — 85027 COMPLETE CBC AUTOMATED: CPT

## 2025-03-11 PROCEDURE — 83735 ASSAY OF MAGNESIUM: CPT

## 2025-03-11 PROCEDURE — 97535 SELF CARE MNGMENT TRAINING: CPT

## 2025-03-11 PROCEDURE — 84145 PROCALCITONIN (PCT): CPT

## 2025-03-11 PROCEDURE — 99232 SBSQ HOSP IP/OBS MODERATE 35: CPT | Performed by: INTERNAL MEDICINE

## 2025-03-11 PROCEDURE — 97530 THERAPEUTIC ACTIVITIES: CPT

## 2025-03-11 RX ORDER — DOCUSATE SODIUM 100 MG/1
100 CAPSULE, LIQUID FILLED ORAL 2 TIMES DAILY
Status: DISCONTINUED | OUTPATIENT
Start: 2025-03-11 | End: 2025-03-12

## 2025-03-11 RX ORDER — FAMOTIDINE 20 MG/1
10 TABLET, FILM COATED ORAL 2 TIMES DAILY
Status: DISCONTINUED | OUTPATIENT
Start: 2025-03-11 | End: 2025-03-14 | Stop reason: HOSPADM

## 2025-03-11 RX ORDER — SUCRALFATE ORAL 1 G/10ML
1 SUSPENSION ORAL
Status: DISCONTINUED | OUTPATIENT
Start: 2025-03-11 | End: 2025-03-14 | Stop reason: HOSPADM

## 2025-03-11 RX ORDER — AMOXICILLIN 250 MG
1 CAPSULE ORAL
Status: DISCONTINUED | OUTPATIENT
Start: 2025-03-11 | End: 2025-03-14 | Stop reason: HOSPADM

## 2025-03-11 RX ADMIN — FAMOTIDINE 10 MG: 20 TABLET, FILM COATED ORAL at 17:17

## 2025-03-11 RX ADMIN — DOCUSATE SODIUM 100 MG: 100 CAPSULE, LIQUID FILLED ORAL at 17:17

## 2025-03-11 RX ADMIN — FAMOTIDINE 10 MG: 20 TABLET, FILM COATED ORAL at 08:34

## 2025-03-11 RX ADMIN — SUCRALFATE 1 G: 1 SUSPENSION ORAL at 17:17

## 2025-03-11 RX ADMIN — DICYCLOMINE HYDROCHLORIDE 10 MG: 10 CAPSULE ORAL at 17:17

## 2025-03-11 RX ADMIN — PANTOPRAZOLE SODIUM 40 MG: 40 TABLET, DELAYED RELEASE ORAL at 07:24

## 2025-03-11 RX ADMIN — SENNOSIDES AND DOCUSATE SODIUM 1 TABLET: 50; 8.6 TABLET ORAL at 21:25

## 2025-03-11 RX ADMIN — DICYCLOMINE HYDROCHLORIDE 10 MG: 10 CAPSULE ORAL at 07:24

## 2025-03-11 RX ADMIN — LORATADINE 5 MG: 10 TABLET ORAL at 08:34

## 2025-03-11 RX ADMIN — SUCRALFATE 1 G: 1 SUSPENSION ORAL at 11:18

## 2025-03-11 RX ADMIN — ACETAMINOPHEN 650 MG: 325 TABLET, FILM COATED ORAL at 13:43

## 2025-03-11 RX ADMIN — ALPRAZOLAM 0.25 MG: 0.25 TABLET ORAL at 21:25

## 2025-03-11 RX ADMIN — CEFTRIAXONE 1000 MG: 1 INJECTION, SOLUTION INTRAVENOUS at 13:44

## 2025-03-11 RX ADMIN — DICYCLOMINE HYDROCHLORIDE 10 MG: 10 CAPSULE ORAL at 11:18

## 2025-03-11 RX ADMIN — ENOXAPARIN SODIUM 40 MG: 40 INJECTION SUBCUTANEOUS at 08:34

## 2025-03-11 RX ADMIN — ACETAMINOPHEN 650 MG: 325 TABLET, FILM COATED ORAL at 21:25

## 2025-03-11 RX ADMIN — LOSARTAN POTASSIUM 50 MG: 50 TABLET, FILM COATED ORAL at 08:34

## 2025-03-11 RX ADMIN — ALPRAZOLAM 0.25 MG: 0.25 TABLET ORAL at 11:18

## 2025-03-11 RX ADMIN — DOCUSATE SODIUM 100 MG: 100 CAPSULE, LIQUID FILLED ORAL at 08:34

## 2025-03-11 NOTE — PLAN OF CARE
Problem: OCCUPATIONAL THERAPY ADULT  Goal: Performs self-care activities at highest level of function for planned discharge setting.  See evaluation for individualized goals.  Description:            See flowsheet documentation for full assessment, interventions and recommendations.   Note: Limitation: Decreased ADL status, Decreased UE strength, Decreased Safe judgement during ADL, Decreased endurance, Decreased high-level ADLs, Decreased self-care trans     Assessment: Patient participated in Skilled OT session this date with interventions consisting of ADL re training with the use of correct body mechnaics, safety awareness and fall prevention techniques,  therapeutic activities to: increase activity tolerance, increase dynamic sit/ stand balance during functional activity , increase postural control, and increase trunk control. Patient agreeable to OT treatment session, upon arrival patient was found seated OOB to Chair. Patient requiring verbal cues for safety, verbal cues for correct technique, verbal cues for pacing thru activity steps, and frequent rest periods. Patient continues to be functioning below baseline level, occupational performance remains limited secondary to factors listed above and increased risk for falls and injury. The patient's raw score on the -PAC Daily Activity Inpatient Short Form is 20. A raw score of greater than or equal to 19 suggests the patient may benefit from discharge to home. Please refer to the recommendation of the Occupational Therapist for safe discharge planning. From OT standpoint, recommendation at time of d/c would be level II moderate resource intensity     Rehab Resource Intensity Level, OT: II (Moderate Resource Intensity)

## 2025-03-11 NOTE — DISCHARGE SUPPORT
Case Management Assessment & Discharge Planning Note    Patient name Ama Burnett  Location /423-01 MRN [id number]  : 1931 Date 3/11/2025       Current Admission Date: 3/8/2025  Current Admission Diagnosis:Severe sepsis (HCC)   Patient Active Problem List    Diagnosis Date Noted Date Diagnosed    Intermittent constipation 2025     Acute cystitis with hematuria 03/10/2025     Severe sepsis (HCC) 2025     Hypomagnesemia 2025     Non-seasonal allergic rhinitis due to pollen 2023     Pulmonary nodule 02/10/2023     Peripheral arterial disease (HCC) 02/10/2023     Idiopathic peripheral neuropathy 2022     Stage 3a chronic kidney disease (HCC) 11/15/2021     Nausea & vomiting 2020     Benign essential hypertension 10/27/2017     GERD (gastroesophageal reflux disease) 2017     Anxiety 2017       LOS (days): 2  Geometric Mean LOS (GMLOS) (days): 4.9  Days to GMLOS:2.8     Facility Authorization Initiated  DC Support Center received request for auth from : Maite WOOD  Authorization Request Submitted for: SNF  Requested Start of Care Date: 25  Facility Name: [facility]  Facility NPI: [id number]  Facility MD: Omar Bautista  Facility MD NPI: [id number]  Authorization initiated by contacting insurance: [facility]  Via: H&CC Portal  Clinicals submitted via: Portal Attachment  Pending reference #: [id number]   notified: Maite WOOD                    Updates to authorization status will be noted in chart.    Please reach out to CM for updates on any clinical information.

## 2025-03-11 NOTE — CASE MANAGEMENT
Case Management Discharge Planning Note    Patient name Ama Llamas /423-01 MRN [id number]  : 1931 Date 3/11/2025       Current Admission Date: 3/8/2025  Current Admission Diagnosis:Severe sepsis (HCC)   Patient Active Problem List    Diagnosis Date Noted Date Diagnosed    Intermittent constipation 2025     Acute cystitis with hematuria 03/10/2025     Severe sepsis (HCC) 2025     Hypomagnesemia 2025     Non-seasonal allergic rhinitis due to pollen 2023     Pulmonary nodule 02/10/2023     Peripheral arterial disease (HCC) 02/10/2023     Idiopathic peripheral neuropathy 2022     Stage 3a chronic kidney disease (HCC) 11/15/2021     Nausea & vomiting 2020     Benign essential hypertension 10/27/2017     GERD (gastroesophageal reflux disease) 2017     Anxiety 2017       LOS (days): 2  Geometric Mean LOS (GMLOS) (days): 4.9  Days to GMLOS:2.8     OBJECTIVE:  Risk of Unplanned Readmission Score: 11.4         Current admission status: Inpatient   Preferred Pharmacy:   [id number] [place] - [address]  [address] [facility] [address]  Phone: [phone] Fax: [phone]    [facility] [place]  [address] [place] [address]  [address] [facility]  [address]  [facility] [address]  Phone: [phone] Fax: [phone]    Primary Care Provider: Rayray Liu DO    Primary Insurance: [facility]  Secondary Insurance:     DISCHARGE DETAILS:    Discharge planning discussed with:: patient  Freedom of Choice: Yes  Comments - Noel of Choice: [facility] secured     Were Treatment Team discharge recommendations reviewed with patient/caregiver?: Yes  Did patient/caregiver verbalize understanding of patient care needs?: Yes  Were patient/caregiver advised of the risks associated with not following Treatment Team discharge recommendations?: Yes      Requested Home Health Care         Is the patient interested  in [facility] at discharge?: No    DME Referral Provided  Referral made for DME?: No      Would you like to participate in our [facility] service program?  : No - Declined       Discharge Destination Plan:: SNF ([facility])  Transport at Discharge : Other (Comment) (staff to transport)             CM provided patient STR options and her preference is [facility].  Secured in aidin.    Auth started in [place] with discharge support.

## 2025-03-11 NOTE — ASSESSMENT & PLAN NOTE
After discussion with the attending, Dr. Calvin, at this point in time secondary to the patient's age and it is her wishes to try to hold off any kind of endoscopic procedures unless they are absolutely necessary and since she is hemodynamically stable, we do not see any current necessity or emergent need for any endoscopic procedures at this time.  However, we will continue to monitor the potential need for an EGD in the future.  The patient was agreeable and verbalized an understanding.    Continue current diet as ordered, encourage patient to continue to try to stay on the lighter side of meals and then slowly advance her diet as tolerated.   Continue to maintain a large bore IV.  Continue to monitor hemoglobin and transfuse as per protocol.   Continue with serial abdominal exams.   Continue to monitor stool output for any overt signs of GI bleeding.   Continue PRN antiemetics as ordered.

## 2025-03-11 NOTE — ASSESSMENT & PLAN NOTE
Bowel regimen:  Start Colace 100 mg twice daily to help soften stool and encourage daily bowel movements.  Start Senokot, 1 tablet at bedtime to help encourage bowel movements and keep her bowels regular, especially since she is on the dicyclomine.  Can continue dicyclomine 10 mg 4 times daily as prescribed for now, however, if she experiences some constipation and is not having abdominal pain or spasms, would encourage switching it to as needed in the future.

## 2025-03-11 NOTE — PROGRESS NOTES
Patient:    MRN:  [id number]    Em Request ID:  [id number]    Level of care reserved:  Skilled Nursing Facility    Partner Reserved:  [facility] [place] [phone]    Clinical needs requested:  physical therapy, occupational therapy    Geography searched:  10 miles around [id number]    Start of Service:    Request sent:  3:59pm EDT on 3/10/2025 by Maite Mejia    Partner reserved:  12:54pm EDT on 3/11/2025 by Maite Mejia    Choice list shared:  10:28am EDT on 3/11/2025 by Maite Mejia

## 2025-03-11 NOTE — PROGRESS NOTES
Progress Note - Family Medicine   Name: Ama Burnett 89 y.o. female I MRN: [id number]  Unit/Bed#: 423-01 I Date of Admission: 3/8/2025   Date of Service: 3/11/2025 I Hospital Day: 2     Assessment & Plan  Severe sepsis (HCC)  Severe sepsis on admission. Tachycardia, leukocytosis and elevated Lactic acid and pro calcitonin.   Possible Possible left lower lobe pneumonia vs acute cystitis with microscopic hematuria vs diverticulitis  Labs 3/11/25: WBC: 3.93, Hb 10.2, Pro Brannon: 0.07, CMP : normal  Urine analysis: wbc:1-2, positive nitrites, small occult blood   Urine legionella and Streptococcal pneumo negative  Blood Cultures : no growth      CT TAP: IMPRESSION:  Left lower lobe airspace opacity may represent pneumonia versus focal atelectasis.  No acute intra-abdominal inflammatory process.  Colonic diverticulosis without evidence of acute diverticulitis    Plan:   Continue Ceftriaxone  Follow sputum cultures  Follow GI consult. Reccs appreciated  Monitor vitals   Acute cystitis with hematuria  See sepsis above  GERD (gastroesophageal reflux disease)  Continue PPI daily  Continue Famotidine bid  Continue Carafate tid with meals per Gi reccs.  Continue to monitor  Anxiety  Continue xanax as needed   Continue to monitor  Benign essential hypertension  Continue losartan 50 mg daily  Continue to monitor  Intermittent constipation  Continue Dicyclomine 10 mg QID  Continue colace 100 mg bid  Continue Senokot HS  Hypomagnesemia  Resolved, S/p IV magnesium replacement  Magnesium: 2.2 on 3/11/35  Continue to monitor    24 Hour Events : no acute overnight events. Patient remained vitally stable  Subjective : Patient reports mild cramping in rectal area, has passed stools this morning, no associated bleeding.   Also reports symptoms of gastric reflux. Denies any nausea vomiting chest pain or shortness of breath.    Objective :  Temp:  [98.2 °F (36.8 °C)-98.5 °F (36.9 °C)] 98.2 °F (36.8 °C)  HR:  [69-81] 81  BP: (142-151)/(67-71)  151/71  Resp:  [17-20] 18  SpO2:  [81 %-97 %] 93 %  O2 Device: Nasal cannula  Nasal Cannula O2 Flow Rate (L/min):  [1 L/min] 1 L/min    Physical Exam  Constitutional:       Appearance: She is obese.   HENT:      Head: Normocephalic and atraumatic.      Nose: Nose normal.      Mouth/Throat:      Mouth: Mucous membranes are moist.   Eyes:      Conjunctiva/sclera: Conjunctivae normal.   Cardiovascular:      Rate and Rhythm: Normal rate and regular rhythm.      Heart sounds: Normal heart sounds.   Pulmonary:      Effort: Pulmonary effort is normal.      Breath sounds: Normal breath sounds.   Abdominal:      General: There is no distension.      Palpations: Abdomen is soft.      Tenderness: There is abdominal tenderness. There is no right CVA tenderness, left CVA tenderness, guarding or rebound.   Musculoskeletal:         General: Normal range of motion.      Cervical back: Normal range of motion and neck supple.   Skin:     General: Skin is warm and dry.      Capillary Refill: Capillary refill takes less than 2 seconds.   Neurological:      General: No focal deficit present.      Mental Status: She is alert and oriented to person, place, and time.   Psychiatric:         Mood and Affect: Mood normal.         Behavior: Behavior normal.         Lab Results: I have reviewed the following results:CBC/BMP:   .     03/11/25  0454   WBC 3.93*   HGB 10.2*   HCT 32.1*      SODIUM 141   K 3.6      CO2 31   BUN 12   CREATININE 0.79   GLUC 84   MG 2.2        Imaging Results Review: I reviewed radiology reports from this admission including: CT abdomen/pelvis.  Other Study Results Review: EKG was reviewed.     VTE Pharmacologic Prophylaxis: VTE covered by:  enoxaparin, Subcutaneous, 40 mg at 03/11/25 0834     VTE Mechanical Prophylaxis: sequential compression device

## 2025-03-11 NOTE — PLAN OF CARE
Problem: PAIN - ADULT  Goal: Verbalizes/displays adequate comfort level or baseline comfort level  Description: Interventions:  - Encourage patient to monitor pain and request assistance  - Assess pain using appropriate pain scale  - Administer analgesics based on type and severity of pain and evaluate response  - Implement non-pharmacological measures as appropriate and evaluate response  - Consider cultural and social influences on pain and pain management  - Notify physician/advanced practitioner if interventions unsuccessful or patient reports new pain  Outcome: Progressing     Problem: INFECTION - ADULT  Goal: Absence or prevention of progression during hospitalization  Description: INTERVENTIONS:  - Assess and monitor for signs and symptoms of infection  - Monitor lab/diagnostic results  - Monitor all insertion sites, i.e. indwelling lines, tubes, and drains  - Monitor endotracheal if appropriate and nasal secretions for changes in amount and color  - [place] appropriate cooling/warming therapies per order  - Administer medications as ordered  - Instruct and encourage patient and family to use good hand hygiene technique  - Identify and instruct in appropriate isolation precautions for identified infection/condition  Outcome: Progressing  Goal: Absence of fever/infection during neutropenic period  Description: INTERVENTIONS:  - Monitor WBC    Outcome: Progressing     Problem: SAFETY ADULT  Goal: Patient will remain free of falls  Description: INTERVENTIONS:  - Educate patient/family on patient safety including physical limitations  - Instruct patient to call for assistance with activity   - Consult OT/PT to assist with strengthening/mobility   - Keep Call bell within reach  - Keep bed low and locked with side rails adjusted as appropriate  - Keep care items and personal belongings within reach  - Initiate and maintain comfort rounds  - Make Fall Risk Sign visible to staff  - Offer Toileting every 2 Hours,  in advance of need  - Initiate/Maintain bed/chair alarm  - Obtain necessary fall risk management equipment: non skid socks  - Apply yellow socks and bracelet for high fall risk patients  - Consider moving patient to room near nurses station  Outcome: Progressing  Goal: Maintain or return to baseline ADL function  Description: INTERVENTIONS:  -  Assess patient's ability to carry out ADLs; assess patient's baseline for ADL function and identify physical deficits which impact ability to perform ADLs (bathing, care of mouth/teeth, toileting, grooming, dressing, etc.)  - Assess/evaluate cause of self-care deficits   - Assess range of motion  - Assess patient's mobility; develop plan if impaired  - Assess patient's need for assistive devices and provide as appropriate  - Encourage maximum independence but intervene and supervise when necessary  - Involve family in performance of ADLs  - Assess for home care needs following discharge   - Consider OT consult to assist with ADL evaluation and planning for discharge  - Provide patient education as appropriate  Outcome: Progressing  Goal: Maintains/Returns to pre admission functional level  Description: INTERVENTIONS:  - Perform AM-PAC 6 Click Basic Mobility/ Daily Activity assessment daily.  - Set and communicate daily mobility goal to care team and patient/family/caregiver.   - Collaborate with rehabilitation services on mobility goals if consulted  - Perform Range of Motion 3 times a day.  - Reposition patient every 2 hours.  - Dangle patient 3 times a day  - Stand patient 3 times a day  - Ambulate patient 3 times a day  - Out of bed to chair 3 times a day   - Out of bed for meals 3 times a day  - Out of bed for toileting  - Record patient progress and toleration of activity level   Outcome: Progressing     Problem: DISCHARGE PLANNING  Goal: Discharge to home or other facility with appropriate resources  Description: INTERVENTIONS:  - Identify barriers to discharge  w/patient and caregiver  - Arrange for needed discharge resources and transportation as appropriate  - Identify discharge learning needs (meds, wound care, etc.)  - Arrange for interpretive services to assist at discharge as needed  - Refer to Case Management Department for coordinating discharge planning if the patient needs post-hospital services based on physician/advanced practitioner order or complex needs related to functional status, cognitive ability, or social support system  Outcome: Progressing     Problem: Knowledge Deficit  Goal: Patient/family/caregiver demonstrates understanding of disease process, treatment plan, medications, and discharge instructions  Description: Complete learning assessment and assess knowledge base.  Interventions:  - Provide teaching at level of understanding  - Provide teaching via preferred learning methods  Outcome: Progressing     Problem: NEUROSENSORY - ADULT  Goal: Achieves stable or improved neurological status  Description: INTERVENTIONS  - Monitor and report changes in neurological status  - Monitor vital signs such as temperature, blood pressure, glucose, and any other labs ordered   - Initiate measures to prevent increased intracranial pressure  - Monitor for seizure activity and implement precautions if appropriate      Outcome: Progressing  Goal: Remains free of injury related to seizures activity  Description: INTERVENTIONS  - Maintain airway, patient safety  and administer oxygen as ordered  - Monitor patient for seizure activity, document and report duration and description of seizure to physician/advanced practitioner  - If seizure occurs,  ensure patient safety during seizure  - Reorient patient post seizure  - Seizure pads on all 4 side rails  - Instruct patient/family to notify RN of any seizure activity including if an aura is experienced  - Instruct patient/family to call for assistance with activity based on nursing assessment  - Administer anti-seizure  medications if ordered    Outcome: Progressing  Goal: Achieves maximal functionality and self care  Description: INTERVENTIONS  - Monitor swallowing and airway patency with patient fatigue and changes in neurological status  - Encourage and assist patient to increase activity and self care.   - Encourage visually impaired, hearing impaired and aphasic patients to use assistive/communication devices  Outcome: Progressing     Problem: CARDIOVASCULAR - ADULT  Goal: Maintains optimal cardiac output and hemodynamic stability  Description: INTERVENTIONS:  - Monitor I/O, vital signs and rhythm  - Monitor for S/S and trends of decreased cardiac output  - Administer and titrate ordered vasoactive medications to optimize hemodynamic stability  - Assess quality of pulses, skin color and temperature  - Assess for signs of decreased coronary artery perfusion  - Instruct patient to report change in severity of symptoms  Outcome: Progressing  Goal: Absence of cardiac dysrhythmias or at baseline rhythm  Description: INTERVENTIONS:  - Continuous cardiac monitoring, vital signs, obtain 12 lead EKG if ordered  - Administer antiarrhythmic and heart rate control medications as ordered  - Monitor electrolytes and administer replacement therapy as ordered  Outcome: Progressing     Problem: RESPIRATORY - ADULT  Goal: Achieves optimal ventilation and oxygenation  Description: INTERVENTIONS:  - Assess for changes in respiratory status  - Assess for changes in mentation and behavior  - Position to facilitate oxygenation and minimize respiratory effort  - Oxygen administered by appropriate delivery if ordered  - Initiate smoking cessation education as indicated  - Encourage broncho-pulmonary hygiene including cough, deep breathe, Incentive Spirometry  - Assess the need for suctioning and aspirate as needed  - Assess and instruct to report SOB or any respiratory difficulty  - Respiratory Therapy support as indicated  Outcome: Progressing      Problem: GASTROINTESTINAL - ADULT  Goal: Minimal or absence of nausea and/or vomiting  Description: INTERVENTIONS:  - Administer IV fluids if ordered to ensure adequate hydration  - Maintain NPO status until nausea and vomiting are resolved  - Nasogastric tube if ordered  - Administer ordered antiemetic medications as needed  - Provide nonpharmacologic comfort measures as appropriate  - Advance diet as tolerated, if ordered  - Consider nutrition services referral to assist patient with adequate nutrition and appropriate food choices  Outcome: Progressing  Goal: Maintains or returns to baseline bowel function  Description: INTERVENTIONS:  - Assess bowel function  - Encourage oral fluids to ensure adequate hydration  - Administer IV fluids if ordered to ensure adequate hydration  - Administer ordered medications as needed  - Encourage mobilization and activity  - Consider nutritional services referral to assist patient with adequate nutrition and appropriate food choices  Outcome: Progressing  Goal: Maintains adequate nutritional intake  Description: INTERVENTIONS:  - Monitor percentage of each meal consumed  - Identify factors contributing to decreased intake, treat as appropriate  - Assist with meals as needed  - Monitor I&O, weight, and lab values if indicated  - Obtain nutrition services referral as needed  Outcome: Progressing  Goal: Establish and maintain optimal ostomy function  Description: INTERVENTIONS:  - Assess bowel function  - Encourage oral fluids to ensure adequate hydration  - Administer IV fluids if ordered to ensure adequate hydration   - Administer ordered medications as needed  - Encourage mobilization and activity  - Nutrition services referral to assist patient with appropriate food choices  - Assess stoma site  - Consider wound care consult   Outcome: Progressing  Goal: Oral mucous membranes remain intact  Description: INTERVENTIONS  - Assess oral mucosa and hygiene practices  - Implement  preventative oral hygiene regimen  - Implement oral medicated treatments as ordered  - Initiate Nutrition services referral as needed  Outcome: Progressing     Problem: GENITOURINARY - ADULT  Goal: Maintains or returns to baseline urinary function  Description: INTERVENTIONS:  - Assess urinary function  - Encourage oral fluids to ensure adequate hydration if ordered  - Administer IV fluids as ordered to ensure adequate hydration  - Administer ordered medications as needed  - Offer frequent toileting  - Follow urinary retention protocol if ordered  Outcome: Progressing  Goal: Absence of urinary retention  Description: INTERVENTIONS:  - Assess patient’s ability to void and empty bladder  - Monitor I/O  - Bladder scan as needed  - Discuss with physician/AP medications to alleviate retention as needed  - Discuss catheterization for long term situations as appropriate  Outcome: Progressing  Goal: Urinary catheter remains patent  Description: INTERVENTIONS:  - Assess patency of urinary catheter  - If patient has a chronic nguyen, consider changing catheter if non-functioning  - Follow guidelines for intermittent irrigation of non-functioning urinary catheter  Outcome: Progressing     Problem: METABOLIC, FLUID AND ELECTROLYTES - ADULT  Goal: Electrolytes maintained within normal limits  Description: INTERVENTIONS:  - Monitor labs and assess patient for signs and symptoms of electrolyte imbalances  - Administer electrolyte replacement as ordered  - Monitor response to electrolyte replacements, including repeat lab results as appropriate  - Instruct patient on fluid and nutrition as appropriate  Outcome: Progressing  Goal: Fluid balance maintained  Description: INTERVENTIONS:  - Monitor labs   - Monitor I/O and WT  - Instruct patient on fluid and nutrition as appropriate  - Assess for signs & symptoms of volume excess or deficit  Outcome: Progressing  Goal: Glucose maintained within target range  Description:  INTERVENTIONS:  - Monitor Blood Glucose as ordered  - Assess for signs and symptoms of hyperglycemia and hypoglycemia  - Administer ordered medications to maintain glucose within target range  - Assess nutritional intake and initiate nutrition service referral as needed  Outcome: Progressing     Problem: SKIN/TISSUE INTEGRITY - ADULT  Goal: Skin Integrity remains intact(Skin Breakdown Prevention)  Description: Assess:  -Perform Mando assessment every shift  -Clean and moisturize skin every shift  -Inspect skin when repositioning, toileting, and assisting with ADLS  -Assess extremities for adequate circulation and sensation     Bed Management:  -Have minimal linens on bed & keep smooth, unwrinkled  -Change linens as needed when moist or perspiring  -Avoid sitting or lying in one position for more than 2 hours while in bed  -Keep HOB at 30degrees     Toileting:  -Offer bedside commode  -Assess for incontinence every shift  -Use incontinent care products after each incontinent episode such as jim wipes    Activity:  -Mobilize patient 3 times a day  -Encourage activity and walks on unit  -Encourage or provide ROM exercises   -Turn and reposition patient every 2 Hours  -Use appropriate equipment to lift or move patient in bed  -Instruct/ Assist with weight shifting every 60 mins when out of bed in chair  -Consider limitation of chair time 6 hour intervals    Skin Care:  -Avoid use of baby powder, tape, friction and shearing, hot water or constrictive clothing  -Relieve pressure over bony prominences using foam wedges/pillows  -Do not massage red bony areas    Outcome: Progressing  Goal: Pressure injury heals and does not worsen  Description: Interventions:  - Implement low air loss mattress or specialty surface (Criteria met)  - Apply silicone foam dressing  - Instruct/assist with weight shifting every 60 minutes when in chair   - Limit chair time to 6 hour intervals  - Use special pressure reducing interventions such  as waffle cushion when in chair   - Apply fecal or urinary incontinence containment device   - Perform passive or active ROM every 8 hours  - Turn and reposition patient & offload bony prominences every 2 hours   - Utilize friction reducing device or surface for transfers   - Use incontinent care products after each incontinent episode such as jim wipes  - Consider nutrition services referral as needed  Outcome: Progressing     Problem: HEMATOLOGIC - ADULT  Goal: Maintains hematologic stability  Description: INTERVENTIONS  - Assess for signs and symptoms of bleeding or hemorrhage  - Monitor labs  - Administer supportive blood products/factors as ordered and appropriate  Outcome: Progressing     Problem: MUSCULOSKELETAL - ADULT  Goal: Maintain or return mobility to safest level of function  Description: INTERVENTIONS:  - Assess patient's ability to carry out ADLs; assess patient's baseline for ADL function and identify physical deficits which impact ability to perform ADLs (bathing, care of mouth/teeth, toileting, grooming, dressing, etc.)  - Assess/evaluate cause of self-care deficits   - Assess range of motion  - Assess patient's mobility  - Assess patient's need for assistive devices and provide as appropriate  - Encourage maximum independence but intervene and supervise when necessary  - Involve family in performance of ADLs  - Assess for home care needs following discharge   - Consider OT consult to assist with ADL evaluation and planning for discharge  - Provide patient education as appropriate  Outcome: Progressing  Goal: Maintain proper alignment of affected body part  Description: INTERVENTIONS:  - Support, maintain and protect limb and body alignment  - Provide patient/ family with appropriate education  Outcome: Progressing

## 2025-03-11 NOTE — ASSESSMENT & PLAN NOTE
Secondary to her current GERD symptoms we discussed the following:    Increase the famotidine 10 mg to twice daily to help with the GERD symptoms.  Start Carafate 1 g 3 times daily with meals.  Continue PPI as ordered.

## 2025-03-11 NOTE — CONSULTS
Consultation - Gastroenterology   Name: Ama Burnett 89 y.o. female I MRN: [id number]  Unit/Bed#: 423-01 I Date of Admission: 3/8/2025   Date of Service: 3/11/2025 I Hospital Day: 2   Inpatient consult to gastroenterology  Consult performed by: KIZZY Luz  Consult ordered by: Radu Ramirez MD        Physician Requesting Evaluation: Ji Johnson DO   Reason for Evaluation / Principal Problem: Nausea, vomiting, and abdominal pain.    Assessment & Plan  Nausea & vomiting  After discussion with the attending, Dr. Calvin, at this point in time secondary to the patient's age and it is her wishes to try to hold off any kind of endoscopic procedures unless they are absolutely necessary and since she is hemodynamically stable, we do not see any current necessity or emergent need for any endoscopic procedures at this time.  However, we will continue to monitor the potential need for an EGD in the future.  The patient was agreeable and verbalized an understanding.    Continue current diet as ordered, encourage patient to continue to try to stay on the lighter side of meals and then slowly advance her diet as tolerated.   Continue to maintain a large bore IV.  Continue to monitor hemoglobin and transfuse as per protocol.   Continue with serial abdominal exams.   Continue to monitor stool output for any overt signs of GI bleeding.   Continue PRN antiemetics as ordered.  GERD (gastroesophageal reflux disease)  Secondary to her current GERD symptoms we discussed the following:    Increase the famotidine 10 mg to twice daily to help with the GERD symptoms.  Start Carafate 1 g 3 times daily with meals.  Continue PPI as ordered.   Intermittent constipation  Bowel regimen:  Start Colace 100 mg twice daily to help soften stool and encourage daily bowel movements.  Start Senokot, 1 tablet at bedtime to help encourage bowel movements and keep her bowels regular, especially since she is on the dicyclomine.  Can continue  dicyclomine 10 mg 4 times daily as prescribed for now, however, if she experiences some constipation and is not having abdominal pain or spasms, would encourage switching it to as needed in the future.  Severe sepsis (HCC)  As per primary team.  Benign essential hypertension  As per primary team.  Hypomagnesemia  As per primary team.  Acute cystitis with hematuria  As per primary team.    Continue rest of medications as per primary team.   Continue supportive care.     I have discussed the above management plan in detail with the primary service.   Gastroenterology service will follow.    History of Present Illness   HPI:  Aam Burnett is a 89 y.o. female with a past medical history of GERD, hiatal hernia, diverticulitis and cholecystectomy who presented to the ED on 3/8/2025 for acute onset nausea, vomiting, and abdominal pain.  The patient reports that she went to bed and had a bowl of cereal for a snack without any issues.  She reports that she was having trouble and feeling restless and then started to feel nauseous and at about 2 AM in the morning woke up and started with severe nausea, vomiting, and abdominal pain.  She reports that since admission the abdominal pain has improved but does continue with intermittent nausea and a burning reflux feeling, especially when she eats, but denies any vomiting since admission.  She denies any contact with anyone who is sick as she lives alone and has not seen anyone. The patient reports that she does have some irritable bowel syndrome symptoms with intermittent constipation/loose stools at home but recently has not had any significant issues and denies any recent evidence of blood or melena in her stools at home.    The patient reports that they are currently experiencing GERD symptoms and nausea, but, denies any dysphagia, vomiting, decreased appetite, unplanned weight loss, or abdominal pain.    The patient currently denies any diarrhea, constipation, straining, melena  or bloody stools). Last BM: This morning, small amount according to the patient without any evidence of blood or melena. Flatus: Yes.    The patient is currently tolerating a regular lactose restricted diet, however, she does report that she has been trying to order on the lighter side because of the nausea    Tobacco/Vaping: Denied  ETOH: Denied Hx of ETOH: Denied  Marijuana: Denied  Illicit Drug Use: Denied Hx of Illicit Drug Use: Denied    GI Meds: Famotidine 10 mg daily, Protonix 40 mg daily, Zofran as needed.    Imaging: (3/8/25) CT of the abdomen and pelvis with contrast: Evidence of diverticulosis without any evidence of diverticulitis, otherwise normal from a GI standpoint.    Endoscopy History: EGD: (10/5/16) Dr Tobar: Dilatation of a Schatzki's ring with evidence of GERD.    COLONOSCOPY: (4/25/13) Dr Shields: Normal.    DUE: Not applicable secondary to age.    Review of Systems  Medical History Review: I have reviewed the patient's PMH, PSH, Social History, Family History, Meds, and Allergies     Objective :  Temp:  [98.2 °F (36.8 °C)-98.5 °F (36.9 °C)] 98.2 °F (36.8 °C)  HR:  [69-81] 81  BP: (142-151)/(67-71) 151/71  Resp:  [17-20] 20  SpO2:  [81 %-97 %] 93 %  O2 Device: Nasal cannula  Nasal Cannula O2 Flow Rate (L/min):  [1 L/min] 1 L/min    Physical Exam  Exam:  Oral mucosa normal upon visual inspection, without any sores, lesions, or ulcerations. Sclera without icterus and benign. Lung sounds clear to auscultation b/l. Normal S1 & S2 upon exam. Abdomen is softly distended, with mild tenderness on upon exam in the epigastric region and left upper quadrant, without any significant guarding or rebound tenderness and upon exam, with hyperactive bowel sounds x 4.  No edema noted of the b/l lower extremities upon exam today. Skin is non-icteric      Lab Results: I have reviewed the following results:CBC/BMP:   .     03/11/25  0454   WBC 3.93*   HGB 10.2*   HCT 32.1*      SODIUM 141   K 3.6       CO2 31   BUN 12   CREATININE 0.79   GLUC 84   MG 2.2    , Creatinine Clearance: Estimated Creatinine Clearance: 40.5 mL/min (by C-G formula based on SCr of 0.79 mg/dL)., LFTs:   .     03/11/25  0454   AST 17   ALT 14   ALB 3.4*   TBILI 0.42   ALKPHOS 55        Imaging Results Review: I reviewed radiology reports from this admission including: CT abdomen/pelvis.  Other Study Results Review: No additional pertinent studies reviewed.

## 2025-03-11 NOTE — OCCUPATIONAL THERAPY NOTE
"  Occupational Therapy Progress Note     Patient Name: Ama Burnett  Today's Date: 3/11/2025  Problem List  Principal Problem:    Severe sepsis (HCC)  Active Problems:    GERD (gastroesophageal reflux disease)    Anxiety    Benign essential hypertension    Nausea & vomiting    Hypomagnesemia    Acute cystitis with hematuria    Intermittent constipation              03/11/25 1346   OT Last Visit   OT Visit Date 03/11/25   Note Type   Note Type Treatment   Pain Assessment   Pain Assessment Tool 0-10   Pain Score 8   Pain Location/Orientation Location: Knee;Location: Hip;Orientation: Bilateral   Restrictions/Precautions   Weight Bearing Precautions Per Order No   Other Precautions Fall Risk;Pain;Chair Alarm;Impulsive   ADL   Where Assessed Chair   Grooming Assistance 5  Supervision/Setup   Grooming Deficit Brushing hair   LB Dressing Assistance 5  Supervision/Setup   LB Dressing Deficit Thread RLE into underwear;Thread LLE into underwear;Don/doff L sock;Don/doff R sock;Increased time to complete;Supervision/safety   Bed Mobility   Additional Comments seated in chair at start and end of session on RA with SPO2>90%.   Transfers   Sit to Stand 5  Supervision   Additional items Verbal cues  (RW)   Stand to Sit 5  Supervision   Additional items Verbal cues  (RW)   Stand pivot 5  Supervision   Additional items Verbal cues  (RW)   Additional Comments functional transfers with mild instability and increased pain in B knees   Functional Mobility   Functional Mobility 5  Supervision   Additional Comments ~60 ft functional mobility with (S) and RW with mild instability but no significant LOB   Additional items Rolling walker   Subjective   Subjective \" I don't think I can do exercises with my bones\"   Cognition   Overall Cognitive Status WFL   Arousal/Participation Alert   Attention Within functional limits   Orientation Level Oriented X4   Memory Within functional limits   Following Commands Follows all commands and directions " without difficulty   Activity Tolerance   Activity Tolerance Patient limited by pain   Assessment   Assessment Patient participated in Skilled OT session this date with interventions consisting of ADL re training with the use of correct body mechnaics, safety awareness and fall prevention techniques,  therapeutic activities to: increase activity tolerance, increase dynamic sit/ stand balance during functional activity , increase postural control, and increase trunk control. Patient agreeable to OT treatment session, upon arrival patient was found seated OOB to Chair. Patient requiring verbal cues for safety, verbal cues for correct technique, verbal cues for pacing thru activity steps, and frequent rest periods. Patient continues to be functioning below baseline level, occupational performance remains limited secondary to factors listed above and increased risk for falls and injury. The patient's raw score on the AM-PAC Daily Activity Inpatient Short Form is 20. A raw score of greater than or equal to 19 suggests the patient may benefit from discharge to home. Please refer to the recommendation of the Occupational Therapist for safe discharge planning. From OT standpoint, recommendation at time of d/c would be level II moderate resource intensity   Plan   Goal Expiration Date 03/24/25   OT Treatment Day 1   OT Frequency 3-5x/wk   Discharge Recommendation   Rehab Resource Intensity Level, OT II (Moderate Resource Intensity)   AM-PAC Daily Activity Inpatient   Lower Body Dressing 3   Bathing 3   Toileting 3   Upper Body Dressing 3   Grooming 4   Eating 4   Daily Activity Raw Score 20   Daily Activity Standardized Score (Calc for Raw Score >=11) 42.03   AM-[facility] Applied Cognition Inpatient   Following a Speech/Presentation 4   Understanding Ordinary Conversation 4   Taking Medications 4   Remembering Where Things Are Placed or Put Away 4   Remembering List of 4-5 Errands 3   Taking Care of Complicated Tasks 3   Applied  Cognition Raw Score 22   Applied Cognition Standardized Score 47.83

## 2025-03-11 NOTE — ASSESSMENT & PLAN NOTE
Severe sepsis on admission. Tachycardia, leukocytosis and elevated Lactic acid and pro calcitonin.   Possible Possible left lower lobe pneumonia vs acute cystitis with microscopic hematuria vs diverticulitis  Labs 3/11/25: WBC: 3.93, Hb 10.2, Pro Brannon: 0.07, CMP : normal  Urine analysis: wbc:1-2, positive nitrites, small occult blood   Urine legionella and Streptococcal pneumo negative  Blood Cultures : no growth      CT TAP: IMPRESSION:  Left lower lobe airspace opacity may represent pneumonia versus focal atelectasis.  No acute intra-abdominal inflammatory process.  Colonic diverticulosis without evidence of acute diverticulitis    Plan:   Continue Ceftriaxone  Follow sputum cultures  Follow GI consult. Reccs appreciated  Monitor vitals

## 2025-03-11 NOTE — ASSESSMENT & PLAN NOTE
Continue PPI daily  Continue Famotidine bid  Continue Carafate tid with meals per Gi reccs.  Continue to monitor

## 2025-03-11 NOTE — PLAN OF CARE
Problem: PAIN - ADULT  Goal: Verbalizes/displays adequate comfort level or baseline comfort level  Description: Interventions:  - Encourage patient to monitor pain and request assistance  - Assess pain using appropriate pain scale  - Administer analgesics based on type and severity of pain and evaluate response  - Implement non-pharmacological measures as appropriate and evaluate response  - Consider cultural and social influences on pain and pain management  - Notify physician/advanced practitioner if interventions unsuccessful or patient reports new pain  Outcome: Progressing     Problem: INFECTION - ADULT  Goal: Absence or prevention of progression during hospitalization  Description: INTERVENTIONS:  - Assess and monitor for signs and symptoms of infection  - Monitor lab/diagnostic results  - Monitor all insertion sites, i.e. indwelling lines, tubes, and drains  - Monitor endotracheal if appropriate and nasal secretions for changes in amount and color  - [place] appropriate cooling/warming therapies per order  - Administer medications as ordered  - Instruct and encourage patient and family to use good hand hygiene technique  - Identify and instruct in appropriate isolation precautions for identified infection/condition  Outcome: Progressing  Goal: Absence of fever/infection during neutropenic period  Description: INTERVENTIONS:  - Monitor WBC    Outcome: Progressing     Problem: SAFETY ADULT  Goal: Patient will remain free of falls  Description: INTERVENTIONS:  - Educate patient/family on patient safety including physical limitations  - Instruct patient to call for assistance with activity   - Consult OT/PT to assist with strengthening/mobility   - Keep Call bell within reach  - Keep bed low and locked with side rails adjusted as appropriate  - Keep care items and personal belongings within reach  - Initiate and maintain comfort rounds  - Make Fall Risk Sign visible to staff  - Offer Toileting every 2 Hours,  in advance of need  - Initiate/Maintain bed/chair alarm  - Obtain necessary fall risk management equipment: non skid socks  - Apply yellow socks and bracelet for high fall risk patients  - Consider moving patient to room near nurses station  Outcome: Progressing  Goal: Maintain or return to baseline ADL function  Description: INTERVENTIONS:  -  Assess patient's ability to carry out ADLs; assess patient's baseline for ADL function and identify physical deficits which impact ability to perform ADLs (bathing, care of mouth/teeth, toileting, grooming, dressing, etc.)  - Assess/evaluate cause of self-care deficits   - Assess range of motion  - Assess patient's mobility; develop plan if impaired  - Assess patient's need for assistive devices and provide as appropriate  - Encourage maximum independence but intervene and supervise when necessary  - Involve family in performance of ADLs  - Assess for home care needs following discharge   - Consider OT consult to assist with ADL evaluation and planning for discharge  - Provide patient education as appropriate  Outcome: Progressing  Goal: Maintains/Returns to pre admission functional level  Description: INTERVENTIONS:  - Perform AM-PAC 6 Click Basic Mobility/ Daily Activity assessment daily.  - Set and communicate daily mobility goal to care team and patient/family/caregiver.   - Collaborate with rehabilitation services on mobility goals if consulted  - Perform Range of Motion 3 times a day.  - Reposition patient every 2 hours.  - Dangle patient 3 times a day  - Stand patient 3 times a day  - Ambulate patient 3 times a day  - Out of bed to chair 3 times a day   - Out of bed for meals 3 times a day  - Out of bed for toileting  - Record patient progress and toleration of activity level   Outcome: Progressing     Problem: DISCHARGE PLANNING  Goal: Discharge to home or other facility with appropriate resources  Description: INTERVENTIONS:  - Identify barriers to discharge  w/patient and caregiver  - Arrange for needed discharge resources and transportation as appropriate  - Identify discharge learning needs (meds, wound care, etc.)  - Arrange for interpretive services to assist at discharge as needed  - Refer to Case Management Department for coordinating discharge planning if the patient needs post-hospital services based on physician/advanced practitioner order or complex needs related to functional status, cognitive ability, or social support system  Outcome: Progressing     Problem: Knowledge Deficit  Goal: Patient/family/caregiver demonstrates understanding of disease process, treatment plan, medications, and discharge instructions  Description: Complete learning assessment and assess knowledge base.  Interventions:  - Provide teaching at level of understanding  - Provide teaching via preferred learning methods  Outcome: Progressing     Problem: NEUROSENSORY - ADULT  Goal: Achieves stable or improved neurological status  Description: INTERVENTIONS  - Monitor and report changes in neurological status  - Monitor vital signs such as temperature, blood pressure, glucose, and any other labs ordered   - Initiate measures to prevent increased intracranial pressure  - Monitor for seizure activity and implement precautions if appropriate      Outcome: Progressing  Goal: Remains free of injury related to seizures activity  Description: INTERVENTIONS  - Maintain airway, patient safety  and administer oxygen as ordered  - Monitor patient for seizure activity, document and report duration and description of seizure to physician/advanced practitioner  - If seizure occurs,  ensure patient safety during seizure  - Reorient patient post seizure  - Seizure pads on all 4 side rails  - Instruct patient/family to notify RN of any seizure activity including if an aura is experienced  - Instruct patient/family to call for assistance with activity based on nursing assessment  - Administer anti-seizure  medications if ordered    Outcome: Progressing  Goal: Achieves maximal functionality and self care  Description: INTERVENTIONS  - Monitor swallowing and airway patency with patient fatigue and changes in neurological status  - Encourage and assist patient to increase activity and self care.   - Encourage visually impaired, hearing impaired and aphasic patients to use assistive/communication devices  Outcome: Progressing     Problem: CARDIOVASCULAR - ADULT  Goal: Maintains optimal cardiac output and hemodynamic stability  Description: INTERVENTIONS:  - Monitor I/O, vital signs and rhythm  - Monitor for S/S and trends of decreased cardiac output  - Administer and titrate ordered vasoactive medications to optimize hemodynamic stability  - Assess quality of pulses, skin color and temperature  - Assess for signs of decreased coronary artery perfusion  - Instruct patient to report change in severity of symptoms  Outcome: Progressing  Goal: Absence of cardiac dysrhythmias or at baseline rhythm  Description: INTERVENTIONS:  - Continuous cardiac monitoring, vital signs, obtain 12 lead EKG if ordered  - Administer antiarrhythmic and heart rate control medications as ordered  - Monitor electrolytes and administer replacement therapy as ordered  Outcome: Progressing     Problem: RESPIRATORY - ADULT  Goal: Achieves optimal ventilation and oxygenation  Description: INTERVENTIONS:  - Assess for changes in respiratory status  - Assess for changes in mentation and behavior  - Position to facilitate oxygenation and minimize respiratory effort  - Oxygen administered by appropriate delivery if ordered  - Initiate smoking cessation education as indicated  - Encourage broncho-pulmonary hygiene including cough, deep breathe, Incentive Spirometry  - Assess the need for suctioning and aspirate as needed  - Assess and instruct to report SOB or any respiratory difficulty  - Respiratory Therapy support as indicated  Outcome: Progressing      Problem: GASTROINTESTINAL - ADULT  Goal: Minimal or absence of nausea and/or vomiting  Description: INTERVENTIONS:  - Administer IV fluids if ordered to ensure adequate hydration  - Maintain NPO status until nausea and vomiting are resolved  - Nasogastric tube if ordered  - Administer ordered antiemetic medications as needed  - Provide nonpharmacologic comfort measures as appropriate  - Advance diet as tolerated, if ordered  - Consider nutrition services referral to assist patient with adequate nutrition and appropriate food choices  Outcome: Progressing  Goal: Maintains or returns to baseline bowel function  Description: INTERVENTIONS:  - Assess bowel function  - Encourage oral fluids to ensure adequate hydration  - Administer IV fluids if ordered to ensure adequate hydration  - Administer ordered medications as needed  - Encourage mobilization and activity  - Consider nutritional services referral to assist patient with adequate nutrition and appropriate food choices  Outcome: Progressing  Goal: Maintains adequate nutritional intake  Description: INTERVENTIONS:  - Monitor percentage of each meal consumed  - Identify factors contributing to decreased intake, treat as appropriate  - Assist with meals as needed  - Monitor I&O, weight, and lab values if indicated  - Obtain nutrition services referral as needed  Outcome: Progressing  Goal: Establish and maintain optimal ostomy function  Description: INTERVENTIONS:  - Assess bowel function  - Encourage oral fluids to ensure adequate hydration  - Administer IV fluids if ordered to ensure adequate hydration   - Administer ordered medications as needed  - Encourage mobilization and activity  - Nutrition services referral to assist patient with appropriate food choices  - Assess stoma site  - Consider wound care consult   Outcome: Progressing  Goal: Oral mucous membranes remain intact  Description: INTERVENTIONS  - Assess oral mucosa and hygiene practices  - Implement  preventative oral hygiene regimen  - Implement oral medicated treatments as ordered  - Initiate Nutrition services referral as needed  Outcome: Progressing     Problem: GENITOURINARY - ADULT  Goal: Maintains or returns to baseline urinary function  Description: INTERVENTIONS:  - Assess urinary function  - Encourage oral fluids to ensure adequate hydration if ordered  - Administer IV fluids as ordered to ensure adequate hydration  - Administer ordered medications as needed  - Offer frequent toileting  - Follow urinary retention protocol if ordered  Outcome: Progressing  Goal: Absence of urinary retention  Description: INTERVENTIONS:  - Assess patient’s ability to void and empty bladder  - Monitor I/O  - Bladder scan as needed  - Discuss with physician/AP medications to alleviate retention as needed  - Discuss catheterization for long term situations as appropriate  Outcome: Progressing  Goal: Urinary catheter remains patent  Description: INTERVENTIONS:  - Assess patency of urinary catheter  - If patient has a chronic nguyen, consider changing catheter if non-functioning  - Follow guidelines for intermittent irrigation of non-functioning urinary catheter  Outcome: Progressing     Problem: METABOLIC, FLUID AND ELECTROLYTES - ADULT  Goal: Electrolytes maintained within normal limits  Description: INTERVENTIONS:  - Monitor labs and assess patient for signs and symptoms of electrolyte imbalances  - Administer electrolyte replacement as ordered  - Monitor response to electrolyte replacements, including repeat lab results as appropriate  - Instruct patient on fluid and nutrition as appropriate  Outcome: Progressing  Goal: Fluid balance maintained  Description: INTERVENTIONS:  - Monitor labs   - Monitor I/O and WT  - Instruct patient on fluid and nutrition as appropriate  - Assess for signs & symptoms of volume excess or deficit  Outcome: Progressing  Goal: Glucose maintained within target range  Description:  INTERVENTIONS:  - Monitor Blood Glucose as ordered  - Assess for signs and symptoms of hyperglycemia and hypoglycemia  - Administer ordered medications to maintain glucose within target range  - Assess nutritional intake and initiate nutrition service referral as needed  Outcome: Progressing     Problem: SKIN/TISSUE INTEGRITY - ADULT  Goal: Skin Integrity remains intact(Skin Breakdown Prevention)  Description: Assess:  -Perform Mando assessment every shift  -Clean and moisturize skin every shift  -Inspect skin when repositioning, toileting, and assisting with ADLS  -Assess extremities for adequate circulation and sensation     Bed Management:  -Have minimal linens on bed & keep smooth, unwrinkled  -Change linens as needed when moist or perspiring  -Avoid sitting or lying in one position for more than 2 hours while in bed  -Keep HOB at 30degrees     Toileting:  -Offer bedside commode  -Assess for incontinence every shift  -Use incontinent care products after each incontinent episode such as jim wipes    Activity:  -Mobilize patient 3 times a day  -Encourage activity and walks on unit  -Encourage or provide ROM exercises   -Turn and reposition patient every 2 Hours  -Use appropriate equipment to lift or move patient in bed  -Instruct/ Assist with weight shifting every 60 mins when out of bed in chair  -Consider limitation of chair time 6 hour intervals    Skin Care:  -Avoid use of baby powder, tape, friction and shearing, hot water or constrictive clothing  -Relieve pressure over bony prominences using foam wedges/pillows  -Do not massage red bony areas    Outcome: Progressing  Goal: Pressure injury heals and does not worsen  Description: Interventions:  - Implement low air loss mattress or specialty surface (Criteria met)  - Apply silicone foam dressing  - Instruct/assist with weight shifting every 60 minutes when in chair   - Limit chair time to 6 hour intervals  - Use special pressure reducing interventions such  as waffle cushion when in chair   - Apply fecal or urinary incontinence containment device   - Perform passive or active ROM every 8 hours  - Turn and reposition patient & offload bony prominences every 2 hours   - Utilize friction reducing device or surface for transfers   - Use incontinent care products after each incontinent episode such as jim wipes  - Consider nutrition services referral as needed  Outcome: Progressing     Problem: HEMATOLOGIC - ADULT  Goal: Maintains hematologic stability  Description: INTERVENTIONS  - Assess for signs and symptoms of bleeding or hemorrhage  - Monitor labs  - Administer supportive blood products/factors as ordered and appropriate  Outcome: Progressing     Problem: MUSCULOSKELETAL - ADULT  Goal: Maintain or return mobility to safest level of function  Description: INTERVENTIONS:  - Assess patient's ability to carry out ADLs; assess patient's baseline for ADL function and identify physical deficits which impact ability to perform ADLs (bathing, care of mouth/teeth, toileting, grooming, dressing, etc.)  - Assess/evaluate cause of self-care deficits   - Assess range of motion  - Assess patient's mobility  - Assess patient's need for assistive devices and provide as appropriate  - Encourage maximum independence but intervene and supervise when necessary  - Involve family in performance of ADLs  - Assess for home care needs following discharge   - Consider OT consult to assist with ADL evaluation and planning for discharge  - Provide patient education as appropriate  Outcome: Progressing  Goal: Maintain proper alignment of affected body part  Description: INTERVENTIONS:  - Support, maintain and protect limb and body alignment  - Provide patient/ family with appropriate education  Outcome: Progressing

## 2025-03-11 NOTE — QUICK NOTE
Family updated of current management plan. All questions answered. Patient's grand daughter request her to be discharged after 5 pm tomorrow when she finishes work.

## 2025-03-12 LAB
ALBUMIN SERPL BCG-MCNC: 3.8 G/DL (ref 3.5–5)
ALP SERPL-CCNC: 64 U/L (ref 34–104)
ALT SERPL W P-5'-P-CCNC: 19 U/L (ref 7–52)
ANION GAP SERPL CALCULATED.3IONS-SCNC: 9 MMOL/L (ref 4–13)
AST SERPL W P-5'-P-CCNC: 23 U/L (ref 13–39)
BILIRUB SERPL-MCNC: 0.55 MG/DL (ref 0.2–1)
BUN SERPL-MCNC: 10 MG/DL (ref 5–25)
CALCIUM SERPL-MCNC: 9.1 MG/DL (ref 8.4–10.2)
CHLORIDE SERPL-SCNC: 102 MMOL/L (ref 96–108)
CO2 SERPL-SCNC: 29 MMOL/L (ref 21–32)
CREAT SERPL-MCNC: 0.73 MG/DL (ref 0.6–1.3)
ERYTHROCYTE [DISTWIDTH] IN BLOOD BY AUTOMATED COUNT: 13.4 % (ref 11.6–15.1)
GFR SERPL CREATININE-BSD FRML MDRD: 71 ML/MIN/1.73SQ M
GLUCOSE SERPL-MCNC: 92 MG/DL (ref 65–140)
HCT VFR BLD AUTO: 32.9 % (ref 34.8–46.1)
HGB BLD-MCNC: 10.9 G/DL (ref 11.5–15.4)
MCH RBC QN AUTO: 35.3 PG (ref 26.8–34.3)
MCHC RBC AUTO-ENTMCNC: 33.1 G/DL (ref 31.4–37.4)
MCV RBC AUTO: 107 FL (ref 82–98)
PLATELET # BLD AUTO: 284 THOUSANDS/UL (ref 149–390)
PMV BLD AUTO: 10.1 FL (ref 8.9–12.7)
POTASSIUM SERPL-SCNC: 3.8 MMOL/L (ref 3.5–5.3)
PROT SERPL-MCNC: 6.3 G/DL (ref 6.4–8.4)
RBC # BLD AUTO: 3.09 MILLION/UL (ref 3.81–5.12)
SODIUM SERPL-SCNC: 140 MMOL/L (ref 135–147)
WBC # BLD AUTO: 3.94 THOUSAND/UL (ref 4.31–10.16)

## 2025-03-12 PROCEDURE — 80053 COMPREHEN METABOLIC PANEL: CPT

## 2025-03-12 PROCEDURE — 99232 SBSQ HOSP IP/OBS MODERATE 35: CPT | Performed by: INTERNAL MEDICINE

## 2025-03-12 PROCEDURE — 85027 COMPLETE CBC AUTOMATED: CPT

## 2025-03-12 RX ORDER — BISACODYL 10 MG
10 SUPPOSITORY, RECTAL RECTAL DAILY
Status: DISCONTINUED | OUTPATIENT
Start: 2025-03-12 | End: 2025-03-14 | Stop reason: HOSPADM

## 2025-03-12 RX ORDER — DOCUSATE SODIUM 100 MG/1
100 CAPSULE, LIQUID FILLED ORAL 3 TIMES DAILY
Status: DISCONTINUED | OUTPATIENT
Start: 2025-03-12 | End: 2025-03-14

## 2025-03-12 RX ORDER — BISACODYL 5 MG/1
5 TABLET, DELAYED RELEASE ORAL ONCE
Status: COMPLETED | OUTPATIENT
Start: 2025-03-12 | End: 2025-03-12

## 2025-03-12 RX ORDER — DICYCLOMINE HYDROCHLORIDE 10 MG/1
10 CAPSULE ORAL 3 TIMES DAILY PRN
Status: DISCONTINUED | OUTPATIENT
Start: 2025-03-12 | End: 2025-03-14 | Stop reason: HOSPADM

## 2025-03-12 RX ORDER — BISACODYL 5 MG/1
5 TABLET, DELAYED RELEASE ORAL DAILY PRN
Status: DISCONTINUED | OUTPATIENT
Start: 2025-03-12 | End: 2025-03-14 | Stop reason: HOSPADM

## 2025-03-12 RX ADMIN — CEFTRIAXONE 1000 MG: 1 INJECTION, SOLUTION INTRAVENOUS at 13:08

## 2025-03-12 RX ADMIN — LORATADINE 5 MG: 10 TABLET ORAL at 08:35

## 2025-03-12 RX ADMIN — BISACODYL 10 MG: 10 SUPPOSITORY RECTAL at 13:08

## 2025-03-12 RX ADMIN — SENNOSIDES AND DOCUSATE SODIUM 1 TABLET: 50; 8.6 TABLET ORAL at 22:28

## 2025-03-12 RX ADMIN — DOCUSATE SODIUM 100 MG: 100 CAPSULE, LIQUID FILLED ORAL at 17:06

## 2025-03-12 RX ADMIN — FAMOTIDINE 10 MG: 20 TABLET, FILM COATED ORAL at 17:07

## 2025-03-12 RX ADMIN — LOSARTAN POTASSIUM 50 MG: 50 TABLET, FILM COATED ORAL at 08:35

## 2025-03-12 RX ADMIN — ALPRAZOLAM 0.25 MG: 0.25 TABLET ORAL at 22:27

## 2025-03-12 RX ADMIN — SUCRALFATE 1 G: 1 SUSPENSION ORAL at 11:00

## 2025-03-12 RX ADMIN — PANTOPRAZOLE SODIUM 40 MG: 40 TABLET, DELAYED RELEASE ORAL at 05:55

## 2025-03-12 RX ADMIN — ENOXAPARIN SODIUM 40 MG: 40 INJECTION SUBCUTANEOUS at 08:35

## 2025-03-12 RX ADMIN — SUCRALFATE 1 G: 1 SUSPENSION ORAL at 17:06

## 2025-03-12 RX ADMIN — ALPRAZOLAM 0.25 MG: 0.25 TABLET ORAL at 11:00

## 2025-03-12 RX ADMIN — BISACODYL 5 MG: 5 TABLET, COATED ORAL at 08:34

## 2025-03-12 RX ADMIN — SUCRALFATE 1 G: 1 SUSPENSION ORAL at 06:09

## 2025-03-12 RX ADMIN — ALPRAZOLAM 0.25 MG: 0.25 TABLET ORAL at 17:07

## 2025-03-12 RX ADMIN — DOCUSATE SODIUM 100 MG: 100 CAPSULE, LIQUID FILLED ORAL at 08:34

## 2025-03-12 RX ADMIN — FAMOTIDINE 10 MG: 20 TABLET, FILM COATED ORAL at 08:35

## 2025-03-12 RX ADMIN — ACETAMINOPHEN 650 MG: 325 TABLET, FILM COATED ORAL at 22:27

## 2025-03-12 RX ADMIN — DOCUSATE SODIUM 100 MG: 100 CAPSULE, LIQUID FILLED ORAL at 22:27

## 2025-03-12 NOTE — PROGRESS NOTES
Progress Note - Hospitalist   Name: Ama Burnett 89 y.o. female I MRN: [id number]  Unit/Bed#: 423-01 I Date of Admission: 3/8/2025   Date of Service: 3/12/2025 I Hospital Day: 3    Assessment & Plan  Severe sepsis (HCC)  Tachycardia, leukocytosis, and elevated lactic acid, with elevated procalcitonin.  Possible pneumonia as source of infection  CT c/a/p: no PE, diverticulitis, or acute intra-abdominal inflammatory process, but LLL airspace opacity that my represent pneumonia vs atelectasis.    Today is day 5 of 5 of ceftriaxone  GERD (gastroesophageal reflux disease)  Continue home PPI  Anxiety  Continue home Xanax PRN  Benign essential hypertension  Well-controlled  Continue home Losartan  Hypomagnesemia  1.6 in the ED  Repleted intravenously  Recheck in the AM  Nausea & vomiting  Resolved  Acute cystitis with hematuria  Acute cystitis has been ruled out  Intermittent constipation  Appreciate GI input, also added Dulcolax suppository today    VTE Pharmacologic Prophylaxis: VTE Score: 6 High Risk (Score >/= 5) - Pharmacological DVT Prophylaxis Ordered: enoxaparin (Lovenox). Sequential Compression Devices Ordered.    Mobility:   Basic Mobility Inpatient Raw Score: 18  JH-HLM Goal: 6: Walk 10 steps or more  JH-HLM Achieved: 6: Walk 10 steps or more  JH-HLM Goal achieved. Continue to encourage appropriate mobility.    Patient Centered Rounds: I performed bedside rounds with nursing staff today.   Discussions with Specialists or Other Care Team Provider: Plan of care discussed with case management team    Education and Discussions with Family / Patient: Patient declined call to .     Current Length of Stay: 3 day(s)  Current Patient Status: Inpatient   Certification Statement: The patient will continue to require additional inpatient hospital stay due to significant constipation, plan on discharge tomorrow after suppository today  Discharge Plan: Anticipate discharge tomorrow to rehab facility.    Code  Status: Level 3 - DNAR and DNI    Subjective   Patient complaining of intermittent constipation, had 2 small hard stools today    Objective :  Temp:  [98.2 °F (36.8 °C)-98.5 °F (36.9 °C)] 98.5 °F (36.9 °C)  HR:  [68-77] 77  BP: (157-168)/(71-77) 168/77  Resp:  [15-16] 15  SpO2:  [93 %-95 %] 95 %    Body mass index is 31.61 kg/m².     Input and Output Summary (last 24 hours):     Intake/Output Summary (Last 24 hours) at 3/12/2025 1750  Last data filed at 3/12/2025 1227  Gross per 24 hour   Intake 660 ml   Output --   Net 660 ml       Physical Exam  Vitals and nursing note reviewed.   Constitutional:       General: She is not in acute distress.     Appearance: Normal appearance. She is not ill-appearing, toxic-appearing or diaphoretic.   Cardiovascular:      Rate and Rhythm: Normal rate.      Pulses: Normal pulses.   Pulmonary:      Effort: Pulmonary effort is normal. No respiratory distress.      Breath sounds: Normal breath sounds. No wheezing.   Skin:     General: Skin is warm and dry.      Coloration: Skin is not jaundiced.      Findings: No bruising.   Neurological:      Mental Status: She is alert and oriented to person, place, and time. Mental status is at baseline.      Cranial Nerves: No cranial nerve deficit.      Motor: No weakness.   Psychiatric:         Mood and Affect: Mood normal.         Behavior: Behavior normal.         Thought Content: Thought content normal.         Judgment: Judgment normal.               Lab Results: I have reviewed the following results:   Results from last 7 days   Lab Units 03/12/25  0604 03/11/25  0454 03/10/25  0600 03/08/25  1530 03/08/25  1224   WBC Thousand/uL 3.94*   < > 3.95*   < > 13.56*   HEMOGLOBIN g/dL 10.9*   < > 10.2*   < > 13.2   HEMATOCRIT % 32.9*   < > 32.2*   < > 39.1   PLATELETS Thousands/uL 284   < > 229   < > 348   BANDS PCT %  --   --   --   --  6   SEGS PCT %  --   --  38*   < >  --    LYMPHO PCT %  --   --  39   < > 2*   MONO PCT %  --   --  18*   < > 8    EOS PCT %  --   --  3   < > 0    < > = values in this interval not displayed.     Results from last 7 days   Lab Units 03/12/25  0604   SODIUM mmol/L 140   POTASSIUM mmol/L 3.8   CHLORIDE mmol/L 102   CO2 mmol/L 29   BUN mg/dL 10   CREATININE mg/dL 0.73   ANION GAP mmol/L 9   CALCIUM mg/dL 9.1   ALBUMIN g/dL 3.8   TOTAL BILIRUBIN mg/dL 0.55   ALK PHOS U/L 64   ALT U/L 19   AST U/L 23   GLUCOSE RANDOM mg/dL 92     Results from last 7 days   Lab Units 03/08/25  1224   INR  1.05             Results from last 7 days   Lab Units 03/11/25  0454 03/10/25  0600 03/09/25  0521 03/08/25  1530 03/08/25  1224   LACTIC ACID mmol/L  --   --   --  1.6 2.5*   PROCALCITONIN ng/ml 0.07 0.09 0.31*  --  0.34*       Recent Cultures (last 7 days):   Results from last 7 days   Lab Units 03/08/25  1711 03/08/25  1246 03/08/25  1224   BLOOD CULTURE   --  No Growth at 72 hrs. No Growth at 72 hrs.   LEGIONELLA URINARY ANTIGEN  Negative  --   --        Imaging Results Review: No pertinent imaging studies reviewed.  Other Study Results Review: No additional pertinent studies reviewed.    Last 24 Hours Medication List:     Current Facility-Administered Medications:     acetaminophen (TYLENOL) tablet 650 mg, TID PRN    ALPRAZolam (XANAX) tablet 0.25 mg, TID PRN    aluminum-magnesium hydroxide-simethicone (MAALOX) oral suspension 30 mL, Q4H PRN    bisacodyl (DULCOLAX) EC tablet 5 mg, Daily PRN    bisacodyl (DULCOLAX) rectal suppository 10 mg, Daily    cefTRIAXone (ROCEPHIN) IVPB (premix in dextrose) 1,000 mg 50 mL, Q24H, Last Rate: 1,000 mg (03/12/25 1308)    dicyclomine (BENTYL) capsule 10 mg, TID PRN    docusate sodium (COLACE) capsule 100 mg, TID    enoxaparin (LOVENOX) subcutaneous injection 40 mg, Daily    famotidine (PEPCID) tablet 10 mg, BID    loratadine (CLARITIN) tablet 5 mg, Daily    losartan (COZAAR) tablet 50 mg, Daily    ondansetron (ZOFRAN) injection 4 mg, Q4H PRN    pantoprazole (PROTONIX) EC tablet 40 mg, Early Morning     senna-docusate sodium (SENOKOT S) 8.6-50 mg per tablet 1 tablet, HS    Insert peripheral IV, Once **AND** sodium chloride (PF) 0.9 % injection 3 mL, Once PRN    sucralfate (CARAFATE) oral suspension (WOLF/PEDS) 1 g, TID AC    Administrative Statements   Today, Patient Was Seen By: Ji Johnson DO      **Please Note: This note may have been constructed using a voice recognition system.**

## 2025-03-12 NOTE — ASSESSMENT & PLAN NOTE
Improving    Continue famotidine 10 mg to twice daily to help with the GERD symptoms.  Continue Carafate 1 g 3 times daily with meals.  Continue PPI as ordered.

## 2025-03-12 NOTE — ASSESSMENT & PLAN NOTE
Improving    Continue current diet as ordered.   Continue to maintain a large bore IV.  Continue to monitor hemoglobin and transfuse as per protocol.   Continue with serial abdominal exams.   Continue to monitor stool output for any overt signs of GI bleeding.   Continue PRN antiemetics as ordered.

## 2025-03-12 NOTE — DISCHARGE SUPPORT
"Rec'd message from [facility] stating, \"Nursing needs : stop date for IV CefTRIAXone - MD note on 03/11 indicate to continue, member is SUP for transfers and ambulation x 40 feet x RW.\" CM notified: Maite WOOD    ADDENDUM  Per CM, \"5 days for IV antibiotic therapy started 3/8 til 3/12/25\". Notified [facility] via portal.   "

## 2025-03-12 NOTE — PLAN OF CARE
Problem: PAIN - ADULT  Goal: Verbalizes/displays adequate comfort level or baseline comfort level  Description: Interventions:  - Encourage patient to monitor pain and request assistance  - Assess pain using appropriate pain scale  - Administer analgesics based on type and severity of pain and evaluate response  - Implement non-pharmacological measures as appropriate and evaluate response  - Consider cultural and social influences on pain and pain management  - Notify physician/advanced practitioner if interventions unsuccessful or patient reports new pain  Outcome: Progressing     Problem: INFECTION - ADULT  Goal: Absence or prevention of progression during hospitalization  Description: INTERVENTIONS:  - Assess and monitor for signs and symptoms of infection  - Monitor lab/diagnostic results  - Monitor all insertion sites, i.e. indwelling lines, tubes, and drains  - Monitor endotracheal if appropriate and nasal secretions for changes in amount and color  - [place] appropriate cooling/warming therapies per order  - Administer medications as ordered  - Instruct and encourage patient and family to use good hand hygiene technique  - Identify and instruct in appropriate isolation precautions for identified infection/condition  Outcome: Progressing  Goal: Absence of fever/infection during neutropenic period  Description: INTERVENTIONS:  - Monitor WBC    Outcome: Progressing     Problem: SAFETY ADULT  Goal: Patient will remain free of falls  Description: INTERVENTIONS:  - Educate patient/family on patient safety including physical limitations  - Instruct patient to call for assistance with activity   - Consult OT/PT to assist with strengthening/mobility   - Keep Call bell within reach  - Keep bed low and locked with side rails adjusted as appropriate  - Keep care items and personal belongings within reach  - Initiate and maintain comfort rounds  - Make Fall Risk Sign visible to staff  - Offer Toileting every 2 Hours,  in advance of need  - Initiate/Maintain bed/chair alarm  - Obtain necessary fall risk management equipment: non skid socks  - Apply yellow socks and bracelet for high fall risk patients  - Consider moving patient to room near nurses station  Outcome: Progressing  Goal: Maintain or return to baseline ADL function  Description: INTERVENTIONS:  -  Assess patient's ability to carry out ADLs; assess patient's baseline for ADL function and identify physical deficits which impact ability to perform ADLs (bathing, care of mouth/teeth, toileting, grooming, dressing, etc.)  - Assess/evaluate cause of self-care deficits   - Assess range of motion  - Assess patient's mobility; develop plan if impaired  - Assess patient's need for assistive devices and provide as appropriate  - Encourage maximum independence but intervene and supervise when necessary  - Involve family in performance of ADLs  - Assess for home care needs following discharge   - Consider OT consult to assist with ADL evaluation and planning for discharge  - Provide patient education as appropriate  Outcome: Progressing  Goal: Maintains/Returns to pre admission functional level  Description: INTERVENTIONS:  - Perform AM-PAC 6 Click Basic Mobility/ Daily Activity assessment daily.  - Set and communicate daily mobility goal to care team and patient/family/caregiver.   - Collaborate with rehabilitation services on mobility goals if consulted  - Perform Range of Motion 3 times a day.  - Reposition patient every 2 hours.  - Dangle patient 3 times a day  - Stand patient 3 times a day  - Ambulate patient 3 times a day  - Out of bed to chair 3 times a day   - Out of bed for meals 3 times a day  - Out of bed for toileting  - Record patient progress and toleration of activity level   Outcome: Progressing     Problem: DISCHARGE PLANNING  Goal: Discharge to home or other facility with appropriate resources  Description: INTERVENTIONS:  - Identify barriers to discharge  w/patient and caregiver  - Arrange for needed discharge resources and transportation as appropriate  - Identify discharge learning needs (meds, wound care, etc.)  - Arrange for interpretive services to assist at discharge as needed  - Refer to Case Management Department for coordinating discharge planning if the patient needs post-hospital services based on physician/advanced practitioner order or complex needs related to functional status, cognitive ability, or social support system  Outcome: Progressing     Problem: Knowledge Deficit  Goal: Patient/family/caregiver demonstrates understanding of disease process, treatment plan, medications, and discharge instructions  Description: Complete learning assessment and assess knowledge base.  Interventions:  - Provide teaching at level of understanding  - Provide teaching via preferred learning methods  Outcome: Progressing     Problem: NEUROSENSORY - ADULT  Goal: Achieves stable or improved neurological status  Description: INTERVENTIONS  - Monitor and report changes in neurological status  - Monitor vital signs such as temperature, blood pressure, glucose, and any other labs ordered   - Initiate measures to prevent increased intracranial pressure  - Monitor for seizure activity and implement precautions if appropriate      Outcome: Progressing  Goal: Remains free of injury related to seizures activity  Description: INTERVENTIONS  - Maintain airway, patient safety  and administer oxygen as ordered  - Monitor patient for seizure activity, document and report duration and description of seizure to physician/advanced practitioner  - If seizure occurs,  ensure patient safety during seizure  - Reorient patient post seizure  - Seizure pads on all 4 side rails  - Instruct patient/family to notify RN of any seizure activity including if an aura is experienced  - Instruct patient/family to call for assistance with activity based on nursing assessment  - Administer anti-seizure  medications if ordered    Outcome: Progressing  Goal: Achieves maximal functionality and self care  Description: INTERVENTIONS  - Monitor swallowing and airway patency with patient fatigue and changes in neurological status  - Encourage and assist patient to increase activity and self care.   - Encourage visually impaired, hearing impaired and aphasic patients to use assistive/communication devices  Outcome: Progressing     Problem: CARDIOVASCULAR - ADULT  Goal: Maintains optimal cardiac output and hemodynamic stability  Description: INTERVENTIONS:  - Monitor I/O, vital signs and rhythm  - Monitor for S/S and trends of decreased cardiac output  - Administer and titrate ordered vasoactive medications to optimize hemodynamic stability  - Assess quality of pulses, skin color and temperature  - Assess for signs of decreased coronary artery perfusion  - Instruct patient to report change in severity of symptoms  Outcome: Progressing  Goal: Absence of cardiac dysrhythmias or at baseline rhythm  Description: INTERVENTIONS:  - Continuous cardiac monitoring, vital signs, obtain 12 lead EKG if ordered  - Administer antiarrhythmic and heart rate control medications as ordered  - Monitor electrolytes and administer replacement therapy as ordered  Outcome: Progressing     Problem: RESPIRATORY - ADULT  Goal: Achieves optimal ventilation and oxygenation  Description: INTERVENTIONS:  - Assess for changes in respiratory status  - Assess for changes in mentation and behavior  - Position to facilitate oxygenation and minimize respiratory effort  - Oxygen administered by appropriate delivery if ordered  - Initiate smoking cessation education as indicated  - Encourage broncho-pulmonary hygiene including cough, deep breathe, Incentive Spirometry  - Assess the need for suctioning and aspirate as needed  - Assess and instruct to report SOB or any respiratory difficulty  - Respiratory Therapy support as indicated  Outcome: Progressing      Problem: GASTROINTESTINAL - ADULT  Goal: Minimal or absence of nausea and/or vomiting  Description: INTERVENTIONS:  - Administer IV fluids if ordered to ensure adequate hydration  - Maintain NPO status until nausea and vomiting are resolved  - Nasogastric tube if ordered  - Administer ordered antiemetic medications as needed  - Provide nonpharmacologic comfort measures as appropriate  - Advance diet as tolerated, if ordered  - Consider nutrition services referral to assist patient with adequate nutrition and appropriate food choices  Outcome: Progressing  Goal: Maintains or returns to baseline bowel function  Description: INTERVENTIONS:  - Assess bowel function  - Encourage oral fluids to ensure adequate hydration  - Administer IV fluids if ordered to ensure adequate hydration  - Administer ordered medications as needed  - Encourage mobilization and activity  - Consider nutritional services referral to assist patient with adequate nutrition and appropriate food choices  Outcome: Progressing  Goal: Maintains adequate nutritional intake  Description: INTERVENTIONS:  - Monitor percentage of each meal consumed  - Identify factors contributing to decreased intake, treat as appropriate  - Assist with meals as needed  - Monitor I&O, weight, and lab values if indicated  - Obtain nutrition services referral as needed  Outcome: Progressing  Goal: Establish and maintain optimal ostomy function  Description: INTERVENTIONS:  - Assess bowel function  - Encourage oral fluids to ensure adequate hydration  - Administer IV fluids if ordered to ensure adequate hydration   - Administer ordered medications as needed  - Encourage mobilization and activity  - Nutrition services referral to assist patient with appropriate food choices  - Assess stoma site  - Consider wound care consult   Outcome: Progressing  Goal: Oral mucous membranes remain intact  Description: INTERVENTIONS  - Assess oral mucosa and hygiene practices  - Implement  preventative oral hygiene regimen  - Implement oral medicated treatments as ordered  - Initiate Nutrition services referral as needed  Outcome: Progressing     Problem: GENITOURINARY - ADULT  Goal: Maintains or returns to baseline urinary function  Description: INTERVENTIONS:  - Assess urinary function  - Encourage oral fluids to ensure adequate hydration if ordered  - Administer IV fluids as ordered to ensure adequate hydration  - Administer ordered medications as needed  - Offer frequent toileting  - Follow urinary retention protocol if ordered  Outcome: Progressing  Goal: Absence of urinary retention  Description: INTERVENTIONS:  - Assess patient’s ability to void and empty bladder  - Monitor I/O  - Bladder scan as needed  - Discuss with physician/AP medications to alleviate retention as needed  - Discuss catheterization for long term situations as appropriate  Outcome: Progressing  Goal: Urinary catheter remains patent  Description: INTERVENTIONS:  - Assess patency of urinary catheter  - If patient has a chronic nguyne, consider changing catheter if non-functioning  - Follow guidelines for intermittent irrigation of non-functioning urinary catheter  Outcome: Progressing     Problem: METABOLIC, FLUID AND ELECTROLYTES - ADULT  Goal: Electrolytes maintained within normal limits  Description: INTERVENTIONS:  - Monitor labs and assess patient for signs and symptoms of electrolyte imbalances  - Administer electrolyte replacement as ordered  - Monitor response to electrolyte replacements, including repeat lab results as appropriate  - Instruct patient on fluid and nutrition as appropriate  Outcome: Progressing  Goal: Fluid balance maintained  Description: INTERVENTIONS:  - Monitor labs   - Monitor I/O and WT  - Instruct patient on fluid and nutrition as appropriate  - Assess for signs & symptoms of volume excess or deficit  Outcome: Progressing  Goal: Glucose maintained within target range  Description:  INTERVENTIONS:  - Monitor Blood Glucose as ordered  - Assess for signs and symptoms of hyperglycemia and hypoglycemia  - Administer ordered medications to maintain glucose within target range  - Assess nutritional intake and initiate nutrition service referral as needed  Outcome: Progressing     Problem: SKIN/TISSUE INTEGRITY - ADULT  Goal: Skin Integrity remains intact(Skin Breakdown Prevention)  Description: Assess:  -Perform [place] assessment every shift  -Clean and moisturize skin every shift  -Inspect skin when repositioning, toileting, and assisting with ADLS  -Assess extremities for adequate circulation and sensation     Bed Management:  -Have minimal linens on bed & keep smooth, unwrinkled  -Change linens as needed when moist or perspiring  -Avoid sitting or lying in one position for more than 2 hours while in bed  -Keep HOB at 30degrees     Toileting:  -Offer bedside commode  -Assess for incontinence every shift  -Use incontinent care products after each incontinent episode such as jim wipes    Activity:  -Mobilize patient 3 times a day  -Encourage activity and walks on unit  -Encourage or provide ROM exercises   -Turn and reposition patient every 2 Hours  -Use appropriate equipment to lift or move patient in bed  -Instruct/ Assist with weight shifting every 60 mins when out of bed in chair  -Consider limitation of chair time 6 hour intervals    Skin Care:  -Avoid use of baby powder, tape, friction and shearing, hot water or constrictive clothing  -Relieve pressure over bony prominences using foam wedges/pillows  -Do not massage red bony areas    Outcome: Progressing  Goal: Pressure injury heals and does not worsen  Description: Interventions:  - Implement low air loss mattress or specialty surface (Criteria met)  - Apply silicone foam dressing  - Instruct/assist with weight shifting every 60 minutes when in chair   - Limit chair time to 6 hour intervals  - Use special pressure reducing interventions such  as waffle cushion when in chair   - Apply fecal or urinary incontinence containment device   - Perform passive or active ROM every 8 hours  - Turn and reposition patient & offload bony prominences every 2 hours   - Utilize friction reducing device or surface for transfers   - Use incontinent care products after each incontinent episode such as jim wipes  - Consider nutrition services referral as needed  Outcome: Progressing     Problem: MUSCULOSKELETAL - ADULT  Goal: Maintain or return mobility to safest level of function  Description: INTERVENTIONS:  - Assess patient's ability to carry out ADLs; assess patient's baseline for ADL function and identify physical deficits which impact ability to perform ADLs (bathing, care of mouth/teeth, toileting, grooming, dressing, etc.)  - Assess/evaluate cause of self-care deficits   - Assess range of motion  - Assess patient's mobility  - Assess patient's need for assistive devices and provide as appropriate  - Encourage maximum independence but intervene and supervise when necessary  - Involve family in performance of ADLs  - Assess for home care needs following discharge   - Consider OT consult to assist with ADL evaluation and planning for discharge  - Provide patient education as appropriate  Outcome: Progressing  Goal: Maintain proper alignment of affected body part  Description: INTERVENTIONS:  - Support, maintain and protect limb and body alignment  - Provide patient/ family with appropriate education  Outcome: Progressing     Problem: HEMATOLOGIC - ADULT  Goal: Maintains hematologic stability  Description: INTERVENTIONS  - Assess for signs and symptoms of bleeding or hemorrhage  - Monitor labs  - Administer supportive blood products/factors as ordered and appropriate  Outcome: Progressing

## 2025-03-12 NOTE — PLAN OF CARE
Problem: PAIN - ADULT  Goal: Verbalizes/displays adequate comfort level or baseline comfort level  Description: Interventions:  - Encourage patient to monitor pain and request assistance  - Assess pain using appropriate pain scale  - Administer analgesics based on type and severity of pain and evaluate response  - Implement non-pharmacological measures as appropriate and evaluate response  - Consider cultural and social influences on pain and pain management  - Notify physician/advanced practitioner if interventions unsuccessful or patient reports new pain  Outcome: Progressing     Problem: INFECTION - ADULT  Goal: Absence or prevention of progression during hospitalization  Description: INTERVENTIONS:  - Assess and monitor for signs and symptoms of infection  - Monitor lab/diagnostic results  - Monitor all insertion sites, i.e. indwelling lines, tubes, and drains  - Monitor endotracheal if appropriate and nasal secretions for changes in amount and color  - [place] appropriate cooling/warming therapies per order  - Administer medications as ordered  - Instruct and encourage patient and family to use good hand hygiene technique  - Identify and instruct in appropriate isolation precautions for identified infection/condition  Outcome: Progressing  Goal: Absence of fever/infection during neutropenic period  Description: INTERVENTIONS:  - Monitor WBC    Outcome: Progressing

## 2025-03-12 NOTE — ASSESSMENT & PLAN NOTE
Bowel regimen:  One-time dose of Dulcolax 5 mg, now to help encourage a more complete bowel movement and help with her bloating and constipation feelings.  Ordered Dulcolax 5 mg, daily as needed for constipation for future use.    Increased Colace 100 mg to three times daily to help soften stool and encourage daily bowel movements.  Continue Senokot, 1 tablet at bedtime to help encourage bowel movements and keep her bowels regular, especially since she is on the dicyclomine.  Switched dicyclomine 10 mg from scheduled to 1 p.o. 3 times daily as needed for abdominal pain and spasms.

## 2025-03-12 NOTE — PROGRESS NOTES
Progress Note - Gastroenterology   Name: Ama Burnett 89 y.o. female I MRN: [id number]  Unit/Bed#: 423-01 I Date of Admission: 3/8/2025   Date of Service: 3/12/2025 I Hospital Day: 3    Assessment & Plan  Nausea & vomiting  Improving    Continue current diet as ordered.   Continue to maintain a large bore IV.  Continue to monitor hemoglobin and transfuse as per protocol.   Continue with serial abdominal exams.   Continue to monitor stool output for any overt signs of GI bleeding.   Continue PRN antiemetics as ordered.  GERD (gastroesophageal reflux disease)  Improving    Continue famotidine 10 mg to twice daily to help with the GERD symptoms.  Continue Carafate 1 g 3 times daily with meals.  Continue PPI as ordered.   Intermittent constipation  Bowel regimen:  One-time dose of Dulcolax 5 mg, now to help encourage a more complete bowel movement and help with her bloating and constipation feelings.  Ordered Dulcolax 5 mg, daily as needed for constipation for future use.    Increased Colace 100 mg to three times daily to help soften stool and encourage daily bowel movements.  Continue Senokot, 1 tablet at bedtime to help encourage bowel movements and keep her bowels regular, especially since she is on the dicyclomine.  Switched dicyclomine 10 mg from scheduled to 1 p.o. 3 times daily as needed for abdominal pain and spasms.  Severe sepsis (HCC)  As per primary team.  Benign essential hypertension  As per primary team.  Hypomagnesemia  As per primary team.  Acute cystitis with hematuria  As per primary team.    Continue rest of medications as per primary team.   Continue supportive care.     I have discussed the above management plan in detail with the primary service.   Gastroenterology service will follow.    Subjective   The patient is currently being seen for her nausea, vomiting, GERD symptoms, and intermittent constipation.  She reports that compared to yesterday the nausea has definitely improved and she has not  had any vomiting or significant GERD symptoms, but definitely feels that she is somewhat constipated and bloated.  She reports she is having bowel movements but is still having to strain and just feels as though there is still pressure or that she is not completely emptying when she has a bowel movement.  She had a small bowel movement this morning without any evidence of blood or melena.  She is currently tolerating her diet without any significant issues and again feels that the nausea has somewhat improved.  However, she is worried about being discharged or transferred today because she just does not feel quite right and wants to make sure she feels better before she leaves as she has also seriously trying to consider what she is going to do from here as she may actually consider going into SNF permanently because she just feels that might be in her best interest overall.    Objective :  Temp:  [98.2 °F (36.8 °C)-98.5 °F (36.9 °C)] 98.5 °F (36.9 °C)  HR:  [68-77] 77  BP: (127-168)/(58-77) 168/77  Resp:  [15-17] 15  SpO2:  [93 %-95 %] 95 %  O2 Device: None (Room air)    Physical Exam  Exam: Pt was resting in bed comfortably during today's exam, A&O x 3, pleasant and cooperative. Abdomen is moderately distended, with mild tenderness noted upon exam in the left upper and lower quadrants, without any significant guarding or rebound tenderness on upon exam, with hypoactive bowel sounds x 4. Skin is non-icteric.  No edema noted of the b/l lower extremities upon exam today.       Lab Results: I have reviewed the following results:CBC/BMP:   .     03/12/25  0604   WBC 3.94*   HGB 10.9*   HCT 32.9*      SODIUM 140   K 3.8      CO2 29   BUN 10   CREATININE 0.73   GLUC 92    , LFTs:   .     03/12/25  0604   AST 23   ALT 19   ALB 3.8   TBILI 0.55   ALKPHOS 64        Imaging Results Review: No pertinent imaging studies reviewed.  Other Study Results Review: No additional pertinent studies reviewed.

## 2025-03-12 NOTE — PLAN OF CARE
Problem: PAIN - ADULT  Goal: Verbalizes/displays adequate comfort level or baseline comfort level  Description: Interventions:  - Encourage patient to monitor pain and request assistance  - Assess pain using appropriate pain scale  - Administer analgesics based on type and severity of pain and evaluate response  - Implement non-pharmacological measures as appropriate and evaluate response  - Consider cultural and social influences on pain and pain management  - Notify physician/advanced practitioner if interventions unsuccessful or patient reports new pain  Outcome: Progressing     Problem: INFECTION - ADULT  Goal: Absence or prevention of progression during hospitalization  Description: INTERVENTIONS:  - Assess and monitor for signs and symptoms of infection  - Monitor lab/diagnostic results  - Monitor all insertion sites, i.e. indwelling lines, tubes, and drains  - Monitor endotracheal if appropriate and nasal secretions for changes in amount and color  - [place] appropriate cooling/warming therapies per order  - Administer medications as ordered  - Instruct and encourage patient and family to use good hand hygiene technique  - Identify and instruct in appropriate isolation precautions for identified infection/condition  Outcome: Progressing     Problem: SAFETY ADULT  Goal: Patient will remain free of falls  Description: INTERVENTIONS:  - Educate patient/family on patient safety including physical limitations  - Instruct patient to call for assistance with activity   - Consult OT/PT to assist with strengthening/mobility   - Keep Call bell within reach  - Keep bed low and locked with side rails adjusted as appropriate  - Keep care items and personal belongings within reach  - Initiate and maintain comfort rounds  - Make Fall Risk Sign visible to staff  - Offer Toileting every 2 Hours, in advance of need  - Initiate/Maintain bed/chair alarm  - Obtain necessary fall risk management equipment: non skid socks  -  Apply yellow socks and bracelet for high fall risk patients  - Consider moving patient to room near nurses station  Outcome: Progressing  Goal: Maintain or return to baseline ADL function  Description: INTERVENTIONS:  -  Assess patient's ability to carry out ADLs; assess patient's baseline for ADL function and identify physical deficits which impact ability to perform ADLs (bathing, care of mouth/teeth, toileting, grooming, dressing, etc.)  - Assess/evaluate cause of self-care deficits   - Assess range of motion  - Assess patient's mobility; develop plan if impaired  - Assess patient's need for assistive devices and provide as appropriate  - Encourage maximum independence but intervene and supervise when necessary  - Involve family in performance of ADLs  - Assess for home care needs following discharge   - Consider OT consult to assist with ADL evaluation and planning for discharge  - Provide patient education as appropriate  Outcome: Progressing  Goal: Maintains/Returns to pre admission functional level  Description: INTERVENTIONS:  - Perform AM-PAC 6 Click Basic Mobility/ Daily Activity assessment daily.  - Set and communicate daily mobility goal to care team and patient/family/caregiver.   - Collaborate with rehabilitation services on mobility goals if consulted  - Perform Range of Motion 3 times a day.  - Reposition patient every 2 hours.  - Dangle patient 3 times a day  - Stand patient 3 times a day  - Ambulate patient 3 times a day  - Out of bed to chair 3 times a day   - Out of bed for meals 3 times a day  - Out of bed for toileting  - Record patient progress and toleration of activity level   Outcome: Progressing

## 2025-03-13 ENCOUNTER — HOME HEALTH ADMISSION (OUTPATIENT)
Dept: HOME HEALTH SERVICES | Facility: HOME HEALTHCARE | Age: OVER 89
End: 2025-03-13
Payer: COMMERCIAL

## 2025-03-13 PROBLEM — I10 UNCONTROLLED HYPERTENSION: Status: ACTIVE | Noted: 2025-03-13

## 2025-03-13 LAB
BACTERIA BLD CULT: NORMAL
BACTERIA BLD CULT: NORMAL

## 2025-03-13 PROCEDURE — 99233 SBSQ HOSP IP/OBS HIGH 50: CPT | Performed by: INTERNAL MEDICINE

## 2025-03-13 PROCEDURE — 97530 THERAPEUTIC ACTIVITIES: CPT

## 2025-03-13 PROCEDURE — 97116 GAIT TRAINING THERAPY: CPT

## 2025-03-13 RX ORDER — DICYCLOMINE HYDROCHLORIDE 10 MG/1
10 CAPSULE ORAL 3 TIMES DAILY PRN
Qty: 30 CAPSULE | Refills: 0 | Status: SHIPPED | OUTPATIENT
Start: 2025-03-13

## 2025-03-13 RX ORDER — LOSARTAN POTASSIUM 50 MG/1
100 TABLET ORAL DAILY
Status: DISCONTINUED | OUTPATIENT
Start: 2025-03-14 | End: 2025-03-14 | Stop reason: HOSPADM

## 2025-03-13 RX ORDER — AMOXICILLIN 250 MG
1 CAPSULE ORAL
Qty: 30 TABLET | Refills: 0 | Status: SHIPPED | OUTPATIENT
Start: 2025-03-13

## 2025-03-13 RX ORDER — LOSARTAN POTASSIUM 50 MG/1
50 TABLET ORAL ONCE
Status: COMPLETED | OUTPATIENT
Start: 2025-03-13 | End: 2025-03-13

## 2025-03-13 RX ORDER — BISACODYL 5 MG/1
5 TABLET, DELAYED RELEASE ORAL DAILY PRN
Qty: 30 TABLET | Refills: 0 | Status: SHIPPED | OUTPATIENT
Start: 2025-03-13

## 2025-03-13 RX ORDER — DOCUSATE SODIUM 100 MG/1
100 CAPSULE, LIQUID FILLED ORAL 3 TIMES DAILY
Qty: 90 CAPSULE | Refills: 0 | Status: SHIPPED | OUTPATIENT
Start: 2025-03-13 | End: 2025-03-14

## 2025-03-13 RX ORDER — AMLODIPINE BESYLATE 5 MG/1
5 TABLET ORAL DAILY
Status: DISCONTINUED | OUTPATIENT
Start: 2025-03-13 | End: 2025-03-14

## 2025-03-13 RX ORDER — FAMOTIDINE 10 MG
10 TABLET ORAL 2 TIMES DAILY
Qty: 60 TABLET | Refills: 0 | Status: SHIPPED | OUTPATIENT
Start: 2025-03-13

## 2025-03-13 RX ADMIN — SUCRALFATE 1 G: 1 SUSPENSION ORAL at 17:07

## 2025-03-13 RX ADMIN — FAMOTIDINE 10 MG: 20 TABLET, FILM COATED ORAL at 17:07

## 2025-03-13 RX ADMIN — ALPRAZOLAM 0.25 MG: 0.25 TABLET ORAL at 09:07

## 2025-03-13 RX ADMIN — SUCRALFATE 1 G: 1 SUSPENSION ORAL at 12:22

## 2025-03-13 RX ADMIN — DOCUSATE SODIUM 100 MG: 100 CAPSULE, LIQUID FILLED ORAL at 09:01

## 2025-03-13 RX ADMIN — LOSARTAN POTASSIUM 50 MG: 50 TABLET, FILM COATED ORAL at 09:01

## 2025-03-13 RX ADMIN — DOCUSATE SODIUM 100 MG: 100 CAPSULE, LIQUID FILLED ORAL at 17:07

## 2025-03-13 RX ADMIN — DOCUSATE SODIUM 100 MG: 100 CAPSULE, LIQUID FILLED ORAL at 21:57

## 2025-03-13 RX ADMIN — ACETAMINOPHEN 650 MG: 325 TABLET, FILM COATED ORAL at 21:58

## 2025-03-13 RX ADMIN — FAMOTIDINE 10 MG: 20 TABLET, FILM COATED ORAL at 09:01

## 2025-03-13 RX ADMIN — ALUMINUM HYDROXIDE, MAGNESIUM HYDROXIDE, AND DIMETHICONE 30 ML: 200; 20; 200 SUSPENSION ORAL at 22:08

## 2025-03-13 RX ADMIN — SUCRALFATE 1 G: 1 SUSPENSION ORAL at 06:32

## 2025-03-13 RX ADMIN — ENOXAPARIN SODIUM 40 MG: 40 INJECTION SUBCUTANEOUS at 09:01

## 2025-03-13 RX ADMIN — AMLODIPINE BESYLATE 5 MG: 5 TABLET ORAL at 14:54

## 2025-03-13 RX ADMIN — ALPRAZOLAM 0.25 MG: 0.25 TABLET ORAL at 17:07

## 2025-03-13 RX ADMIN — SENNOSIDES AND DOCUSATE SODIUM 1 TABLET: 50; 8.6 TABLET ORAL at 21:57

## 2025-03-13 RX ADMIN — LORATADINE 5 MG: 10 TABLET ORAL at 09:01

## 2025-03-13 RX ADMIN — LOSARTAN POTASSIUM 50 MG: 50 TABLET, FILM COATED ORAL at 12:46

## 2025-03-13 RX ADMIN — ALPRAZOLAM 0.25 MG: 0.25 TABLET ORAL at 21:58

## 2025-03-13 RX ADMIN — ACETAMINOPHEN 650 MG: 325 TABLET, FILM COATED ORAL at 09:05

## 2025-03-13 NOTE — QUICK NOTE
Was called to bedside for multiple complaints by patient.  Patient reports that she and she was given amlodipine earlier today for her elevated blood pressure, this was a new medication added earlier.  She states that she had some nausea and is having some discomfort in her left upper quadrant of her abdomen.  She also notes a sensation of fullness in her ears, some dizziness versus lightheadedness, though cannot clarify it more specifically.  As I inquired about more symptoms she seemed to report various additional symptoms.  She is currently hemodynamically stable other than her elevated blood pressures which are not critically high, systolic blood pressure in the 170s.  She seems very anxious on exam.  She was just recently given Xanax, as well as some GI medications to help possible nausea/gastritis.  Patient requested I listen to her heart, she felt her heart rate might be fast, she has a heart rate of 80.  No significant abnormalities on auscultation of her heart.  I attempted to reassure the patient that her symptoms do not sound cardiac in nature, they sound like possible upset stomach, possibly related to taking amlodipine since that around the time she noticed the symptoms.  With attempting to reassure her, she then started to state that she felt like her throat was tight, she did not mention this in the previous 10 minutes of our conversation.  She was eating just prior to me initially entering the room, and did not report any difficulty with eating her food.  I did offer her a low-dose Benadryl, as she may possibly be having a very mild reaction, though this seems less likely based on my exam.  Ultimately patient seems extremely anxious, likely etiology of her elevated blood pressures.  Further discussion, patient felt good just monitoring her symptoms, and felt she may improve after eating more for dinner.  We agreed on this plan.

## 2025-03-13 NOTE — PROGRESS NOTES
Progress Note - Hospitalist   Name: Ama Burnett 89 y.o. female I MRN: [id number]  Unit/Bed#: 423-01 I Date of Admission: 3/8/2025   Date of Service: 3/13/2025 I Hospital Day: 4    Assessment & Plan  Severe sepsis (HCC)  Tachycardia, leukocytosis, and elevated lactic acid, with elevated procalcitonin.  Possible pneumonia as source of infection  CT c/a/p: no PE, diverticulitis, or acute intra-abdominal inflammatory process, but LLL airspace opacity that my represent pneumonia vs atelectasis.    Patient completed antibiotic course  Uncontrolled hypertension  Patient with markedly elevated blood pressure today, increase Cozaar to 100 mg daily, add Norvasc 2.5 mg daily.    Will monitor BP, patient did have a slight headache, continue to monitor symptoms.    Modified diet/2 g sodium diet  GERD (gastroesophageal reflux disease)  Continue home PPI  Anxiety  Continue home Xanax PRN  Nausea & vomiting  Resolved  Acute cystitis with hematuria  Acute cystitis has been ruled out  Intermittent constipation  Continue current bowel regimen  Current Length of Stay: 4 day(s)  Current Patient Status: Inpatient   Patient had been clinically improving, today with headache and elevated blood pressure significantly above baseline, will need medication adjustments, will require ongoing inpatient medical management, possible discharge tomorrow.        Code Status: Level 3 - DNAR and DNI    Subjective   Patient complaining of slight headache, reported bowel movement overnight    Objective :  Temp:  [98.3 °F (36.8 °C)-98.5 °F (36.9 °C)] 98.3 °F (36.8 °C)  HR:  [77-89] 84  BP: (164-184)/(74-95) 174/87  Resp:  [18] 18  SpO2:  [90 %-94 %] 92 %    Body mass index is 31.61 kg/m².     Input and Output Summary (last 24 hours):     Intake/Output Summary (Last 24 hours) at 3/13/2025 1410  Last data filed at 3/13/2025 1325  Gross per 24 hour   Intake 480 ml   Output --   Net 480 ml       Physical Exam  Vitals and nursing note reviewed.    Constitutional:       Appearance: Normal appearance.   HENT:      Head: Normocephalic and atraumatic.   Cardiovascular:      Pulses: Normal pulses.      Heart sounds: Normal heart sounds.   Pulmonary:      Effort: Pulmonary effort is normal. No respiratory distress.      Breath sounds: Normal breath sounds. No wheezing.   Skin:     General: Skin is warm.   Neurological:      Mental Status: She is alert and oriented to person, place, and time. Mental status is at baseline.      Cranial Nerves: No cranial nerve deficit.      Motor: No weakness.   Psychiatric:         Mood and Affect: Mood normal.         Behavior: Behavior normal.         Thought Content: Thought content normal.         Judgment: Judgment normal.                     Lab Results: I have reviewed the following results:   Results from last 7 days   Lab Units 03/12/25  0604 03/11/25  0454 03/10/25  0600 03/08/25  1530 03/08/25  1224   WBC Thousand/uL 3.94*   < > 3.95*   < > 13.56*   HEMOGLOBIN g/dL 10.9*   < > 10.2*   < > 13.2   HEMATOCRIT % 32.9*   < > 32.2*   < > 39.1   PLATELETS Thousands/uL 284   < > 229   < > 348   BANDS PCT %  --   --   --   --  6   SEGS PCT %  --   --  38*   < >  --    LYMPHO PCT %  --   --  39   < > 2*   MONO PCT %  --   --  18*   < > 8   EOS PCT %  --   --  3   < > 0    < > = values in this interval not displayed.     Results from last 7 days   Lab Units 03/12/25  0604   SODIUM mmol/L 140   POTASSIUM mmol/L 3.8   CHLORIDE mmol/L 102   CO2 mmol/L 29   BUN mg/dL 10   CREATININE mg/dL 0.73   ANION GAP mmol/L 9   CALCIUM mg/dL 9.1   ALBUMIN g/dL 3.8   TOTAL BILIRUBIN mg/dL 0.55   ALK PHOS U/L 64   ALT U/L 19   AST U/L 23   GLUCOSE RANDOM mg/dL 92     Results from last 7 days   Lab Units 03/08/25  1224   INR  1.05             Results from last 7 days   Lab Units 03/11/25  0454 03/10/25  0600 03/09/25  0521 03/08/25  1530 03/08/25  1224   LACTIC ACID mmol/L  --   --   --  1.6 2.5*   PROCALCITONIN ng/ml 0.07 0.09 0.31*  --  0.34*        Recent Cultures (last 7 days):   Results from last 7 days   Lab Units 03/08/25  1711 03/08/25  1246 03/08/25  1224   BLOOD CULTURE   --  No Growth After 4 Days. No Growth After 4 Days.   LEGIONELLA URINARY ANTIGEN  Negative  --   --        Imaging Results Review: No pertinent imaging studies reviewed.  Other Study Results Review: No additional pertinent studies reviewed.    Last 24 Hours Medication List:     Current Facility-Administered Medications:     acetaminophen (TYLENOL) tablet 650 mg, TID PRN    ALPRAZolam (XANAX) tablet 0.25 mg, TID PRN    aluminum-magnesium hydroxide-simethicone (MAALOX) oral suspension 30 mL, Q4H PRN    amLODIPine (NORVASC) tablet 5 mg, Daily    bisacodyl (DULCOLAX) EC tablet 5 mg, Daily PRN    bisacodyl (DULCOLAX) rectal suppository 10 mg, Daily    dicyclomine (BENTYL) capsule 10 mg, TID PRN    docusate sodium (COLACE) capsule 100 mg, TID    enoxaparin (LOVENOX) subcutaneous injection 40 mg, Daily    famotidine (PEPCID) tablet 10 mg, BID    loratadine (CLARITIN) tablet 5 mg, Daily    [START ON 3/14/2025] losartan (COZAAR) tablet 100 mg, Daily    ondansetron (ZOFRAN) injection 4 mg, Q4H PRN    pantoprazole (PROTONIX) EC tablet 40 mg, Early Morning    senna-docusate sodium (SENOKOT S) 8.6-50 mg per tablet 1 tablet, HS    Insert peripheral IV, Once **AND** sodium chloride (PF) 0.9 % injection 3 mL, Once PRN    sucralfate (CARAFATE) oral suspension (WOLF/PEDS) 1 g, TID AC    Administrative Statements   Today, Patient Was Seen By: Ji Johnson, DO  I have spent a total time of 70 minutes in caring for this patient on the day of the visit/encounter including Diagnostic results, Prognosis, Instructions for management, Impressions, Documenting in the medical record, Reviewing/placing orders in the medical record (including tests, medications, and/or procedures), Obtaining or reviewing history  , and Communicating with other healthcare professionals .    **Please Note: This note may  have been constructed using a voice recognition system.**

## 2025-03-13 NOTE — PLAN OF CARE
Problem: PHYSICAL THERAPY ADULT  Goal: Performs mobility at highest level of function for planned discharge setting.  See evaluation for individualized goals.  Description: Treatment/Interventions: Functional transfer training, LE strengthening/ROM, Elevations, Therapeutic exercise, Endurance training, Bed mobility, Gait training          See flowsheet documentation for full assessment, interventions and recommendations.  Outcome: Progressing  Note: Prognosis: Good  Problem List: Decreased endurance, Impaired balance, Decreased mobility  Assessment: Pt seen this date for PT treatment session to increase level of mobility and functional activity tolerance. PT treatment session consisting of  therapeutic exercises, bed mobility, transfers and  gait training. Pt. Currently performing bed mobility, tx and ambulation at  SUP  x 1 level of function with use of RW .  Pt able to navigate 7 steps with 2 handrails step-to-step pattern with Supervision. Decreased c/o LE stiffness and pain after ambulating.  In comparison to previous session, Pt. With improvement  activity tolerance. Pt is in need of continued activity in PT to improve strength balance endurance mobility transfers and ambulation with return to maximize LOF. From PT/mobility standpoint, recommendation at time of d/c would be Level III:  minimal resource intensity  in order to promote return to PLOF and independence.  The patient's AM-[facility] Basic Mobility Inpatient Short Form Raw Score is 22. A Raw score of greater than 16 suggests the patient may benefit from discharge to home. Please also refer to the recommendation of the Physical Therapist for safe discharge planning. Pt continues to be functioning below baseline level. PT will continue to see pt during current hospitalization in order to address the deficits listed above and provide interventions consistent w/ POC in effort to achieve goals.        Rehab Resource Intensity Level, PT: III (Minimum Resource  Intensity)    See flowsheet documentation for full assessment.

## 2025-03-13 NOTE — ASSESSMENT & PLAN NOTE
Patient with markedly elevated blood pressure today, increase Cozaar to 100 mg daily, add Norvasc 2.5 mg daily.    Will monitor BP, patient did have a slight headache, continue to monitor symptoms.    Modified diet/2 g sodium diet

## 2025-03-13 NOTE — PLAN OF CARE
Problem: PAIN - ADULT  Goal: Verbalizes/displays adequate comfort level or baseline comfort level  Description: Interventions:  - Encourage patient to monitor pain and request assistance  - Assess pain using appropriate pain scale  - Administer analgesics based on type and severity of pain and evaluate response  - Implement non-pharmacological measures as appropriate and evaluate response  - Consider cultural and social influences on pain and pain management  - Notify physician/advanced practitioner if interventions unsuccessful or patient reports new pain  Outcome: Progressing     Problem: INFECTION - ADULT  Goal: Absence or prevention of progression during hospitalization  Description: INTERVENTIONS:  - Assess and monitor for signs and symptoms of infection  - Monitor lab/diagnostic results  - Monitor all insertion sites, i.e. indwelling lines, tubes, and drains  - Monitor endotracheal if appropriate and nasal secretions for changes in amount and color  - [place] appropriate cooling/warming therapies per order  - Administer medications as ordered  - Instruct and encourage patient and family to use good hand hygiene technique  - Identify and instruct in appropriate isolation precautions for identified infection/condition  Outcome: Progressing  Goal: Absence of fever/infection during neutropenic period  Description: INTERVENTIONS:  - Monitor WBC    Outcome: Progressing     Problem: SAFETY ADULT  Goal: Patient will remain free of falls  Description: INTERVENTIONS:  - Educate patient/family on patient safety including physical limitations  - Instruct patient to call for assistance with activity   - Consult OT/PT to assist with strengthening/mobility   - Keep Call bell within reach  - Keep bed low and locked with side rails adjusted as appropriate  - Keep care items and personal belongings within reach  - Initiate and maintain comfort rounds  - Make Fall Risk Sign visible to staff  - Offer Toileting every 2 Hours,  in advance of need  - Initiate/Maintain bed/chair alarm  - Obtain necessary fall risk management equipment: non skid socks  - Apply yellow socks and bracelet for high fall risk patients  - Consider moving patient to room near nurses station  Outcome: Progressing  Goal: Maintain or return to baseline ADL function  Description: INTERVENTIONS:  -  Assess patient's ability to carry out ADLs; assess patient's baseline for ADL function and identify physical deficits which impact ability to perform ADLs (bathing, care of mouth/teeth, toileting, grooming, dressing, etc.)  - Assess/evaluate cause of self-care deficits   - Assess range of motion  - Assess patient's mobility; develop plan if impaired  - Assess patient's need for assistive devices and provide as appropriate  - Encourage maximum independence but intervene and supervise when necessary  - Involve family in performance of ADLs  - Assess for home care needs following discharge   - Consider OT consult to assist with ADL evaluation and planning for discharge  - Provide patient education as appropriate  Outcome: Progressing  Goal: Maintains/Returns to pre admission functional level  Description: INTERVENTIONS:  - Perform AM-PAC 6 Click Basic Mobility/ Daily Activity assessment daily.  - Set and communicate daily mobility goal to care team and patient/family/caregiver.   - Collaborate with rehabilitation services on mobility goals if consulted  - Perform Range of Motion 3 times a day.  - Reposition patient every 2 hours.  - Dangle patient 3 times a day  - Stand patient 3 times a day  - Ambulate patient 3 times a day  - Out of bed to chair 3 times a day   - Out of bed for meals 3 times a day  - Out of bed for toileting  - Record patient progress and toleration of activity level   Outcome: Progressing     Problem: DISCHARGE PLANNING  Goal: Discharge to home or other facility with appropriate resources  Description: INTERVENTIONS:  - Identify barriers to discharge  w/patient and caregiver  - Arrange for needed discharge resources and transportation as appropriate  - Identify discharge learning needs (meds, wound care, etc.)  - Arrange for interpretive services to assist at discharge as needed  - Refer to Case Management Department for coordinating discharge planning if the patient needs post-hospital services based on physician/advanced practitioner order or complex needs related to functional status, cognitive ability, or social support system  Outcome: Progressing     Problem: Knowledge Deficit  Goal: Patient/family/caregiver demonstrates understanding of disease process, treatment plan, medications, and discharge instructions  Description: Complete learning assessment and assess knowledge base.  Interventions:  - Provide teaching at level of understanding  - Provide teaching via preferred learning methods  Outcome: Progressing     Problem: NEUROSENSORY - ADULT  Goal: Achieves stable or improved neurological status  Description: INTERVENTIONS  - Monitor and report changes in neurological status  - Monitor vital signs such as temperature, blood pressure, glucose, and any other labs ordered   - Initiate measures to prevent increased intracranial pressure  - Monitor for seizure activity and implement precautions if appropriate      Outcome: Progressing  Goal: Remains free of injury related to seizures activity  Description: INTERVENTIONS  - Maintain airway, patient safety  and administer oxygen as ordered  - Monitor patient for seizure activity, document and report duration and description of seizure to physician/advanced practitioner  - If seizure occurs,  ensure patient safety during seizure  - Reorient patient post seizure  - Seizure pads on all 4 side rails  - Instruct patient/family to notify RN of any seizure activity including if an aura is experienced  - Instruct patient/family to call for assistance with activity based on nursing assessment  - Administer anti-seizure  medications if ordered    Outcome: Progressing  Goal: Achieves maximal functionality and self care  Description: INTERVENTIONS  - Monitor swallowing and airway patency with patient fatigue and changes in neurological status  - Encourage and assist patient to increase activity and self care.   - Encourage visually impaired, hearing impaired and aphasic patients to use assistive/communication devices  Outcome: Progressing     Problem: CARDIOVASCULAR - ADULT  Goal: Maintains optimal cardiac output and hemodynamic stability  Description: INTERVENTIONS:  - Monitor I/O, vital signs and rhythm  - Monitor for S/S and trends of decreased cardiac output  - Administer and titrate ordered vasoactive medications to optimize hemodynamic stability  - Assess quality of pulses, skin color and temperature  - Assess for signs of decreased coronary artery perfusion  - Instruct patient to report change in severity of symptoms  Outcome: Progressing  Goal: Absence of cardiac dysrhythmias or at baseline rhythm  Description: INTERVENTIONS:  - Continuous cardiac monitoring, vital signs, obtain 12 lead EKG if ordered  - Administer antiarrhythmic and heart rate control medications as ordered  - Monitor electrolytes and administer replacement therapy as ordered  Outcome: Progressing     Problem: RESPIRATORY - ADULT  Goal: Achieves optimal ventilation and oxygenation  Description: INTERVENTIONS:  - Assess for changes in respiratory status  - Assess for changes in mentation and behavior  - Position to facilitate oxygenation and minimize respiratory effort  - Oxygen administered by appropriate delivery if ordered  - Initiate smoking cessation education as indicated  - Encourage broncho-pulmonary hygiene including cough, deep breathe, Incentive Spirometry  - Assess the need for suctioning and aspirate as needed  - Assess and instruct to report SOB or any respiratory difficulty  - Respiratory Therapy support as indicated  Outcome: Progressing      Problem: GASTROINTESTINAL - ADULT  Goal: Minimal or absence of nausea and/or vomiting  Description: INTERVENTIONS:  - Administer IV fluids if ordered to ensure adequate hydration  - Maintain NPO status until nausea and vomiting are resolved  - Nasogastric tube if ordered  - Administer ordered antiemetic medications as needed  - Provide nonpharmacologic comfort measures as appropriate  - Advance diet as tolerated, if ordered  - Consider nutrition services referral to assist patient with adequate nutrition and appropriate food choices  Outcome: Progressing  Goal: Maintains or returns to baseline bowel function  Description: INTERVENTIONS:  - Assess bowel function  - Encourage oral fluids to ensure adequate hydration  - Administer IV fluids if ordered to ensure adequate hydration  - Administer ordered medications as needed  - Encourage mobilization and activity  - Consider nutritional services referral to assist patient with adequate nutrition and appropriate food choices  Outcome: Progressing  Goal: Maintains adequate nutritional intake  Description: INTERVENTIONS:  - Monitor percentage of each meal consumed  - Identify factors contributing to decreased intake, treat as appropriate  - Assist with meals as needed  - Monitor I&O, weight, and lab values if indicated  - Obtain nutrition services referral as needed  Outcome: Progressing  Goal: Establish and maintain optimal ostomy function  Description: INTERVENTIONS:  - Assess bowel function  - Encourage oral fluids to ensure adequate hydration  - Administer IV fluids if ordered to ensure adequate hydration   - Administer ordered medications as needed  - Encourage mobilization and activity  - Nutrition services referral to assist patient with appropriate food choices  - Assess stoma site  - Consider wound care consult   Outcome: Progressing  Goal: Oral mucous membranes remain intact  Description: INTERVENTIONS  - Assess oral mucosa and hygiene practices  - Implement  preventative oral hygiene regimen  - Implement oral medicated treatments as ordered  - Initiate Nutrition services referral as needed  Outcome: Progressing     Problem: GENITOURINARY - ADULT  Goal: Maintains or returns to baseline urinary function  Description: INTERVENTIONS:  - Assess urinary function  - Encourage oral fluids to ensure adequate hydration if ordered  - Administer IV fluids as ordered to ensure adequate hydration  - Administer ordered medications as needed  - Offer frequent toileting  - Follow urinary retention protocol if ordered  Outcome: Progressing  Goal: Absence of urinary retention  Description: INTERVENTIONS:  - Assess patient’s ability to void and empty bladder  - Monitor I/O  - Bladder scan as needed  - Discuss with physician/AP medications to alleviate retention as needed  - Discuss catheterization for long term situations as appropriate  Outcome: Progressing  Goal: Urinary catheter remains patent  Description: INTERVENTIONS:  - Assess patency of urinary catheter  - If patient has a chronic nguyen, consider changing catheter if non-functioning  - Follow guidelines for intermittent irrigation of non-functioning urinary catheter  Outcome: Progressing     Problem: METABOLIC, FLUID AND ELECTROLYTES - ADULT  Goal: Electrolytes maintained within normal limits  Description: INTERVENTIONS:  - Monitor labs and assess patient for signs and symptoms of electrolyte imbalances  - Administer electrolyte replacement as ordered  - Monitor response to electrolyte replacements, including repeat lab results as appropriate  - Instruct patient on fluid and nutrition as appropriate  Outcome: Progressing  Goal: Fluid balance maintained  Description: INTERVENTIONS:  - Monitor labs   - Monitor I/O and WT  - Instruct patient on fluid and nutrition as appropriate  - Assess for signs & symptoms of volume excess or deficit  Outcome: Progressing  Goal: Glucose maintained within target range  Description:  INTERVENTIONS:  - Monitor Blood Glucose as ordered  - Assess for signs and symptoms of hyperglycemia and hypoglycemia  - Administer ordered medications to maintain glucose within target range  - Assess nutritional intake and initiate nutrition service referral as needed  Outcome: Progressing     Problem: SKIN/TISSUE INTEGRITY - ADULT  Goal: Skin Integrity remains intact(Skin Breakdown Prevention)  Description: Assess:  -Perform Mando assessment every shift  -Clean and moisturize skin every shift  -Inspect skin when repositioning, toileting, and assisting with ADLS  -Assess extremities for adequate circulation and sensation     Bed Management:  -Have minimal linens on bed & keep smooth, unwrinkled  -Change linens as needed when moist or perspiring  -Avoid sitting or lying in one position for more than 2 hours while in bed  -Keep HOB at 30degrees     Toileting:  -Offer bedside commode  -Assess for incontinence every shift  -Use incontinent care products after each incontinent episode such as jim wipes    Activity:  -Mobilize patient 3 times a day  -Encourage activity and walks on unit  -Encourage or provide ROM exercises   -Turn and reposition patient every 2 Hours  -Use appropriate equipment to lift or move patient in bed  -Instruct/ Assist with weight shifting every 60 mins when out of bed in chair  -Consider limitation of chair time 6 hour intervals    Skin Care:  -Avoid use of baby powder, tape, friction and shearing, hot water or constrictive clothing  -Relieve pressure over bony prominences using foam wedges/pillows  -Do not massage red bony areas    Outcome: Progressing  Goal: Pressure injury heals and does not worsen  Description: Interventions:  - Implement low air loss mattress or specialty surface (Criteria met)  - Apply silicone foam dressing  - Instruct/assist with weight shifting every 60 minutes when in chair   - Limit chair time to 6 hour intervals  - Use special pressure reducing interventions such  as waffle cushion when in chair   - Apply fecal or urinary incontinence containment device   - Perform passive or active ROM every 8 hours  - Turn and reposition patient & offload bony prominences every 2 hours   - Utilize friction reducing device or surface for transfers   - Use incontinent care products after each incontinent episode such as jim wipes  - Consider nutrition services referral as needed  Outcome: Progressing     Problem: HEMATOLOGIC - ADULT  Goal: Maintains hematologic stability  Description: INTERVENTIONS  - Assess for signs and symptoms of bleeding or hemorrhage  - Monitor labs  - Administer supportive blood products/factors as ordered and appropriate  Outcome: Progressing     Problem: MUSCULOSKELETAL - ADULT  Goal: Maintain or return mobility to safest level of function  Description: INTERVENTIONS:  - Assess patient's ability to carry out ADLs; assess patient's baseline for ADL function and identify physical deficits which impact ability to perform ADLs (bathing, care of mouth/teeth, toileting, grooming, dressing, etc.)  - Assess/evaluate cause of self-care deficits   - Assess range of motion  - Assess patient's mobility  - Assess patient's need for assistive devices and provide as appropriate  - Encourage maximum independence but intervene and supervise when necessary  - Involve family in performance of ADLs  - Assess for home care needs following discharge   - Consider OT consult to assist with ADL evaluation and planning for discharge  - Provide patient education as appropriate  Outcome: Progressing  Goal: Maintain proper alignment of affected body part  Description: INTERVENTIONS:  - Support, maintain and protect limb and body alignment  - Provide patient/ family with appropriate education  Outcome: Progressing

## 2025-03-13 NOTE — CASE MANAGEMENT
Case Management Discharge Planning Note    Patient name Ama Burnett  Location /423-01 MRN [id number]  : 1931 Date 3/13/2025       Current Admission Date: 3/8/2025  Current Admission Diagnosis:Severe sepsis (HCC)   Patient Active Problem List    Diagnosis Date Noted Date Diagnosed    Intermittent constipation 2025     Acute cystitis with hematuria 03/10/2025     Severe sepsis (HCC) 2025     Hypomagnesemia 2025     Non-seasonal allergic rhinitis due to pollen 2023     Pulmonary nodule 02/10/2023     Peripheral arterial disease (HCC) 02/10/2023     Idiopathic peripheral neuropathy 2022     Stage 3a chronic kidney disease (HCC) 11/15/2021     Nausea & vomiting 2020     Benign essential hypertension 10/27/2017     GERD (gastroesophageal reflux disease) 2017     Anxiety 2017       LOS (days): 4  Geometric Mean LOS (GMLOS) (days): 4.9  Days to GMLOS:1     OBJECTIVE:  Risk of Unplanned Readmission Score: 10.89         Current admission status: Inpatient   Preferred Pharmacy:   [id number] - [address]  [address] [facility] [address]  Phone: [phone] Fax: [phone]    [facility]  [place]  [address] [place] [address]  [address] [facility]  [address]  [facility] [address]  Phone: [phone] Fax: [phone]    Primary Care Provider: Rayray Liu DO    Primary Insurance: [facility] REP  Secondary Insurance:     DISCHARGE DETAILS:        Peer to Peer requested by insurance before final determination for STR approval.

## 2025-03-13 NOTE — DISCHARGE SUPPORT
Case Management Assessment & Discharge Planning Note    Patient name Ama Burnett  Location /423-01 MRN [id number]  : 1931 Date 3/13/2025       Current Admission Date: 3/8/2025  Current Admission Diagnosis:Severe sepsis (HCC)   Patient Active Problem List    Diagnosis Date Noted Date Diagnosed    Intermittent constipation 2025     Acute cystitis with hematuria 03/10/2025     Severe sepsis (HCC) 2025     Hypomagnesemia 2025     Non-seasonal allergic rhinitis due to pollen 2023     Pulmonary nodule 02/10/2023     Peripheral arterial disease (HCC) 02/10/2023     Idiopathic peripheral neuropathy 2022     Stage 3a chronic kidney disease (HCC) 11/15/2021     Nausea & vomiting 2020     Benign essential hypertension 10/27/2017     GERD (gastroesophageal reflux disease) 2017     Anxiety 2017       LOS (days): 4  Geometric Mean LOS (GMLOS) (days): 4.9  Days to GMLOS:1   Facility Auth Adverse Decision  DC Support Center has received: P2P offered prior to determination  For Level of Care: SNF  Information obtained via : Phone  Insurance: [facility]  Name/Phone # of Rep who called in information (if applicable): [email or web address]  Reference Number: [id number]  [facility]: [facility]  Peer to Peer Phone #: [phone] option 5  P2P Deadline: 3/13 @ 1 pm  For P2P Completion:: CM to task P2P to attending to complete if desired   notified: Maite WOOD     Updates to authorization status will be noted in chart.    Please reach out to CM for updates on any clinical information.

## 2025-03-13 NOTE — CASE MANAGEMENT
Case Management Discharge Planning Note    Patient name Ama Burnett  Location /423-01 MRN [id number]  : 1931 Date 3/13/2025       Current Admission Date: 3/8/2025  Current Admission Diagnosis:Severe sepsis (HCC)   Patient Active Problem List    Diagnosis Date Noted Date Diagnosed    Intermittent constipation 2025     Acute cystitis with hematuria 03/10/2025     Severe sepsis (HCC) 2025     Hypomagnesemia 2025     Non-seasonal allergic rhinitis due to pollen 2023     Pulmonary nodule 02/10/2023     Peripheral arterial disease (HCC) 02/10/2023     Idiopathic peripheral neuropathy 2022     Stage 3a chronic kidney disease (HCC) 11/15/2021     Nausea & vomiting 2020     Benign essential hypertension 10/27/2017     GERD (gastroesophageal reflux disease) 2017     Anxiety 2017       LOS (days): 4  Geometric Mean LOS (GMLOS) (days): 4.9  Days to GMLOS:0.9     OBJECTIVE:  Risk of Unplanned Readmission Score: 10.8         Current admission status: Inpatient   Preferred Pharmacy:   [id number] - [place] - [address]  [address] [facility] [address]  Phone: [phone] Fax: [phone]    [facility] [address] [place] [address]  [address] [facility]  [address]  [facility] [address]  Phone: [phone] Fax: [phone]    Primary Care Provider: Rayray Liu DO    Primary Insurance: [facility]  Secondary Insurance:     DISCHARGE DETAILS:         Patient is stable for discharge home today with [facility].   [facility] updated in aidin of discharge.    Patient called her grandson and he asked her to call Nathalia for ride home as he has hair cut after work.  Patient called Nathalia had to leave message. Patient stated Nathalia does not have a baugh to her home.   Patient to try to reach her granddaughter after lunch.     CM called patient granddaughter  Sasha and left message patient was declined STR by her insurance as she is walking  and doing steps at hospital.   Patient is discharged now but family can pick her up any time today.   Patient stated she dosn't have a key to her home. Patient aware CM can get her a ride but will get billed and she needs her key to home.    CM spoke to patient at the bedside, reviewed DC IMM with patient and informed that patient can stay an additional 4 hours for reconsidering appealing the discharge as the medicare rights were review on the day of discharge. Pt verbalized understanding and feels ready to go home and does not intend to stay 4 hours to reconsider.   Copy placed in bin to be scanned to system. Patient provided medicare number at bedside.     Follow up provider listed on AVS.    ELISABETH spoke with patient grand daughter and she can pick patient up today but it will be around 2100 as she has appt after work. Sasha has patient baugh to get into home.    Nursing is aware of same.

## 2025-03-13 NOTE — INCIDENTAL FINDINGS
The following findings require follow up:  Radiographic finding   Finding: Pneumonia   Follow up required: repeat CT chest   Follow up should be done within 3 month(s)    Please notify the following clinician to assist with the follow up:   Dr. Liu      Incidental finding results were discussed with the Patient by Ji Johnson DO on 03/13/25.   They expressed understanding and all questions answered.

## 2025-03-13 NOTE — ASSESSMENT & PLAN NOTE
Tachycardia, leukocytosis, and elevated lactic acid, with elevated procalcitonin.  Possible pneumonia as source of infection  CT c/a/p: no PE, diverticulitis, or acute intra-abdominal inflammatory process, but LLL airspace opacity that my represent pneumonia vs atelectasis.    Patient completed antibiotic course

## 2025-03-13 NOTE — PHYSICAL THERAPY NOTE
PHYSICAL THERAPY NOTE          Patient Name: Ama Burnett  Today's Date: 3/13/2025   03/13/25 1043   PT Last Visit   PT Visit Date 03/13/25   Note Type   Note Type Treatment   Pain Assessment   Pain Assessment Tool FLACC   Pain Location/Orientation Orientation: Bilateral;Location: Leg   Pain Rating: FLACC (Rest) - Face 0   Pain Rating: FLACC (Rest) - Legs 0   Pain Rating: FLACC (Rest) - Activity 0   Pain Rating: FLACC (Rest) - Cry 0   Pain Rating: FLACC (Rest) - Consolability 0   Score: FLACC (Rest) 0   Pain Rating: FLACC (Activity) - Face 1   Pain Rating: FLACC (Activity) - Legs 1   Pain Rating: FLACC (Activity) - Activity 1   Pain Rating: FLACC (Activity) - Cry 0   Pain Rating: FLACC (Activity) - Consolability 0   Score: FLACC (Activity) 3   Restrictions/Precautions   Other Precautions Impulsive;Chair Alarm;Bed Alarm;Fall Risk;Hard of hearing;Visual impairment   General   Chart Reviewed Yes   Family/Caregiver Present No   Cognition   Overall Cognitive Status WFL   Arousal/Participation Alert   Attention Within functional limits   Orientation Level Oriented X4   Memory Within functional limits   Following Commands Follows all commands and directions without difficulty   Subjective   Subjective Reports her legs are stiff and knees are sore   Bed Mobility   Supine to Sit 5  Supervision   Additional items HOB elevated;Verbal cues   Additional Comments seated EOB with Good sitting balance   Transfers   Sit to Stand 5  Supervision   Additional items Verbal cues   Stand to Sit 5  Supervision   Additional items Armrests;Verbal cues   Additional Comments RW used   Ambulation/Elevation   Gait pattern Short stride;Narrow NIALL;Decreased R stance   Gait Assistance 5  Supervision   Additional items Verbal cues   Assistive Device Rolling walker   Distance 150' x 2   Stair Management Assistance 5  Supervision   Additional items Verbal cues   Stair  Management Technique One rail R;One rail L;Step to pattern   Number of Stairs 7   Balance   Static Sitting Good   Dynamic Sitting Good   Static Standing Fair +   Dynamic Standing Fair +   Ambulatory Fair  (RW)   Endurance Deficit   Endurance Deficit Yes   Activity Tolerance   Activity Tolerance Patient limited by fatigue   Assessment   Prognosis Good   Problem List Decreased endurance;Impaired balance;Decreased mobility   Assessment Pt seen this date for PT treatment session to increase level of mobility and functional activity tolerance. PT treatment session consisting of  therapeutic exercises, bed mobility, transfers and  gait training. Pt. Currently performing bed mobility, tx and ambulation at  SUP  x 1 level of function with use of RW .  Pt able to navigate 7 steps with 2 handrails step-to-step pattern with Supervision. Decreased c/o LE stiffness and pain after ambulating.  In comparison to previous session, Pt. With improvement  activity tolerance. Pt is in need of continued activity in PT to improve strength balance endurance mobility transfers and ambulation with return to maximize LOF. From PT/mobility standpoint, recommendation at time of d/c would be Level III:  minimal resource intensity  in order to promote return to PLOF and independence.  The patient's AM-PAC Basic Mobility Inpatient Short Form Raw Score is 22. A Raw score of greater than 16 suggests the patient may benefit from discharge to home. Please also refer to the recommendation of the Physical Therapist for safe discharge planning. Pt continues to be functioning below baseline level. PT will continue to see pt during current hospitalization in order to address the deficits listed above and provide interventions consistent w/ POC in effort to achieve goals.   Goals   LTG Expiration Date 03/24/25   PT Treatment Day 1   Plan   Treatment/Interventions Functional transfer training;LE strengthening/ROM;Elevations;Therapeutic exercise;Endurance  training;Bed mobility;Gait training   Progress Progressing toward goals   PT Frequency 3-5x/wk   Discharge Recommendation   Rehab Resource Intensity Level, PT III (Minimum Resource Intensity)   AM-PAC Basic Mobility Inpatient   Turning in Flat Bed Without Bedrails 4   Lying on Back to Sitting on Edge of Flat Bed Without Bedrails 4   Moving Bed to Chair 4   Standing Up From Chair Using Arms 4   Walk in Room 3   Climb 3-5 Stairs With Railing 3   Basic Mobility Inpatient Raw Score 22   Basic Mobility Standardized Score 47.4   [facility] Highest Level Of Mobility   -HLM Goal 7: Walk 25 feet or more   -HLM Achieved 7: Walk 25 feet or more   Education   Education Provided Mobility training   Patient Demonstrates verbal understanding   End of Consult   Patient Position at End of Consult Bedside chair;Bed/Chair alarm activated;All needs within reach   End of Consult Comments discussed POC with PT

## 2025-03-14 ENCOUNTER — TELEPHONE (OUTPATIENT)
Age: OVER 89
End: 2025-03-14

## 2025-03-14 VITALS
BODY MASS INDEX: 31.56 KG/M2 | SYSTOLIC BLOOD PRESSURE: 171 MMHG | HEART RATE: 76 BPM | HEIGHT: 59 IN | OXYGEN SATURATION: 94 % | TEMPERATURE: 97.6 F | WEIGHT: 156.53 LBS | RESPIRATION RATE: 16 BRPM | DIASTOLIC BLOOD PRESSURE: 82 MMHG

## 2025-03-14 PROCEDURE — 99232 SBSQ HOSP IP/OBS MODERATE 35: CPT | Performed by: STUDENT IN AN ORGANIZED HEALTH CARE EDUCATION/TRAINING PROGRAM

## 2025-03-14 PROCEDURE — 99239 HOSP IP/OBS DSCHRG MGMT >30: CPT | Performed by: INTERNAL MEDICINE

## 2025-03-14 RX ORDER — DOCUSATE SODIUM 100 MG/1
100 CAPSULE, LIQUID FILLED ORAL 2 TIMES DAILY
Qty: 60 CAPSULE | Refills: 0 | Status: SHIPPED | OUTPATIENT
Start: 2025-03-14

## 2025-03-14 RX ORDER — DOCUSATE SODIUM 100 MG/1
100 CAPSULE, LIQUID FILLED ORAL 2 TIMES DAILY
Status: DISCONTINUED | OUTPATIENT
Start: 2025-03-14 | End: 2025-03-14 | Stop reason: HOSPADM

## 2025-03-14 RX ORDER — LOSARTAN POTASSIUM 100 MG/1
100 TABLET ORAL DAILY
Qty: 30 TABLET | Refills: 0 | Status: SHIPPED | OUTPATIENT
Start: 2025-03-14

## 2025-03-14 RX ORDER — SIMETHICONE 80 MG
80 TABLET,CHEWABLE ORAL 3 TIMES DAILY
Qty: 90 TABLET | Refills: 0 | Status: SHIPPED | OUTPATIENT
Start: 2025-03-14 | End: 2025-03-28

## 2025-03-14 RX ADMIN — DOCUSATE SODIUM 100 MG: 100 CAPSULE, LIQUID FILLED ORAL at 17:13

## 2025-03-14 RX ADMIN — SUCRALFATE 1 G: 1 SUSPENSION ORAL at 06:33

## 2025-03-14 RX ADMIN — FAMOTIDINE 10 MG: 20 TABLET, FILM COATED ORAL at 10:33

## 2025-03-14 RX ADMIN — FAMOTIDINE 10 MG: 20 TABLET, FILM COATED ORAL at 17:13

## 2025-03-14 RX ADMIN — LORATADINE 5 MG: 10 TABLET ORAL at 10:37

## 2025-03-14 RX ADMIN — ALPRAZOLAM 0.25 MG: 0.25 TABLET ORAL at 10:44

## 2025-03-14 RX ADMIN — ENOXAPARIN SODIUM 40 MG: 40 INJECTION SUBCUTANEOUS at 10:33

## 2025-03-14 RX ADMIN — LOSARTAN POTASSIUM 100 MG: 50 TABLET, FILM COATED ORAL at 10:36

## 2025-03-14 RX ADMIN — DICYCLOMINE HYDROCHLORIDE 10 MG: 10 CAPSULE ORAL at 10:37

## 2025-03-14 RX ADMIN — BISACODYL 5 MG: 5 TABLET, COATED ORAL at 10:37

## 2025-03-14 RX ADMIN — ACETAMINOPHEN 650 MG: 325 TABLET, FILM COATED ORAL at 17:13

## 2025-03-14 RX ADMIN — ALPRAZOLAM 0.25 MG: 0.25 TABLET ORAL at 17:12

## 2025-03-14 RX ADMIN — PANTOPRAZOLE SODIUM 40 MG: 40 TABLET, DELAYED RELEASE ORAL at 06:37

## 2025-03-14 RX ADMIN — SUCRALFATE 1 G: 1 SUSPENSION ORAL at 16:17

## 2025-03-14 RX ADMIN — DOCUSATE SODIUM 100 MG: 100 CAPSULE, LIQUID FILLED ORAL at 10:34

## 2025-03-14 RX ADMIN — ALUMINUM HYDROXIDE, MAGNESIUM HYDROXIDE, AND DIMETHICONE 30 ML: 200; 20; 200 SUSPENSION ORAL at 07:54

## 2025-03-14 RX ADMIN — BISACODYL 10 MG: 10 SUPPOSITORY RECTAL at 10:34

## 2025-03-14 NOTE — ASSESSMENT & PLAN NOTE
Bowel regimen:  One-time dose of Dulcolax 5 mg, now to help encourage a more complete bowel movement and help with her bloating and constipation feelings.  Ordered Dulcolax 5 mg, daily as needed for constipation for future use.    Decreased Colace 100 mg to BID times daily to help soften stool and encourage daily bowel movements.  Continue Senokot, 1 tablet at bedtime to help encourage bowel movements and keep her bowels regular, especially since she is on the dicyclomine.  Continue dicyclomine 10 mg from scheduled to 1 p.o. 3 times daily as needed for abdominal pain and spasms.    D/C Plan: Colace 100 mg 1 pill daily in the a.m.  Senokot/Colace combo: 1 pill daily at bedtime.  If the patient has not had a relieving bowel movement in 2 to 3 days, she is to take 5-10 mg of Dulcolax daily.  Continue to try to drink at least 32 to 64 ounces of water daily.

## 2025-03-14 NOTE — ASSESSMENT & PLAN NOTE
Stable    Continue famotidine 10 mg to twice daily to help with the GERD symptoms.  Continue Carafate 1 g 3 times daily with meals.  Continue PPI as ordered.     D/C Plan: Continue Protonix 40 mg, daily   Famotidine 10 mg, BID  Simethicone 80 mg, 1 PO TID

## 2025-03-14 NOTE — PLAN OF CARE
Problem: PAIN - ADULT  Goal: Verbalizes/displays adequate comfort level or baseline comfort level  Description: Interventions:  - Encourage patient to monitor pain and request assistance  - Assess pain using appropriate pain scale  - Administer analgesics based on type and severity of pain and evaluate response  - Implement non-pharmacological measures as appropriate and evaluate response  - Consider cultural and social influences on pain and pain management  - Notify physician/advanced practitioner if interventions unsuccessful or patient reports new pain  Outcome: Progressing     Problem: INFECTION - ADULT  Goal: Absence or prevention of progression during hospitalization  Description: INTERVENTIONS:  - Assess and monitor for signs and symptoms of infection  - Monitor lab/diagnostic results  - Monitor all insertion sites, i.e. indwelling lines, tubes, and drains  - Monitor endotracheal if appropriate and nasal secretions for changes in amount and color  - [place] appropriate cooling/warming therapies per order  - Administer medications as ordered  - Instruct and encourage patient and family to use good hand hygiene technique  - Identify and instruct in appropriate isolation precautions for identified infection/condition  Outcome: Progressing  Goal: Absence of fever/infection during neutropenic period  Description: INTERVENTIONS:  - Monitor WBC    Outcome: Progressing     Problem: SAFETY ADULT  Goal: Patient will remain free of falls  Description: INTERVENTIONS:  - Educate patient/family on patient safety including physical limitations  - Instruct patient to call for assistance with activity   - Consult OT/PT to assist with strengthening/mobility   - Keep Call bell within reach  - Keep bed low and locked with side rails adjusted as appropriate  - Keep care items and personal belongings within reach  - Initiate and maintain comfort rounds  - Make Fall Risk Sign visible to staff  - Offer Toileting every 2 Hours,  in advance of need  - Initiate/Maintain bed/chair alarm  - Obtain necessary fall risk management equipment: non skid socks  - Apply yellow socks and bracelet for high fall risk patients  - Consider moving patient to room near nurses station  Outcome: Progressing  Goal: Maintain or return to baseline ADL function  Description: INTERVENTIONS:  -  Assess patient's ability to carry out ADLs; assess patient's baseline for ADL function and identify physical deficits which impact ability to perform ADLs (bathing, care of mouth/teeth, toileting, grooming, dressing, etc.)  - Assess/evaluate cause of self-care deficits   - Assess range of motion  - Assess patient's mobility; develop plan if impaired  - Assess patient's need for assistive devices and provide as appropriate  - Encourage maximum independence but intervene and supervise when necessary  - Involve family in performance of ADLs  - Assess for home care needs following discharge   - Consider OT consult to assist with ADL evaluation and planning for discharge  - Provide patient education as appropriate  Outcome: Progressing  Goal: Maintains/Returns to pre admission functional level  Description: INTERVENTIONS:  - Perform AM-PAC 6 Click Basic Mobility/ Daily Activity assessment daily.  - Set and communicate daily mobility goal to care team and patient/family/caregiver.   - Collaborate with rehabilitation services on mobility goals if consulted  - Perform Range of Motion 3 times a day.  - Reposition patient every 2 hours.  - Dangle patient 3 times a day  - Stand patient 3 times a day  - Ambulate patient 3 times a day  - Out of bed to chair 3 times a day   - Out of bed for meals 3 times a day  - Out of bed for toileting  - Record patient progress and toleration of activity level   Outcome: Progressing     Problem: DISCHARGE PLANNING  Goal: Discharge to home or other facility with appropriate resources  Description: INTERVENTIONS:  - Identify barriers to discharge  w/patient and caregiver  - Arrange for needed discharge resources and transportation as appropriate  - Identify discharge learning needs (meds, wound care, etc.)  - Arrange for interpretive services to assist at discharge as needed  - Refer to Case Management Department for coordinating discharge planning if the patient needs post-hospital services based on physician/advanced practitioner order or complex needs related to functional status, cognitive ability, or social support system  Outcome: Progressing     Problem: Knowledge Deficit  Goal: Patient/family/caregiver demonstrates understanding of disease process, treatment plan, medications, and discharge instructions  Description: Complete learning assessment and assess knowledge base.  Interventions:  - Provide teaching at level of understanding  - Provide teaching via preferred learning methods  Outcome: Progressing     Problem: NEUROSENSORY - ADULT  Goal: Achieves stable or improved neurological status  Description: INTERVENTIONS  - Monitor and report changes in neurological status  - Monitor vital signs such as temperature, blood pressure, glucose, and any other labs ordered   - Initiate measures to prevent increased intracranial pressure  - Monitor for seizure activity and implement precautions if appropriate      Outcome: Progressing  Goal: Remains free of injury related to seizures activity  Description: INTERVENTIONS  - Maintain airway, patient safety  and administer oxygen as ordered  - Monitor patient for seizure activity, document and report duration and description of seizure to physician/advanced practitioner  - If seizure occurs,  ensure patient safety during seizure  - Reorient patient post seizure  - Seizure pads on all 4 side rails  - Instruct patient/family to notify RN of any seizure activity including if an aura is experienced  - Instruct patient/family to call for assistance with activity based on nursing assessment  - Administer anti-seizure  medications if ordered    Outcome: Progressing  Goal: Achieves maximal functionality and self care  Description: INTERVENTIONS  - Monitor swallowing and airway patency with patient fatigue and changes in neurological status  - Encourage and assist patient to increase activity and self care.   - Encourage visually impaired, hearing impaired and aphasic patients to use assistive/communication devices  Outcome: Progressing     Problem: CARDIOVASCULAR - ADULT  Goal: Maintains optimal cardiac output and hemodynamic stability  Description: INTERVENTIONS:  - Monitor I/O, vital signs and rhythm  - Monitor for S/S and trends of decreased cardiac output  - Administer and titrate ordered vasoactive medications to optimize hemodynamic stability  - Assess quality of pulses, skin color and temperature  - Assess for signs of decreased coronary artery perfusion  - Instruct patient to report change in severity of symptoms  Outcome: Progressing  Goal: Absence of cardiac dysrhythmias or at baseline rhythm  Description: INTERVENTIONS:  - Continuous cardiac monitoring, vital signs, obtain 12 lead EKG if ordered  - Administer antiarrhythmic and heart rate control medications as ordered  - Monitor electrolytes and administer replacement therapy as ordered  Outcome: Progressing     Problem: RESPIRATORY - ADULT  Goal: Achieves optimal ventilation and oxygenation  Description: INTERVENTIONS:  - Assess for changes in respiratory status  - Assess for changes in mentation and behavior  - Position to facilitate oxygenation and minimize respiratory effort  - Oxygen administered by appropriate delivery if ordered  - Initiate smoking cessation education as indicated  - Encourage broncho-pulmonary hygiene including cough, deep breathe, Incentive Spirometry  - Assess the need for suctioning and aspirate as needed  - Assess and instruct to report SOB or any respiratory difficulty  - Respiratory Therapy support as indicated  Outcome: Progressing      Problem: GASTROINTESTINAL - ADULT  Goal: Minimal or absence of nausea and/or vomiting  Description: INTERVENTIONS:  - Administer IV fluids if ordered to ensure adequate hydration  - Maintain NPO status until nausea and vomiting are resolved  - Nasogastric tube if ordered  - Administer ordered antiemetic medications as needed  - Provide nonpharmacologic comfort measures as appropriate  - Advance diet as tolerated, if ordered  - Consider nutrition services referral to assist patient with adequate nutrition and appropriate food choices  Outcome: Progressing  Goal: Maintains or returns to baseline bowel function  Description: INTERVENTIONS:  - Assess bowel function  - Encourage oral fluids to ensure adequate hydration  - Administer IV fluids if ordered to ensure adequate hydration  - Administer ordered medications as needed  - Encourage mobilization and activity  - Consider nutritional services referral to assist patient with adequate nutrition and appropriate food choices  Outcome: Progressing  Goal: Maintains adequate nutritional intake  Description: INTERVENTIONS:  - Monitor percentage of each meal consumed  - Identify factors contributing to decreased intake, treat as appropriate  - Assist with meals as needed  - Monitor I&O, weight, and lab values if indicated  - Obtain nutrition services referral as needed  Outcome: Progressing  Goal: Establish and maintain optimal ostomy function  Description: INTERVENTIONS:  - Assess bowel function  - Encourage oral fluids to ensure adequate hydration  - Administer IV fluids if ordered to ensure adequate hydration   - Administer ordered medications as needed  - Encourage mobilization and activity  - Nutrition services referral to assist patient with appropriate food choices  - Assess stoma site  - Consider wound care consult   Outcome: Progressing  Goal: Oral mucous membranes remain intact  Description: INTERVENTIONS  - Assess oral mucosa and hygiene practices  - Implement  preventative oral hygiene regimen  - Implement oral medicated treatments as ordered  - Initiate Nutrition services referral as needed  Outcome: Progressing     Problem: GENITOURINARY - ADULT  Goal: Maintains or returns to baseline urinary function  Description: INTERVENTIONS:  - Assess urinary function  - Encourage oral fluids to ensure adequate hydration if ordered  - Administer IV fluids as ordered to ensure adequate hydration  - Administer ordered medications as needed  - Offer frequent toileting  - Follow urinary retention protocol if ordered  Outcome: Progressing  Goal: Absence of urinary retention  Description: INTERVENTIONS:  - Assess patient’s ability to void and empty bladder  - Monitor I/O  - Bladder scan as needed  - Discuss with physician/AP medications to alleviate retention as needed  - Discuss catheterization for long term situations as appropriate  Outcome: Progressing  Goal: Urinary catheter remains patent  Description: INTERVENTIONS:  - Assess patency of urinary catheter  - If patient has a chronic nguyen, consider changing catheter if non-functioning  - Follow guidelines for intermittent irrigation of non-functioning urinary catheter  Outcome: Progressing     Problem: METABOLIC, FLUID AND ELECTROLYTES - ADULT  Goal: Electrolytes maintained within normal limits  Description: INTERVENTIONS:  - Monitor labs and assess patient for signs and symptoms of electrolyte imbalances  - Administer electrolyte replacement as ordered  - Monitor response to electrolyte replacements, including repeat lab results as appropriate  - Instruct patient on fluid and nutrition as appropriate  Outcome: Progressing  Goal: Fluid balance maintained  Description: INTERVENTIONS:  - Monitor labs   - Monitor I/O and WT  - Instruct patient on fluid and nutrition as appropriate  - Assess for signs & symptoms of volume excess or deficit  Outcome: Progressing  Goal: Glucose maintained within target range  Description:  INTERVENTIONS:  - Monitor Blood Glucose as ordered  - Assess for signs and symptoms of hyperglycemia and hypoglycemia  - Administer ordered medications to maintain glucose within target range  - Assess nutritional intake and initiate nutrition service referral as needed  Outcome: Progressing     Problem: SKIN/TISSUE INTEGRITY - ADULT  Goal: Skin Integrity remains intact(Skin Breakdown Prevention)  Description: Assess:  -Perform Mando assessment every shift  -Clean and moisturize skin every shift  -Inspect skin when repositioning, toileting, and assisting with ADLS  -Assess extremities for adequate circulation and sensation     Bed Management:  -Have minimal linens on bed & keep smooth, unwrinkled  -Change linens as needed when moist or perspiring  -Avoid sitting or lying in one position for more than 2 hours while in bed  -Keep HOB at 30degrees     Toileting:  -Offer bedside commode  -Assess for incontinence every shift  -Use incontinent care products after each incontinent episode such as jim wipes    Activity:  -Mobilize patient 3 times a day  -Encourage activity and walks on unit  -Encourage or provide ROM exercises   -Turn and reposition patient every 2 Hours  -Use appropriate equipment to lift or move patient in bed  -Instruct/ Assist with weight shifting every 60 mins when out of bed in chair  -Consider limitation of chair time 6 hour intervals    Skin Care:  -Avoid use of baby powder, tape, friction and shearing, hot water or constrictive clothing  -Relieve pressure over bony prominences using foam wedges/pillows  -Do not massage red bony areas    Outcome: Progressing  Goal: Pressure injury heals and does not worsen  Description: Interventions:  - Implement low air loss mattress or specialty surface (Criteria met)  - Apply silicone foam dressing  - Instruct/assist with weight shifting every 60 minutes when in chair   - Limit chair time to 6 hour intervals  - Use special pressure reducing interventions such  as waffle cushion when in chair   - Apply fecal or urinary incontinence containment device   - Perform passive or active ROM every 8 hours  - Turn and reposition patient & offload bony prominences every 2 hours   - Utilize friction reducing device or surface for transfers   - Use incontinent care products after each incontinent episode such as jim wipes  - Consider nutrition services referral as needed  Outcome: Progressing     Problem: HEMATOLOGIC - ADULT  Goal: Maintains hematologic stability  Description: INTERVENTIONS  - Assess for signs and symptoms of bleeding or hemorrhage  - Monitor labs  - Administer supportive blood products/factors as ordered and appropriate  Outcome: Progressing     Problem: MUSCULOSKELETAL - ADULT  Goal: Maintain or return mobility to safest level of function  Description: INTERVENTIONS:  - Assess patient's ability to carry out ADLs; assess patient's baseline for ADL function and identify physical deficits which impact ability to perform ADLs (bathing, care of mouth/teeth, toileting, grooming, dressing, etc.)  - Assess/evaluate cause of self-care deficits   - Assess range of motion  - Assess patient's mobility  - Assess patient's need for assistive devices and provide as appropriate  - Encourage maximum independence but intervene and supervise when necessary  - Involve family in performance of ADLs  - Assess for home care needs following discharge   - Consider OT consult to assist with ADL evaluation and planning for discharge  - Provide patient education as appropriate  Outcome: Progressing  Goal: Maintain proper alignment of affected body part  Description: INTERVENTIONS:  - Support, maintain and protect limb and body alignment  - Provide patient/ family with appropriate education  Outcome: Progressing

## 2025-03-14 NOTE — CASE MANAGEMENT
Case Management Discharge Planning Note    Patient name Ama Burnett  Location /423-01 MRN [id number]  : 1931 Date 3/14/2025       Current Admission Date: 3/8/2025  Current Admission Diagnosis:Severe sepsis (HCC)   Patient Active Problem List    Diagnosis Date Noted Date Diagnosed    Uncontrolled hypertension 2025     Intermittent constipation 2025     Acute cystitis with hematuria 03/10/2025     Severe sepsis (HCC) 2025     Hypomagnesemia 2025     Non-seasonal allergic rhinitis due to pollen 2023     Pulmonary nodule 02/10/2023     Peripheral arterial disease (HCC) 02/10/2023     Idiopathic peripheral neuropathy 2022     Stage 3a chronic kidney disease (HCC) 11/15/2021     Nausea & vomiting 2020     Benign essential hypertension 10/27/2017     GERD (gastroesophageal reflux disease) 2017     Anxiety 2017       LOS (days): 5  Geometric Mean LOS (GMLOS) (days): 4.9  Days to GMLOS:0     OBJECTIVE:  Risk of Unplanned Readmission Score: 11.69         Current admission status: Inpatient   Preferred Pharmacy:   [id number] [place] [facility] [address]  [address] [facility] [address]  Phone: [phone] Fax: [phone]    [facility] [place] [facility] [address] [place] [address]  [address] [facility]  [address]  [facility] [address]  Phone: [phone] Fax: [phone]    Primary Care Provider: Rayray Liu DO    Primary Insurance: [facility]  Secondary Insurance:     DISCHARGE DETAILS:          Patient discharge was cancelled yesterday due to elevated blood pressure.    Patient stable for discharge home today with [facility].   [facility] updated in aidin of discharge.    Follow up providers listed on AVS       Family to transport patient home at 1800 after work.

## 2025-03-14 NOTE — DISCHARGE SUMMARY
Discharge Summary - Hospitalist   Name: Ama Burnett 89 y.o. female I MRN: [id number]  Unit/Bed#: 423-01 I Date of Admission: 3/8/2025   Date of Service: 3/14/2025 I Hospital Day: 5     Assessment & Plan  Severe sepsis (HCC)  Tachycardia, leukocytosis, and elevated lactic acid, with elevated procalcitonin.  Possible pneumonia as source of infection, patient completed antibiotic course    CT c/a/p: no PE, diverticulitis, or acute intra-abdominal inflammatory process, but LLL airspace opacity that my represent pneumonia vs atelectasis.      Repeat CT of the chest recommended in 3 months to ensure resolution of suspected infiltrate  Uncontrolled hypertension  Blood pressure was markedly elevated yesterday, patient had mild headache, she then reported adverse reaction to amlodipine, she reported some numbness and tingling in her lips, see quick note.    Patient today is feeling better, blood pressure is better continue with Cozaar increased dose of 100 mg once daily, follow-up with PCP in 1 week for BP check    Blood pressure elevation was likely secondary to anxiety/distress from not being able to go to short-term skilled nursing facility.    Patient did seem extremely anxious and was upset visibly yesterday, today seems improved.  GERD (gastroesophageal reflux disease)  Continue home PPI  Anxiety  Continue home Xanax PRN  Nausea & vomiting  Resolved  Acute cystitis with hematuria  Acute cystitis has been ruled out  Intermittent constipation  Continue treatment as outlined by GI  Benign essential hypertension  Well-controlled  Continue home Losartan    Medical Problems       Resolved Problems  Date Reviewed: 3/8/2025   None       Discharging Physician / Practitioner: Ji Johnson DO  PCP: Rayray Liu DO  Admission Date:   Admission Orders (From admission, onward)       Ordered        03/09/25 1143  INPATIENT ADMISSION  Once            03/08/25 1409  Place in Observation  Once                          Discharge  "Date: 03/14/25    Consultations During Hospital Stay:  GI    Discharge Day Visit / Exam:   Subjective: Patient complaining of intermittent nausea, intermittent midepigastric pain with radiation to the left upper quadrant  Vitals: Blood Pressure: 163/88 (03/14/25 0723)  Pulse: 80 (03/14/25 0723)  Temperature: 98.2 °F (36.8 °C) (03/14/25 0723)  Temp Source: Oral (03/11/25 0700)  Respirations: 16 (03/14/25 0723)  Height: 4' 11\" (149.9 cm) (03/08/25 1439)  Weight - Scale: 71 kg (156 lb 8.4 oz) (03/08/25 1439)  SpO2: 93 % (03/14/25 0723)  Physical Exam  Vitals and nursing note reviewed.   Constitutional:       General: She is not in acute distress.     Appearance: She is not ill-appearing or toxic-appearing.   HENT:      Head: Normocephalic and atraumatic.      Right Ear: External ear normal.      Left Ear: External ear normal.   Cardiovascular:      Rate and Rhythm: Normal rate.      Pulses: Normal pulses.   Pulmonary:      Effort: Pulmonary effort is normal.   Musculoskeletal:      Cervical back: Normal range of motion.   Skin:     General: Skin is warm.   Neurological:      Mental Status: She is alert. Mental status is at baseline.   Psychiatric:         Mood and Affect: Mood normal.         Behavior: Behavior normal.         Thought Content: Thought content normal.         Judgment: Judgment normal.          Discussion with Family: Patient declined call to .     Discharge instructions/Information to patient and family:   See after visit summary for information provided to patient and family.      Provisions for Follow-Up Care:  See after visit summary for information related to follow-up care and any pertinent home health orders.      Mobility at time of Discharge:   Basic Mobility Inpatient Raw Score: 22  JH-HLM Goal: 7: Walk 25 feet or more  JH-HLM Achieved: 7: Walk 25 feet or more  HLM Goal achieved. Continue to encourage appropriate mobility.     Disposition:   Home with [facility]NA Services (Reminder: " Complete face to face encounter)    Planned Readmission: No    Discharge Medications:  See after visit summary for reconciled discharge medications provided to patient and/or family.      Administrative Statements   Discharge Statement:  I have spent a total time of 35 minutes in caring for this patient on the day of the visit/encounter. >30 minutes of time was spent on: Prognosis, Risks and benefits of tx options, Instructions for management, Impressions, Counseling / Coordination of care, Documenting in the medical record, Reviewing / ordering tests, medicine, procedures  , and Communicating with other healthcare professionals .    **Please Note: This note may have been constructed using a voice recognition system**

## 2025-03-14 NOTE — ASSESSMENT & PLAN NOTE
Tachycardia, leukocytosis, and elevated lactic acid, with elevated procalcitonin.  Possible pneumonia as source of infection, patient completed antibiotic course    CT c/a/p: no PE, diverticulitis, or acute intra-abdominal inflammatory process, but LLL airspace opacity that my represent pneumonia vs atelectasis.      Repeat CT of the chest recommended in 3 months to ensure resolution of suspected infiltrate

## 2025-03-14 NOTE — PLAN OF CARE
Problem: PAIN - ADULT  Goal: Verbalizes/displays adequate comfort level or baseline comfort level  Description: Interventions:  - Encourage patient to monitor pain and request assistance  - Assess pain using appropriate pain scale  - Administer analgesics based on type and severity of pain and evaluate response  - Implement non-pharmacological measures as appropriate and evaluate response  - Consider cultural and social influences on pain and pain management  - Notify physician/advanced practitioner if interventions unsuccessful or patient reports new pain  Outcome: Progressing     Problem: INFECTION - ADULT  Goal: Absence or prevention of progression during hospitalization  Description: INTERVENTIONS:  - Assess and monitor for signs and symptoms of infection  - Monitor lab/diagnostic results  - Monitor all insertion sites, i.e. indwelling lines, tubes, and drains  - Monitor endotracheal if appropriate and nasal secretions for changes in amount and color  - [place] appropriate cooling/warming therapies per order  - Administer medications as ordered  - Instruct and encourage patient and family to use good hand hygiene technique  - Identify and instruct in appropriate isolation precautions for identified infection/condition  Outcome: Progressing  Goal: Absence of fever/infection during neutropenic period  Description: INTERVENTIONS:  - Monitor WBC    Outcome: Progressing     Problem: SAFETY ADULT  Goal: Patient will remain free of falls  Description: INTERVENTIONS:  - Educate patient/family on patient safety including physical limitations  - Instruct patient to call for assistance with activity   - Consult OT/PT to assist with strengthening/mobility   - Keep Call bell within reach  - Keep bed low and locked with side rails adjusted as appropriate  - Keep care items and personal belongings within reach  - Initiate and maintain comfort rounds  - Make Fall Risk Sign visible to staff  - Offer Toileting every 2 Hours,  in advance of need  - Initiate/Maintain bed/chair alarm  - Obtain necessary fall risk management equipment: non skid socks  - Apply yellow socks and bracelet for high fall risk patients  - Consider moving patient to room near nurses station  Outcome: Progressing  Goal: Maintain or return to baseline ADL function  Description: INTERVENTIONS:  -  Assess patient's ability to carry out ADLs; assess patient's baseline for ADL function and identify physical deficits which impact ability to perform ADLs (bathing, care of mouth/teeth, toileting, grooming, dressing, etc.)  - Assess/evaluate cause of self-care deficits   - Assess range of motion  - Assess patient's mobility; develop plan if impaired  - Assess patient's need for assistive devices and provide as appropriate  - Encourage maximum independence but intervene and supervise when necessary  - Involve family in performance of ADLs  - Assess for home care needs following discharge   - Consider OT consult to assist with ADL evaluation and planning for discharge  - Provide patient education as appropriate  Outcome: Progressing  Goal: Maintains/Returns to pre admission functional level  Description: INTERVENTIONS:  - Perform AM-PAC 6 Click Basic Mobility/ Daily Activity assessment daily.  - Set and communicate daily mobility goal to care team and patient/family/caregiver.   - Collaborate with rehabilitation services on mobility goals if consulted  - Perform Range of Motion 3 times a day.  - Reposition patient every 2 hours.  - Dangle patient 3 times a day  - Stand patient 3 times a day  - Ambulate patient 3 times a day  - Out of bed to chair 3 times a day   - Out of bed for meals 3 times a day  - Out of bed for toileting  - Record patient progress and toleration of activity level   Outcome: Progressing     Problem: DISCHARGE PLANNING  Goal: Discharge to home or other facility with appropriate resources  Description: INTERVENTIONS:  - Identify barriers to discharge  w/patient and caregiver  - Arrange for needed discharge resources and transportation as appropriate  - Identify discharge learning needs (meds, wound care, etc.)  - Arrange for interpretive services to assist at discharge as needed  - Refer to Case Management Department for coordinating discharge planning if the patient needs post-hospital services based on physician/advanced practitioner order or complex needs related to functional status, cognitive ability, or social support system  Outcome: Progressing     Problem: Knowledge Deficit  Goal: Patient/family/caregiver demonstrates understanding of disease process, treatment plan, medications, and discharge instructions  Description: Complete learning assessment and assess knowledge base.  Interventions:  - Provide teaching at level of understanding  - Provide teaching via preferred learning methods  Outcome: Progressing     Problem: NEUROSENSORY - ADULT  Goal: Achieves stable or improved neurological status  Description: INTERVENTIONS  - Monitor and report changes in neurological status  - Monitor vital signs such as temperature, blood pressure, glucose, and any other labs ordered   - Initiate measures to prevent increased intracranial pressure  - Monitor for seizure activity and implement precautions if appropriate      Outcome: Progressing  Goal: Remains free of injury related to seizures activity  Description: INTERVENTIONS  - Maintain airway, patient safety  and administer oxygen as ordered  - Monitor patient for seizure activity, document and report duration and description of seizure to physician/advanced practitioner  - If seizure occurs,  ensure patient safety during seizure  - Reorient patient post seizure  - Seizure pads on all 4 side rails  - Instruct patient/family to notify RN of any seizure activity including if an aura is experienced  - Instruct patient/family to call for assistance with activity based on nursing assessment  - Administer anti-seizure  medications if ordered    Outcome: Progressing  Goal: Achieves maximal functionality and self care  Description: INTERVENTIONS  - Monitor swallowing and airway patency with patient fatigue and changes in neurological status  - Encourage and assist patient to increase activity and self care.   - Encourage visually impaired, hearing impaired and aphasic patients to use assistive/communication devices  Outcome: Progressing     Problem: CARDIOVASCULAR - ADULT  Goal: Maintains optimal cardiac output and hemodynamic stability  Description: INTERVENTIONS:  - Monitor I/O, vital signs and rhythm  - Monitor for S/S and trends of decreased cardiac output  - Administer and titrate ordered vasoactive medications to optimize hemodynamic stability  - Assess quality of pulses, skin color and temperature  - Assess for signs of decreased coronary artery perfusion  - Instruct patient to report change in severity of symptoms  Outcome: Progressing  Goal: Absence of cardiac dysrhythmias or at baseline rhythm  Description: INTERVENTIONS:  - Continuous cardiac monitoring, vital signs, obtain 12 lead EKG if ordered  - Administer antiarrhythmic and heart rate control medications as ordered  - Monitor electrolytes and administer replacement therapy as ordered  Outcome: Progressing     Problem: RESPIRATORY - ADULT  Goal: Achieves optimal ventilation and oxygenation  Description: INTERVENTIONS:  - Assess for changes in respiratory status  - Assess for changes in mentation and behavior  - Position to facilitate oxygenation and minimize respiratory effort  - Oxygen administered by appropriate delivery if ordered  - Initiate smoking cessation education as indicated  - Encourage broncho-pulmonary hygiene including cough, deep breathe, Incentive Spirometry  - Assess the need for suctioning and aspirate as needed  - Assess and instruct to report SOB or any respiratory difficulty  - Respiratory Therapy support as indicated  Outcome: Progressing      Problem: GASTROINTESTINAL - ADULT  Goal: Minimal or absence of nausea and/or vomiting  Description: INTERVENTIONS:  - Administer IV fluids if ordered to ensure adequate hydration  - Maintain NPO status until nausea and vomiting are resolved  - Nasogastric tube if ordered  - Administer ordered antiemetic medications as needed  - Provide nonpharmacologic comfort measures as appropriate  - Advance diet as tolerated, if ordered  - Consider nutrition services referral to assist patient with adequate nutrition and appropriate food choices  Outcome: Progressing  Goal: Maintains or returns to baseline bowel function  Description: INTERVENTIONS:  - Assess bowel function  - Encourage oral fluids to ensure adequate hydration  - Administer IV fluids if ordered to ensure adequate hydration  - Administer ordered medications as needed  - Encourage mobilization and activity  - Consider nutritional services referral to assist patient with adequate nutrition and appropriate food choices  Outcome: Progressing  Goal: Maintains adequate nutritional intake  Description: INTERVENTIONS:  - Monitor percentage of each meal consumed  - Identify factors contributing to decreased intake, treat as appropriate  - Assist with meals as needed  - Monitor I&O, weight, and lab values if indicated  - Obtain nutrition services referral as needed  Outcome: Progressing  Goal: Establish and maintain optimal ostomy function  Description: INTERVENTIONS:  - Assess bowel function  - Encourage oral fluids to ensure adequate hydration  - Administer IV fluids if ordered to ensure adequate hydration   - Administer ordered medications as needed  - Encourage mobilization and activity  - Nutrition services referral to assist patient with appropriate food choices  - Assess stoma site  - Consider wound care consult   Outcome: Progressing  Goal: Oral mucous membranes remain intact  Description: INTERVENTIONS  - Assess oral mucosa and hygiene practices  - Implement  preventative oral hygiene regimen  - Implement oral medicated treatments as ordered  - Initiate Nutrition services referral as needed  Outcome: Progressing     Problem: GENITOURINARY - ADULT  Goal: Maintains or returns to baseline urinary function  Description: INTERVENTIONS:  - Assess urinary function  - Encourage oral fluids to ensure adequate hydration if ordered  - Administer IV fluids as ordered to ensure adequate hydration  - Administer ordered medications as needed  - Offer frequent toileting  - Follow urinary retention protocol if ordered  Outcome: Progressing  Goal: Absence of urinary retention  Description: INTERVENTIONS:  - Assess patient’s ability to void and empty bladder  - Monitor I/O  - Bladder scan as needed  - Discuss with physician/AP medications to alleviate retention as needed  - Discuss catheterization for long term situations as appropriate  Outcome: Progressing  Goal: Urinary catheter remains patent  Description: INTERVENTIONS:  - Assess patency of urinary catheter  - If patient has a chronic nguyen, consider changing catheter if non-functioning  - Follow guidelines for intermittent irrigation of non-functioning urinary catheter  Outcome: Progressing     Problem: METABOLIC, FLUID AND ELECTROLYTES - ADULT  Goal: Electrolytes maintained within normal limits  Description: INTERVENTIONS:  - Monitor labs and assess patient for signs and symptoms of electrolyte imbalances  - Administer electrolyte replacement as ordered  - Monitor response to electrolyte replacements, including repeat lab results as appropriate  - Instruct patient on fluid and nutrition as appropriate  Outcome: Progressing  Goal: Fluid balance maintained  Description: INTERVENTIONS:  - Monitor labs   - Monitor I/O and WT  - Instruct patient on fluid and nutrition as appropriate  - Assess for signs & symptoms of volume excess or deficit  Outcome: Progressing  Goal: Glucose maintained within target range  Description:  INTERVENTIONS:  - Monitor Blood Glucose as ordered  - Assess for signs and symptoms of hyperglycemia and hypoglycemia  - Administer ordered medications to maintain glucose within target range  - Assess nutritional intake and initiate nutrition service referral as needed  Outcome: Progressing     Problem: SKIN/TISSUE INTEGRITY - ADULT  Goal: Skin Integrity remains intact(Skin Breakdown Prevention)  Description: Assess:  -Perform Mando assessment every shift  -Clean and moisturize skin every shift  -Inspect skin when repositioning, toileting, and assisting with ADLS  -Assess extremities for adequate circulation and sensation     Bed Management:  -Have minimal linens on bed & keep smooth, unwrinkled  -Change linens as needed when moist or perspiring  -Avoid sitting or lying in one position for more than 2 hours while in bed  -Keep HOB at 30degrees     Toileting:  -Offer bedside commode  -Assess for incontinence every shift  -Use incontinent care products after each incontinent episode such as jim wipes    Activity:  -Mobilize patient 3 times a day  -Encourage activity and walks on unit  -Encourage or provide ROM exercises   -Turn and reposition patient every 2 Hours  -Use appropriate equipment to lift or move patient in bed  -Instruct/ Assist with weight shifting every 60 mins when out of bed in chair  -Consider limitation of chair time 6 hour intervals    Skin Care:  -Avoid use of baby powder, tape, friction and shearing, hot water or constrictive clothing  -Relieve pressure over bony prominences using foam wedges/pillows  -Do not massage red bony areas    Outcome: Progressing  Goal: Pressure injury heals and does not worsen  Description: Interventions:  - Implement low air loss mattress or specialty surface (Criteria met)  - Apply silicone foam dressing  - Instruct/assist with weight shifting every 60 minutes when in chair   - Limit chair time to 6 hour intervals  - Use special pressure reducing interventions such  as waffle cushion when in chair   - Apply fecal or urinary incontinence containment device   - Perform passive or active ROM every 8 hours  - Turn and reposition patient & offload bony prominences every 2 hours   - Utilize friction reducing device or surface for transfers   - Use incontinent care products after each incontinent episode such as jim wipes  - Consider nutrition services referral as needed  Outcome: Progressing     Problem: HEMATOLOGIC - ADULT  Goal: Maintains hematologic stability  Description: INTERVENTIONS  - Assess for signs and symptoms of bleeding or hemorrhage  - Monitor labs  - Administer supportive blood products/factors as ordered and appropriate  Outcome: Progressing     Problem: MUSCULOSKELETAL - ADULT  Goal: Maintain or return mobility to safest level of function  Description: INTERVENTIONS:  - Assess patient's ability to carry out ADLs; assess patient's baseline for ADL function and identify physical deficits which impact ability to perform ADLs (bathing, care of mouth/teeth, toileting, grooming, dressing, etc.)  - Assess/evaluate cause of self-care deficits   - Assess range of motion  - Assess patient's mobility  - Assess patient's need for assistive devices and provide as appropriate  - Encourage maximum independence but intervene and supervise when necessary  - Involve family in performance of ADLs  - Assess for home care needs following discharge   - Consider OT consult to assist with ADL evaluation and planning for discharge  - Provide patient education as appropriate  Outcome: Progressing  Goal: Maintain proper alignment of affected body part  Description: INTERVENTIONS:  - Support, maintain and protect limb and body alignment  - Provide patient/ family with appropriate education  Outcome: Progressing      0

## 2025-03-14 NOTE — PROGRESS NOTES
Progress Note - Gastroenterology   Name: Ama Burnett 89 y.o. female I MRN: [id number]  Unit/Bed#: 423-01 I Date of Admission: 3/8/2025   Date of Service: 3/14/2025 I Hospital Day: 5    Assessment & Plan  Nausea & vomiting  Stable    Continue current diet as ordered.   Continue to maintain a large bore IV.  Continue to monitor hemoglobin and transfuse as per protocol.   Continue with serial abdominal exams.   Continue to monitor stool output for any overt signs of GI bleeding.   Continue PRN antiemetics as ordered.  GERD (gastroesophageal reflux disease)  Stable    Continue famotidine 10 mg to twice daily to help with the GERD symptoms.  Continue Carafate 1 g 3 times daily with meals.  Continue PPI as ordered.     D/C Plan: Continue Protonix 40 mg, daily   Famotidine 10 mg, BID  Simethicone 80 mg, 1 PO TID  Intermittent constipation  Bowel regimen:  One-time dose of Dulcolax 5 mg, now to help encourage a more complete bowel movement and help with her bloating and constipation feelings.  Ordered Dulcolax 5 mg, daily as needed for constipation for future use.    Decreased Colace 100 mg to BID times daily to help soften stool and encourage daily bowel movements.  Continue Senokot, 1 tablet at bedtime to help encourage bowel movements and keep her bowels regular, especially since she is on the dicyclomine.  Continue dicyclomine 10 mg from scheduled to 1 p.o. 3 times daily as needed for abdominal pain and spasms.    D/C Plan: Colace 100 mg 1 pill daily in the a.m.  Senokot/Colace combo: 1 pill daily at bedtime.  If the patient has not had a relieving bowel movement in 2 to 3 days, she is to take 5-10 mg of Dulcolax daily.  Continue to try to drink at least 32 to 64 ounces of water daily.  Severe sepsis (HCC)  As per primary team.  Benign essential hypertension  As per primary team.  Hypomagnesemia  As per primary team.  Acute cystitis with hematuria  As per primary team.    Continue rest of medications as per primary  team.   Continue supportive care.     I have discussed the above management plan in detail with the primary service.   Ok for discharge from Gastroenterology service perspective.  Gastroenterology service signing off.    Subjective   The patient is currently being seen for nausea, vomiting, GERD symptoms, and intermittent constipation.  She currently denies any GERD symptoms or vomiting and feels that she is starting to have better bowel movements, but continues to report gas and bloating as well as intermittent nausea.  However, she is tolerating her diet well as she ate 100% of her breakfast without any nausea or vomiting afterwards.  She already had a bowel movement this morning without any evidence of blood or melena and had 3 medium sized bowel movement yesterday, again, without any evidence of blood or melena.    Objective :  Temp:  [97.6 °F (36.4 °C)-98.7 °F (37.1 °C)] 98.2 °F (36.8 °C)  HR:  [75-84] 80  BP: (163-176)/(82-90) 163/88  Resp:  [16-18] 16  SpO2:  [90 %-94 %] 93 %    Physical Exam  Abdomen is moderately distended, soft, with mild tenderness and upon exam the left upper quadrant, without any significant guarding or rebound tenderness noted by exam, with hypoactive bowel sounds x 4.  No edema noted of the b/l lower extremities upon exam today. Skin is non-icteric.       Lab Results: I have reviewed the following results:

## 2025-03-14 NOTE — DISCHARGE SUPPORT
Case Management Assessment & Discharge Planning Note    Patient name Ama Burnett  Location /423-01 MRN [id number]  : 1931 Date 3/14/2025       Current Admission Date: 3/8/2025  Current Admission Diagnosis:Severe sepsis (HCC)   Patient Active Problem List    Diagnosis Date Noted Date Diagnosed    Uncontrolled hypertension 2025     Intermittent constipation 2025     Acute cystitis with hematuria 03/10/2025     Severe sepsis (HCC) 2025     Hypomagnesemia 2025     Non-seasonal allergic rhinitis due to pollen 2023     Pulmonary nodule 02/10/2023     Peripheral arterial disease (HCC) 02/10/2023     Idiopathic peripheral neuropathy 2022     Stage 3a chronic kidney disease (HCC) 11/15/2021     Nausea & vomiting 2020     Benign essential hypertension 10/27/2017     GERD (gastroesophageal reflux disease) 2017     Anxiety 2017       LOS (days): 5  Geometric Mean LOS (GMLOS) (days): 4.9  Days to GMLOS:0   Facility Auth Adverse Decision  DC Support Center has received: Denial  For Level of Care: SNF  Information obtained via : Phone  Insurance: [facility]  Name/Phone # of Rep who called in information (if applicable): Veda  Reason for Adverse Decision: does not meet cms guidelines  Reference Number: [id number]  Facility Name: [facility]  Appeal P#: [phone]   notified: Maite WOOD     Updates to authorization status will be noted in chart.    Please reach out to CM for updates on any clinical information.

## 2025-03-14 NOTE — ASSESSMENT & PLAN NOTE
Blood pressure was markedly elevated yesterday, patient had mild headache, she then reported adverse reaction to amlodipine, she reported some numbness and tingling in her lips, see quick note.    Patient today is feeling better, blood pressure is better continue with Cozaar increased dose of 100 mg once daily, follow-up with PCP in 1 week for BP check    Blood pressure elevation was likely secondary to anxiety/distress from not being able to go to short-term skilled nursing facility.    Patient did seem extremely anxious and was upset visibly yesterday, today seems improved.

## 2025-03-14 NOTE — ASSESSMENT & PLAN NOTE
Stable    Continue current diet as ordered.   Continue to maintain a large bore IV.  Continue to monitor hemoglobin and transfuse as per protocol.   Continue with serial abdominal exams.   Continue to monitor stool output for any overt signs of GI bleeding.   Continue PRN antiemetics as ordered.

## 2025-03-15 ENCOUNTER — HOME CARE VISIT (OUTPATIENT)
Dept: HOME HEALTH SERVICES | Facility: HOME HEALTHCARE | Age: OVER 89
End: 2025-03-15
Payer: COMMERCIAL

## 2025-03-15 VITALS
TEMPERATURE: 97.7 F | OXYGEN SATURATION: 96 % | HEART RATE: 88 BPM | SYSTOLIC BLOOD PRESSURE: 142 MMHG | RESPIRATION RATE: 18 BRPM | DIASTOLIC BLOOD PRESSURE: 62 MMHG

## 2025-03-15 PROCEDURE — 400013 VN SOC

## 2025-03-15 PROCEDURE — G0299 HHS/HOSPICE OF RN EA 15 MIN: HCPCS

## 2025-03-15 NOTE — CASE COMMUNICATION
Medication discrepancies or Major drug interactions__  ordered 400units Vitamin E however taking 180units 2x daily  Not taking Dulcolax  Not taking Bentyl- states her GI told her not to take this   Not taking Colace- states she was told not to take this  Not taking Pepcid  Not using Saline nasal spray    Abnormal clinical findings__patient requesting help with finding placement into LTC. Patient lives alone with intermittent assistance  and feels she would rather have 24/7 caregivers. very anxious regarding health. DUSTIN wallis added to assist with future planning    [facility] has Admitted your patient to Home Health service with the following disciplines: SN, PT, OT and MSW  Patient stated goals of care__to get help     Potential barriers to goal achievement__n/a    Primary focus of home health care:Digestive - recent hospital stay for N/V Abdominal Pain    Anticipat ed visit pattern and next visit date__1w1__2w4  Thank you for allowing us to participate in the care of your patient.      Fallon Whelan RN      
No

## 2025-03-17 ENCOUNTER — TELEPHONE (OUTPATIENT)
Dept: GASTROENTEROLOGY | Facility: CLINIC | Age: OVER 89
End: 2025-03-17

## 2025-03-17 ENCOUNTER — HOME CARE VISIT (OUTPATIENT)
Dept: HOME HEALTH SERVICES | Facility: HOME HEALTHCARE | Age: OVER 89
End: 2025-03-17
Payer: COMMERCIAL

## 2025-03-17 ENCOUNTER — TRANSITIONAL CARE MANAGEMENT (OUTPATIENT)
Dept: FAMILY MEDICINE CLINIC | Facility: CLINIC | Age: OVER 89
End: 2025-03-17

## 2025-03-17 VITALS — OXYGEN SATURATION: 100 % | SYSTOLIC BLOOD PRESSURE: 130 MMHG | DIASTOLIC BLOOD PRESSURE: 64 MMHG | HEART RATE: 80 BPM

## 2025-03-17 PROCEDURE — G0151 HHCP-SERV OF PT,EA 15 MIN: HCPCS

## 2025-03-17 NOTE — UTILIZATION REVIEW
NOTIFICATION OF ADMISSION DISCHARGE   This is a Notification of Discharge from [facility]. Please be advised that this patient has been discharge from our facility. Below you will find the admission and discharge date and time including the patient’s disposition.   UTILIZATION REVIEW CONTACT:  Serafin Crain  Utilization   Network Utilization Review Department  Phone: [phone] x carefully listen to the prompts. All voicemails are confidential.  Email: [email or web address]     ADMISSION INFORMATION  PRESENTATION DATE: 3/8/2025 11:33 AM  OBERVATION ADMISSION DATE: 03/08/2025 1409  INPATIENT ADMISSION DATE: 3/9/25 11:43 AM   DISCHARGE DATE: 3/14/2025  6:30 PM   DISPOSITION:Home with Home Health Care    Network Utilization Review Department  ATTENTION: Please call with any questions or concerns to [phone] and carefully listen to the prompts so that you are directed to the right person. All voicemails are confidential.   For Discharge needs, contact Care Management DC Support Team at [phone] opt. 2  Send all requests for admission clinical reviews, approved or denied determinations and any other requests to dedicated fax number below belonging to the campus where the patient is receiving treatment. List of dedicated fax numbers for the Facilities:  FACILITY NAME UR FAX NUMBER   ADMISSION DENIALS (Administrative/Medical Necessity) [phone]   DISCHARGE SUPPORT TEAM ([facility]) [phone]   PARENT CHILD HEALTH (Maternity/NICU/Pediatrics) [phone]   [facility] [phone]   [facility] [phone]   [facility] [phone]   [facility] [phone]   [facility] [phone]   [facility] [phone]   [facility] [phone]   [facility]  [place] [phone]   [facility] [phone]   [facility] [phone]   [facility] [phone]   [facility] [phone]

## 2025-03-17 NOTE — TELEPHONE ENCOUNTER
Can you please call the patient to schedule her a follow-up office visit in 6 to 8 weeks for a hospital follow-up.

## 2025-03-18 ENCOUNTER — HOME CARE VISIT (OUTPATIENT)
Dept: HOME HEALTH SERVICES | Facility: HOME HEALTHCARE | Age: OVER 89
End: 2025-03-18
Payer: COMMERCIAL

## 2025-03-18 VITALS
OXYGEN SATURATION: 98 % | DIASTOLIC BLOOD PRESSURE: 72 MMHG | HEART RATE: 68 BPM | TEMPERATURE: 98 F | RESPIRATION RATE: 18 BRPM | SYSTOLIC BLOOD PRESSURE: 140 MMHG

## 2025-03-18 PROCEDURE — G0299 HHS/HOSPICE OF RN EA 15 MIN: HCPCS

## 2025-03-19 ENCOUNTER — HOME CARE VISIT (OUTPATIENT)
Dept: HOME HEALTH SERVICES | Facility: HOME HEALTHCARE | Age: OVER 89
End: 2025-03-19
Payer: COMMERCIAL

## 2025-03-19 VITALS — HEART RATE: 89 BPM | SYSTOLIC BLOOD PRESSURE: 136 MMHG | DIASTOLIC BLOOD PRESSURE: 74 MMHG | OXYGEN SATURATION: 95 %

## 2025-03-19 PROCEDURE — G0152 HHCP-SERV OF OT,EA 15 MIN: HCPCS

## 2025-03-20 ENCOUNTER — HOME CARE VISIT (OUTPATIENT)
Dept: HOME HEALTH SERVICES | Facility: HOME HEALTHCARE | Age: OVER 89
End: 2025-03-20
Payer: COMMERCIAL

## 2025-03-20 VITALS — OXYGEN SATURATION: 97 % | DIASTOLIC BLOOD PRESSURE: 64 MMHG | SYSTOLIC BLOOD PRESSURE: 138 MMHG | HEART RATE: 90 BPM

## 2025-03-20 PROCEDURE — G0151 HHCP-SERV OF PT,EA 15 MIN: HCPCS

## 2025-03-20 NOTE — CASE COMMUNICATION
OT kadi completed on 3/19.  OT to see pt 1 week 1. 2 week 1 to address energy conservation and fall prevention for ADL safety and independence.

## 2025-03-21 ENCOUNTER — HOME CARE VISIT (OUTPATIENT)
Dept: HOME HEALTH SERVICES | Facility: HOME HEALTHCARE | Age: OVER 89
End: 2025-03-21
Payer: COMMERCIAL

## 2025-03-21 VITALS
RESPIRATION RATE: 18 BRPM | TEMPERATURE: 97.9 F | SYSTOLIC BLOOD PRESSURE: 132 MMHG | DIASTOLIC BLOOD PRESSURE: 76 MMHG | HEART RATE: 74 BPM | OXYGEN SATURATION: 98 %

## 2025-03-21 PROCEDURE — G0299 HHS/HOSPICE OF RN EA 15 MIN: HCPCS

## 2025-03-21 NOTE — CASE COMMUNICATION
Pt using mylanta nightly- updated in med list.    Pt using biofreeze roll on B shoulders/B knee PRn for pain- added to med list.

## 2025-03-23 ENCOUNTER — HOME CARE VISIT (OUTPATIENT)
Dept: HOME HEALTH SERVICES | Facility: HOME HEALTHCARE | Age: OVER 89
End: 2025-03-23
Payer: COMMERCIAL

## 2025-03-24 ENCOUNTER — HOME CARE VISIT (OUTPATIENT)
Dept: HOME HEALTH SERVICES | Facility: HOME HEALTHCARE | Age: OVER 89
End: 2025-03-24
Payer: COMMERCIAL

## 2025-03-24 VITALS — DIASTOLIC BLOOD PRESSURE: 60 MMHG | OXYGEN SATURATION: 95 % | HEART RATE: 83 BPM | SYSTOLIC BLOOD PRESSURE: 136 MMHG

## 2025-03-24 VITALS — SYSTOLIC BLOOD PRESSURE: 138 MMHG | OXYGEN SATURATION: 95 % | DIASTOLIC BLOOD PRESSURE: 68 MMHG | HEART RATE: 81 BPM

## 2025-03-24 PROCEDURE — G0152 HHCP-SERV OF OT,EA 15 MIN: HCPCS

## 2025-03-24 PROCEDURE — G0151 HHCP-SERV OF PT,EA 15 MIN: HCPCS

## 2025-03-24 NOTE — CASE COMMUNICATION
OT discipline discharge today as pt has met her OT goals and feels she has returned to baseline in regards to ADL's.  DC to [facility].

## 2025-03-25 ENCOUNTER — HOME CARE VISIT (OUTPATIENT)
Dept: HOME HEALTH SERVICES | Facility: HOME HEALTHCARE | Age: OVER 89
End: 2025-03-25
Payer: COMMERCIAL

## 2025-03-25 DIAGNOSIS — F41.1 GENERALIZED ANXIETY DISORDER: ICD-10-CM

## 2025-03-25 PROCEDURE — G0299 HHS/HOSPICE OF RN EA 15 MIN: HCPCS

## 2025-03-25 NOTE — TELEPHONE ENCOUNTER
Reason for call:   [x] Refill   [] Prior Auth  [] Other:     Office:   [x] PCP/Provider - Rayray Liu,   [] Specialty/Provider -     Medication: ALPRAZolam (XANAX) 0.25 mg     Dose/Frequency: Take 1 tablet (0.25 mg total) by mouth 3 (three) times a day,     Quantity: 90    Pharmacy: [facility]    [facility]   Does the patient have enough for 3 days?   [x] Yes   [] No - Send as HP to POD    [facility] Away Pharmacy   Does the patient have enough for 10 days?   [] Yes   [] No - Send as HP to POD

## 2025-03-26 VITALS
HEART RATE: 78 BPM | TEMPERATURE: 98.1 F | RESPIRATION RATE: 18 BRPM | SYSTOLIC BLOOD PRESSURE: 128 MMHG | DIASTOLIC BLOOD PRESSURE: 56 MMHG | OXYGEN SATURATION: 97 %

## 2025-03-26 RX ORDER — ALPRAZOLAM 0.25 MG
0.25 TABLET ORAL 3 TIMES DAILY
Qty: 90 TABLET | Refills: 0 | Status: SHIPPED | OUTPATIENT
Start: 2025-03-26 | End: 2025-04-25

## 2025-03-28 ENCOUNTER — HOME CARE VISIT (OUTPATIENT)
Dept: HOME HEALTH SERVICES | Facility: HOME HEALTHCARE | Age: OVER 89
End: 2025-03-28
Payer: COMMERCIAL

## 2025-03-28 ENCOUNTER — OFFICE VISIT (OUTPATIENT)
Dept: FAMILY MEDICINE CLINIC | Facility: CLINIC | Age: OVER 89
End: 2025-03-28
Payer: COMMERCIAL

## 2025-03-28 VITALS
HEART RATE: 104 BPM | BODY MASS INDEX: 31.65 KG/M2 | HEIGHT: 59 IN | OXYGEN SATURATION: 95 % | SYSTOLIC BLOOD PRESSURE: 126 MMHG | DIASTOLIC BLOOD PRESSURE: 70 MMHG | TEMPERATURE: 97.7 F | WEIGHT: 157 LBS

## 2025-03-28 DIAGNOSIS — I10 BENIGN ESSENTIAL HYPERTENSION: ICD-10-CM

## 2025-03-28 DIAGNOSIS — A41.9 SEVERE SEPSIS (HCC): ICD-10-CM

## 2025-03-28 DIAGNOSIS — Z78.9 TRANSITION OF CARE: Primary | ICD-10-CM

## 2025-03-28 DIAGNOSIS — R65.20 SEVERE SEPSIS (HCC): ICD-10-CM

## 2025-03-28 PROCEDURE — 99495 TRANSJ CARE MGMT MOD F2F 14D: CPT | Performed by: FAMILY MEDICINE

## 2025-03-28 NOTE — PROGRESS NOTES
"Name: Ama Burnett      : 1931      MRN: [id number]  Encounter Provider: Rayray Liu DO  Encounter Date: 3/28/2025   Encounter department: [facility]  :  Assessment & Plan           History of Present Illness   Scope is here she was hospitalized this is a transition of care patient had sepsis from urinary tract infection and possible pneumonia she was treated and released completed rehab she is doing much better now that she is at home there I have reconciled her medication list      Review of Systems   Constitutional:  Negative for activity change, appetite change, diaphoresis, fatigue and fever.   HENT: Negative.     Eyes: Negative.    Respiratory:  Negative for apnea, cough, chest tightness, shortness of breath and wheezing.    Cardiovascular:  Negative for chest pain, palpitations and leg swelling.   Gastrointestinal:  Negative for abdominal distention, abdominal pain, anal bleeding, constipation, diarrhea, nausea and vomiting.   Endocrine: Negative for cold intolerance, heat intolerance, polydipsia, polyphagia and polyuria.   Genitourinary:  Positive for difficulty urinating. Negative for dysuria, flank pain, hematuria and urgency.   Musculoskeletal:  Negative for arthralgias, back pain, gait problem, joint swelling and myalgias.   Skin:  Negative for color change, rash and wound.   Allergic/Immunologic: Negative for environmental allergies, food allergies and immunocompromised state.   Neurological:  Negative for dizziness, seizures, syncope, speech difficulty, numbness and headaches.   Hematological:  Negative for adenopathy. Does not bruise/bleed easily.   Psychiatric/Behavioral:  Negative for agitation, behavioral problems, hallucinations, sleep disturbance and suicidal ideas.        Objective   /70 (BP Location: Left arm, Patient Position: Sitting, Cuff Size: Standard)   Pulse 104   Temp 97.7 °F (36.5 °C) (Temporal)   Ht 4' 11\" (1.499 m)   Wt 71.2 kg (157 lb)   SpO2 95%   " BMI 31.71 kg/m²      Physical Exam  Constitutional:       Appearance: She is well-developed.   HENT:      Head: Normocephalic and atraumatic.      Right Ear: External ear normal.      Left Ear: External ear normal.      Nose: Nose normal.   Eyes:      Conjunctiva/sclera: Conjunctivae normal.      Pupils: Pupils are equal, round, and reactive to light.   Cardiovascular:      Rate and Rhythm: Normal rate and regular rhythm.      Heart sounds: Normal heart sounds. No murmur heard.     No friction rub.   Pulmonary:      Effort: Pulmonary effort is normal. No respiratory distress.      Breath sounds: Normal breath sounds. No wheezing or rales.   Chest:      Chest wall: No tenderness.   Abdominal:      General: Bowel sounds are normal.      Palpations: Abdomen is soft.   Musculoskeletal:         General: Normal range of motion.      Cervical back: Normal range of motion and neck supple.   Skin:     General: Skin is warm and dry.      Capillary Refill: Capillary refill takes 2 to 3 seconds.   Neurological:      Mental Status: She is alert and oriented to person, place, and time.   Psychiatric:         Behavior: Behavior normal.         Thought Content: Thought content normal.         Judgment: Judgment normal.

## 2025-03-28 NOTE — PROGRESS NOTES
"Transition of Care Visit  Name: Ama Burnett      : 1931      MRN: [id number]  Encounter Provider: Rayray Liu DO  Encounter Date: 3/28/2025   Encounter department: [facility]    Assessment & Plan  Transition of care  Patient discharged asymptomatic I have reviewed the entire hospital chart and examined the patient she has no signs of sepsis       Severe sepsis (HCC)  No signs of sepsis no further antibiotics needed       Benign essential hypertension  Blood pressure well-controlled              History of Present Illness     Transitional Care Management Review:   Ama Burnett is a 89 y.o. female here for TCM follow up.     During the TCM phone call patient stated:  TCM Call (since 3/14/2025)       Date and time call was made  3/17/2025  9:45 AM    Hospital care reviewed  Records reviewed    Patient was hospitialized at  [facility]    Date of Admission  25    Date of discharge  25    Diagnosis  Severe sepsis    Disposition  Home; Home health services  [facility]          TCM Call (since 3/14/2025)       Patients specialists  --  gastroenterology            Review of Systems   Constitutional:  Positive for activity change, fatigue and unexpected weight change.   HENT:  Positive for hearing loss.    Eyes:  Positive for visual disturbance.   Respiratory:  Negative for cough and choking.    Gastrointestinal:  Negative for abdominal distention, abdominal pain and constipation.   Genitourinary:  Positive for difficulty urinating.   Musculoskeletal:  Positive for arthralgias and back pain.   Psychiatric/Behavioral:  The patient is nervous/anxious.      Objective   /70 (BP Location: Left arm, Patient Position: Sitting, Cuff Size: Standard)   Pulse 104   Temp 97.7 °F (36.5 °C) (Temporal)   Ht 4' 11\" (1.499 m)   Wt 71.2 kg (157 lb)   SpO2 95%   BMI 31.71 kg/m²       Physical Exam  Constitutional:       General: She is not in acute distress.     Appearance: She is " well-developed. She is not diaphoretic.   HENT:      Head: Normocephalic.      Right Ear: External ear normal.      Left Ear: External ear normal.      Nose: Nose normal.   Eyes:      General: No scleral icterus.        Right eye: No discharge.         Left eye: No discharge.      Conjunctiva/sclera: Conjunctivae normal.      Pupils: Pupils are equal, round, and reactive to light.   Neck:      Thyroid: No thyromegaly.      Trachea: No tracheal deviation.   Cardiovascular:      Rate and Rhythm: Normal rate and regular rhythm.      Heart sounds: Normal heart sounds. No murmur heard.     No friction rub. No gallop.   Pulmonary:      Effort: Pulmonary effort is normal. No respiratory distress.      Breath sounds: Normal breath sounds. No wheezing.   Abdominal:      General: Bowel sounds are normal.      Palpations: Abdomen is soft. There is no mass.      Tenderness: There is no abdominal tenderness. There is no guarding.   Musculoskeletal:         General: No deformity.      Cervical back: Normal range of motion.   Lymphadenopathy:      Cervical: No cervical adenopathy.   Skin:     General: Skin is warm and dry.      Findings: No erythema or rash.   Neurological:      Mental Status: She is alert and oriented to person, place, and time.      Cranial Nerves: No cranial nerve deficit.   Psychiatric:         Thought Content: Thought content normal.       Medications have been reviewed by provider in current encounter

## 2025-03-28 NOTE — PROGRESS NOTES
"Transition of Care Visit  Name: Ama Burnett      : 1931      MRN: [id number]  Encounter Provider: Rayray Liu DO  Encounter Date: 3/28/2025   Encounter department: [facility]    Assessment & Plan  Transition of care         Severe sepsis (HCC)         Benign essential hypertension              History of Present Illness     Transitional Care Management Review:   Ama Burnett is a 89 y.o. female here for TCM follow up.     During the TCM phone call patient stated:  TCM Call (since 3/14/2025)       Date and time call was made  3/17/2025  9:45 AM    Hospital care reviewed  Records reviewed    Patient was hospitialized at  [facility]    Date of Admission  25    Date of discharge  25    Diagnosis  Severe sepsis    Disposition  Home; Home health services  [facility]          TCM Call (since 3/14/2025)       Patients specialists  --  gastroenterology          HPI  Review of Systems  Objective   /70 (BP Location: Left arm, Patient Position: Sitting, Cuff Size: Standard)   Pulse 104   Temp 97.7 °F (36.5 °C) (Temporal)   Ht 4' 11\" (1.499 m)   Wt 71.2 kg (157 lb)   SpO2 95%   BMI 31.71 kg/m²     Physical Exam  Medications have been reviewed by provider in current encounter      "

## 2025-03-28 NOTE — PROGRESS NOTES
"Transition of Care Visit  Name: Ama Burnett      : 1931      MRN: [id number]  Encounter Provider: Rayray Liu DO  Encounter Date: 3/28/2025   Encounter department: [facility]    Assessment & Plan  Transition of care  Patient is at home now she seems to be doing very well I have reviewed her hospital record reconciled her medication list       Severe sepsis (HCC)  No fever or chills since leaving the hospital no symptoms of sepsis       Benign essential hypertension  Continue the 100 mg dose of losartan              History of Present Illness     Transitional Care Management Review:   Ama Burnett is a 89 y.o. female here for TCM follow up.     During the TCM phone call patient stated:  TCM Call (since 2025)       None          TCM Call (since 2025)       None            Review of Systems   Constitutional:  Positive for activity change, fatigue and unexpected weight change.   HENT:  Positive for hearing loss.    Eyes:  Positive for visual disturbance.   Respiratory:  Negative for cough and choking.    Gastrointestinal:  Negative for abdominal distention, abdominal pain and constipation.   Genitourinary:  Positive for difficulty urinating.   Musculoskeletal:  Positive for arthralgias and back pain.   Psychiatric/Behavioral:  The patient is nervous/anxious.      Objective   /70 (BP Location: Left arm, Patient Position: Sitting, Cuff Size: Standard)   Pulse 104   Temp 97.7 °F (36.5 °C) (Temporal)   Ht 4' 11\" (1.499 m)   Wt 71.2 kg (157 lb)   SpO2 95%   BMI 31.71 kg/m²       Physical Exam  Constitutional:       General: She is not in acute distress.     Appearance: She is well-developed. She is not diaphoretic.   HENT:      Head: Normocephalic.      Right Ear: External ear normal.      Left Ear: External ear normal.      Nose: Nose normal.   Eyes:      General: No scleral icterus.        Right eye: No discharge.         Left eye: No discharge.      Conjunctiva/sclera: " Conjunctivae normal.      Pupils: Pupils are equal, round, and reactive to light.   Neck:      Thyroid: No thyromegaly.      Trachea: No tracheal deviation.   Cardiovascular:      Rate and Rhythm: Normal rate and regular rhythm.      Heart sounds: Normal heart sounds. No murmur heard.     No friction rub. No gallop.   Pulmonary:      Effort: Pulmonary effort is normal. No respiratory distress.      Breath sounds: Normal breath sounds. No wheezing.   Abdominal:      General: Bowel sounds are normal.      Palpations: Abdomen is soft. There is no mass.      Tenderness: There is no abdominal tenderness. There is no guarding.   Musculoskeletal:         General: No deformity.      Cervical back: Normal range of motion.   Lymphadenopathy:      Cervical: No cervical adenopathy.   Skin:     General: Skin is warm and dry.      Findings: No erythema or rash.   Neurological:      Mental Status: She is alert and oriented to person, place, and time.      Cranial Nerves: No cranial nerve deficit.   Psychiatric:         Thought Content: Thought content normal.       Medications have been reviewed by provider in current encounter

## 2025-04-01 ENCOUNTER — HOME CARE VISIT (OUTPATIENT)
Dept: HOME HEALTH SERVICES | Facility: HOME HEALTHCARE | Age: OVER 89
End: 2025-04-01
Payer: COMMERCIAL

## 2025-04-01 VITALS
HEART RATE: 72 BPM | OXYGEN SATURATION: 99 % | DIASTOLIC BLOOD PRESSURE: 70 MMHG | TEMPERATURE: 98.1 F | RESPIRATION RATE: 18 BRPM | SYSTOLIC BLOOD PRESSURE: 138 MMHG

## 2025-04-01 PROCEDURE — G0299 HHS/HOSPICE OF RN EA 15 MIN: HCPCS

## 2025-04-04 ENCOUNTER — HOME CARE VISIT (OUTPATIENT)
Dept: HOME HEALTH SERVICES | Facility: HOME HEALTHCARE | Age: OVER 89
End: 2025-04-04
Payer: COMMERCIAL

## 2025-04-07 PROBLEM — A41.9 SEVERE SEPSIS (HCC): Status: RESOLVED | Noted: 2025-03-08 | Resolved: 2025-04-07

## 2025-04-07 PROBLEM — R65.20 SEVERE SEPSIS (HCC): Status: RESOLVED | Noted: 2025-03-08 | Resolved: 2025-04-07

## 2025-04-08 ENCOUNTER — HOME CARE VISIT (OUTPATIENT)
Dept: HOME HEALTH SERVICES | Facility: HOME HEALTHCARE | Age: OVER 89
End: 2025-04-08
Payer: COMMERCIAL

## 2025-04-08 VITALS
RESPIRATION RATE: 18 BRPM | OXYGEN SATURATION: 94 % | DIASTOLIC BLOOD PRESSURE: 66 MMHG | TEMPERATURE: 97.8 F | SYSTOLIC BLOOD PRESSURE: 118 MMHG | HEART RATE: 78 BPM

## 2025-04-08 PROCEDURE — G0299 HHS/HOSPICE OF RN EA 15 MIN: HCPCS

## 2025-04-09 PROBLEM — N30.01 ACUTE CYSTITIS WITH HEMATURIA: Status: RESOLVED | Noted: 2025-03-10 | Resolved: 2025-04-09

## 2025-04-24 DIAGNOSIS — F41.1 GENERALIZED ANXIETY DISORDER: ICD-10-CM

## 2025-04-24 NOTE — TELEPHONE ENCOUNTER
Reason for call:   [x] Refill   [] Prior Auth  [] Other:     Office: [facility]  [x] PCP/Provider - Rayray Liu   [] Specialty/Provider -     Medication: alprazolam     Dose/Frequency: 0.25 mg/ 1 tab 3 times daily     Quantity: 30 day supply     Pharmacy: [facility]   Does the patient have enough for 3 days?   [x] Yes   [] No - Send as HP to POD    Mail Away Pharmacy   Does the patient have enough for 10 days?   [] Yes   [] No - Send as HP to POD

## 2025-04-25 RX ORDER — ALPRAZOLAM 0.25 MG
0.25 TABLET ORAL 2 TIMES DAILY
Qty: 60 TABLET | Refills: 0 | Status: SHIPPED | OUTPATIENT
Start: 2025-04-25 | End: 2025-05-25

## 2025-05-14 ENCOUNTER — OFFICE VISIT (OUTPATIENT)
Dept: GASTROENTEROLOGY | Facility: CLINIC | Age: OVER 89
End: 2025-05-14

## 2025-05-14 VITALS
OXYGEN SATURATION: 96 % | BODY MASS INDEX: 31.25 KG/M2 | TEMPERATURE: 98.2 F | DIASTOLIC BLOOD PRESSURE: 80 MMHG | HEART RATE: 85 BPM | HEIGHT: 59 IN | SYSTOLIC BLOOD PRESSURE: 153 MMHG | WEIGHT: 155 LBS

## 2025-05-14 DIAGNOSIS — K59.09 INTERMITTENT CONSTIPATION: ICD-10-CM

## 2025-05-14 DIAGNOSIS — R10.9 ABDOMINAL SPASMS: ICD-10-CM

## 2025-05-14 DIAGNOSIS — K21.9 GASTROESOPHAGEAL REFLUX DISEASE WITHOUT ESOPHAGITIS: Primary | Chronic | ICD-10-CM

## 2025-05-14 PROBLEM — R11.2 NAUSEA & VOMITING: Status: RESOLVED | Noted: 2020-09-08 | Resolved: 2025-05-14

## 2025-05-14 NOTE — ASSESSMENT & PLAN NOTE
Stable    Encouraged patient continue to try to drink at least 32 to 64 ounces of water daily.  Continue to watch for red flag symptoms.    While the patient was in the office today we did review GI red flag symptoms, including, but not limited to: chronic nausea, vomiting, diarrhea, chills, fever, and unintentional weight loss and should call or contact our office with any changes or concerns. I reviewed with the patient that if they notice any blood while vomiting or in their stool they should contact or office or go to the nearest emergency room for immediate evaluation. The patient was agreeable and verbalized an understanding.

## 2025-05-14 NOTE — PROGRESS NOTES
Name: Ama Burnett      : 1931      MRN: [id number]  Encounter Provider: KIZZY Luz  Encounter Date: 2025   Encounter department: [facility] [place]  :  Assessment & Plan  Gastroesophageal reflux disease without esophagitis  While the patient and her family member were in the office today, I discussed with them that at this point in time I would like her to start taking the famotidine 10 mg, 1 pill twice daily to try to help with the GERD symptoms more consistently.    Can continue Protonix daily in the a.m. as prescribed by primary care provider.  Can use Mylanta as needed for any breakthrough symptoms, but I advised the patient that I do not want her to take the Mylanta every day.  Can use Gas-X 1-2 times daily for gas and bloating since it helped her while she was in the hospital.  Continue to avoid acidic or trigger foods is much as possible.       Intermittent constipation  Stable    Encouraged patient continue to try to drink at least 32 to 64 ounces of water daily.  Continue to watch for red flag symptoms.    While the patient was in the office today we did review GI red flag symptoms, including, but not limited to: chronic nausea, vomiting, diarrhea, chills, fever, and unintentional weight loss and should call or contact our office with any changes or concerns. I reviewed with the patient that if they notice any blood while vomiting or in their stool they should contact or office or go to the nearest emergency room for immediate evaluation. The patient was agreeable and verbalized an understanding.       Abdominal spasms  See above.       The patient is to schedule a follow up office visit in 6 months, but understands to call or contact our offices with any issues before then or as needed.     History of Present Illness   Ama Burnett is a 89 y.o. female who presents today for a follow-up office visit regarding her chronic GERD symptoms, especially at  "nighttime, intermittent constipation, and abdominal spasms.  The patient reports that she does feel there has been some improvement in her symptoms since she has been taking the famotidine over-the-counter at bedtime and Protonix daily in the AM.  However, she continues to take Mylanta at nighttime because of the GERD symptoms or the abdominal spasms and is found that she typically feels it causes some bloating and gassy side effects.  She also reports intermittent constipation but that her biggest issue is the GERD symptoms.    The patient currently denies any reflux, nausea, dysphagia, vomiting, decreased appetite, or unplanned weight loss.Water Intake: Adequate amounts per day.    The patient currently reports that they have a BM daily and reports that it is relieving, without any consistent diarrhea, constipation, straining, melena or bloody stools. Last BM: This morning. Flatus: No.    GI Meds: Protonix 40 mg daily as prescribed by primary care provider, famotidine 10 mg at bedtime.  Daily NSAID Use: Denied    Imaging: (None):     Endoscopy History: EGD: (10/5/16) Dr Tobar: Dilatation of a Schatzki's ring with evidence of GERD.     COLONOSCOPY: (4/25/13) Dr Shields: Normal.     DUE: Not applicable secondary to age.     HPI  History obtained from: patient and family member  Review of Systems A complete review of systems is negative other than that noted above in the HPI.      Current Medications[1]  Objective   /80 (BP Location: Left arm, Patient Position: Sitting, Cuff Size: Adult)   Pulse 85   Temp 98.2 °F (36.8 °C) (Temporal)   Ht 4' 11\" (1.499 m)   Wt 70.3 kg (155 lb)   SpO2 96%   BMI 31.31 kg/m²     Physical Exam   Abdomen is softly distended, nontender, with hypoactive bowel sounds x 4.  No edema noted of the b/l lower extremities upon exam today. Skin is non-icteric.     Lab Results: I personally reviewed relevant lab results.                    [1]  Current Outpatient Medications "   Medication Sig Dispense Refill   • acetaminophen (TYLENOL) 500 mg tablet Take 500 mg by mouth 3 (three) times a day as needed     • ALPRAZolam (XANAX) 0.25 mg tablet Take 1 tablet (0.25 mg total) by mouth 2 (two) times a day 60 tablet 0   • aluminum-magnesium hydroxide-simethicone (Mirta-Lanta) 5691-1246-843 mg/30 mL suspension Take 30 mL by mouth every 4 (four) hours as needed for indigestion or heartburn +++not taking as of 03/15/2025+++  -3/20/25: pt reports taking at night only.     • Calcium Citrate (CITRACAL PO) Take 1 tablet by mouth daily.     • cetirizine (ZyrTEC) 5 MG tablet Take 1 tablet (5 mg total) by mouth daily 30 tablet 2   • Cholecalciferol (VITAMIN D PO) Take 1,000 Units by mouth 2 (two) times a day.     • famotidine (PEPCID) 10 mg tablet Take 1 tablet (10 mg total) by mouth 2 (two) times a day 60 tablet 0   • losartan (COZAAR) 100 MG tablet Take 1 tablet (100 mg total) by mouth daily 30 tablet 0   • Menthol, Topical Analgesic, (Biofreeze Roll-On) 4 % GEL Apply 1 Application topically as needed (pain). pt reported. per contatiner: no more than 3-4x/day.   pt reports on putting on B shoulder/B knees.     • Multiple Vitamin (MULTIVITAMIN) capsule Take 1 capsule by mouth daily.     • pantoprazole (PROTONIX) 40 mg tablet Take 1 tablet (40 mg total) by mouth daily 90 tablet 1   • senna-docusate sodium (SENOKOT S) 8.6-50 mg per tablet Take 1 tablet by mouth daily at bedtime 30 tablet 0   • sodium chloride (OCEAN) 0.65 % nasal spray 1 spray into each nostril as needed for congestion +++not taking as of 03/15/2025++     • vitamin E, tocopherol, 400 units capsule Take 400 Units by mouth daily has 180 unit capsules in the home she is taking one in the morning and one in the evening       No current facility-administered medications for this visit.

## 2025-05-14 NOTE — PATIENT INSTRUCTIONS
Start taking famotidine 10 mg, 1 pil, 2 x daily.   Continue Protonix daily in AM as per PCP.   Can use Mylanta as needed for GERD symptoms.   Can use Gas X, 1-2 x daily for gas and bloating.   Continue to avoid acidic or trigger foods as much as possible.   Continue to watch for red flag symptoms.   Continue to drink at least 32-64 oz of water daily.   Continue to watch for red flag symptoms.   Schedule a f/u OV in 6 months.     We did review GI red flag symptoms, including, but not limited to: chronic nausea, vomiting, diarrhea, chills, fever, and unintentional weight loss and should call or contact our office with any changes or concerns. I reviewed with the patient that if they notice any blood while vomiting or in their stool they should contact or office or go to the nearest emergency room for immediate evaluation. The patient was agreeable and verbalized an understanding.

## 2025-05-14 NOTE — ASSESSMENT & PLAN NOTE
While the patient and her family member were in the office today, I discussed with them that at this point in time I would like her to start taking the famotidine 10 mg, 1 pill twice daily to try to help with the GERD symptoms more consistently.    Can continue Protonix daily in the a.m. as prescribed by primary care provider.  Can use Mylanta as needed for any breakthrough symptoms, but I advised the patient that I do not want her to take the Mylanta every day.  Can use Gas-X 1-2 times daily for gas and bloating since it helped her while she was in the hospital.  Continue to avoid acidic or trigger foods is much as possible.

## 2025-05-21 DIAGNOSIS — I10 UNCONTROLLED HYPERTENSION: ICD-10-CM

## 2025-05-21 DIAGNOSIS — F41.1 GENERALIZED ANXIETY DISORDER: ICD-10-CM

## 2025-05-22 RX ORDER — LOSARTAN POTASSIUM 100 MG/1
100 TABLET ORAL DAILY
Qty: 90 TABLET | Refills: 1 | Status: SHIPPED | OUTPATIENT
Start: 2025-05-22

## 2025-05-23 RX ORDER — ALPRAZOLAM 0.25 MG
0.25 TABLET ORAL 2 TIMES DAILY
Qty: 60 TABLET | Refills: 0 | OUTPATIENT
Start: 2025-05-23 | End: 2025-06-22

## 2025-06-18 ENCOUNTER — TELEPHONE (OUTPATIENT)
Age: OVER 89
End: 2025-06-18

## 2025-06-18 NOTE — TELEPHONE ENCOUNTER
Patient requesting refill of simethicone 80 mg  Not on med list.  Or is this something she can get over the counter

## 2025-06-30 ENCOUNTER — OFFICE VISIT (OUTPATIENT)
Dept: FAMILY MEDICINE CLINIC | Facility: CLINIC | Age: OVER 89
End: 2025-06-30
Payer: COMMERCIAL

## 2025-06-30 VITALS
OXYGEN SATURATION: 96 % | DIASTOLIC BLOOD PRESSURE: 70 MMHG | SYSTOLIC BLOOD PRESSURE: 128 MMHG | HEART RATE: 78 BPM | WEIGHT: 155 LBS | BODY MASS INDEX: 31.31 KG/M2 | TEMPERATURE: 98.1 F

## 2025-06-30 DIAGNOSIS — F41.1 GENERALIZED ANXIETY DISORDER: ICD-10-CM

## 2025-06-30 DIAGNOSIS — K21.9 GASTROESOPHAGEAL REFLUX DISEASE WITHOUT ESOPHAGITIS: Primary | Chronic | ICD-10-CM

## 2025-06-30 DIAGNOSIS — I10 BENIGN ESSENTIAL HYPERTENSION: ICD-10-CM

## 2025-06-30 PROCEDURE — 99213 OFFICE O/P EST LOW 20 MIN: CPT | Performed by: FAMILY MEDICINE

## 2025-06-30 RX ORDER — ALPRAZOLAM 0.25 MG
0.25 TABLET ORAL 3 TIMES DAILY PRN
Qty: 60 TABLET | Refills: 0 | Status: SHIPPED | OUTPATIENT
Start: 2025-06-30 | End: 2025-07-30

## 2025-06-30 RX ORDER — ARIPIPRAZOLE 2 MG/1
2 TABLET ORAL DAILY
Qty: 30 TABLET | Refills: 1 | Status: CANCELLED | OUTPATIENT
Start: 2025-06-30

## 2025-06-30 NOTE — PROGRESS NOTES
Name: Ama Burnett      : 1931      MRN: [id number]  Encounter Provider: Rayray Liu DO  Encounter Date: 2025   Encounter department: [facility]  :  Assessment & Plan  Gastroesophageal reflux disease without esophagitis         Benign essential hypertension         Generalized anxiety disorder                History of Present Illness   Anxiety and odor patient claims is only present in her home      Review of Systems   Constitutional:  Negative for activity change, appetite change, diaphoresis, fatigue and fever.   HENT: Negative.     Eyes: Negative.    Respiratory:  Negative for apnea, cough, chest tightness, shortness of breath and wheezing.    Cardiovascular:  Negative for chest pain, palpitations and leg swelling.   Gastrointestinal:  Negative for abdominal distention, abdominal pain, anal bleeding, constipation, diarrhea, nausea and vomiting.   Endocrine: Negative for cold intolerance, heat intolerance, polydipsia, polyphagia and polyuria.   Genitourinary:  Negative for difficulty urinating, dysuria, flank pain, hematuria and urgency.   Musculoskeletal:  Negative for arthralgias, back pain, gait problem, joint swelling and myalgias.   Skin:  Negative for color change, rash and wound.   Allergic/Immunologic: Negative for environmental allergies, food allergies and immunocompromised state.   Neurological:  Negative for dizziness, seizures, syncope, speech difficulty, numbness and headaches.   Hematological:  Negative for adenopathy. Does not bruise/bleed easily.   Psychiatric/Behavioral:  Negative for agitation, behavioral problems, hallucinations, sleep disturbance and suicidal ideas.        Objective   /70   Pulse 78   Temp 98.1 °F (36.7 °C)   Wt 70.3 kg (155 lb)   SpO2 96%   BMI 31.31 kg/m²      Physical Exam  Constitutional:       Appearance: She is well-developed.   HENT:      Head: Normocephalic and atraumatic.      Right Ear: External ear normal.      Left Ear:  External ear normal.      Nose: Nose normal.     Eyes:      Conjunctiva/sclera: Conjunctivae normal.      Pupils: Pupils are equal, round, and reactive to light.       Cardiovascular:      Rate and Rhythm: Normal rate and regular rhythm.      Heart sounds: Normal heart sounds. No murmur heard.     No friction rub.   Pulmonary:      Effort: Pulmonary effort is normal. No respiratory distress.      Breath sounds: Normal breath sounds. No wheezing or rales.   Chest:      Chest wall: No tenderness.   Abdominal:      General: Bowel sounds are normal.      Palpations: Abdomen is soft.     Musculoskeletal:         General: Normal range of motion.      Cervical back: Normal range of motion and neck supple.     Skin:     General: Skin is warm and dry.      Capillary Refill: Capillary refill takes 2 to 3 seconds.     Neurological:      Mental Status: She is alert and oriented to person, place, and time.     Psychiatric:         Behavior: Behavior normal.         Thought Content: Thought content normal.         Judgment: Judgment normal.

## 2025-07-02 ENCOUNTER — TELEPHONE (OUTPATIENT)
Age: OVER 89
End: 2025-07-02

## 2025-07-02 NOTE — TELEPHONE ENCOUNTER
Patient is calling about her alprazolam. Patient is asking why she received 60 pills for 20 days vs 90 pills for 30 days like she did in the past. Patient is also requesting a prescription for the burn ivelisse she showed Dr. Liu during her appointment this past Monday. Please advise and call back patient with further recommendations.

## 2025-07-03 NOTE — TELEPHONE ENCOUNTER
Called Pt with message - Pt was thinking Dr would want to Rx something for the burn so it doesn't get infected - She's not sure [facility] would work on a burn & she is concerned of infection - I recommended that Pt speak with Pharmacist & ask what they would have for treatment of a burn.

## 2025-07-29 DIAGNOSIS — F41.1 GENERALIZED ANXIETY DISORDER: ICD-10-CM

## 2025-07-29 RX ORDER — ALPRAZOLAM 0.25 MG
0.25 TABLET ORAL 3 TIMES DAILY PRN
Qty: 60 TABLET | Refills: 0 | Status: SHIPPED | OUTPATIENT
Start: 2025-07-29 | End: 2025-08-28

## 2025-08-08 ENCOUNTER — HOSPITAL ENCOUNTER (EMERGENCY)
Facility: HOSPITAL | Age: OVER 89
Discharge: HOME | End: 2025-08-08
Attending: EMERGENCY MEDICINE
Payer: COMMERCIAL

## 2025-08-08 ENCOUNTER — APPOINTMENT (EMERGENCY)
Dept: CT IMAGING | Facility: HOSPITAL | Age: OVER 89
End: 2025-08-08
Payer: COMMERCIAL

## 2025-08-13 ENCOUNTER — NURSE TRIAGE (OUTPATIENT)
Age: OVER 89
End: 2025-08-13

## 2025-08-14 ENCOUNTER — APPOINTMENT (EMERGENCY)
Dept: CT IMAGING | Facility: HOSPITAL | Age: OVER 89
End: 2025-08-14
Payer: COMMERCIAL

## 2025-08-14 ENCOUNTER — HOSPITAL ENCOUNTER (OUTPATIENT)
Facility: HOSPITAL | Age: OVER 89
Setting detail: OBSERVATION
Discharge: HOME HEALTH | End: 2025-08-16
Attending: EMERGENCY MEDICINE | Admitting: FAMILY MEDICINE
Payer: COMMERCIAL

## 2025-08-14 DIAGNOSIS — M19.90 ARTHRITIS: ICD-10-CM

## 2025-08-14 DIAGNOSIS — R11.2 NAUSEA AND VOMITING: ICD-10-CM

## 2025-08-14 DIAGNOSIS — R53.1 WEAKNESS: ICD-10-CM

## 2025-08-14 DIAGNOSIS — R42 DIZZINESS: Primary | ICD-10-CM

## 2025-08-14 DIAGNOSIS — R10.13 EPIGASTRIC PAIN: ICD-10-CM

## 2025-08-14 DIAGNOSIS — K59.00 CONSTIPATION: ICD-10-CM

## 2025-08-14 DIAGNOSIS — K21.9 GASTROESOPHAGEAL REFLUX DISEASE WITHOUT ESOPHAGITIS: Chronic | ICD-10-CM

## 2025-08-14 PROBLEM — R26.2 AMBULATORY DYSFUNCTION: Status: ACTIVE | Noted: 2025-08-14

## 2025-08-14 LAB
2HR DELTA HS TROPONIN: 1 NG/L
ALBUMIN SERPL BCG-MCNC: 4.4 G/DL (ref 3.5–5)
ALP SERPL-CCNC: 73 U/L (ref 34–104)
ALT SERPL W P-5'-P-CCNC: 12 U/L (ref 7–52)
ANION GAP SERPL CALCULATED.3IONS-SCNC: 10 MMOL/L (ref 4–13)
AST SERPL W P-5'-P-CCNC: 17 U/L (ref 13–39)
BASOPHILS # BLD AUTO: 0.09 THOUSANDS/ÂΜL (ref 0–0.1)
BASOPHILS NFR BLD AUTO: 1 % (ref 0–1)
BILIRUB SERPL-MCNC: 0.77 MG/DL (ref 0.2–1)
BUN SERPL-MCNC: 13 MG/DL (ref 5–25)
CALCIUM SERPL-MCNC: 10 MG/DL (ref 8.4–10.2)
CARDIAC TROPONIN I PNL SERPL HS: 3 NG/L (ref ?–50)
CARDIAC TROPONIN I PNL SERPL HS: 4 NG/L (ref ?–50)
CHLORIDE SERPL-SCNC: 99 MMOL/L (ref 96–108)
CO2 SERPL-SCNC: 27 MMOL/L (ref 21–32)
CREAT SERPL-MCNC: 0.88 MG/DL (ref 0.6–1.3)
EOSINOPHIL # BLD AUTO: 0.13 THOUSAND/ÂΜL (ref 0–0.61)
EOSINOPHIL NFR BLD AUTO: 2 % (ref 0–6)
ERYTHROCYTE [DISTWIDTH] IN BLOOD BY AUTOMATED COUNT: 14.1 % (ref 11.6–15.1)
FLUAV AG UPPER RESP QL IA.RAPID: NEGATIVE
FLUBV AG UPPER RESP QL IA.RAPID: NEGATIVE
GFR SERPL CREATININE-BSD FRML MDRD: 56 ML/MIN/1.73SQ M
GLUCOSE SERPL-MCNC: 111 MG/DL (ref 65–140)
HCT VFR BLD AUTO: 35.8 % (ref 34.8–46.1)
HGB BLD-MCNC: 12.1 G/DL (ref 11.5–15.4)
IMM GRANULOCYTES # BLD AUTO: 0.06 THOUSAND/UL (ref 0–0.2)
IMM GRANULOCYTES NFR BLD AUTO: 1 % (ref 0–2)
LIPASE SERPL-CCNC: 23 U/L (ref 11–82)
LYMPHOCYTES # BLD AUTO: 3.04 THOUSANDS/ÂΜL (ref 0.6–4.47)
LYMPHOCYTES NFR BLD AUTO: 42 % (ref 14–44)
MCH RBC QN AUTO: 34.9 PG (ref 26.8–34.3)
MCHC RBC AUTO-ENTMCNC: 33.8 G/DL (ref 31.4–37.4)
MCV RBC AUTO: 103 FL (ref 82–98)
MONOCYTES # BLD AUTO: 0.72 THOUSAND/ÂΜL (ref 0.17–1.22)
MONOCYTES NFR BLD AUTO: 10 % (ref 4–12)
NEUTROPHILS # BLD AUTO: 3.25 THOUSANDS/ÂΜL (ref 1.85–7.62)
NEUTS SEG NFR BLD AUTO: 44 % (ref 43–75)
NRBC BLD AUTO-RTO: 0 /100 WBCS
PLATELET # BLD AUTO: 323 THOUSANDS/UL (ref 149–390)
PMV BLD AUTO: 10 FL (ref 8.9–12.7)
POTASSIUM SERPL-SCNC: 3.5 MMOL/L (ref 3.5–5.3)
PROT SERPL-MCNC: 7.4 G/DL (ref 6.4–8.4)
RBC # BLD AUTO: 3.47 MILLION/UL (ref 3.81–5.12)
SARS-COV+SARS-COV-2 AG RESP QL IA.RAPID: NEGATIVE
SODIUM SERPL-SCNC: 136 MMOL/L (ref 135–147)
WBC # BLD AUTO: 7.29 THOUSAND/UL (ref 4.31–10.16)

## 2025-08-14 PROCEDURE — 36415 COLL VENOUS BLD VENIPUNCTURE: CPT

## 2025-08-14 PROCEDURE — 70450 CT HEAD/BRAIN W/O DYE: CPT

## 2025-08-14 PROCEDURE — 83690 ASSAY OF LIPASE: CPT

## 2025-08-14 PROCEDURE — 84484 ASSAY OF TROPONIN QUANT: CPT

## 2025-08-14 PROCEDURE — 85025 COMPLETE CBC W/AUTO DIFF WBC: CPT

## 2025-08-14 PROCEDURE — 96374 THER/PROPH/DIAG INJ IV PUSH: CPT

## 2025-08-14 PROCEDURE — 93005 ELECTROCARDIOGRAM TRACING: CPT

## 2025-08-14 PROCEDURE — 87811 SARS-COV-2 COVID19 W/OPTIC: CPT

## 2025-08-14 PROCEDURE — 99284 EMERGENCY DEPT VISIT MOD MDM: CPT

## 2025-08-14 PROCEDURE — 71275 CT ANGIOGRAPHY CHEST: CPT

## 2025-08-14 PROCEDURE — 87804 INFLUENZA ASSAY W/OPTIC: CPT

## 2025-08-14 PROCEDURE — 80053 COMPREHEN METABOLIC PANEL: CPT

## 2025-08-14 PROCEDURE — 99223 1ST HOSP IP/OBS HIGH 75: CPT

## 2025-08-14 PROCEDURE — 74177 CT ABD & PELVIS W/CONTRAST: CPT

## 2025-08-14 PROCEDURE — 99285 EMERGENCY DEPT VISIT HI MDM: CPT | Performed by: EMERGENCY MEDICINE

## 2025-08-14 RX ORDER — FAMOTIDINE 20 MG/1
10 TABLET, FILM COATED ORAL 2 TIMES DAILY
Status: DISCONTINUED | OUTPATIENT
Start: 2025-08-15 | End: 2025-08-16 | Stop reason: HOSPADM

## 2025-08-14 RX ORDER — PANTOPRAZOLE SODIUM 40 MG/1
40 TABLET, DELAYED RELEASE ORAL
Status: DISCONTINUED | OUTPATIENT
Start: 2025-08-15 | End: 2025-08-15

## 2025-08-14 RX ORDER — ONDANSETRON 2 MG/ML
4 INJECTION INTRAMUSCULAR; INTRAVENOUS ONCE
Status: COMPLETED | OUTPATIENT
Start: 2025-08-14 | End: 2025-08-14

## 2025-08-14 RX ORDER — ALPRAZOLAM 0.25 MG
0.25 TABLET ORAL 3 TIMES DAILY PRN
Status: DISCONTINUED | OUTPATIENT
Start: 2025-08-14 | End: 2025-08-16 | Stop reason: HOSPADM

## 2025-08-14 RX ORDER — MECLIZINE HCL 12.5 MG 12.5 MG/1
12.5 TABLET ORAL ONCE
Status: COMPLETED | OUTPATIENT
Start: 2025-08-14 | End: 2025-08-14

## 2025-08-14 RX ORDER — HEPARIN SODIUM 5000 [USP'U]/ML
5000 INJECTION, SOLUTION INTRAVENOUS; SUBCUTANEOUS EVERY 8 HOURS SCHEDULED
Status: DISCONTINUED | OUTPATIENT
Start: 2025-08-15 | End: 2025-08-16 | Stop reason: HOSPADM

## 2025-08-14 RX ORDER — ACETAMINOPHEN 325 MG/1
650 TABLET ORAL EVERY 4 HOURS PRN
Status: DISCONTINUED | OUTPATIENT
Start: 2025-08-14 | End: 2025-08-16

## 2025-08-14 RX ORDER — LOSARTAN POTASSIUM 50 MG/1
100 TABLET ORAL DAILY
Status: DISCONTINUED | OUTPATIENT
Start: 2025-08-15 | End: 2025-08-16 | Stop reason: HOSPADM

## 2025-08-14 RX ADMIN — MECLIZINE 12.5 MG: 12.5 TABLET ORAL at 18:14

## 2025-08-14 RX ADMIN — ONDANSETRON 4 MG: 2 INJECTION INTRAMUSCULAR; INTRAVENOUS at 17:15

## 2025-08-14 RX ADMIN — ACETAMINOPHEN 650 MG: 325 TABLET ORAL at 23:35

## 2025-08-14 RX ADMIN — ALPRAZOLAM 0.25 MG: 0.25 TABLET ORAL at 23:36

## 2025-08-14 RX ADMIN — IOHEXOL 100 ML: 350 INJECTION, SOLUTION INTRAVENOUS at 19:13

## 2025-08-15 LAB
ANION GAP SERPL CALCULATED.3IONS-SCNC: 5 MMOL/L (ref 4–13)
ATRIAL RATE: 81 BPM
BUN SERPL-MCNC: 12 MG/DL (ref 5–25)
CALCIUM SERPL-MCNC: 9.6 MG/DL (ref 8.4–10.2)
CHLORIDE SERPL-SCNC: 102 MMOL/L (ref 96–108)
CO2 SERPL-SCNC: 31 MMOL/L (ref 21–32)
CREAT SERPL-MCNC: 0.79 MG/DL (ref 0.6–1.3)
ERYTHROCYTE [DISTWIDTH] IN BLOOD BY AUTOMATED COUNT: 14.1 % (ref 11.6–15.1)
GFR SERPL CREATININE-BSD FRML MDRD: 64 ML/MIN/1.73SQ M
GLUCOSE P FAST SERPL-MCNC: 97 MG/DL (ref 65–99)
GLUCOSE SERPL-MCNC: 97 MG/DL (ref 65–140)
HCT VFR BLD AUTO: 33.1 % (ref 34.8–46.1)
HGB BLD-MCNC: 11.1 G/DL (ref 11.5–15.4)
MAGNESIUM SERPL-MCNC: 2.1 MG/DL (ref 1.9–2.7)
MCH RBC QN AUTO: 34.9 PG (ref 26.8–34.3)
MCHC RBC AUTO-ENTMCNC: 33.5 G/DL (ref 31.4–37.4)
MCV RBC AUTO: 104 FL (ref 82–98)
P AXIS: 51 DEGREES
PHOSPHATE SERPL-MCNC: 3.9 MG/DL (ref 2.3–4.1)
PLATELET # BLD AUTO: 282 THOUSANDS/UL (ref 149–390)
PMV BLD AUTO: 10.6 FL (ref 8.9–12.7)
POTASSIUM SERPL-SCNC: 3.9 MMOL/L (ref 3.5–5.3)
PR INTERVAL: 192 MS
QRS AXIS: -31 DEGREES
QRSD INTERVAL: 104 MS
QT INTERVAL: 396 MS
QTC INTERVAL: 460 MS
RBC # BLD AUTO: 3.18 MILLION/UL (ref 3.81–5.12)
SODIUM SERPL-SCNC: 138 MMOL/L (ref 135–147)
T WAVE AXIS: 56 DEGREES
VENTRICULAR RATE: 81 BPM
WBC # BLD AUTO: 5.29 THOUSAND/UL (ref 4.31–10.16)

## 2025-08-15 PROCEDURE — 84100 ASSAY OF PHOSPHORUS: CPT

## 2025-08-15 PROCEDURE — 85027 COMPLETE CBC AUTOMATED: CPT

## 2025-08-15 PROCEDURE — 93010 ELECTROCARDIOGRAM REPORT: CPT | Performed by: INTERNAL MEDICINE

## 2025-08-15 PROCEDURE — 99239 HOSP IP/OBS DSCHRG MGMT >30: CPT | Performed by: HOSPITALIST

## 2025-08-15 PROCEDURE — 97166 OT EVAL MOD COMPLEX 45 MIN: CPT

## 2025-08-15 PROCEDURE — 83735 ASSAY OF MAGNESIUM: CPT

## 2025-08-15 PROCEDURE — 97162 PT EVAL MOD COMPLEX 30 MIN: CPT

## 2025-08-15 PROCEDURE — 80048 BASIC METABOLIC PNL TOTAL CA: CPT

## 2025-08-15 RX ORDER — ONDANSETRON 4 MG/1
4 TABLET, FILM COATED ORAL EVERY 8 HOURS PRN
Qty: 20 TABLET | Refills: 0 | Status: SHIPPED | OUTPATIENT
Start: 2025-08-15

## 2025-08-15 RX ORDER — PANTOPRAZOLE SODIUM 40 MG/1
40 TABLET, DELAYED RELEASE ORAL 2 TIMES DAILY
Qty: 90 TABLET | Refills: 0 | Status: SHIPPED | OUTPATIENT
Start: 2025-08-15

## 2025-08-15 RX ORDER — PANTOPRAZOLE SODIUM 40 MG/1
40 TABLET, DELAYED RELEASE ORAL
Status: DISCONTINUED | OUTPATIENT
Start: 2025-08-15 | End: 2025-08-16 | Stop reason: HOSPADM

## 2025-08-15 RX ADMIN — ACETAMINOPHEN 650 MG: 325 TABLET ORAL at 21:48

## 2025-08-15 RX ADMIN — FAMOTIDINE 10 MG: 20 TABLET, FILM COATED ORAL at 18:48

## 2025-08-15 RX ADMIN — PANTOPRAZOLE SODIUM 40 MG: 40 TABLET, DELAYED RELEASE ORAL at 15:02

## 2025-08-15 RX ADMIN — ALPRAZOLAM 0.25 MG: 0.25 TABLET ORAL at 21:48

## 2025-08-15 RX ADMIN — PANTOPRAZOLE SODIUM 40 MG: 40 TABLET, DELAYED RELEASE ORAL at 05:22

## 2025-08-15 RX ADMIN — HEPARIN SODIUM 5000 UNITS: 5000 INJECTION INTRAVENOUS; SUBCUTANEOUS at 05:22

## 2025-08-15 RX ADMIN — ALPRAZOLAM 0.25 MG: 0.25 TABLET ORAL at 10:35

## 2025-08-15 RX ADMIN — ACETAMINOPHEN 650 MG: 325 TABLET ORAL at 08:40

## 2025-08-15 RX ADMIN — ACETAMINOPHEN 650 MG: 325 TABLET ORAL at 18:50

## 2025-08-15 RX ADMIN — FAMOTIDINE 10 MG: 20 TABLET, FILM COATED ORAL at 08:40

## 2025-08-15 RX ADMIN — HEPARIN SODIUM 5000 UNITS: 5000 INJECTION INTRAVENOUS; SUBCUTANEOUS at 21:48

## 2025-08-15 RX ADMIN — HEPARIN SODIUM 5000 UNITS: 5000 INJECTION INTRAVENOUS; SUBCUTANEOUS at 14:57

## 2025-08-15 RX ADMIN — LOSARTAN POTASSIUM 100 MG: 50 TABLET, FILM COATED ORAL at 08:40

## 2025-08-16 VITALS
RESPIRATION RATE: 19 BRPM | DIASTOLIC BLOOD PRESSURE: 73 MMHG | SYSTOLIC BLOOD PRESSURE: 159 MMHG | HEART RATE: 76 BPM | TEMPERATURE: 97.7 F | BODY MASS INDEX: 30.13 KG/M2 | HEIGHT: 59 IN | WEIGHT: 149.47 LBS | OXYGEN SATURATION: 99 %

## 2025-08-16 PROCEDURE — 99239 HOSP IP/OBS DSCHRG MGMT >30: CPT | Performed by: HOSPITALIST

## 2025-08-16 RX ORDER — ACETAMINOPHEN 325 MG/1
975 TABLET ORAL EVERY 8 HOURS SCHEDULED
Qty: 126 TABLET | Refills: 0 | Status: SHIPPED | OUTPATIENT
Start: 2025-08-16 | End: 2025-08-16

## 2025-08-16 RX ORDER — AMOXICILLIN 250 MG
1 CAPSULE ORAL
Status: DISCONTINUED | OUTPATIENT
Start: 2025-08-16 | End: 2025-08-16 | Stop reason: HOSPADM

## 2025-08-16 RX ORDER — POLYETHYLENE GLYCOL 3350 17 G/17G
17 POWDER, FOR SOLUTION ORAL DAILY
Qty: 510 G | Refills: 0 | Status: SHIPPED | OUTPATIENT
Start: 2025-08-16 | End: 2025-08-21

## 2025-08-16 RX ORDER — POLYETHYLENE GLYCOL 3350 17 G/17G
17 POWDER, FOR SOLUTION ORAL DAILY PRN
Status: DISCONTINUED | OUTPATIENT
Start: 2025-08-16 | End: 2025-08-16 | Stop reason: HOSPADM

## 2025-08-16 RX ORDER — AMOXICILLIN 250 MG
1 CAPSULE ORAL
Qty: 30 TABLET | Refills: 0 | Status: SHIPPED | OUTPATIENT
Start: 2025-08-16 | End: 2025-08-16

## 2025-08-16 RX ORDER — ONDANSETRON 4 MG/1
4 TABLET, ORALLY DISINTEGRATING ORAL EVERY 6 HOURS PRN
Status: DISCONTINUED | OUTPATIENT
Start: 2025-08-16 | End: 2025-08-16 | Stop reason: HOSPADM

## 2025-08-16 RX ORDER — ACETAMINOPHEN 325 MG/1
975 TABLET ORAL EVERY 8 HOURS SCHEDULED
Status: DISCONTINUED | OUTPATIENT
Start: 2025-08-16 | End: 2025-08-16 | Stop reason: HOSPADM

## 2025-08-16 RX ORDER — ACETAMINOPHEN 325 MG/1
975 TABLET ORAL EVERY 8 HOURS SCHEDULED
Qty: 126 TABLET | Refills: 0 | Status: SHIPPED | OUTPATIENT
Start: 2025-08-16 | End: 2025-08-21

## 2025-08-16 RX ADMIN — ALPRAZOLAM 0.25 MG: 0.25 TABLET ORAL at 11:28

## 2025-08-16 RX ADMIN — FAMOTIDINE 10 MG: 20 TABLET, FILM COATED ORAL at 08:46

## 2025-08-16 RX ADMIN — LOSARTAN POTASSIUM 100 MG: 50 TABLET, FILM COATED ORAL at 08:46

## 2025-08-16 RX ADMIN — HEPARIN SODIUM 5000 UNITS: 5000 INJECTION INTRAVENOUS; SUBCUTANEOUS at 06:06

## 2025-08-16 RX ADMIN — PANTOPRAZOLE SODIUM 40 MG: 40 TABLET, DELAYED RELEASE ORAL at 06:06

## 2025-08-16 RX ADMIN — ACETAMINOPHEN 975 MG: 325 TABLET ORAL at 11:26

## 2025-08-18 ENCOUNTER — TRANSITIONAL CARE MANAGEMENT (OUTPATIENT)
Dept: FAMILY MEDICINE CLINIC | Facility: CLINIC | Age: OVER 89
End: 2025-08-18

## 2025-08-18 ENCOUNTER — OFFICE VISIT (OUTPATIENT)
Dept: FAMILY MEDICINE CLINIC | Facility: CLINIC | Age: OVER 89
End: 2025-08-18
Payer: COMMERCIAL

## 2025-08-18 VITALS
SYSTOLIC BLOOD PRESSURE: 126 MMHG | DIASTOLIC BLOOD PRESSURE: 60 MMHG | WEIGHT: 151.2 LBS | TEMPERATURE: 98.6 F | OXYGEN SATURATION: 96 % | HEIGHT: 59 IN | BODY MASS INDEX: 30.48 KG/M2 | HEART RATE: 75 BPM

## 2025-08-18 DIAGNOSIS — K58.9 IRRITABLE BOWEL SYNDROME, UNSPECIFIED TYPE: ICD-10-CM

## 2025-08-18 DIAGNOSIS — Z78.9 TRANSITION OF CARE: Primary | ICD-10-CM

## 2025-08-18 DIAGNOSIS — F41.8 DEPRESSION WITH ANXIETY: ICD-10-CM

## 2025-08-18 PROCEDURE — 99495 TRANSJ CARE MGMT MOD F2F 14D: CPT | Performed by: FAMILY MEDICINE

## 2025-08-18 RX ORDER — VENLAFAXINE HYDROCHLORIDE 37.5 MG/1
37.5 CAPSULE, EXTENDED RELEASE ORAL
Qty: 30 CAPSULE | Refills: 5 | Status: SHIPPED | OUTPATIENT
Start: 2025-08-18

## 2025-08-20 ENCOUNTER — HOSPITAL ENCOUNTER (OUTPATIENT)
Facility: HOSPITAL | Age: OVER 89
Setting detail: OBSERVATION
Discharge: NURSING, REHAB OR LONG-TERM CARE | End: 2025-08-21
Attending: EMERGENCY MEDICINE | Admitting: INTERNAL MEDICINE
Payer: COMMERCIAL

## 2025-08-20 DIAGNOSIS — K62.5 BRIGHT RED BLOOD PER RECTUM: ICD-10-CM

## 2025-08-20 DIAGNOSIS — R10.9 ABDOMINAL PAIN: Primary | ICD-10-CM

## 2025-08-20 DIAGNOSIS — I49.1 PREMATURE ATRIAL COMPLEXES: ICD-10-CM

## 2025-08-20 DIAGNOSIS — K92.1 HEMATOCHEZIA: ICD-10-CM

## 2025-08-20 DIAGNOSIS — D64.9 ANEMIA: ICD-10-CM

## 2025-08-20 DIAGNOSIS — T78.40XA ALLERGIES: ICD-10-CM

## 2025-08-20 DIAGNOSIS — R11.0 NAUSEA: ICD-10-CM

## 2025-08-20 DIAGNOSIS — R42 DIZZINESS: ICD-10-CM

## 2025-08-20 DIAGNOSIS — I10 UNCONTROLLED HYPERTENSION: ICD-10-CM

## 2025-08-20 DIAGNOSIS — K59.00 CONSTIPATION: ICD-10-CM

## 2025-08-20 DIAGNOSIS — I45.10 INCOMPLETE RIGHT BUNDLE BRANCH BLOCK: ICD-10-CM

## 2025-08-20 DIAGNOSIS — G60.9 IDIOPATHIC PERIPHERAL NEUROPATHY: ICD-10-CM

## 2025-08-20 DIAGNOSIS — R26.2 AMBULATORY DYSFUNCTION: ICD-10-CM

## 2025-08-20 PROBLEM — I16.9 HYPERTENSIVE CRISIS: Status: ACTIVE | Noted: 2025-08-20

## 2025-08-20 PROBLEM — E87.1 HYPONATREMIA: Status: ACTIVE | Noted: 2025-08-20

## 2025-08-20 LAB
ALBUMIN SERPL BCG-MCNC: 4.1 G/DL (ref 3.5–5)
ALP SERPL-CCNC: 75 U/L (ref 34–104)
ALT SERPL W P-5'-P-CCNC: 17 U/L (ref 7–52)
ANION GAP SERPL CALCULATED.3IONS-SCNC: 10 MMOL/L (ref 4–13)
AST SERPL W P-5'-P-CCNC: 27 U/L (ref 13–39)
ATRIAL RATE: 82 BPM
BACTERIA UR QL AUTO: NORMAL /HPF
BASOPHILS # BLD AUTO: 0.06 THOUSANDS/ÂΜL (ref 0–0.1)
BASOPHILS NFR BLD AUTO: 1 % (ref 0–1)
BILIRUB SERPL-MCNC: 0.98 MG/DL (ref 0.2–1)
BILIRUB UR QL STRIP: NEGATIVE
BUN SERPL-MCNC: 12 MG/DL (ref 5–25)
CALCIUM SERPL-MCNC: 9.8 MG/DL (ref 8.4–10.2)
CARDIAC TROPONIN I PNL SERPL HS: 3 NG/L (ref 8–18)
CHLORIDE SERPL-SCNC: 98 MMOL/L (ref 96–108)
CLARITY UR: NORMAL
CO2 SERPL-SCNC: 23 MMOL/L (ref 21–32)
COLOR UR: COLORLESS
CREAT SERPL-MCNC: 0.71 MG/DL (ref 0.6–1.3)
EOSINOPHIL # BLD AUTO: 0.1 THOUSAND/ÂΜL (ref 0–0.61)
EOSINOPHIL NFR BLD AUTO: 2 % (ref 0–6)
ERYTHROCYTE [DISTWIDTH] IN BLOOD BY AUTOMATED COUNT: 13.9 % (ref 11.6–15.1)
GFR SERPL CREATININE-BSD FRML MDRD: 73 ML/MIN/1.73SQ M
GLUCOSE SERPL-MCNC: 121 MG/DL (ref 65–140)
GLUCOSE UR STRIP-MCNC: NEGATIVE MG/DL
HCT VFR BLD AUTO: 32.1 % (ref 34.8–46.1)
HGB BLD-MCNC: 10.8 G/DL (ref 11.5–15.4)
HGB UR QL STRIP.AUTO: NEGATIVE
IMM GRANULOCYTES # BLD AUTO: 0.03 THOUSAND/UL (ref 0–0.2)
IMM GRANULOCYTES NFR BLD AUTO: 1 % (ref 0–2)
KETONES UR STRIP-MCNC: NEGATIVE MG/DL
LEUKOCYTE ESTERASE UR QL STRIP: NEGATIVE
LYMPHOCYTES # BLD AUTO: 1.84 THOUSANDS/ÂΜL (ref 0.6–4.47)
LYMPHOCYTES NFR BLD AUTO: 34 % (ref 14–44)
MAGNESIUM SERPL-MCNC: 2 MG/DL (ref 1.9–2.7)
MCH RBC QN AUTO: 34.6 PG (ref 26.8–34.3)
MCHC RBC AUTO-ENTMCNC: 33.6 G/DL (ref 31.4–37.4)
MCV RBC AUTO: 103 FL (ref 82–98)
MONOCYTES # BLD AUTO: 0.69 THOUSAND/ÂΜL (ref 0.17–1.22)
MONOCYTES NFR BLD AUTO: 13 % (ref 4–12)
NEUTROPHILS # BLD AUTO: 2.77 THOUSANDS/ÂΜL (ref 1.85–7.62)
NEUTS SEG NFR BLD AUTO: 49 % (ref 43–75)
NITRITE UR QL STRIP: NEGATIVE
NON-SQ EPI CELLS URNS QL MICRO: NORMAL /HPF
NRBC BLD AUTO-RTO: 0 /100 WBCS
OSMOLALITY UR: 262 MMOL/KG (ref 250–900)
P AXIS: 31 DEGREES
PH UR STRIP.AUTO: 7.5 [PH]
PLATELET # BLD AUTO: 290 THOUSANDS/UL (ref 149–390)
PMV BLD AUTO: 10.3 FL (ref 8.9–12.7)
POTASSIUM SERPL-SCNC: 3.7 MMOL/L (ref 3.5–5.3)
PR INTERVAL: 202 MS
PROT SERPL-MCNC: 7.2 G/DL (ref 6.4–8.4)
PROT UR STRIP-MCNC: NEGATIVE MG/DL
QRS AXIS: -36 DEGREES
QRSD INTERVAL: 102 MS
QT INTERVAL: 388 MS
QTC INTERVAL: 453 MS
RBC # BLD AUTO: 3.12 MILLION/UL (ref 3.81–5.12)
RBC #/AREA URNS AUTO: NORMAL /HPF
SODIUM SERPL-SCNC: 131 MMOL/L (ref 135–147)
SODIUM UR-SCNC: 97 MMOL/L
SP GR UR STRIP.AUTO: 1.01 (ref 1–1.03)
T WAVE AXIS: 2 DEGREES
UROBILINOGEN UR STRIP-ACNC: <2 MG/DL
VENTRICULAR RATE: 82 BPM
WBC # BLD AUTO: 5.49 THOUSAND/UL (ref 4.31–10.16)
WBC #/AREA URNS AUTO: NORMAL /HPF

## 2025-08-20 PROCEDURE — 97167 OT EVAL HIGH COMPLEX 60 MIN: CPT

## 2025-08-20 PROCEDURE — 83735 ASSAY OF MAGNESIUM: CPT | Performed by: EMERGENCY MEDICINE

## 2025-08-20 PROCEDURE — 83935 ASSAY OF URINE OSMOLALITY: CPT | Performed by: INTERNAL MEDICINE

## 2025-08-20 PROCEDURE — 93005 ELECTROCARDIOGRAM TRACING: CPT

## 2025-08-20 PROCEDURE — 36415 COLL VENOUS BLD VENIPUNCTURE: CPT | Performed by: EMERGENCY MEDICINE

## 2025-08-20 PROCEDURE — 96374 THER/PROPH/DIAG INJ IV PUSH: CPT

## 2025-08-20 PROCEDURE — 99223 1ST HOSP IP/OBS HIGH 75: CPT | Performed by: INTERNAL MEDICINE

## 2025-08-20 PROCEDURE — 81001 URINALYSIS AUTO W/SCOPE: CPT | Performed by: INTERNAL MEDICINE

## 2025-08-20 PROCEDURE — 99285 EMERGENCY DEPT VISIT HI MDM: CPT | Performed by: EMERGENCY MEDICINE

## 2025-08-20 PROCEDURE — 84300 ASSAY OF URINE SODIUM: CPT | Performed by: INTERNAL MEDICINE

## 2025-08-20 PROCEDURE — 97163 PT EVAL HIGH COMPLEX 45 MIN: CPT

## 2025-08-20 PROCEDURE — 87086 URINE CULTURE/COLONY COUNT: CPT | Performed by: INTERNAL MEDICINE

## 2025-08-20 PROCEDURE — 96361 HYDRATE IV INFUSION ADD-ON: CPT

## 2025-08-20 PROCEDURE — 99284 EMERGENCY DEPT VISIT MOD MDM: CPT

## 2025-08-20 PROCEDURE — 80053 COMPREHEN METABOLIC PANEL: CPT | Performed by: EMERGENCY MEDICINE

## 2025-08-20 PROCEDURE — 85025 COMPLETE CBC W/AUTO DIFF WBC: CPT | Performed by: EMERGENCY MEDICINE

## 2025-08-20 PROCEDURE — 84484 ASSAY OF TROPONIN QUANT: CPT | Performed by: EMERGENCY MEDICINE

## 2025-08-20 RX ORDER — PANTOPRAZOLE SODIUM 40 MG/10ML
40 INJECTION, POWDER, LYOPHILIZED, FOR SOLUTION INTRAVENOUS
Status: DISCONTINUED | OUTPATIENT
Start: 2025-08-20 | End: 2025-08-20

## 2025-08-20 RX ORDER — VENLAFAXINE HYDROCHLORIDE 37.5 MG/1
37.5 CAPSULE, EXTENDED RELEASE ORAL
Status: DISCONTINUED | OUTPATIENT
Start: 2025-08-20 | End: 2025-08-21 | Stop reason: HOSPADM

## 2025-08-20 RX ORDER — ALPRAZOLAM 0.25 MG
0.25 TABLET ORAL 3 TIMES DAILY PRN
Status: DISCONTINUED | OUTPATIENT
Start: 2025-08-20 | End: 2025-08-21 | Stop reason: HOSPADM

## 2025-08-20 RX ORDER — MAGNESIUM HYDROXIDE/ALUMINUM HYDROXICE/SIMETHICONE 120; 1200; 1200 MG/30ML; MG/30ML; MG/30ML
30 SUSPENSION ORAL EVERY 4 HOURS PRN
Status: DISCONTINUED | OUTPATIENT
Start: 2025-08-20 | End: 2025-08-21 | Stop reason: HOSPADM

## 2025-08-20 RX ORDER — ENOXAPARIN SODIUM 100 MG/ML
40 INJECTION SUBCUTANEOUS DAILY
Status: DISCONTINUED | OUTPATIENT
Start: 2025-08-20 | End: 2025-08-20

## 2025-08-20 RX ORDER — ACETAMINOPHEN 325 MG/1
650 TABLET ORAL EVERY 6 HOURS PRN
Status: DISCONTINUED | OUTPATIENT
Start: 2025-08-20 | End: 2025-08-21 | Stop reason: HOSPADM

## 2025-08-20 RX ORDER — SODIUM CHLORIDE 9 MG/ML
75 INJECTION, SOLUTION INTRAVENOUS CONTINUOUS
Status: DISCONTINUED | OUTPATIENT
Start: 2025-08-20 | End: 2025-08-20

## 2025-08-20 RX ORDER — HYDRALAZINE HYDROCHLORIDE 20 MG/ML
5 INJECTION INTRAMUSCULAR; INTRAVENOUS EVERY 6 HOURS PRN
Status: DISCONTINUED | OUTPATIENT
Start: 2025-08-20 | End: 2025-08-20

## 2025-08-20 RX ORDER — ONDANSETRON 2 MG/ML
4 INJECTION INTRAMUSCULAR; INTRAVENOUS EVERY 6 HOURS PRN
Status: DISCONTINUED | OUTPATIENT
Start: 2025-08-20 | End: 2025-08-21 | Stop reason: HOSPADM

## 2025-08-20 RX ORDER — LOSARTAN POTASSIUM 50 MG/1
100 TABLET ORAL DAILY
Status: DISCONTINUED | OUTPATIENT
Start: 2025-08-20 | End: 2025-08-21 | Stop reason: HOSPADM

## 2025-08-20 RX ORDER — PANTOPRAZOLE SODIUM 40 MG/10ML
40 INJECTION, POWDER, LYOPHILIZED, FOR SOLUTION INTRAVENOUS EVERY 12 HOURS SCHEDULED
Status: DISCONTINUED | OUTPATIENT
Start: 2025-08-20 | End: 2025-08-21 | Stop reason: HOSPADM

## 2025-08-20 RX ORDER — POLYETHYLENE GLYCOL 3350 17 G/17G
17 POWDER, FOR SOLUTION ORAL DAILY
Status: DISCONTINUED | OUTPATIENT
Start: 2025-08-20 | End: 2025-08-20

## 2025-08-20 RX ORDER — FAMOTIDINE 10 MG/ML
20 INJECTION, SOLUTION INTRAVENOUS
Status: DISCONTINUED | OUTPATIENT
Start: 2025-08-20 | End: 2025-08-21 | Stop reason: HOSPADM

## 2025-08-20 RX ORDER — DIPHENHYDRAMINE HYDROCHLORIDE 50 MG/ML
25 INJECTION, SOLUTION INTRAMUSCULAR; INTRAVENOUS ONCE
Status: COMPLETED | OUTPATIENT
Start: 2025-08-20 | End: 2025-08-20

## 2025-08-20 RX ORDER — SENNOSIDES 8.6 MG
1 TABLET ORAL 2 TIMES DAILY
Status: DISCONTINUED | OUTPATIENT
Start: 2025-08-20 | End: 2025-08-21 | Stop reason: HOSPADM

## 2025-08-20 RX ORDER — PROMETHAZINE HYDROCHLORIDE 25 MG/1
25 TABLET ORAL ONCE
Status: COMPLETED | OUTPATIENT
Start: 2025-08-20 | End: 2025-08-20

## 2025-08-20 RX ORDER — HYDRALAZINE HYDROCHLORIDE 20 MG/ML
5 INJECTION INTRAMUSCULAR; INTRAVENOUS EVERY 6 HOURS PRN
Status: DISCONTINUED | OUTPATIENT
Start: 2025-08-20 | End: 2025-08-21

## 2025-08-20 RX ADMIN — SENNOSIDES 8.6 MG: 8.6 TABLET, FILM COATED ORAL at 12:10

## 2025-08-20 RX ADMIN — VENLAFAXINE HYDROCHLORIDE 37.5 MG: 37.5 CAPSULE, EXTENDED RELEASE ORAL at 09:45

## 2025-08-20 RX ADMIN — ACETAMINOPHEN 650 MG: 325 TABLET ORAL at 18:51

## 2025-08-20 RX ADMIN — PROMETHAZINE HYDROCHLORIDE 25 MG: 25 TABLET ORAL at 01:47

## 2025-08-20 RX ADMIN — ALPRAZOLAM 0.25 MG: 0.25 TABLET ORAL at 21:38

## 2025-08-20 RX ADMIN — HYDRALAZINE HYDROCHLORIDE 5 MG: 20 INJECTION INTRAMUSCULAR; INTRAVENOUS at 18:56

## 2025-08-20 RX ADMIN — SODIUM CHLORIDE 150 ML/HR: 0.9 INJECTION, SOLUTION INTRAVENOUS at 01:47

## 2025-08-20 RX ADMIN — ALUMINUM HYDROXIDE, MAGNESIUM HYDROXIDE, AND DIMETHICONE 30 ML: 200; 20; 200 SUSPENSION ORAL at 14:22

## 2025-08-20 RX ADMIN — DIPHENHYDRAMINE HYDROCHLORIDE 25 MG: 50 INJECTION, SOLUTION INTRAMUSCULAR; INTRAVENOUS at 01:47

## 2025-08-20 RX ADMIN — PANTOPRAZOLE SODIUM 40 MG: 40 INJECTION, POWDER, FOR SOLUTION INTRAVENOUS at 11:53

## 2025-08-20 RX ADMIN — ACETAMINOPHEN 650 MG: 325 TABLET ORAL at 12:10

## 2025-08-20 RX ADMIN — LOSARTAN POTASSIUM 100 MG: 50 TABLET, FILM COATED ORAL at 09:45

## 2025-08-20 RX ADMIN — ALUMINUM HYDROXIDE, MAGNESIUM HYDROXIDE, AND DIMETHICONE 30 ML: 200; 20; 200 SUSPENSION ORAL at 18:57

## 2025-08-21 VITALS
HEART RATE: 71 BPM | RESPIRATION RATE: 17 BRPM | SYSTOLIC BLOOD PRESSURE: 164 MMHG | HEIGHT: 59 IN | WEIGHT: 150.79 LBS | TEMPERATURE: 98.2 F | OXYGEN SATURATION: 96 % | BODY MASS INDEX: 30.4 KG/M2 | DIASTOLIC BLOOD PRESSURE: 83 MMHG

## 2025-08-21 PROBLEM — R71.8 ELEVATED MCV: Status: ACTIVE | Noted: 2025-08-21

## 2025-08-21 PROBLEM — I45.10 INCOMPLETE RIGHT BUNDLE BRANCH BLOCK: Status: ACTIVE | Noted: 2025-08-21

## 2025-08-21 PROBLEM — T78.40XA ALLERGIES: Status: ACTIVE | Noted: 2025-08-21

## 2025-08-21 PROBLEM — R59.1 LYMPHADENOPATHY: Status: ACTIVE | Noted: 2025-08-21

## 2025-08-21 PROBLEM — R26.81 GAIT INSTABILITY: Status: ACTIVE | Noted: 2025-08-21

## 2025-08-21 PROBLEM — D17.9 LIPOMA: Status: ACTIVE | Noted: 2025-08-21

## 2025-08-21 PROBLEM — R11.0 NAUSEA: Status: ACTIVE | Noted: 2025-08-21

## 2025-08-21 PROBLEM — I25.10 CORONARY ARTERY CALCIFICATION SEEN ON CT SCAN: Status: ACTIVE | Noted: 2025-08-21

## 2025-08-21 PROBLEM — I49.1 PREMATURE ATRIAL COMPLEXES: Status: ACTIVE | Noted: 2025-08-21

## 2025-08-21 PROBLEM — R53.1 GENERALIZED WEAKNESS: Status: ACTIVE | Noted: 2025-08-21

## 2025-08-21 PROBLEM — K76.0 HEPATIC STEATOSIS: Status: ACTIVE | Noted: 2025-08-21

## 2025-08-21 PROBLEM — D70.9 NEUTROPENIA (HCC): Status: ACTIVE | Noted: 2025-08-21

## 2025-08-21 PROBLEM — R10.9 INTRACTABLE ABDOMINAL PAIN: Status: ACTIVE | Noted: 2025-08-21

## 2025-08-21 PROBLEM — D72.819 LEUKOPENIA: Status: ACTIVE | Noted: 2025-08-21

## 2025-08-21 LAB
ALBUMIN SERPL BCG-MCNC: 4.2 G/DL (ref 3.5–5)
ALP SERPL-CCNC: 73 U/L (ref 34–104)
ALT SERPL W P-5'-P-CCNC: 18 U/L (ref 7–52)
ANION GAP SERPL CALCULATED.3IONS-SCNC: 5 MMOL/L (ref 4–13)
AST SERPL W P-5'-P-CCNC: 21 U/L (ref 13–39)
BASOPHILS # BLD AUTO: 0.05 THOUSANDS/ÂΜL (ref 0–0.1)
BASOPHILS NFR BLD AUTO: 1 % (ref 0–1)
BILIRUB SERPL-MCNC: 0.99 MG/DL (ref 0.2–1)
BUN SERPL-MCNC: 8 MG/DL (ref 5–25)
CALCIUM SERPL-MCNC: 9.4 MG/DL (ref 8.4–10.2)
CHLORIDE SERPL-SCNC: 103 MMOL/L (ref 96–108)
CO2 SERPL-SCNC: 28 MMOL/L (ref 21–32)
CREAT SERPL-MCNC: 0.73 MG/DL (ref 0.6–1.3)
EOSINOPHIL # BLD AUTO: 0.09 THOUSAND/ÂΜL (ref 0–0.61)
EOSINOPHIL NFR BLD AUTO: 2 % (ref 0–6)
ERYTHROCYTE [DISTWIDTH] IN BLOOD BY AUTOMATED COUNT: 14.5 % (ref 11.6–15.1)
GFR SERPL CREATININE-BSD FRML MDRD: 70 ML/MIN/1.73SQ M
GLUCOSE SERPL-MCNC: 103 MG/DL (ref 65–140)
HCT VFR BLD AUTO: 35.2 % (ref 34.8–46.1)
HGB BLD-MCNC: 11.7 G/DL (ref 11.5–15.4)
IMM GRANULOCYTES # BLD AUTO: 0.02 THOUSAND/UL (ref 0–0.2)
IMM GRANULOCYTES NFR BLD AUTO: 1 % (ref 0–2)
LACTATE SERPL-SCNC: 0.8 MMOL/L (ref 0.5–2)
LYMPHOCYTES # BLD AUTO: 1.82 THOUSANDS/ÂΜL (ref 0.6–4.47)
LYMPHOCYTES NFR BLD AUTO: 46 % (ref 14–44)
MAGNESIUM SERPL-MCNC: 2 MG/DL (ref 1.9–2.7)
MCH RBC QN AUTO: 34.5 PG (ref 26.8–34.3)
MCHC RBC AUTO-ENTMCNC: 33.2 G/DL (ref 31.4–37.4)
MCV RBC AUTO: 104 FL (ref 82–98)
MONOCYTES # BLD AUTO: 0.46 THOUSAND/ÂΜL (ref 0.17–1.22)
MONOCYTES NFR BLD AUTO: 11 % (ref 4–12)
NEUTROPHILS # BLD AUTO: 1.58 THOUSANDS/ÂΜL (ref 1.85–7.62)
NEUTS SEG NFR BLD AUTO: 39 % (ref 43–75)
NRBC BLD AUTO-RTO: 0 /100 WBCS
PHOSPHATE SERPL-MCNC: 2.7 MG/DL (ref 2.3–4.1)
PLATELET # BLD AUTO: 311 THOUSANDS/UL (ref 149–390)
PMV BLD AUTO: 9.8 FL (ref 8.9–12.7)
POTASSIUM SERPL-SCNC: 3.6 MMOL/L (ref 3.5–5.3)
PROCALCITONIN SERPL-MCNC: <0.05 NG/ML
PROT SERPL-MCNC: 7 G/DL (ref 6.4–8.4)
RBC # BLD AUTO: 3.39 MILLION/UL (ref 3.81–5.12)
SODIUM SERPL-SCNC: 136 MMOL/L (ref 135–147)
TSH SERPL DL<=0.05 MIU/L-ACNC: 3.21 UIU/ML (ref 0.45–4.5)
WBC # BLD AUTO: 4.02 THOUSAND/UL (ref 4.31–10.16)

## 2025-08-21 PROCEDURE — 83605 ASSAY OF LACTIC ACID: CPT | Performed by: PHYSICIAN ASSISTANT

## 2025-08-21 PROCEDURE — 84443 ASSAY THYROID STIM HORMONE: CPT | Performed by: PHYSICIAN ASSISTANT

## 2025-08-21 PROCEDURE — 97530 THERAPEUTIC ACTIVITIES: CPT

## 2025-08-21 PROCEDURE — NC001 PR NO CHARGE: Performed by: INTERNAL MEDICINE

## 2025-08-21 PROCEDURE — 80074 ACUTE HEPATITIS PANEL: CPT | Performed by: PHYSICIAN ASSISTANT

## 2025-08-21 PROCEDURE — 84100 ASSAY OF PHOSPHORUS: CPT | Performed by: PHYSICIAN ASSISTANT

## 2025-08-21 PROCEDURE — 83735 ASSAY OF MAGNESIUM: CPT | Performed by: PHYSICIAN ASSISTANT

## 2025-08-21 PROCEDURE — 80053 COMPREHEN METABOLIC PANEL: CPT | Performed by: PHYSICIAN ASSISTANT

## 2025-08-21 PROCEDURE — 97116 GAIT TRAINING THERAPY: CPT

## 2025-08-21 PROCEDURE — 85025 COMPLETE CBC W/AUTO DIFF WBC: CPT | Performed by: PHYSICIAN ASSISTANT

## 2025-08-21 PROCEDURE — 99239 HOSP IP/OBS DSCHRG MGMT >30: CPT | Performed by: INTERNAL MEDICINE

## 2025-08-21 PROCEDURE — 84145 PROCALCITONIN (PCT): CPT | Performed by: PHYSICIAN ASSISTANT

## 2025-08-21 RX ORDER — MAGNESIUM HYDROXIDE/ALUMINUM HYDROXICE/SIMETHICONE 120; 1200; 1200 MG/30ML; MG/30ML; MG/30ML
30 SUSPENSION ORAL EVERY 4 HOURS PRN
Start: 2025-08-21

## 2025-08-21 RX ORDER — ECHINACEA PURPUREA EXTRACT 125 MG
1 TABLET ORAL
Status: DISCONTINUED | OUTPATIENT
Start: 2025-08-21 | End: 2025-08-21 | Stop reason: HOSPADM

## 2025-08-21 RX ORDER — LORATADINE 10 MG/1
10 TABLET ORAL DAILY
Start: 2025-08-22

## 2025-08-21 RX ORDER — LOSARTAN POTASSIUM 100 MG/1
100 TABLET ORAL DAILY
Start: 2025-08-21

## 2025-08-21 RX ORDER — POTASSIUM CHLORIDE 1500 MG/1
20 TABLET, EXTENDED RELEASE ORAL ONCE
Status: COMPLETED | OUTPATIENT
Start: 2025-08-21 | End: 2025-08-21

## 2025-08-21 RX ORDER — LORATADINE 10 MG/1
10 TABLET ORAL DAILY
Status: DISCONTINUED | OUTPATIENT
Start: 2025-08-21 | End: 2025-08-21 | Stop reason: HOSPADM

## 2025-08-21 RX ORDER — ACETAMINOPHEN 325 MG/1
650 TABLET ORAL EVERY 6 HOURS PRN
Start: 2025-08-21

## 2025-08-21 RX ORDER — SENNOSIDES 8.6 MG
8.6 TABLET ORAL 2 TIMES DAILY
Start: 2025-08-21

## 2025-08-21 RX ORDER — ENOXAPARIN SODIUM 100 MG/ML
40 INJECTION SUBCUTANEOUS DAILY
Start: 2025-08-21

## 2025-08-21 RX ORDER — LABETALOL HYDROCHLORIDE 5 MG/ML
10 INJECTION, SOLUTION INTRAVENOUS EVERY 4 HOURS PRN
Status: DISCONTINUED | OUTPATIENT
Start: 2025-08-21 | End: 2025-08-21 | Stop reason: HOSPADM

## 2025-08-21 RX ADMIN — LOSARTAN POTASSIUM 100 MG: 50 TABLET, FILM COATED ORAL at 08:54

## 2025-08-21 RX ADMIN — PANTOPRAZOLE SODIUM 40 MG: 40 INJECTION, POWDER, FOR SOLUTION INTRAVENOUS at 08:54

## 2025-08-21 RX ADMIN — FAMOTIDINE 20 MG: 10 INJECTION, SOLUTION INTRAVENOUS at 08:54

## 2025-08-21 RX ADMIN — POTASSIUM CHLORIDE EXTENDED-RELEASE 20 MEQ: 1500 TABLET ORAL at 16:21

## 2025-08-21 RX ADMIN — ALUMINUM HYDROXIDE, MAGNESIUM HYDROXIDE, AND DIMETHICONE 30 ML: 200; 20; 200 SUSPENSION ORAL at 14:08

## 2025-08-21 RX ADMIN — ACETAMINOPHEN 650 MG: 325 TABLET ORAL at 10:09

## 2025-08-21 RX ADMIN — ACETAMINOPHEN 650 MG: 325 TABLET ORAL at 17:28

## 2025-08-21 RX ADMIN — LORATADINE 10 MG: 10 TABLET ORAL at 08:54

## 2025-08-21 RX ADMIN — ALPRAZOLAM 0.25 MG: 0.25 TABLET ORAL at 08:59

## 2025-08-21 RX ADMIN — ALPRAZOLAM 0.25 MG: 0.25 TABLET ORAL at 17:27

## 2025-08-22 LAB
BACTERIA UR CULT: NORMAL
HAV IGM SER QL: NORMAL
HBV CORE IGM SER QL: NORMAL
HBV SURFACE AG SER QL: NORMAL
HCV AB SER QL: NORMAL

## (undated) DEVICE — CURITY STRETCH BANDAGE: Brand: CURITY

## (undated) DEVICE — BETHLEHEM UNIVERSAL MINOR GEN: Brand: CARDINAL HEALTH

## (undated) DEVICE — DRESSING MEPILEX AG BORDER 4 X 4 IN

## (undated) DEVICE — INTENDED FOR TISSUE SEPARATION, AND OTHER PROCEDURES THAT REQUIRE A SHARP SURGICAL BLADE TO PUNCTURE OR CUT.: Brand: BARD-PARKER SAFETY BLADES SIZE 15, STERILE

## (undated) DEVICE — 3.2MM GUIDE WIRE 400MM

## (undated) DEVICE — MEDI-VAC YANK SUCT HNDL W/TPRD BULBOUS TIP: Brand: CARDINAL HEALTH

## (undated) DEVICE — SUT ETHILON 3-0 PS-1 18 IN 1663H

## (undated) DEVICE — 6617 IOBAN II PATIENT ISOLATION DRAPE 5/BX,4BX/CS: Brand: STERI-DRAPE™ IOBAN™ 2

## (undated) DEVICE — NEEDLE 25G X 1 1/2

## (undated) DEVICE — ACE WRAP 4 IN STERILE

## (undated) DEVICE — 3M™ STERI-STRIP™ COMPOUND BENZOIN TINCTURE 40 BAGS/CARTON 4 CARTONS/CASE C1544: Brand: 3M™ STERI-STRIP™

## (undated) DEVICE — 4.0MM THREE-FLUTED DRILL BIT QC/260MM/65MM CALIBRATION

## (undated) DEVICE — SUT VICRYL PLUS 0 CTB-1 27 IN VCPB260H

## (undated) DEVICE — GLOVE INDICATOR PI UNDERGLOVE SZ 7.5 BLUE

## (undated) DEVICE — ALL PURPOSE SPONGES,NONWOVEN, 4 PLY: Brand: CURITY

## (undated) DEVICE — PLUMEPEN PRO 10FT

## (undated) DEVICE — PADDING CAST 4 IN  COTTON STRL

## (undated) DEVICE — 10FR FRAZIER SUCTION HANDLE: Brand: CARDINAL HEALTH

## (undated) DEVICE — PAD GROUNDING ADULT

## (undated) DEVICE — INTENDED FOR TISSUE SEPARATION, AND OTHER PROCEDURES THAT REQUIRE A SHARP SURGICAL BLADE TO PUNCTURE OR CUT.: Brand: BARD-PARKER SAFETY BLADES SIZE 10, STERILE

## (undated) DEVICE — GLOVE INDICATOR PI UNDERGLOVE SZ 7 BLUE

## (undated) DEVICE — SUT PROLENE 4-0 PS-2 18 IN 8682H

## (undated) DEVICE — 3M™ STERI-STRIP™ REINFORCED ADHESIVE SKIN CLOSURES, R1546, 1/4 IN X 4 IN (6 MM X 100 MM), 10 STRIPS/ENVELOPE: Brand: 3M™ STERI-STRIP™

## (undated) DEVICE — SYRINGE 10ML LL

## (undated) DEVICE — CHLORAPREP HI-LITE 26ML ORANGE

## (undated) DEVICE — SUT VICRYL PLUS 2-0 CTB-1 27 IN VCPB259H

## (undated) DEVICE — SUT SILK 3-0 SH 30 IN K832H

## (undated) DEVICE — STERILE ORIF HIP PACK: Brand: CARDINAL HEALTH

## (undated) DEVICE — TUBING SUCTION 5MM X 12 FT

## (undated) DEVICE — DRAPE SURGIKIT SADDLE BAG

## (undated) DEVICE — GLOVE SRG BIOGEL 7

## (undated) DEVICE — GLOVE SRG BIOGEL 7.5

## (undated) DEVICE — ACE WRAP 6 IN UNSTERILE

## (undated) DEVICE — SUT MONOCRYL 4-0 PS-2 27 IN Y426H

## (undated) DEVICE — GAUZE SPONGES,16 PLY: Brand: CURITY

## (undated) DEVICE — PAD CAST 4 IN COTTON NON STERILE

## (undated) DEVICE — SPONGE PVP SCRUB WING STERILE

## (undated) DEVICE — ALL PURPOSE SPONGES,NON-WOVEN, 4 PLY: Brand: CURITY

## (undated) DEVICE — SUT VICRYL 3-0 SH 27 IN J416H

## (undated) DEVICE — ARTHROSCOPY FLOOR MAT

## (undated) DEVICE — ABDOMINAL PAD: Brand: DERMACEA

## (undated) DEVICE — SUT VICRYL 4-0 PS-2 27 IN J426H

## (undated) DEVICE — 3M™ TEGADERM™ TRANSPARENT FILM DRESSING FRAME STYLE, 1626W, 4 IN X 4-3/4 IN (10 CM X 12 CM), 50/CT 4CT/CASE: Brand: 3M™ TEGADERM™

## (undated) DEVICE — 3M™ TEGADERM™ TRANSPARENT FILM DRESSING FRAME STYLE, 1624W, 2-3/8 IN X 2-3/4 IN (6 CM X 7 CM), 100/CT 4CT/CASE: Brand: 3M™ TEGADERM™

## (undated) DEVICE — TAPE CAST 3IN FIBERGLASS 4YD WHITE